# Patient Record
Sex: FEMALE | Race: WHITE | NOT HISPANIC OR LATINO | Employment: FULL TIME | URBAN - METROPOLITAN AREA
[De-identification: names, ages, dates, MRNs, and addresses within clinical notes are randomized per-mention and may not be internally consistent; named-entity substitution may affect disease eponyms.]

---

## 2017-01-07 ENCOUNTER — OFFICE VISIT (OUTPATIENT)
Dept: URGENT CARE | Facility: URGENT CARE | Age: 47
End: 2017-01-07
Payer: COMMERCIAL

## 2017-01-07 VITALS
HEART RATE: 81 BPM | OXYGEN SATURATION: 97 % | RESPIRATION RATE: 16 BRPM | WEIGHT: 258 LBS | DIASTOLIC BLOOD PRESSURE: 72 MMHG | TEMPERATURE: 98.2 F | BODY MASS INDEX: 45.71 KG/M2 | SYSTOLIC BLOOD PRESSURE: 112 MMHG

## 2017-01-07 DIAGNOSIS — R42 LIGHTHEADEDNESS: ICD-10-CM

## 2017-01-07 DIAGNOSIS — J01.90 ACUTE SINUSITIS WITH SYMPTOMS > 10 DAYS: ICD-10-CM

## 2017-01-07 DIAGNOSIS — A08.4 VIRAL GASTROENTERITIS: Primary | ICD-10-CM

## 2017-01-07 PROCEDURE — 99213 OFFICE O/P EST LOW 20 MIN: CPT | Performed by: FAMILY MEDICINE

## 2017-01-07 RX ORDER — AZITHROMYCIN 250 MG/1
TABLET, FILM COATED ORAL
Qty: 6 TABLET | Refills: 0 | Status: SHIPPED | OUTPATIENT
Start: 2017-01-07 | End: 2017-07-31

## 2017-01-07 RX ORDER — FLUTICASONE PROPIONATE 50 MCG
2 SPRAY, SUSPENSION (ML) NASAL DAILY
Qty: 1 BOTTLE | Refills: 1 | Status: SHIPPED | OUTPATIENT
Start: 2017-01-07 | End: 2017-07-31

## 2017-01-07 NOTE — NURSING NOTE
"Hannah Meade is a 46 year old female.      Chief Complaint   Patient presents with     Urgent Care     Sick     pt states that she has not been feeling well for the last couple month - sinus congestion and fever off and on - over the last 4 days it has gotten worse and nausea - also having diarrhea       Initial /72 mmHg  Pulse 81  Temp(Src) 98.2  F (36.8  C) (Oral)  Resp 16  Wt 258 lb (117.028 kg)  SpO2 97% Estimated body mass index is 45.71 kg/(m^2) as calculated from the following:    Height as of 9/23/16: 5' 3\" (1.6 m).    Weight as of this encounter: 258 lb (117.028 kg).    BP completed using cuff size: X-large    Questioned patient about current smoking habits.  Pt. has never smoked.      Rosemary Mckeon CMA        "

## 2017-01-07 NOTE — MR AVS SNAPSHOT
After Visit Summary   1/7/2017    Hannah Meade    MRN: [id number]           Patient Information     Date Of Birth          1970        Visit Information        Provider Department      1/7/2017 1:00 PM Gómez Askew MD [facility]        Today's Diagnoses     Viral gastroenteritis    -  1     Acute sinusitis with symptoms > 10 days         Lightheadedness           Care Instructions    Saline nasal rinses    Steam, Humidifier    Drink plenty of liquids    follow up with the primary care provider if not better in 5-7 days.     Eat a gentle, bland, mushy diet for now.     Go to the emergency room if you feel like you're going to pass out or if there are large amounts of blood in the stools.              Follow-ups after your visit        Who to contact     If you have questions or need follow up information about today's clinic visit or your schedule please contact [facility] directly at [phone].  Normal or non-critical lab and imaging results will be communicated to you by Raynhart, letter or phone within 4 business days after the clinic has received the results. If you do not hear from us within 7 days, please contact the clinic through HomeStarst or phone. If you have a critical or abnormal lab result, we will notify you by phone as soon as possible.  Submit refill requests through Hand Talk or call your pharmacy and they will forward the refill request to us. Please allow 3 business days for your refill to be completed.          Additional Information About Your Visit        MyChart Information     Hand Talk gives you secure access to your electronic health record. If you see a primary care provider, you can also send messages to your care team and make appointments. If you have questions, please call your primary care clinic.  If you do not have a primary care provider, please call [phone] and they will assist you.        Care EveryWhere ID     This is your  Care EveryWhere ID. This could be used by other organizations to access your [place] medical records  [id number]        Your Vitals Were     Pulse Temperature Respirations Pulse Oximetry          81 98.2  F (36.8  C) (Oral) 16 97%         Blood Pressure from Last 3 Encounters:   01/07/17 112/72   12/22/16 139/86   11/02/16 144/90    Weight from Last 3 Encounters:   01/07/17 258 lb (117.028 kg)   09/23/16 258 lb (117.028 kg)   08/22/16 255 lb (115.667 kg)              Today, you had the following     No orders found for display         Today's Medication Changes          These changes are accurate as of: 1/7/17  1:31 PM.  If you have any questions, ask your nurse or doctor.               Start taking these medicines.        Dose/Directions    azithromycin 250 MG tablet   Commonly known as:  ZITHROMAX   Used for:  Acute sinusitis with symptoms > 10 days   Started by:  Gómez Askew MD        Two tablets first day, then one tablet daily for four days.   Quantity:  6 tablet   Refills:  0       fluticasone 50 MCG/ACT spray   Commonly known as:  FLONASE   Used for:  Acute sinusitis with symptoms > 10 days   Started by:  Gómez Askew MD        Dose:  2 spray   Spray 2 sprays into both nostrils daily   Quantity:  1 Bottle   Refills:  1            Where to get your medicines      These medications were sent to [place] [address] [place] [address] [address]     Phone:  [phone]    - azithromycin 250 MG tablet  - fluticasone 50 MCG/ACT spray             Primary Care Provider Office Phone # Fax #    Hollis River [phone]       [address] [address]        Thank you!     Thank you for choosing [facility]  for your care. Our goal is always to provide you with excellent care. Hearing back from our patients is one way we can continue to improve our services. Please take a  few minutes to complete the written survey that you may receive in the mail after your visit with us. Thank you!             Your Updated Medication List - Protect others around you: Learn how to safely use, store and throw away your medicines at www.disposemymeds.org.          This list is accurate as of: 1/7/17  1:31 PM.  Always use your most recent med list.                   Brand Name Dispense Instructions for use    azithromycin 250 MG tablet    ZITHROMAX    6 tablet    Two tablets first day, then one tablet daily for four days.       BENADRYL PO      Take 25 mg by mouth daily as needed       BUDESONIDE (INHALATION) 90 MCG/ACT Aepb     1 each    Inhale 2 puffs into the lungs 2 times daily       escitalopram 20 MG tablet    LEXAPRO    90 tablet    Take 1 tablet (20 mg) by mouth daily       fluticasone 50 MCG/ACT spray    FLONASE    1 Bottle    Spray 2 sprays into both nostrils daily       furosemide 20 MG tablet    LASIX    30 tablet    Take 1 tablet (20 mg) by mouth daily       HYDROcodone-acetaminophen 5-325 MG per tablet    NORCO    15 tablet    Take 1-2 tablets by mouth every 4 hours as needed for moderate to severe pain       ibuprofen 600 MG tablet    ADVIL/MOTRIN    40 tablet    Take 1 tablet (600 mg) by mouth 3 times daily       * ipratropium - albuterol 0.5 mg/2.5 mg/3 mL 0.5-2.5 (3) MG/3ML neb solution    DUONEB    30 vial    Take 1 vial (3 mLs) by nebulization every 6 hours as needed for shortness of breath / dyspnea or wheezing       * Ipratropium-Albuterol  MCG/ACT inhaler    COMBIVENT RESPIMAT    1 Inhaler    Inhale 1 puff into the lungs every 4 hours Not to exceed 6 doses per day.       order for DME     1 Device    Equipment being ordered: knee high compression stocking for left leg compression stocking with 20-30 mm of pressure       TYLENOL PO      Take 1,000 mg by mouth every 6 hours as needed for mild pain or fever       * Notice:  This list has 2 medication(s) that are the same as  other medications prescribed for you. Read the directions carefully, and ask your doctor or other care provider to review them with you.

## 2017-01-07 NOTE — PROGRESS NOTES
SUBJECTIVE:   Hannah Meade is a 46 year old female presenting with a chief complaint of feeling dizzy for the past couple days (feeling lightheaded off and on over the past couple days, nothing worsens this sensation.  The episodes last about five minutes. ), nausea (off and on and frequent.  No vomiting) for the past four days, diarrhea (watery discharge, frequent.  No blood in the stools.  ) for the past four days, abdominal cramping at the bilateral lower abdomen, sinus congestion (stuffy nose, pressure at the midline of the forehead) for the past two months, on and off fever (low-grade) over the past 4 days.  There has been thick yellowish-green nasal discharge. No new medications.       Course of illness is about the same. .      Current and Associated symptoms: as listed above.   Treatment measures tried include Benadryl .  Predisposing factors include grandchildren have been diarrhea recently. .    Past Medical History   Diagnosis Date     Nonspecific reaction to tuberculin skin test without active tuberculosis      Obesity, unspecified      Plantar fasciitis 2/23/2015     Anxiety      Current Outpatient Prescriptions   Medication Sig Dispense Refill     escitalopram (LEXAPRO) 20 MG tablet Take 1 tablet (20 mg) by mouth daily 90 tablet 0     furosemide (LASIX) 20 MG tablet Take 1 tablet (20 mg) by mouth daily 30 tablet 3     Ipratropium-Albuterol (COMBIVENT RESPIMAT)  MCG/ACT inhaler Inhale 1 puff into the lungs every 4 hours Not to exceed 6 doses per day. 1 Inhaler 11     HYDROcodone-acetaminophen (NORCO) 5-325 MG per tablet Take 1-2 tablets by mouth every 4 hours as needed for moderate to severe pain 15 tablet 0     order for DME Equipment being ordered: knee high compression stocking for left leg compression stocking with 20-30 mm of pressure 1 Device 0     ibuprofen (ADVIL,MOTRIN) 600 MG tablet Take 1 tablet (600 mg) by mouth 3 times daily 40 tablet 0     BUDESONIDE, INHALATION, 90 MCG/ACT AEPB  Inhale 2 puffs into the lungs 2 times daily 1 each 1     ipratropium - albuterol 0.5 mg/2.5 mg/3 mL (DUONEB) 0.5-2.5 (3) MG/3ML nebulization Take 1 vial (3 mLs) by nebulization every 6 hours as needed for shortness of breath / dyspnea or wheezing 30 vial 1     Acetaminophen (TYLENOL PO) Take 1,000 mg by mouth every 6 hours as needed for mild pain or fever       DiphenhydrAMINE HCl (BENADRYL PO) Take 25 mg by mouth daily as needed        Social History   Substance Use Topics     Smoking status: Never Smoker      Smokeless tobacco: Never Used     Alcohol Use: No       ROS:    OBJECTIVE  :/72 mmHg  Pulse 81  Temp(Src) 98.2  F (36.8  C) (Oral)  Resp 16  Wt 258 lb (117.028 kg)  SpO2 97%  GENERAL APPEARANCE: healthy, alert and no distress.  No acute respiratory distress.    EYES: Eyes grossly normal to inspection.  Eyes are moist.   HENT: TM's normal bilaterally, nasal turbinates erythematous, swollen and oral mucous membranes moist, no erythema noted.  Mouth is moist.   NECK: supple, nontender, no lymphadenopathy  RESP: lungs clear to auscultation - no rales, rhonchi or wheezes  CV: regular rates and rhythm, normal S1 S2, no murmur noted  ABDOMEN: soft, tenderness moderate RLQ and LLQ, hyperactive bowel sounds, No rebound/guarding.  No distension. Abdomen is obese.   SKIN: no suspicious lesions or rashes    ASSESSMENT:  Viral Gastroenteritis  Acute Sinusitis  Lightheadedness    PLAN:  Rx:  Flonase, Azithromycin  Soft, gentle diet for now.   See orders in Epic  Saline nasal rinses  Steam, Humidifier  Drink plenty of liquids  follow up with the primary care provider if not better in 5-7 days.     Gómez Askew MD

## 2017-01-07 NOTE — PATIENT INSTRUCTIONS
Saline nasal rinses    Steam, Humidifier    Drink plenty of liquids    follow up with the primary care provider if not better in 5-7 days.     Eat a gentle, bland, mushy diet for now.     Go to the emergency room if you feel like you're going to pass out or if there are large amounts of blood in the stools.

## 2017-01-30 DIAGNOSIS — F41.9 ANXIETY: Primary | ICD-10-CM

## 2017-01-30 NOTE — TELEPHONE ENCOUNTER
Escitalopram 20mg     Last Written Prescription Date: 11/2/2016  Last Fill Quantity: 90,11/02/2016 # refills: 0  Last Office Visit with [facility] primary care provider:  09/23/2016-Dr River        Last PHQ-9 score on record=   PHQ-9 SCORE 8/4/2016   Total Score -   Total Score 11

## 2017-02-01 RX ORDER — ESCITALOPRAM OXALATE 20 MG/1
20 TABLET ORAL DAILY
Qty: 90 TABLET | Refills: 1 | Status: SHIPPED | OUTPATIENT
Start: 2017-02-01 | End: 2017-07-26

## 2017-02-01 NOTE — TELEPHONE ENCOUNTER
Diagnosis anxiety, pt resumed med, see October note, Hollis River MD please advise refill, route if appointment due  Monique Qiu RN, BSN  Message handled by Nurse Triage.

## 2017-04-04 ENCOUNTER — OFFICE VISIT (OUTPATIENT)
Dept: FAMILY MEDICINE | Facility: CLINIC | Age: 47
End: 2017-04-04
Payer: COMMERCIAL

## 2017-04-04 ENCOUNTER — RADIANT APPOINTMENT (OUTPATIENT)
Dept: GENERAL RADIOLOGY | Facility: CLINIC | Age: 47
End: 2017-04-04
Attending: FAMILY MEDICINE
Payer: COMMERCIAL

## 2017-04-04 VITALS
SYSTOLIC BLOOD PRESSURE: 149 MMHG | BODY MASS INDEX: 46.07 KG/M2 | WEIGHT: 260 LBS | OXYGEN SATURATION: 96 % | HEART RATE: 97 BPM | HEIGHT: 63 IN | RESPIRATION RATE: 14 BRPM | TEMPERATURE: 98.1 F | DIASTOLIC BLOOD PRESSURE: 91 MMHG

## 2017-04-04 DIAGNOSIS — R10.11 ABDOMINAL PAIN, RIGHT UPPER QUADRANT: Primary | ICD-10-CM

## 2017-04-04 DIAGNOSIS — E78.01 FAMILIAL HYPERCHOLESTEROLEMIA: ICD-10-CM

## 2017-04-04 DIAGNOSIS — E66.01 MORBID OBESITY, UNSPECIFIED OBESITY TYPE (H): ICD-10-CM

## 2017-04-04 LAB
ALBUMIN UR-MCNC: NEGATIVE MG/DL
APPEARANCE UR: CLEAR
BASOPHILS # BLD AUTO: 0 10E9/L (ref 0–0.2)
BASOPHILS NFR BLD AUTO: 0.2 %
BILIRUB UR QL STRIP: NEGATIVE
COLOR UR AUTO: YELLOW
DIFFERENTIAL METHOD BLD: ABNORMAL
EOSINOPHIL # BLD AUTO: 0.4 10E9/L (ref 0–0.7)
EOSINOPHIL NFR BLD AUTO: 3 %
ERYTHROCYTE [DISTWIDTH] IN BLOOD BY AUTOMATED COUNT: 13.5 % (ref 10–15)
GLUCOSE UR STRIP-MCNC: NEGATIVE MG/DL
HCT VFR BLD AUTO: 40 % (ref 35–47)
HGB BLD-MCNC: 13 G/DL (ref 11.7–15.7)
HGB UR QL STRIP: NEGATIVE
KETONES UR STRIP-MCNC: NEGATIVE MG/DL
LEUKOCYTE ESTERASE UR QL STRIP: NEGATIVE
LYMPHOCYTES # BLD AUTO: 3.2 10E9/L (ref 0.8–5.3)
LYMPHOCYTES NFR BLD AUTO: 26.7 %
MCH RBC QN AUTO: 30 PG (ref 26.5–33)
MCHC RBC AUTO-ENTMCNC: 32.5 G/DL (ref 31.5–36.5)
MCV RBC AUTO: 92 FL (ref 78–100)
MONOCYTES # BLD AUTO: 0.9 10E9/L (ref 0–1.3)
MONOCYTES NFR BLD AUTO: 7.2 %
NEUTROPHILS # BLD AUTO: 7.6 10E9/L (ref 1.6–8.3)
NEUTROPHILS NFR BLD AUTO: 62.9 %
NITRATE UR QL: NEGATIVE
PH UR STRIP: 6 PH (ref 5–7)
PLATELET # BLD AUTO: 372 10E9/L (ref 150–450)
RBC # BLD AUTO: 4.34 10E12/L (ref 3.8–5.2)
SP GR UR STRIP: 1.02 (ref 1–1.03)
URN SPEC COLLECT METH UR: NORMAL
UROBILINOGEN UR STRIP-ACNC: 1 EU/DL (ref 0.2–1)
WBC # BLD AUTO: 12.1 10E9/L (ref 4–11)

## 2017-04-04 PROCEDURE — 71101 X-RAY EXAM UNILAT RIBS/CHEST: CPT | Mod: RT

## 2017-04-04 PROCEDURE — 81003 URINALYSIS AUTO W/O SCOPE: CPT | Performed by: FAMILY MEDICINE

## 2017-04-04 PROCEDURE — 99214 OFFICE O/P EST MOD 30 MIN: CPT | Performed by: FAMILY MEDICINE

## 2017-04-04 PROCEDURE — 36415 COLL VENOUS BLD VENIPUNCTURE: CPT | Performed by: FAMILY MEDICINE

## 2017-04-04 PROCEDURE — 85025 COMPLETE CBC W/AUTO DIFF WBC: CPT | Performed by: FAMILY MEDICINE

## 2017-04-04 NOTE — MR AVS SNAPSHOT
"              After Visit Summary   4/4/2017    Hannah Meade    MRN: [id number]           Patient Information     Date Of Birth          1970        Visit Information        Provider Department      4/4/2017 4:00 PM Hollis River MD [facility]        Today's Diagnoses     Abdominal pain, right upper quadrant    -  1    Familial hypercholesterolemia        Morbid obesity, unspecified obesity type (H)           Follow-ups after your visit        Who to contact     If you have questions or need follow up information about today's clinic visit or your schedule please contact [facility] directly at [phone].  Normal or non-critical lab and imaging results will be communicated to you by MyChart, letter or phone within 4 business days after the clinic has received the results. If you do not hear from us within 7 days, please contact the clinic through Audiomshart or phone. If you have a critical or abnormal lab result, we will notify you by phone as soon as possible.  Submit refill requests through Mobile365 (fka InphoMatch) or call your pharmacy and they will forward the refill request to us. Please allow 3 business days for your refill to be completed.          Additional Information About Your Visit        MyChart Information     Mobile365 (fka InphoMatch) gives you secure access to your electronic health record. If you see a primary care provider, you can also send messages to your care team and make appointments. If you have questions, please call your primary care clinic.  If you do not have a primary care provider, please call [phone] and they will assist you.        Care EveryWhere ID     This is your Care EveryWhere ID. This could be used by other organizations to access your [place] medical records  [id number]        Your Vitals Were     Pulse Temperature Respirations Height Pulse Oximetry BMI (Body Mass Index)    97 98.1  F (36.7  C) (Oral) 14 5' 3\" (1.6 m) 96% 46.06 kg/m2       Blood " Pressure from Last 3 Encounters:   04/04/17 (!) 149/91   01/07/17 112/72   12/22/16 139/86    Weight from Last 3 Encounters:   04/04/17 260 lb (117.9 kg)   01/07/17 258 lb (117 kg)   09/23/16 258 lb (117 kg)              We Performed the Following     *UA reflex to Microscopic and Culture ([place] and [facility] (except [place] and [place])     CBC with platelets and differential     Lipid panel reflex to direct LDL     XR Ribs & Chest Right G/E 3 Views        Primary Care Provider Office Phone # Fax #    Hollis John River [phone]       [facility] [id number] [place] [id number]        Thank you!     Thank you for choosing [facility]  for your care. Our goal is always to provide you with excellent care. Hearing back from our patients is one way we can continue to improve our services. Please take a few minutes to complete the written survey that you may receive in the mail after your visit with us. Thank you!             Your Updated Medication List - Protect others around you: Learn how to safely use, store and throw away your medicines at www.disposemymeds.org.          This list is accurate as of: 4/4/17 11:59 PM.  Always use your most recent med list.                   Brand Name Dispense Instructions for use    azithromycin 250 MG tablet    ZITHROMAX    6 tablet    Two tablets first day, then one tablet daily for four days.       BENADRYL PO      Take 25 mg by mouth daily as needed       BUDESONIDE (INHALATION) 90 MCG/ACT Aepb     1 each    Inhale 2 puffs into the lungs 2 times daily       escitalopram 20 MG tablet    LEXAPRO    90 tablet    Take 1 tablet (20 mg) by mouth daily       fluticasone 50 MCG/ACT spray    FLONASE    1 Bottle    Spray 2 sprays into both nostrils daily       furosemide 20 MG tablet    LASIX    30 tablet    Take 1 tablet (20 mg) by mouth daily       HYDROcodone-acetaminophen 5-325 MG per tablet    NORCO    15  tablet    Take 1-2 tablets by mouth every 4 hours as needed for moderate to severe pain       ibuprofen 600 MG tablet    ADVIL/MOTRIN    40 tablet    Take 1 tablet (600 mg) by mouth 3 times daily       * ipratropium - albuterol 0.5 mg/2.5 mg/3 mL 0.5-2.5 (3) MG/3ML neb solution    DUONEB    30 vial    Take 1 vial (3 mLs) by nebulization every 6 hours as needed for shortness of breath / dyspnea or wheezing       * Ipratropium-Albuterol  MCG/ACT inhaler    COMBIVENT RESPIMAT    1 Inhaler    Inhale 1 puff into the lungs every 4 hours Not to exceed 6 doses per day.       order for DME     1 Device    Equipment being ordered: knee high compression stocking for left leg compression stocking with 20-30 mm of pressure       TYLENOL PO      Take 1,000 mg by mouth every 6 hours as needed for mild pain or fever       * Notice:  This list has 2 medication(s) that are the same as other medications prescribed for you. Read the directions carefully, and ask your doctor or other care provider to review them with you.

## 2017-04-04 NOTE — PROGRESS NOTES
SUBJECTIVE:                                                    Hannah Meade is a 47 year old female who presents to clinic today for the following health issues:      Concern - breathing issues and pain in the right side      Onset: 1 week     Description:   Sharp pain when working around     Intensity: moderate, severe    Progression of Symptoms:  worsening    Accompanying Signs & Symptoms:  None        Previous history of similar problem:   None     Precipitating factors:   Worsened by: when lift things     Alleviating factors:  Improved by: goes away when not doing things        Therapies Tried and outcome: none    Past Medical History:   Diagnosis Date     Anxiety      Nonspecific reaction to tuberculin skin test without active tuberculosis      Obesity, unspecified      Plantar fasciitis 2/23/2015       Past Surgical History:   Procedure Laterality Date     C NONSPECIFIC PROCEDURE      2 C- SECTIONS     EXCISE MASS BACK Left 9/17/2014    Procedure: EXCISE MASS BACK;  Surgeon: Yaz Avilez MD;  Location: [facility] OR     EXCISE MASS UPPER EXTREMITY Left 9/17/2014    Procedure: EXCISE MASS UPPER EXTREMITY;  Surgeon: Yaz Avilez MD;  Location: [facility] OR     HYSTERECTOMY VAGINAL  1992    ovaries intact     ORTHOPEDIC SURGERY      left foot     other      lump on right arm removed      THORACOTOMY Right 8/25/2015    Procedure: THORACOTOMY;  Surgeon: Andrew Gordon MD;  Location:  OR       Family History   Problem Relation Age of Onset     Neurologic Disorder Mother      Parkinsons     DIABETES Mother      Hypertension Father      Cardiovascular Father      Gallbladder Disease Father      DIABETES Maternal Grandmother      C.A.D. Paternal Grandfather      Cardiovascular Paternal Grandfather      Hypertension Sister        Social History   Substance Use Topics     Smoking status: Never Smoker     Smokeless tobacco: Never Used     Alcohol use No        REVIEW OF SYSTEMS    Generally has  been mostly feeling well until this episode. No problems with vision, hearing, dental or neck pain.Has hph   airborne or ingestion allergy  No chest pain, palpitations, dyspnea, change in bowel habits, blood  in stool or dyspepsia.  No rashes, changing moles, weakness, lassitude or back problems.  No chronic issues . No dysuria  Patient not  a smoker. No problems with significant headaches.  On exam the vital signs are stable  Weight is Body mass index is 46.06 kg/(m^2).   Eyes show nery  No neck masses or thyromegaly.Ear nose and throat shows normal   No bruits, murmers, rubs or extrasounds. No cardiomegaly or chest wall tenderness. Lungs clear, no abdominal masses or organomegaly. No CVA tenderness.  Skin eval no rash   Good peripheral pulses. No adenopathy.  Normal gait and stance. Neck is supple.  Back exam shows lumbar dysfunction and decreased rom    (R10.11) Abdominal pain, right upper quadrant  (primary encounter diagnosis)  Comment:   Plan: *UA reflex to Microscopic and Culture ([place]         and [facility] (except [facility] and         [place]), CBC with platelets and differential,         XR Ribs & Chest Right G/E 3 Views       ua and cbc normal, no abdominal tenderness, right lower ribs tender??obesity related  Get GB study if not improving, food does minimally aggravate     (E78.01) Familial hypercholesterolemia  Comment:   Plan: Lipid panel reflex to direct LDL            (E66.01) BMI 46- obesity, unspecified obesity type (H)  Comment:   Plan: Lipid panel reflex to direct LDL

## 2017-04-04 NOTE — NURSING NOTE
"Chief Complaint   Patient presents with     Breathing Problem     pain in the right side of the ribs        Initial BP (!) 149/91 (BP Location: Right arm, Patient Position: Chair, Cuff Size: Adult Large)  Pulse 97  Temp 98.1  F (36.7  C) (Oral)  Resp 14  Ht 5' 3\" (1.6 m)  Wt 260 lb (117.9 kg)  SpO2 96%  BMI 46.06 kg/m2 Estimated body mass index is 46.06 kg/(m^2) as calculated from the following:    Height as of this encounter: 5' 3\" (1.6 m).    Weight as of this encounter: 260 lb (117.9 kg).  Medication Reconciliation: complete   "

## 2017-04-05 DIAGNOSIS — R60.0 LOCALIZED EDEMA: ICD-10-CM

## 2017-04-05 NOTE — TELEPHONE ENCOUNTER
FUROSEMIDE 20 MG TABLET        Last Written Prescription Date: 11-2-2016  Last Fill Quantity: 02-, #30 refills: 3  Last Office Visit with G, P or [facility] prescribing provider: 04- with Dr. River.       Potassium   Date Value Ref Range Status   09/23/2016 4.0 3.4 - 5.3 mmol/L Final     Creatinine   Date Value Ref Range Status   09/23/2016 0.77 0.52 - 1.04 mg/dL Final     BP Readings from Last 3 Encounters:   04/04/17 (!) 149/91   01/07/17 112/72   12/22/16 139/86

## 2017-04-06 RX ORDER — FUROSEMIDE 20 MG
20 TABLET ORAL DAILY
Qty: 30 TABLET | Refills: 3 | Status: SHIPPED | OUTPATIENT
Start: 2017-04-06 | End: 2017-08-05

## 2017-04-06 NOTE — TELEPHONE ENCOUNTER
Prescription approved per [facility] Refill Protocol  Breanne Ferguson RN BS

## 2017-04-11 ENCOUNTER — TELEPHONE (OUTPATIENT)
Dept: FAMILY MEDICINE | Facility: CLINIC | Age: 47
End: 2017-04-11

## 2017-04-11 NOTE — TELEPHONE ENCOUNTER
METRIC FAILED: Problem List  RECOMMENDATIONS: does not qualify for HLPD diagnosis, see Hollis River MD diagnosis, family hx only  Recent Labs   Lab Test  01/09/12   1837  05/19/09   1608   CHOL  216*  234*   HDL  56  61   LDL  110  129   TRIG  247*  216*   CHOLHDLRATIO  3.8  3.8     Lab Results   Component Value Date    AST 24 01/09/2012     Lab Results   Component Value Date    ALT 25 01/09/2012       Monique Qiu RN, BSN  Message handled by Nurse Triage.

## 2017-06-07 ENCOUNTER — HOSPITAL ENCOUNTER (EMERGENCY)
Facility: CLINIC | Age: 47
Discharge: HOME | End: 2017-06-07
Attending: EMERGENCY MEDICINE | Admitting: EMERGENCY MEDICINE
Payer: COMMERCIAL

## 2017-06-07 ENCOUNTER — APPOINTMENT (OUTPATIENT)
Dept: GENERAL RADIOLOGY | Facility: CLINIC | Age: 47
End: 2017-06-07
Attending: EMERGENCY MEDICINE
Payer: COMMERCIAL

## 2017-06-07 VITALS
SYSTOLIC BLOOD PRESSURE: 127 MMHG | HEIGHT: 63 IN | OXYGEN SATURATION: 96 % | BODY MASS INDEX: 44.3 KG/M2 | HEART RATE: 88 BPM | RESPIRATION RATE: 20 BRPM | DIASTOLIC BLOOD PRESSURE: 81 MMHG | WEIGHT: 250 LBS | TEMPERATURE: 97.2 F

## 2017-06-07 DIAGNOSIS — R07.9 CHEST PAIN, UNSPECIFIED TYPE: ICD-10-CM

## 2017-06-07 DIAGNOSIS — J20.9 ACUTE BRONCHITIS, UNSPECIFIED ORGANISM: ICD-10-CM

## 2017-06-07 LAB
ANION GAP SERPL CALCULATED.3IONS-SCNC: 5 MMOL/L (ref 3–14)
BUN SERPL-MCNC: 15 MG/DL (ref 7–30)
CALCIUM SERPL-MCNC: 9.3 MG/DL (ref 8.5–10.1)
CHLORIDE SERPL-SCNC: 104 MMOL/L (ref 94–109)
CO2 SERPL-SCNC: 29 MMOL/L (ref 20–32)
CREAT SERPL-MCNC: 0.72 MG/DL (ref 0.52–1.04)
D DIMER PPP FEU-MCNC: NORMAL UG/ML FEU (ref 0–0.5)
ERYTHROCYTE [DISTWIDTH] IN BLOOD BY AUTOMATED COUNT: 13.2 % (ref 10–15)
GFR SERPL CREATININE-BSD FRML MDRD: 86 ML/MIN/1.7M2
GLUCOSE SERPL-MCNC: 103 MG/DL (ref 70–99)
HCT VFR BLD AUTO: 40.2 % (ref 35–47)
HGB BLD-MCNC: 13.1 G/DL (ref 11.7–15.7)
MCH RBC QN AUTO: 29.5 PG (ref 26.5–33)
MCHC RBC AUTO-ENTMCNC: 32.6 G/DL (ref 31.5–36.5)
MCV RBC AUTO: 91 FL (ref 78–100)
PLATELET # BLD AUTO: 316 10E9/L (ref 150–450)
POTASSIUM SERPL-SCNC: 4.1 MMOL/L (ref 3.4–5.3)
RBC # BLD AUTO: 4.44 10E12/L (ref 3.8–5.2)
SODIUM SERPL-SCNC: 138 MMOL/L (ref 133–144)
TROPONIN I SERPL-MCNC: NORMAL UG/L (ref 0–0.04)
WBC # BLD AUTO: 12.5 10E9/L (ref 4–11)

## 2017-06-07 PROCEDURE — 71020 XR CHEST 2 VW: CPT

## 2017-06-07 PROCEDURE — 84484 ASSAY OF TROPONIN QUANT: CPT | Performed by: EMERGENCY MEDICINE

## 2017-06-07 PROCEDURE — 85379 FIBRIN DEGRADATION QUANT: CPT | Performed by: EMERGENCY MEDICINE

## 2017-06-07 PROCEDURE — 93005 ELECTROCARDIOGRAM TRACING: CPT

## 2017-06-07 PROCEDURE — 80048 BASIC METABOLIC PNL TOTAL CA: CPT | Performed by: EMERGENCY MEDICINE

## 2017-06-07 PROCEDURE — 99285 EMERGENCY DEPT VISIT HI MDM: CPT | Mod: 25

## 2017-06-07 PROCEDURE — 85027 COMPLETE CBC AUTOMATED: CPT | Performed by: EMERGENCY MEDICINE

## 2017-06-07 RX ORDER — BENZONATATE 200 MG/1
200 CAPSULE ORAL 3 TIMES DAILY PRN
Qty: 21 CAPSULE | Refills: 0 | Status: SHIPPED | OUTPATIENT
Start: 2017-06-07 | End: 2017-07-31

## 2017-06-07 ASSESSMENT — ENCOUNTER SYMPTOMS
PALPITATIONS: 0
VOMITING: 0
FEVER: 0

## 2017-06-07 NOTE — ED NOTES
ABCs intact. Pt c/o chest congestion since Saturday. Pt states congestion is better, but chest is sore and SOB with exertion. Pt has not been able to sleep laying down. Pt declines EKG.

## 2017-06-07 NOTE — DISCHARGE INSTRUCTIONS
Discharge Instructions  Bronchitis, Pneumonia, Bronchospasm    You were seen today for a chest infection or inflammation. If your doctor decided this was due to a bacterial infection, you may need an antibiotic. Sometimes these are caused by a virus, and then an antibiotic will not help.     Return to the Emergency Department if:    Your breathing gets much worse.    You are very weak, or feel much more ill.    You develop new symptoms, such as chest pain.    You cough up blood.    You are vomiting enough that you can t keep fluids or your medicine down.    What can I do to help myself?    Fill any prescriptions the doctor gave you and take them right away--especially antibiotics. Be sure to finish the whole antibiotic prescription.    You may be given a prescription for an inhaler, which can help loosen tight air passages.  Use this as needed, but not more often than directed. Inhalers work much better when used with a spacer.     You may be given a prescription for a steroid to reduce inflammation. Used long-term, these can have many serious side effects, but for short courses these do not happen. You may notice restlessness or increased appetite.        You may use non-prescription cough or cold medicines. Cough medicines may help, but don t make the cough go away completely.     Avoid smoke, because this can make your symptoms worse. If you smoke, this may be a good time to quit! Consider using nicotine lozenges, gum, or patches to reduce cravings.     If you have a fever, Tylenol  (acetaminophen), Motrin  (ibuprofen), or Advil  (ibuprofen) may help bring fever down and may help you feel more comfortable. Be sure to read and follow the package directions, and ask your doctor if you have questions.    Be sure to get your flu shot each year.  The pneumonia shot can help prevent pneumonia.  Probiotics: If you have been given an antibiotic, you may want to also take a probiotic pill or eat yogurt with live cultures.  "Probiotics have \"good bacteria\" to help your intestines stay healthy. Studies have shown that probiotics help prevent diarrhea and other intestine problems (including C. diff infection) when you take antibiotics. You can buy these without a prescription in the pharmacy section of the store.     If your doctor has told you to follow-up at your clinic, be sure to call right away and go to your appointment.  If there is any problem with keeping your appointment, call your doctor or return to the Emergency Department.    If you were given a prescription for medicine here today, be sure to read all of the information (including the package insert) that comes with your prescription.  This will include important information about the medicine, its side effects, and any warnings that you need to know about.  The pharmacist who fills the prescription can provide more information and answer questions you may have about the medicine.  If you have questions or concerns that the pharmacist cannot address, please call or return to the Emergency Department.     Discharge Instructions  Chest Pain    You have been seen today for chest pain or discomfort.  At this time, your doctor has found no signs that your chest pain is due to a serious or life-threatening condition, (or you have declined more testing and/or admission to the hospital). However, sometimes there is a serious problem that does not show up right away. Your evaluation today may not be complete and you may need further testing and evaluation.     You need to follow-up with your regular doctor within 3 days.    Return to the Emergency Department if:    Your chest pain changes, gets worse, starts to happen more often, or comes with less activity.    You are short of breath.    You get very weak or tired.    You pass out or faint.    You have any new symptoms, like fever, cough, numb legs, or you cough up blood.    You have anything else that worries you.    Until you " follow-up with your regular doctor please do the following:    Take one aspirin daily unless you have an allergy or are told not to by your doctor.    If a stress test appointment has been made, go to the appointment.    If you have questions, contact your regular doctor.    If your doctor today has told you to follow-up with your regular doctor, it is very important that you make an appointment with your clinic and go to the appointment.  If you do not follow-up with your primary doctor, it may result in missing an important development which could result in permanent injury or disability and/or lasting pain.  If there is any problem keeping your appointment, call your doctor or return to the Emergency Department.    If you were given a prescription for medicine here today, be sure to read all of the information (including the package insert) that comes with your prescription.  This will include important information about the medicine, its side effects, and any warnings that you need to know about.  The pharmacist who fills the prescription can provide more information and answer questions you may have about the medicine.  If you have questions or concerns that the pharmacist cannot address, please call or return to the Emergency Department.

## 2017-06-07 NOTE — ED AVS SNAPSHOT
[facility] Emergency Department    [address]    [facility] [address]    Phone:  [phone]    Fax:  [phone]                                       Hannah Meade   MRN: [id number]    Department:  [facility] Emergency Department   Date of Visit:  6/7/2017           Patient Information     Date Of Birth          1970        Your diagnoses for this visit were:     Acute bronchitis, unspecified organism     Chest pain, unspecified type        You were seen by Gavin Medina MD.      Follow-up Information     Follow up with Hollis River MD. Schedule an appointment as soon as possible for a visit in 2 days.    Specialty:  Family Practice    Contact information:    [address] [id number] [phone]          Discharge Instructions       Discharge Instructions  Bronchitis, Pneumonia, Bronchospasm    You were seen today for a chest infection or inflammation. If your doctor decided this was due to a bacterial infection, you may need an antibiotic. Sometimes these are caused by a virus, and then an antibiotic will not help.     Return to the Emergency Department if:    Your breathing gets much worse.    You are very weak, or feel much more ill.    You develop new symptoms, such as chest pain.    You cough up blood.    You are vomiting enough that you can t keep fluids or your medicine down.    What can I do to help myself?    Fill any prescriptions the doctor gave you and take them right away--especially antibiotics. Be sure to finish the whole antibiotic prescription.    You may be given a prescription for an inhaler, which can help loosen tight air passages.  Use this as needed, but not more often than directed. Inhalers work much better when used with a spacer.     You may be given a prescription for a steroid to reduce inflammation. Used long-term, these can have many serious side effects, but for short  "courses these do not happen. You may notice restlessness or increased appetite.        You may use non-prescription cough or cold medicines. Cough medicines may help, but don t make the cough go away completely.     Avoid smoke, because this can make your symptoms worse. If you smoke, this may be a good time to quit! Consider using nicotine lozenges, gum, or patches to reduce cravings.     If you have a fever, Tylenol  (acetaminophen), Motrin  (ibuprofen), or Advil  (ibuprofen) may help bring fever down and may help you feel more comfortable. Be sure to read and follow the package directions, and ask your doctor if you have questions.    Be sure to get your flu shot each year.  The pneumonia shot can help prevent pneumonia.  Probiotics: If you have been given an antibiotic, you may want to also take a probiotic pill or eat yogurt with live cultures. Probiotics have \"good bacteria\" to help your intestines stay healthy. Studies have shown that probiotics help prevent diarrhea and other intestine problems (including C. diff infection) when you take antibiotics. You can buy these without a prescription in the pharmacy section of the store.     If your doctor has told you to follow-up at your clinic, be sure to call right away and go to your appointment.  If there is any problem with keeping your appointment, call your doctor or return to the Emergency Department.    If you were given a prescription for medicine here today, be sure to read all of the information (including the package insert) that comes with your prescription.  This will include important information about the medicine, its side effects, and any warnings that you need to know about.  The pharmacist who fills the prescription can provide more information and answer questions you may have about the medicine.  If you have questions or concerns that the pharmacist cannot address, please call or return to the Emergency Department.     Discharge " Instructions  Chest Pain    You have been seen today for chest pain or discomfort.  At this time, your doctor has found no signs that your chest pain is due to a serious or life-threatening condition, (or you have declined more testing and/or admission to the hospital). However, sometimes there is a serious problem that does not show up right away. Your evaluation today may not be complete and you may need further testing and evaluation.     You need to follow-up with your regular doctor within 3 days.    Return to the Emergency Department if:    Your chest pain changes, gets worse, starts to happen more often, or comes with less activity.    You are short of breath.    You get very weak or tired.    You pass out or faint.    You have any new symptoms, like fever, cough, numb legs, or you cough up blood.    You have anything else that worries you.    Until you follow-up with your regular doctor please do the following:    Take one aspirin daily unless you have an allergy or are told not to by your doctor.    If a stress test appointment has been made, go to the appointment.    If you have questions, contact your regular doctor.    If your doctor today has told you to follow-up with your regular doctor, it is very important that you make an appointment with your clinic and go to the appointment.  If you do not follow-up with your primary doctor, it may result in missing an important development which could result in permanent injury or disability and/or lasting pain.  If there is any problem keeping your appointment, call your doctor or return to the Emergency Department.    If you were given a prescription for medicine here today, be sure to read all of the information (including the package insert) that comes with your prescription.  This will include important information about the medicine, its side effects, and any warnings that you need to know about.  The pharmacist who fills the prescription can provide more  information and answer questions you may have about the medicine.  If you have questions or concerns that the pharmacist cannot address, please call or return to the Emergency Department.         24 Hour Appointment Hotline       To make an appointment at any [facility], call [phone] ([phone]). If you don't have a family doctor or clinic, we will help you find one. [place] clinics are conveniently located to serve the needs of you and your family.             Review of your medicines      START taking        Dose / Directions Last dose taken    benzonatate 200 MG capsule   Commonly known as:  TESSALON   Dose:  200 mg   Quantity:  21 capsule        Take 1 capsule (200 mg) by mouth 3 times daily as needed for cough   Refills:  0          Our records show that you are taking the medicines listed below. If these are incorrect, please call your family doctor or clinic.        Dose / Directions Last dose taken    azithromycin 250 MG tablet   Commonly known as:  ZITHROMAX   Quantity:  6 tablet        Two tablets first day, then one tablet daily for four days.   Refills:  0        BENADRYL PO   Dose:  25 mg        Take 25 mg by mouth daily as needed   Refills:  0        BUDESONIDE (INHALATION) 90 MCG/ACT Aepb   Dose:  2 puff   Quantity:  1 each        Inhale 2 puffs into the lungs 2 times daily   Refills:  1        escitalopram 20 MG tablet   Commonly known as:  LEXAPRO   Dose:  20 mg   Quantity:  90 tablet        Take 1 tablet (20 mg) by mouth daily   Refills:  1        fluticasone 50 MCG/ACT spray   Commonly known as:  FLONASE   Dose:  2 spray   Quantity:  1 Bottle        Spray 2 sprays into both nostrils daily   Refills:  1        furosemide 20 MG tablet   Commonly known as:  LASIX   Dose:  20 mg   Quantity:  30 tablet        Take 1 tablet (20 mg) by mouth daily   Refills:  3        HYDROcodone-acetaminophen 5-325 MG per tablet   Commonly known as:  NORCO   Dose:  1-2 tablet   Quantity:  15 tablet         Take 1-2 tablets by mouth every 4 hours as needed for moderate to severe pain   Refills:  0        ibuprofen 600 MG tablet   Commonly known as:  ADVIL/MOTRIN   Dose:  600 mg   Quantity:  40 tablet        Take 1 tablet (600 mg) by mouth 3 times daily   Refills:  0        * ipratropium - albuterol 0.5 mg/2.5 mg/3 mL 0.5-2.5 (3) MG/3ML neb solution   Commonly known as:  DUONEB   Dose:  1 vial   Quantity:  30 vial        Take 1 vial (3 mLs) by nebulization every 6 hours as needed for shortness of breath / dyspnea or wheezing   Refills:  1        * Ipratropium-Albuterol  MCG/ACT inhaler   Commonly known as:  COMBIVENT RESPIMAT   Dose:  1 puff   Quantity:  1 Inhaler        Inhale 1 puff into the lungs every 4 hours Not to exceed 6 doses per day.   Refills:  11        order for DME   Quantity:  1 Device        Equipment being ordered: knee high compression stocking for left leg compression stocking with 20-30 mm of pressure   Refills:  0        TYLENOL PO   Dose:  1000 mg        Take 1,000 mg by mouth every 6 hours as needed for mild pain or fever   Refills:  0        * Notice:  This list has 2 medication(s) that are the same as other medications prescribed for you. Read the directions carefully, and ask your doctor or other care provider to review them with you.            Prescriptions were sent or printed at these locations (1 Prescription)                   Other Prescriptions                Printed at Department/Unit printer (1 of 1)         benzonatate (TESSALON) 200 MG capsule                Procedures and tests performed during your visit     Basic metabolic panel (BMP)    CBC (platelets, no diff)    Chest XR,  PA & LAT    D dimer quantitative    EKG 12 lead    Troponin I (now)      Orders Needing Specimen Collection     None      Pending Results     No orders found from 6/5/2017 to 6/8/2017.            Pending Culture Results     No orders found from 6/5/2017 to 6/8/2017.            Pending Results  Instructions     If you had any lab results that were not finalized at the time of your Discharge, you can call the ED Lab Result RN at [phone]. You will be contacted by this team for any positive Lab results or changes in treatment. The nurses are available 7 days a week from 10A to 6:30P.  You can leave a message 24 hours per day and they will return your call.        Test Results From Your Hospital Stay        6/7/2017  4:39 PM      Narrative     CHEST TWO VIEWS 6/7/2017 4:25 PM     HISTORY: Cough    COMPARISON: 4/4/2017    FINDINGS: There are no acute infiltrates. The cardiac silhouette is  not enlarged. Pulmonary vasculature is unremarkable.        Impression     IMPRESSION: No acute disease.    JOYCE CARMONA MD         6/7/2017  5:41 PM      Component Results     Component Value Ref Range & Units Status    WBC 12.5 (H) 4.0 - 11.0 10e9/L Final    RBC Count 4.44 3.8 - 5.2 10e12/L Final    Hemoglobin 13.1 11.7 - 15.7 g/dL Final    Hematocrit 40.2 35.0 - 47.0 % Final    MCV 91 78 - 100 fl Final    MCH 29.5 26.5 - 33.0 pg Final    MCHC 32.6 31.5 - 36.5 g/dL Final    RDW 13.2 10.0 - 15.0 % Final    Platelet Count 316 150 - 450 10e9/L Final         6/7/2017  5:57 PM      Component Results     Component Value Ref Range & Units Status    Sodium 138 133 - 144 mmol/L Final    Potassium 4.1 3.4 - 5.3 mmol/L Final    Chloride 104 94 - 109 mmol/L Final    Carbon Dioxide 29 20 - 32 mmol/L Final    Anion Gap 5 3 - 14 mmol/L Final    Glucose 103 (H) 70 - 99 mg/dL Final    Urea Nitrogen 15 7 - 30 mg/dL Final    Creatinine 0.72 0.52 - 1.04 mg/dL Final    GFR Estimate 86 >60 mL/min/1.7m2 Final    Non  GFR Calc    GFR Estimate If Black >90   GFR Calc   >60 mL/min/1.7m2 Final    Calcium 9.3 8.5 - 10.1 mg/dL Final         6/7/2017  5:50 PM      Component Results     Component Value Ref Range & Units Status    D Dimer  0.0 - 0.50 ug/ml FEU Final    <0.3  This D-dimer assay is intended for use in  conjuntion with a clinical pretest   probability assessment model to exclude pulmonary embolism (PE) and as an aid   in the diagnosis of deep venous thrombosis (DVT) in outpatients suspected of PE   or DVT. The cut-off value is 0.5 g/mL FEU.           6/7/2017  5:57 PM      Component Results     Component Value Ref Range & Units Status    Troponin I ES  0.000 - 0.045 ug/L Final    <0.015  The 99th percentile for upper reference range is 0.045 ug/L.  Troponin values in   the range of 0.045 - 0.120 ug/L may be associated with risks of adverse   clinical events.                  Clinical Quality Measure: Blood Pressure Screening     Your blood pressure was checked while you were in the emergency department today. The last reading we obtained was  BP: 127/90 . Please read the guidelines below about what these numbers mean and what you should do about them.  If your systolic blood pressure (the top number) is less than 120 and your diastolic blood pressure (the bottom number) is less than 80, then your blood pressure is normal. There is nothing more that you need to do about it.  If your systolic blood pressure (the top number) is 120-139 or your diastolic blood pressure (the bottom number) is 80-89, your blood pressure may be higher than it should be. You should have your blood pressure rechecked within a year by a primary care provider.  If your systolic blood pressure (the top number) is 140 or greater or your diastolic blood pressure (the bottom number) is 90 or greater, you may have high blood pressure. High blood pressure is treatable, but if left untreated over time it can put you at risk for heart attack, stroke, or kidney failure. You should have your blood pressure rechecked by a primary care provider within the next 4 weeks.  If your provider in the emergency department today gave you specific instructions to follow-up with your doctor or provider even sooner than that, you should follow that instruction and  not wait for up to 4 weeks for your follow-up visit.        Thank you for choosing [facility]       Thank you for choosing [facility] for your care. Our goal is always to provide you with excellent care. Hearing back from our patients is one way we can continue to improve our services. Please take a few minutes to complete the written survey that you may receive in the mail after you visit with us. Thank you!        [facility] Information     [facility] gives you secure access to your electronic health record. If you see a primary care provider, you can also send messages to your care team and make appointments. If you have questions, please call your primary care clinic.  If you do not have a primary care provider, please call [phone] and they will assist you.        Care EveryWhere ID     This is your Care EveryWhere ID. This could be used by other organizations to access your [facility] medical records  [id number]        After Visit Summary       This is your record. Keep this with you and show to your community pharmacist(s) and doctor(s) at your next visit.

## 2017-06-07 NOTE — ED AVS SNAPSHOT
[facility] Emergency Department    [address]    [facility] [address]    Phone:  [phone]    Fax:  [phone]                                       Hannah Meade   MRN: [id number]    Department:  [facility] Emergency Department   Date of Visit:  6/7/2017           After Visit Summary Signature Page     I have received my discharge instructions, and my questions have been answered. I have discussed any challenges I see with this plan with the nurse or doctor.    ..........................................................................................................................................  Patient/Patient Representative Signature      ..........................................................................................................................................  Patient Representative Print Name and Relationship to Patient    ..................................................               ................................................  Date                                            Time    ..........................................................................................................................................  Reviewed by Signature/Title    ...................................................              ..............................................  Date                                                            Time

## 2017-06-08 LAB — INTERPRETATION ECG - MUSE: NORMAL

## 2017-06-08 NOTE — ED PROVIDER NOTES
History     Chief Complaint:    Dyspnea     HPI   Hannah Meade is a 47 year old female who presents with a five-day history of cough and dyspnea.  Patient states that a proximal knee 5 days ago she began with a intermittent productive cough this was shortly followed by dyspnea which occurs with exertion or persistent coughing.  Her dyspnea is also worsened by lying flat.  She denies any new leg swelling, unilateral leg edema, hemoptysis, estrogen use or history of DVT PE.  She did have a recent 4 hour car trip to [place] within the last 1 week.  She then also developed a dull central chest pain and has been present for approximately 3 days.  The pain typically lasts 20-30 minutes with each episode.  There are no alleviating factors to her chest pain.  She has no other complaints or concerns .    Allergies:  Seasonal Allergies  Ampicillin  Codeine  Decongestant [Oxymetazoline]  No Clinical Screening - See Comments  Sulfa Drugs     Medications:      Acetaminophen (TYLENOL PO)    --  --  Dummy, Bfp User        Take 1,000 mg by mouth every 6 hours as needed for mild pain or fever      azithromycin (ZITHROMAX) 250 MG tablet    01/07/17  --  Gómez Askew MD      Two tablets first day, then one tablet daily for four days.      BUDESONIDE, INHALATION, 90 MCG/ACT AEPB    02/25/16  --  Sandra Fink DO      Inhale 2 puffs into the lungs 2 times daily      DiphenhydrAMINE HCl (BENADRYL PO)    --  --  Reported, Patient      Take 25 mg by mouth daily as needed       escitalopram (LEXAPRO) 20 MG tablet    02/01/17  --  Hollis River MD      Take 1 tablet (20 mg) by mouth daily      fluticasone (FLONASE) 50 MCG/ACT spray    01/07/17  --  Gómez Askew MD      Spray 2 sprays into both nostrils daily      furosemide (LASIX) 20 MG tablet    04/06/17  --  Hollis River MD      Take 1 tablet (20 mg) by mouth daily      HYDROcodone-acetaminophen (NORCO) 5-325 MG per tablet    08/14/16  --  Yesi Walsh  MD Vikki      Take 1-2 tablets by mouth every 4 hours as needed for moderate to severe pain      ibuprofen (ADVIL,MOTRIN) 600 MG tablet    05/15/16  --  Aliyah Mora PA-C      Take 1 tablet (600 mg) by mouth 3 times daily      ipratropium - albuterol 0.5 mg/2.5 mg/3 mL (DUONEB) 0.5-2.5 (3) MG/3ML nebulization    02/25/16  --  Sandra Fink DO      Take 1 vial (3 mLs) by nebulization every 6 hours as needed for shortness of breath / dyspnea or wheezing      Ipratropium-Albuterol (COMBIVENT RESPIMAT)  MCG/ACT inhaler    08/17/16  --  Sandra Fink DO       Past Medical History:    Past Medical History:   Diagnosis Date     Anxiety      Nonspecific reaction to tuberculin skin test without active tuberculosis      Obesity, unspecified      Plantar fasciitis 2/23/2015        Past Surgical History:    Past Surgical History:   Procedure Laterality Date     C NONSPECIFIC PROCEDURE      2 C- SECTIONS     EXCISE MASS BACK Left 9/17/2014    Procedure: EXCISE MASS BACK;  Surgeon: Yaz Avilez MD;  Location: [facility] OR     EXCISE MASS UPPER EXTREMITY Left 9/17/2014    Procedure: EXCISE MASS UPPER EXTREMITY;  Surgeon: Yaz Avilez MD;  Location: [facility] OR     HYSTERECTOMY VAGINAL  1992    ovaries intact     ORTHOPEDIC SURGERY      left foot     other      lump on right arm removed      THORACOTOMY Right 8/25/2015    Procedure: THORACOTOMY;  Surgeon: Andrew Gordon MD;  Location:  OR        Family History:    family history includes C.A.D. in her paternal grandfather; Cardiovascular in her father and paternal grandfather; DIABETES in her maternal grandmother and mother; Gallbladder Disease in her father; Hypertension in her father and sister; Neurologic Disorder in her mother.    Social History:   reports that she has never smoked. She has never used smokeless tobacco. She reports that she does not drink alcohol or use illicit drugs.    PCP: Hollis River     Review of  "Systems   Constitutional: Negative for fever.   HENT: Negative for ear pain.    Cardiovascular: Negative for palpitations.   Gastrointestinal: Negative for vomiting.   All other systems reviewed and are negative.           Physical Exam   Patient Vitals for the past 24 hrs:   BP Temp Temp src Pulse Heart Rate Resp SpO2 Height Weight   06/07/17 1815 127/81 - - - 74 - 96 % - -   06/07/17 1800 130/83 - - - 77 - 93 % - -   06/07/17 1745 127/77 - - - 76 - 94 % - -   06/07/17 1730 132/71 - - - 76 - 95 % - -   06/07/17 1715 128/86 - - - - - - - -   06/07/17 1555 127/90 97.2  F (36.2  C) Temporal 88 - 20 96 % 1.6 m (5' 3\") 113.4 kg (250 lb)        Physical Exam     General:   Pleasant, age appropriate.  HEENT:    Oropharynx is moist, without lesions or trismus.     No tonsillar erythema or exudate.     No unilateral edema.  Eyes:    Conjunctiva normal  Neck:    Supple, no meningismus.     CV:     Regular rate and rhythm.      No murmurs, rubs or gallops.       No JVD or unilateral leg swelling.       2+ radial pulses bilateral.       No lower extremity edema.  PULM:    Clear to auscultation bilateral.       No respiratory distress.      Good air exchange.     No rales or wheezing.     No stridor.  ABD:    Soft, non-tender, non-distended.       No pulsatile masses.       No rebound, guarding or rigidity.  MSK:     No gross deformity to all four extremities.   LYMPH:   No cervical lymphadenopathy.  NEURO:   Alert. Good muscle tone, no atrophy.  Skin:    Warm, dry and intact.    Psych:    Mood is good and affect is appropriate.        Emergency Department Course     ECG:  Rate 81 bpm.    Interpreted at 1726 per Dr. Medina    Imaging:    Chest XR,  PA & LAT   Final Result   IMPRESSION: No acute disease.      JOYCE CARMONA MD           Laboratory:    Results for orders placed or performed during the hospital encounter of 06/07/17 (from the past 24 hour(s))   Chest XR,  PA & LAT    Narrative    CHEST TWO VIEWS 6/7/2017 4:25 PM "     HISTORY: Cough    COMPARISON: 4/4/2017    FINDINGS: There are no acute infiltrates. The cardiac silhouette is  not enlarged. Pulmonary vasculature is unremarkable.      Impression    IMPRESSION: No acute disease.    JOYCE CARMONA MD   EKG 12 lead   Result Value Ref Range    Interpretation ECG Click View Image link to view waveform and result    CBC (platelets, no diff)   Result Value Ref Range    WBC 12.5 (H) 4.0 - 11.0 10e9/L    RBC Count 4.44 3.8 - 5.2 10e12/L    Hemoglobin 13.1 11.7 - 15.7 g/dL    Hematocrit 40.2 35.0 - 47.0 %    MCV 91 78 - 100 fl    MCH 29.5 26.5 - 33.0 pg    MCHC 32.6 31.5 - 36.5 g/dL    RDW 13.2 10.0 - 15.0 %    Platelet Count 316 150 - 450 10e9/L   Basic metabolic panel (BMP)   Result Value Ref Range    Sodium 138 133 - 144 mmol/L    Potassium 4.1 3.4 - 5.3 mmol/L    Chloride 104 94 - 109 mmol/L    Carbon Dioxide 29 20 - 32 mmol/L    Anion Gap 5 3 - 14 mmol/L    Glucose 103 (H) 70 - 99 mg/dL    Urea Nitrogen 15 7 - 30 mg/dL    Creatinine 0.72 0.52 - 1.04 mg/dL    GFR Estimate 86 >60 mL/min/1.7m2    GFR Estimate If Black >90   GFR Calc   >60 mL/min/1.7m2    Calcium 9.3 8.5 - 10.1 mg/dL   D dimer quantitative   Result Value Ref Range    D Dimer  0.0 - 0.50 ug/ml FEU     <0.3  This D-dimer assay is intended for use in conjuntion with a clinical pretest   probability assessment model to exclude pulmonary embolism (PE) and as an aid   in the diagnosis of deep venous thrombosis (DVT) in outpatients suspected of PE   or DVT. The cut-off value is 0.5 g/mL FEU.     Troponin I (now)   Result Value Ref Range    Troponin I ES  0.000 - 0.045 ug/L     <0.015  The 99th percentile for upper reference range is 0.045 ug/L.  Troponin values in   the range of 0.045 - 0.120 ug/L may be associated with risks of adverse   clinical events.       Emergency Department Course:  Past medical records, nursing notes, and vitals reviewed.  I performed an exam of the patient and obtained history, as  documented above.    I rechecked the patient. Findings and plan explained to the Patient and . Patient was discharge home.    Impression & Plan      Medical Decision Makin-year-old female visiting from cough difficulty breathing and atypical chest pain.  She has no features concerning for ACS.  EKG is without ischemic changes.  Troponin normal limits despite greater than 48 hours of symptoms.  I do not feel that she requires provocative testing in the short-term.  Her only risk factor for pulmonary embolus was recent immobilization thus d-dimer was sent.  D-dimer was negative thus no indication for CT scan of the chest.  Findings are  most suggestive of bronchitis with possible bronchitis-induced pleurisy or costochondritis.  Patient safely discharged home with supportive treatment      Diagnosis:    ICD-10-CM    1. Acute bronchitis, unspecified organism J20.9    2. Chest pain, unspecified type R07.9         Discharge Medications:  Discharge Medication List as of 2017  6:16 PM      START taking these medications    Details   benzonatate (TESSALON) 200 MG capsule Take 1 capsule (200 mg) by mouth 3 times daily as needed for cough, Disp-21 capsule, R-0, Local Print              2017   No att. providers found        Gavin Medina MD  [phone]

## 2017-07-26 DIAGNOSIS — F41.9 ANXIETY: ICD-10-CM

## 2017-07-26 NOTE — TELEPHONE ENCOUNTER
ESCITALOPRAM 20 MG TABLET     Last Written Prescription Date: 02-  Last Fill Quantity: 04-, #90 refills: 1  Last Office Visit with [facility] primary care provider:  04- Dr. River        Last PHQ-9 score on record=   PHQ-9 SCORE 8/4/2016   Total Score -   Total Score 11

## 2017-07-29 NOTE — TELEPHONE ENCOUNTER
1/22/16 last visit that mentions anxiety.  Should have enough medications til 8/1/17.  Sending to provider.  Please advise on plan.  Ban Gomez RN

## 2017-07-30 RX ORDER — ESCITALOPRAM OXALATE 20 MG/1
TABLET ORAL
Qty: 90 TABLET | Refills: 1 | Status: SHIPPED | OUTPATIENT
Start: 2017-07-30 | End: 2017-12-08

## 2017-07-31 ENCOUNTER — OFFICE VISIT (OUTPATIENT)
Dept: FAMILY MEDICINE | Facility: CLINIC | Age: 47
End: 2017-07-31
Payer: COMMERCIAL

## 2017-07-31 VITALS
HEART RATE: 85 BPM | WEIGHT: 252 LBS | BODY MASS INDEX: 44.65 KG/M2 | RESPIRATION RATE: 14 BRPM | SYSTOLIC BLOOD PRESSURE: 126 MMHG | HEIGHT: 63 IN | DIASTOLIC BLOOD PRESSURE: 90 MMHG | TEMPERATURE: 98 F | OXYGEN SATURATION: 96 %

## 2017-07-31 DIAGNOSIS — R10.31 RLQ ABDOMINAL PAIN: Primary | ICD-10-CM

## 2017-07-31 DIAGNOSIS — Z13.220 LIPID SCREENING: ICD-10-CM

## 2017-07-31 LAB
ALBUMIN SERPL-MCNC: 3.5 G/DL (ref 3.4–5)
ALBUMIN UR-MCNC: NEGATIVE MG/DL
ALP SERPL-CCNC: 106 U/L (ref 40–150)
ALT SERPL W P-5'-P-CCNC: 60 U/L (ref 0–50)
ANION GAP SERPL CALCULATED.3IONS-SCNC: 7 MMOL/L (ref 3–14)
APPEARANCE UR: CLEAR
AST SERPL W P-5'-P-CCNC: 46 U/L (ref 0–45)
BACTERIA #/AREA URNS HPF: ABNORMAL /HPF
BILIRUB SERPL-MCNC: 0.7 MG/DL (ref 0.2–1.3)
BILIRUB UR QL STRIP: NEGATIVE
BUN SERPL-MCNC: 11 MG/DL (ref 7–30)
CALCIUM SERPL-MCNC: 9.3 MG/DL (ref 8.5–10.1)
CHLORIDE SERPL-SCNC: 107 MMOL/L (ref 94–109)
CHOLEST SERPL-MCNC: 222 MG/DL
CO2 SERPL-SCNC: 26 MMOL/L (ref 20–32)
COLOR UR AUTO: YELLOW
CREAT SERPL-MCNC: 0.73 MG/DL (ref 0.52–1.04)
ERYTHROCYTE [DISTWIDTH] IN BLOOD BY AUTOMATED COUNT: 13.6 % (ref 10–15)
GFR SERPL CREATININE-BSD FRML MDRD: 84 ML/MIN/1.7M2
GLUCOSE SERPL-MCNC: 111 MG/DL (ref 70–99)
GLUCOSE UR STRIP-MCNC: NEGATIVE MG/DL
HBA1C MFR BLD: 5.4 % (ref 4.3–6)
HCT VFR BLD AUTO: 41.1 % (ref 35–47)
HDLC SERPL-MCNC: 54 MG/DL
HGB BLD-MCNC: 13.4 G/DL (ref 11.7–15.7)
HGB UR QL STRIP: ABNORMAL
KETONES UR STRIP-MCNC: NEGATIVE MG/DL
LDLC SERPL CALC-MCNC: 109 MG/DL
LEUKOCYTE ESTERASE UR QL STRIP: NEGATIVE
MCH RBC QN AUTO: 30 PG (ref 26.5–33)
MCHC RBC AUTO-ENTMCNC: 32.6 G/DL (ref 31.5–36.5)
MCV RBC AUTO: 92 FL (ref 78–100)
NITRATE UR QL: NEGATIVE
NON-SQ EPI CELLS #/AREA URNS LPF: ABNORMAL /LPF
NONHDLC SERPL-MCNC: 168 MG/DL
PH UR STRIP: 5.5 PH (ref 5–7)
PLATELET # BLD AUTO: 307 10E9/L (ref 150–450)
POTASSIUM SERPL-SCNC: 4.2 MMOL/L (ref 3.4–5.3)
PROT SERPL-MCNC: 7.3 G/DL (ref 6.8–8.8)
RBC # BLD AUTO: 4.46 10E12/L (ref 3.8–5.2)
RBC #/AREA URNS AUTO: ABNORMAL /HPF (ref 0–2)
SODIUM SERPL-SCNC: 140 MMOL/L (ref 133–144)
SP GR UR STRIP: 1.02 (ref 1–1.03)
TRIGL SERPL-MCNC: 295 MG/DL
URN SPEC COLLECT METH UR: ABNORMAL
UROBILINOGEN UR STRIP-ACNC: 0.2 EU/DL (ref 0.2–1)
WBC # BLD AUTO: 9.7 10E9/L (ref 4–11)
WBC #/AREA URNS AUTO: ABNORMAL /HPF (ref 0–2)

## 2017-07-31 PROCEDURE — 81001 URINALYSIS AUTO W/SCOPE: CPT | Performed by: FAMILY MEDICINE

## 2017-07-31 PROCEDURE — 80061 LIPID PANEL: CPT | Performed by: FAMILY MEDICINE

## 2017-07-31 PROCEDURE — 80053 COMPREHEN METABOLIC PANEL: CPT | Performed by: FAMILY MEDICINE

## 2017-07-31 PROCEDURE — 83036 HEMOGLOBIN GLYCOSYLATED A1C: CPT | Performed by: FAMILY MEDICINE

## 2017-07-31 PROCEDURE — 99214 OFFICE O/P EST MOD 30 MIN: CPT | Performed by: FAMILY MEDICINE

## 2017-07-31 PROCEDURE — 36415 COLL VENOUS BLD VENIPUNCTURE: CPT | Performed by: FAMILY MEDICINE

## 2017-07-31 PROCEDURE — 85027 COMPLETE CBC AUTOMATED: CPT | Performed by: FAMILY MEDICINE

## 2017-07-31 ASSESSMENT — ANXIETY QUESTIONNAIRES
6. BECOMING EASILY ANNOYED OR IRRITABLE: SEVERAL DAYS
2. NOT BEING ABLE TO STOP OR CONTROL WORRYING: MORE THAN HALF THE DAYS
3. WORRYING TOO MUCH ABOUT DIFFERENT THINGS: MORE THAN HALF THE DAYS
7. FEELING AFRAID AS IF SOMETHING AWFUL MIGHT HAPPEN: MORE THAN HALF THE DAYS
IF YOU CHECKED OFF ANY PROBLEMS ON THIS QUESTIONNAIRE, HOW DIFFICULT HAVE THESE PROBLEMS MADE IT FOR YOU TO DO YOUR WORK, TAKE CARE OF THINGS AT HOME, OR GET ALONG WITH OTHER PEOPLE: SOMEWHAT DIFFICULT
5. BEING SO RESTLESS THAT IT IS HARD TO SIT STILL: SEVERAL DAYS
1. FEELING NERVOUS, ANXIOUS, OR ON EDGE: MORE THAN HALF THE DAYS
GAD7 TOTAL SCORE: 11

## 2017-07-31 ASSESSMENT — PATIENT HEALTH QUESTIONNAIRE - PHQ9: 5. POOR APPETITE OR OVEREATING: SEVERAL DAYS

## 2017-07-31 NOTE — PROGRESS NOTES
SUBJECTIVE:                                                    Hannah Meade is a 47 year old female who presents to clinic today for the following health issues:      Abdominal Pain      Duration: since April    Description (location/character/radiation): RLQ, pain sharp pains       Associated flank pain: None    Intensity:  moderate    Accompanying signs and symptoms:        Fever/Chills: no        Gas/Bloating: no        Nausea/vomitting: no        Diarrhea: no        Dysuria or Hematuria: no     History (previous similar pain/trauma/previous testing): yes has had previous problem    Precipitating or alleviating factors:       Pain worse with eating/BM/urination: no       Pain relieved by BM: no     Therapies tried and outcome: None    LMP:  not applicable    S/P hysterectomy, but no other abdominal surgeries.  Still has both ovaries.        Problem list and histories reviewed & adjusted, as indicated.  Additional history: as documented    Patient Active Problem List   Diagnosis     Backache     Carbuncle and furuncle of trunk     CARDIOVASCULAR SCREENING; LDL GOAL LESS THAN 160     Insomnia     Anxiety     Plantar fasciitis     Ovarian cyst     Chest mass     Bronchogenic cyst     Chest wall pain     Right-sided low back pain with left-sided sciatica     bmi over 40     Past Surgical History:   Procedure Laterality Date     C NONSPECIFIC PROCEDURE      2 C- SECTIONS     EXCISE MASS BACK Left 9/17/2014    Procedure: EXCISE MASS BACK;  Surgeon: Yaz Avilez MD;  Location: [facility] OR     EXCISE MASS UPPER EXTREMITY Left 9/17/2014    Procedure: EXCISE MASS UPPER EXTREMITY;  Surgeon: Yaz Avilez MD;  Location: [facility] OR     HYSTERECTOMY VAGINAL  1992    ovaries intact     ORTHOPEDIC SURGERY      left foot     other      lump on right arm removed      THORACOTOMY Right 8/25/2015    Procedure: THORACOTOMY;  Surgeon: Andrew Gordon MD;  Location:  OR       Social History   Substance Use  "Topics     Smoking status: Never Smoker     Smokeless tobacco: Never Used     Alcohol use No     Family History   Problem Relation Age of Onset     Neurologic Disorder Mother      Parkinsons     DIABETES Mother      Hypertension Father      Cardiovascular Father      Gallbladder Disease Father      DIABETES Maternal Grandmother      C.A.D. Paternal Grandfather      Cardiovascular Paternal Grandfather      Hypertension Sister              Reviewed and updated as needed this visit by clinical staffTobacco  Allergies  Meds  Med Hx  Surg Hx  Fam Hx  Soc Hx      Reviewed and updated as needed this visit by Provider         ROS:  Constitutional, HEENT, cardiovascular, pulmonary, gi and gu systems are negative, except as otherwise noted.      OBJECTIVE:   /90 (BP Location: Right arm, Patient Position: Chair, Cuff Size: Adult Large)  Pulse 85  Temp 98  F (36.7  C) (Oral)  Resp 14  Ht 5' 3\" (1.6 m)  Wt 252 lb (114.3 kg)  SpO2 96%  BMI 44.64 kg/m2  Body mass index is 44.64 kg/(m^2).  GENERAL: healthy, alert and no distress  RESP: lungs clear to auscultation - no rales, rhonchi or wheezes  CV: regular rates and rhythm, normal S1 S2, no S3 or S4 and no murmur, click or rub  ABDOMEN: Soft.  Non-distended.  Mild TTP in RLQ with no rebound or guarding.  No other TTP.  No masses or organomegaly.    Results for orders placed or performed in visit on 07/31/17 (from the past 48 hour(s))   UA reflex to Microscopic and Culture   Result Value Ref Range    Color Urine Yellow     Appearance Urine Clear     Glucose Urine Negative NEG mg/dL    Bilirubin Urine Negative NEG    Ketones Urine Negative NEG mg/dL    Specific Gravity Urine 1.025 1.003 - 1.035    Blood Urine Trace (A) NEG    pH Urine 5.5 5.0 - 7.0 pH    Protein Albumin Urine Negative NEG mg/dL    Urobilinogen Urine 0.2 0.2 - 1.0 EU/dL    Nitrite Urine Negative NEG    Leukocyte Esterase Urine Negative NEG    Source Midstream Urine    CBC with platelets   Result " Value Ref Range    WBC 9.7 4.0 - 11.0 10e9/L    RBC Count 4.46 3.8 - 5.2 10e12/L    Hemoglobin 13.4 11.7 - 15.7 g/dL    Hematocrit 41.1 35.0 - 47.0 %    MCV 92 78 - 100 fl    MCH 30.0 26.5 - 33.0 pg    MCHC 32.6 31.5 - 36.5 g/dL    RDW 13.6 10.0 - 15.0 %    Platelet Count 307 150 - 450 10e9/L   Hemoglobin A1c   Result Value Ref Range    Hemoglobin A1C 5.4 4.3 - 6.0 %   Urine Microscopic   Result Value Ref Range    WBC Urine O - 2 0 - 2 /HPF    RBC Urine O - 2 0 - 2 /HPF    Squamous Epithelial /LPF Urine Many (A) FEW /LPF    Bacteria Urine Moderate (A) NEG /HPF         ASSESSMENT/PLAN:     1. RLQ abdominal pain  - Discussed large differential for RLQ pain in a woman   - Will get labs and US for now  - May need CT scan if pain continues   - UA reflex to Microscopic and Culture  - CBC with platelets  - Comprehensive metabolic panel  - US Pelvic Complete w Transvaginal; Future  - Urine Microscopic    2. Lipid screening  - Lipid panel reflex to direct LDL    3. BMI 40.0-44.9, adult (H)  - Hemoglobin A1c    F/U after labs and US are back     Sandra Fink DO  [facility]

## 2017-07-31 NOTE — MR AVS SNAPSHOT
After Visit Summary   7/31/2017    Hannah Meade    MRN: [id number]           Patient Information     Date Of Birth          1970        Visit Information        Provider Department      7/31/2017 7:20 AM Sandra Fink,  [facility]        Today's Diagnoses     RLQ abdominal pain    -  1    Lipid screening        BMI 40.0-44.9, adult (H)           Follow-ups after your visit        Your next 10 appointments already scheduled     Aug 01, 2017  2:40 PM CDT   US PELVIC COMPLETE W TRANSVAGINAL with [id number]   [facility] [place] ([facility])    [address]  [facility] [address] [phone]           Please bring a list of your medicines (including vitamins, minerals and over-the-counter drugs). Also, tell your doctor about any allergies you may have. Wear comfortable clothes and leave your valuables at home.  Adults: Drink six 8-ounce glasses of fluid one hour before your exam. Do NOT empty your bladder.  If you need to empty your bladder before your exam, try to release only a little bit of urine. Then, drink another 8oz glass of fluid.  Children: Children who are potty trained should drink at least 4 cups (32 oz) of liquid 45 minutes to one hour prior to the exam. The child s bladder must be full in order to achieve a diagnostic exam. If your child is very uncomfortable or has an urgent need to pee, please notify a technologist; they will try to find out how much longer the wait may be and provide instructions to help relieve the pressure. Occasionally it is medically necessary to insert a urinary catheter to fill the bladder.  Please call the Imaging Department at your exam site with any questions.              Future tests that were ordered for you today     Open Future Orders        Priority Expected Expires Ordered    US Pelvic Complete w Transvaginal Routine  7/31/2018 7/31/2017            Who to contact   "   If you have questions or need follow up information about today's clinic visit or your schedule please contact [facility] directly at [phone].  Normal or non-critical lab and imaging results will be communicated to you by MyChart, letter or phone within 4 business days after the clinic has received the results. If you do not hear from us within 7 days, please contact the clinic through [employer] or phone. If you have a critical or abnormal lab result, we will notify you by phone as soon as possible.  Submit refill requests through [employer] or call your pharmacy and they will forward the refill request to us. Please allow 3 business days for your refill to be completed.          Additional Information About Your Visit        [employer] Information     [employer] gives you secure access to your electronic health record. If you see a primary care provider, you can also send messages to your care team and make appointments. If you have questions, please call your primary care clinic.  If you do not have a primary care provider, please call [phone] and they will assist you.        Care EveryWhere ID     This is your Care EveryWhere ID. This could be used by other organizations to access your [place] medical records  [id number]        Your Vitals Were     Pulse Temperature Respirations Height Pulse Oximetry BMI (Body Mass Index)    85 98  F (36.7  C) (Oral) 14 5' 3\" (1.6 m) 96% 44.64 kg/m2       Blood Pressure from Last 3 Encounters:   07/31/17 126/90   06/07/17 127/81   04/04/17 (!) 149/91    Weight from Last 3 Encounters:   07/31/17 252 lb (114.3 kg)   06/07/17 250 lb (113.4 kg)   04/04/17 260 lb (117.9 kg)              We Performed the Following     CBC with platelets     Comprehensive metabolic panel     Hemoglobin A1c     Lipid panel reflex to direct LDL     UA reflex to Microscopic and Culture     Urine Microscopic          Today's Medication Changes          These changes are accurate as " of: 7/31/17 12:33 PM.  If you have any questions, ask your nurse or doctor.               Stop taking these medicines if you haven't already. Please contact your care team if you have questions.     azithromycin 250 MG tablet   Commonly known as:  ZITHROMAX   Stopped by:  Sandra Fink,            benzonatate 200 MG capsule   Commonly known as:  TESSALON   Stopped by:  Sandra Fink, DO           fluticasone 50 MCG/ACT spray   Commonly known as:  FLONASE   Stopped by:  Sandra Fink,            HYDROcodone-acetaminophen 5-325 MG per tablet   Commonly known as:  NORCO   Stopped by:  Sandra Fink, DO                    Primary Care Provider Office Phone # Fax #    Hollis River [phone]       [address] [address]        Equal Access to Services     [facility]: Hadii aad ku hadasho Soomaali, waaxda luqadaha, qaybta kaalmada adeegyada, waxay idiin hayaaching nelson khbell menezes . So [facility] [phone].    ATENCIÓN: Si habla español, tiene a lloyd disposición servicios gratuitos de asistencia lingüística. Llame al [phone].    We comply with applicable federal civil rights laws and [place] laws. We do not discriminate on the basis of race, color, national origin, age, disability sex, sexual orientation or gender identity.            Thank you!     Thank you for choosing [facility]  for your care. Our goal is always to provide you with excellent care. Hearing back from our patients is one way we can continue to improve our services. Please take a few minutes to complete the written survey that you may receive in the mail after your visit with us. Thank you!             Your Updated Medication List - Protect others around you: Learn how to safely use, store and throw away your medicines at www.disposemymeds.org.          This list is accurate as of: 7/31/17 12:33 PM.  Always use your most recent med  list.                   Brand Name Dispense Instructions for use Diagnosis    BENADRYL PO      Take 25 mg by mouth daily as needed        BUDESONIDE (INHALATION) 90 MCG/ACT Aepb     1 each    Inhale 2 puffs into the lungs 2 times daily    SOB (shortness of breath)       escitalopram 20 MG tablet    LEXAPRO    90 tablet    TAKE 1 TABLET (20 MG) BY MOUTH DAILY    Anxiety       furosemide 20 MG tablet    LASIX    30 tablet    Take 1 tablet (20 mg) by mouth daily    Localized edema       ibuprofen 600 MG tablet    ADVIL/MOTRIN    40 tablet    Take 1 tablet (600 mg) by mouth 3 times daily    Acute exacerbation of chronic low back pain       * ipratropium - albuterol 0.5 mg/2.5 mg/3 mL 0.5-2.5 (3) MG/3ML neb solution    DUONEB    30 vial    Take 1 vial (3 mLs) by nebulization every 6 hours as needed for shortness of breath / dyspnea or wheezing    SOB (shortness of breath)       * Ipratropium-Albuterol  MCG/ACT inhaler    COMBIVENT RESPIMAT    1 Inhaler    Inhale 1 puff into the lungs every 4 hours Not to exceed 6 doses per day.    SOB (shortness of breath)       order for DME     1 Device    Equipment being ordered: knee high compression stocking for left leg compression stocking with 20-30 mm of pressure    Pain and swelling of left ankle       TYLENOL PO      Take 1,000 mg by mouth every 6 hours as needed for mild pain or fever        * Notice:  This list has 2 medication(s) that are the same as other medications prescribed for you. Read the directions carefully, and ask your doctor or other care provider to review them with you.

## 2017-07-31 NOTE — NURSING NOTE
"Chief Complaint   Patient presents with     Abdominal Pain     Chest Pain       Initial There were no vitals taken for this visit. Estimated body mass index is 44.29 kg/(m^2) as calculated from the following:    Height as of 6/7/17: 5' 3\" (1.6 m).    Weight as of 6/7/17: 250 lb (113.4 kg).  Medication Reconciliation: complete   Jing Lauren CMA      "

## 2017-08-01 ENCOUNTER — HOSPITAL ENCOUNTER (OUTPATIENT)
Dept: ULTRASOUND IMAGING | Facility: CLINIC | Age: 47
Discharge: HOME | End: 2017-08-01
Attending: FAMILY MEDICINE | Admitting: FAMILY MEDICINE
Payer: COMMERCIAL

## 2017-08-01 DIAGNOSIS — R10.31 RLQ ABDOMINAL PAIN: ICD-10-CM

## 2017-08-01 PROCEDURE — 76830 TRANSVAGINAL US NON-OB: CPT

## 2017-08-01 ASSESSMENT — ASTHMA QUESTIONNAIRES: ACT_TOTALSCORE: 17

## 2017-08-01 ASSESSMENT — ANXIETY QUESTIONNAIRES: GAD7 TOTAL SCORE: 11

## 2017-08-01 ASSESSMENT — PATIENT HEALTH QUESTIONNAIRE - PHQ9: SUM OF ALL RESPONSES TO PHQ QUESTIONS 1-9: 11

## 2017-08-02 ENCOUNTER — MYC MEDICAL ADVICE (OUTPATIENT)
Dept: FAMILY MEDICINE | Facility: CLINIC | Age: 47
End: 2017-08-02

## 2017-08-02 DIAGNOSIS — E66.01 MORBID OBESITY DUE TO EXCESS CALORIES (H): Primary | ICD-10-CM

## 2017-08-05 DIAGNOSIS — R60.0 LOCALIZED EDEMA: ICD-10-CM

## 2017-08-05 NOTE — TELEPHONE ENCOUNTER
Pending Prescriptions:                       Disp   Refills    furosemide (LASIX) 20 MG tablet [Pharmacy*30 tab*3            Sig: TAKE 1 TABLET (20 MG) BY MOUTH DAILY              Last Written Prescription Date: 4/6/2017  Last Fill Quantity: 30, # refills: 3  Last Office Visit with [facility], [facility] or [facility] prescribing provider: 7/31/2017, Aleks         Potassium   Date Value Ref Range Status   07/31/2017 4.2 3.4 - 5.3 mmol/L Final     Creatinine   Date Value Ref Range Status   07/31/2017 0.73 0.52 - 1.04 mg/dL Final     BP Readings from Last 3 Encounters:   07/31/17 126/90   06/07/17 127/81   04/04/17 (!) 149/91

## 2017-08-07 RX ORDER — FUROSEMIDE 20 MG
TABLET ORAL
Qty: 30 TABLET | Refills: 5 | Status: SHIPPED | OUTPATIENT
Start: 2017-08-07 | End: 2017-12-20

## 2017-08-09 ENCOUNTER — OFFICE VISIT (OUTPATIENT)
Dept: URGENT CARE | Facility: URGENT CARE | Age: 47
End: 2017-08-09
Payer: COMMERCIAL

## 2017-08-09 VITALS
DIASTOLIC BLOOD PRESSURE: 78 MMHG | SYSTOLIC BLOOD PRESSURE: 128 MMHG | WEIGHT: 256.06 LBS | BODY MASS INDEX: 45.37 KG/M2 | HEIGHT: 63 IN | HEART RATE: 78 BPM | TEMPERATURE: 98.2 F

## 2017-08-09 DIAGNOSIS — H60.502 ACUTE OTITIS EXTERNA OF LEFT EAR, UNSPECIFIED TYPE: Primary | ICD-10-CM

## 2017-08-09 PROCEDURE — 99213 OFFICE O/P EST LOW 20 MIN: CPT | Performed by: PHYSICIAN ASSISTANT

## 2017-08-09 RX ORDER — CIPROFLOXACIN AND DEXAMETHASONE 3; 1 MG/ML; MG/ML
4 SUSPENSION/ DROPS AURICULAR (OTIC) 2 TIMES DAILY
Qty: 7.5 ML | Refills: 0 | Status: SHIPPED | OUTPATIENT
Start: 2017-08-09 | End: 2017-08-16

## 2017-08-09 NOTE — NURSING NOTE
"Chief Complaint   Patient presents with     Urgent Care     Ear Problem     c/o ear pain and kendrick pain for 6 days       Initial /78  Pulse 78  Temp 98.2  F (36.8  C) (Tympanic)  Ht 5' 3\" (1.6 m)  Wt 256 lb 1 oz (116.1 kg)  BMI 45.36 kg/m2 Estimated body mass index is 45.36 kg/(m^2) as calculated from the following:    Height as of this encounter: 5' 3\" (1.6 m).    Weight as of this encounter: 256 lb 1 oz (116.1 kg).  Medication Reconciliation: complete   Lola Aparicio MA    "

## 2017-08-09 NOTE — PATIENT INSTRUCTIONS
External Ear Infection (Adult)    External otitis (also called  swimmer s ear ) is an infection in the ear canal. It is often caused by bacteria or fungus. It can occur a few days after water gets trapped in the ear canal (from swimming or bathing). It can also occur after cleaning too deeply in the ear canal with a cotton swab or other object. Sometimes, hair care products get into the ear canal and cause this problem.  Symptoms can include pain, fever, itching, redness, drainage, or swelling of the ear canal. Temporary hearing loss may also occur.  Home care    Do not try to clean the ear canal. This can push pus and bacteria deeper into the canal.    Use prescribed ear drops as directed. These help reduce swelling and fight the infection. If an ear wick was placed in the ear canal, apply drops right onto the end of the wick. The wick will draw the medication into the ear canal even if it is swollen closed.    A cotton ball may be loosely placed in the outer ear to absorb any drainage.    You may use acetaminophen or ibuprofen to control pain, unless another medication was prescribed. Note: If you have chronic liver or kidney disease or ever had a stomach ulcer or GI bleeding, talk to your health care provider before taking any of these medications.    Do not allow water to get into your ear when bathing. Also, avoid swimming until the infection has cleared.  Prevention    Keep your ears dry. This helps lower the risk of infection. Dry your ears with a towel or hair dryer after getting wet. Also, use ear plugs when swimming.    Do not stick any objects in the ear to remove wax.    If you feel water trapped in your ear, use ear drops right away. You can get these drops over the counter at most drugstores. They work by removing water from the ear canal.  Follow-up care  Follow up with your health care provider in one week, or as advised.  When to seek medical advice  Call your health care provider right away if  any of these occur:    Ear pain becomes worse or doesn t improve after 3 days of treatment    Redness or swelling of the outer ear occurs or gets worse    Headache    Painful or stiff neck    Drowsiness or confusion    Fever of 100.4 F (38 C) or higher, or as directed by your health care provider    Seizure  Date Last Reviewed: 3/22/2015    4682-5696 [employer]. [address] [place] [address]. All rights reserved. This information is not intended as a substitute for professional medical care. Always follow your healthcare professional's instructions.

## 2017-08-09 NOTE — MR AVS SNAPSHOT
After Visit Summary   8/9/2017    Hannah Meade    MRN: [id number]           Patient Information     Date Of Birth          1970        Visit Information        Provider Department      8/9/2017 5:55 PM Price Tovar PA-C [facility]        Today's Diagnoses     Acute otitis externa of left ear, unspecified type    -  1      Care Instructions      External Ear Infection (Adult)    External otitis (also called  swimmer s ear ) is an infection in the ear canal. It is often caused by bacteria or fungus. It can occur a few days after water gets trapped in the ear canal (from swimming or bathing). It can also occur after cleaning too deeply in the ear canal with a cotton swab or other object. Sometimes, hair care products get into the ear canal and cause this problem.  Symptoms can include pain, fever, itching, redness, drainage, or swelling of the ear canal. Temporary hearing loss may also occur.  Home care    Do not try to clean the ear canal. This can push pus and bacteria deeper into the canal.    Use prescribed ear drops as directed. These help reduce swelling and fight the infection. If an ear wick was placed in the ear canal, apply drops right onto the end of the wick. The wick will draw the medication into the ear canal even if it is swollen closed.    A cotton ball may be loosely placed in the outer ear to absorb any drainage.    You may use acetaminophen or ibuprofen to control pain, unless another medication was prescribed. Note: If you have chronic liver or kidney disease or ever had a stomach ulcer or GI bleeding, talk to your health care provider before taking any of these medications.    Do not allow water to get into your ear when bathing. Also, avoid swimming until the infection has cleared.  Prevention    Keep your ears dry. This helps lower the risk of infection. Dry your ears with a towel or hair dryer after getting wet. Also, use ear plugs when  swimming.    Do not stick any objects in the ear to remove wax.    If you feel water trapped in your ear, use ear drops right away. You can get these drops over the counter at most drugstores. They work by removing water from the ear canal.  Follow-up care  Follow up with your health care provider in one week, or as advised.  When to seek medical advice  Call your health care provider right away if any of these occur:    Ear pain becomes worse or doesn t improve after 3 days of treatment    Redness or swelling of the outer ear occurs or gets worse    Headache    Painful or stiff neck    Drowsiness or confusion    Fever of 100.4 F (38 C) or higher, or as directed by your health care provider    Seizure  Date Last Reviewed: 3/22/2015    [id number] [employer]. [address] [place] [address]. All rights reserved. This information is not intended as a substitute for professional medical care. Always follow your healthcare professional's instructions.                Follow-ups after your visit        Your next 10 appointments already scheduled     Aug 16, 2017  8:00 AM CDT   New Visit with SUSIE Rowell [facility] ([facility])    [address] [facility] [address] [phone]              Who to contact     If you have questions or need follow up information about today's clinic visit or your schedule please contact [facility] directly at [phone].  Normal or non-critical lab and imaging results will be communicated to you by MyChart, letter or phone within 4 business days after the clinic has received the results. If you do not hear from us within 7 days, please contact the clinic through MyChart or phone. If you have a critical or abnormal lab result, we will notify you by phone as soon as possible.  Submit refill requests through [employer] or call your pharmacy and they will forward the refill request to us.  "Please allow 3 business days for your refill to be completed.          Additional Information About Your Visit        [employer] Information     [employer] gives you secure access to your electronic health record. If you see a primary care provider, you can also send messages to your care team and make appointments. If you have questions, please call your primary care clinic.  If you do not have a primary care provider, please call [phone] and they will assist you.        Care EveryWhere ID     This is your Care EveryWhere ID. This could be used by other organizations to access your [facility] medical records  [id number]        Your Vitals Were     Pulse Temperature Height BMI (Body Mass Index)          78 98.2  F (36.8  C) (Tympanic) 5' 3\" (1.6 m) 45.36 kg/m2         Blood Pressure from Last 3 Encounters:   08/09/17 128/78   07/31/17 126/90   06/07/17 127/81    Weight from Last 3 Encounters:   08/09/17 256 lb 1 oz (116.1 kg)   07/31/17 252 lb (114.3 kg)   06/07/17 250 lb (113.4 kg)              Today, you had the following     No orders found for display         Today's Medication Changes          These changes are accurate as of: 8/9/17  6:27 PM.  If you have any questions, ask your nurse or doctor.               Start taking these medicines.        Dose/Directions    ciprofloxacin-dexamethasone otic suspension   Commonly known as:  CIPRODEX   Used for:  Acute otitis externa of left ear, unspecified type   Started by:  Price Tovar PA-C        Dose:  4 drop   Place 4 drops Into the left ear 2 times daily for 7 days   Quantity:  7.5 mL   Refills:  0            Where to get your medicines      These medications were sent to [place] [address] [place] [address] [address]     Phone:  [phone]     ciprofloxacin-dexamethasone otic suspension                Primary Care Provider Office Phone # Fax #    Sandra Yaneth DO Aleks [phone] " [phone]       [address] [address] [facility] [address]        Equal Access to Services     ONELJL RENETTA : Hadii claus donahue linda Lees, wahillaryda luqadaha, qaybta karodrigoda mimi, man dayanarain hayaaching weinbergmanav doll laMairavlad khan. So [facility] [phone].    ATENCIÓN: Si habla español, tiene a lloyd disposición servicios gratuitos de asistencia lingüística. Llame al [phone].    We comply with applicable federal civil rights laws and [place] laws. We do not discriminate on the basis of race, color, national origin, age, disability sex, sexual orientation or gender identity.            Thank you!     Thank you for choosing [facility]  for your care. Our goal is always to provide you with excellent care. Hearing back from our patients is one way we can continue to improve our services. Please take a few minutes to complete the written survey that you may receive in the mail after your visit with us. Thank you!             Your Updated Medication List - Protect others around you: Learn how to safely use, store and throw away your medicines at www.disposemymeds.org.          This list is accurate as of: 8/9/17  6:27 PM.  Always use your most recent med list.                   Brand Name Dispense Instructions for use Diagnosis    BENADRYL PO      Take 25 mg by mouth daily as needed        BUDESONIDE (INHALATION) 90 MCG/ACT Aepb     1 each    Inhale 2 puffs into the lungs 2 times daily    SOB (shortness of breath)       ciprofloxacin-dexamethasone otic suspension    CIPRODEX    7.5 mL    Place 4 drops Into the left ear 2 times daily for 7 days    Acute otitis externa of left ear, unspecified type       escitalopram 20 MG tablet    LEXAPRO    90 tablet    TAKE 1 TABLET (20 MG) BY MOUTH DAILY    Anxiety       furosemide 20 MG tablet    LASIX    30 tablet    TAKE 1 TABLET (20 MG) BY MOUTH DAILY    Localized edema       ibuprofen 600 MG tablet    ADVIL/MOTRIN    40 tablet    Take 1 tablet (600 mg) by mouth 3 times  daily    Acute exacerbation of chronic low back pain       * ipratropium - albuterol 0.5 mg/2.5 mg/3 mL 0.5-2.5 (3) MG/3ML neb solution    DUONEB    30 vial    Take 1 vial (3 mLs) by nebulization every 6 hours as needed for shortness of breath / dyspnea or wheezing    SOB (shortness of breath)       * Ipratropium-Albuterol  MCG/ACT inhaler    COMBIVENT RESPIMAT    1 Inhaler    Inhale 1 puff into the lungs every 4 hours Not to exceed 6 doses per day.    SOB (shortness of breath)       order for DME     1 Device    Equipment being ordered: knee high compression stocking for left leg compression stocking with 20-30 mm of pressure    Pain and swelling of left ankle       TYLENOL PO      Take 1,000 mg by mouth every 6 hours as needed for mild pain or fever        * Notice:  This list has 2 medication(s) that are the same as other medications prescribed for you. Read the directions carefully, and ask your doctor or other care provider to review them with you.

## 2017-08-09 NOTE — PROGRESS NOTES
"SUBJECTIVE:    Hannah Meade presents to  today for evaluation of left ear pain and left jaw pain x 6 days duration. Onset of pain after using ear wax gtts and Q-Tips.     Admits to chronic nasal congestion and chronic tooth pain (many teeth) but denies any acute worsening of these chronic sxs over the past 6 days. Denies any recent dental work.     ROS:     HEENT: Positive chronic nasal congestion and chronic tooth pain. Denies any increased pain in right lower molar but admits to chronic pain in that tooth   RESP: Denies any cough, wheezing or SOB   GI: Denies any N/V/D. No abdominal pain. Normal BM's  SKIN: Denies rash  NEURO: Denies any severe headaches, neck stiffness, photophobia, rash, mental status changes or lethargy.     Current Outpatient Prescriptions   Medication     furosemide (LASIX) 20 MG tablet     escitalopram (LEXAPRO) 20 MG tablet     Ipratropium-Albuterol (COMBIVENT RESPIMAT)  MCG/ACT inhaler     ibuprofen (ADVIL,MOTRIN) 600 MG tablet     BUDESONIDE, INHALATION, 90 MCG/ACT AEPB     ipratropium - albuterol 0.5 mg/2.5 mg/3 mL (DUONEB) 0.5-2.5 (3) MG/3ML nebulization     Acetaminophen (TYLENOL PO)     DiphenhydrAMINE HCl (BENADRYL PO)     order for DME     No current facility-administered medications for this visit.      Allergies   Allergen Reactions     Seasonal Allergies      Ampicillin Rash     \"sunburn\"     Codeine Rash     \"sunburn\"     Decongestant [Oxymetazoline] Rash     \"sunburn\"     No Clinical Screening - See Comments Rash     Sulfa Drugs Rash     \"sunburn\"         OBJECTIVE:  /78  Pulse 78  Temp 98.2  F (36.8  C) (Tympanic)  Ht 5' 3\" (1.6 m)  Wt 256 lb 1 oz (116.1 kg)  BMI 45.36 kg/m2    General appearance: alert and no apparent distress  Skin color is pink and without rash.  HEENT:   Conjunctiva not injected.  Sclera clear.  Left external canal erythema, mild swelling and small amount of debris. Left TM is normal in color  Right TM is normal: no effusions, no " "erythema, and normal landmarks.  Nasal mucosa is congested   Oropharyngeal exam is normal other than somehat poor dentition and left lower back molar with filling occupying most of tooth: no lesions, erythema, adenopathy or exudate.No facial swelling.   Neck is supple, FROM with no adenopathy. Specifically no lateral neck swelling.   CARDIAC:NORMAL - regular rate and rhythm without murmur.  RESP: Normal - CTA without rales, rhonchi, or wheezing.  ABDOMEN: Abdomen soft, non-tender. BS normal. No masses, organomegaly    ASSESSMENT/PLAN:    (H60.502) Acute otitis externa of left ear, unspecified type  (primary encounter diagnosis)  Plan: ciprofloxacin-dexamethasone (CIPRODEX) otic         suspension    1. Cipro-dex as per above   2. Follow-up with dentist to evaluate left jaw pain if jaw pain fails to resolve with tx provided here today   3. Follow-up with PCP if sxs change, worsen or fail to fully resolve with above tx.  4.  In addition to the above, OE \"red flag\" signs and sxs are reviewed with pt both verbally and by way of printed educational material for home review.  Pt verbalizes understanding of and agrees to the above plan.         "

## 2017-08-16 ENCOUNTER — OFFICE VISIT (OUTPATIENT)
Dept: ENDOCRINOLOGY | Facility: CLINIC | Age: 47
End: 2017-08-16
Payer: COMMERCIAL

## 2017-08-16 VITALS
DIASTOLIC BLOOD PRESSURE: 83 MMHG | RESPIRATION RATE: 16 BRPM | OXYGEN SATURATION: 95 % | SYSTOLIC BLOOD PRESSURE: 122 MMHG | HEART RATE: 83 BPM | WEIGHT: 252.7 LBS | BODY MASS INDEX: 44.76 KG/M2 | TEMPERATURE: 98 F

## 2017-08-16 DIAGNOSIS — E66.01 MORBID OBESITY DUE TO EXCESS CALORIES (H): Primary | ICD-10-CM

## 2017-08-16 LAB
ALBUMIN SERPL-MCNC: 3.3 G/DL (ref 3.4–5)
ALP SERPL-CCNC: 90 U/L (ref 40–150)
ALT SERPL W P-5'-P-CCNC: 49 U/L (ref 0–50)
AST SERPL W P-5'-P-CCNC: 42 U/L (ref 0–45)
BILIRUB DIRECT SERPL-MCNC: 0.1 MG/DL (ref 0–0.2)
BILIRUB SERPL-MCNC: 0.5 MG/DL (ref 0.2–1.3)
PROT SERPL-MCNC: 7 G/DL (ref 6.8–8.8)
TSH SERPL DL<=0.005 MIU/L-ACNC: 1.84 MU/L (ref 0.4–4)

## 2017-08-16 PROCEDURE — 99204 OFFICE O/P NEW MOD 45 MIN: CPT | Performed by: CLINICAL NURSE SPECIALIST

## 2017-08-16 PROCEDURE — 84443 ASSAY THYROID STIM HORMONE: CPT | Performed by: CLINICAL NURSE SPECIALIST

## 2017-08-16 PROCEDURE — 80076 HEPATIC FUNCTION PANEL: CPT | Performed by: CLINICAL NURSE SPECIALIST

## 2017-08-16 PROCEDURE — 36415 COLL VENOUS BLD VENIPUNCTURE: CPT | Performed by: CLINICAL NURSE SPECIALIST

## 2017-08-16 RX ORDER — AMOXICILLIN 500 MG/1
CAPSULE ORAL
Refills: 0 | COMMUNITY
Start: 2017-08-10 | End: 2017-09-12

## 2017-08-16 RX ORDER — PHENTERMINE HYDROCHLORIDE 37.5 MG/1
37.5 TABLET ORAL
Qty: 32 TABLET | Refills: 0 | Status: SHIPPED | OUTPATIENT
Start: 2017-08-16 | End: 2017-09-12

## 2017-08-16 NOTE — NURSING NOTE
"Chief Complaint   Patient presents with     Weight Problem     management       Initial /83 (BP Location: Left arm, Patient Position: Chair, Cuff Size: Adult Large)  Pulse 83  Temp 98  F (36.7  C) (Oral)  Resp 16  Wt 252 lb 11.2 oz (114.6 kg)  SpO2 95%  Breastfeeding? No  BMI 44.76 kg/m2 Estimated body mass index is 44.76 kg/(m^2) as calculated from the following:    Height as of 8/9/17: 5' 3\" (1.6 m).    Weight as of this encounter: 252 lb 11.2 oz (114.6 kg).  Medication Reconciliation: complete  Chen Valadez M.A.  "

## 2017-08-16 NOTE — PROGRESS NOTES
Name: Hannah Meade  Seen at the request of Sandra Fink for obesity.  HPI:  Hannah Meade is a 47 year old female who presents for the evaluation of obesity.    Menses:   Diarrhea/Constipation:No  Changes in Hair or Skin: Yes, increased hair loss  Diabetes:No  Sleep: takes benadryl   Sleep Apnea/Snores: snores, no sleep apnea  Hypertension or CAD:No  Hyperlipidemia:Yes: recent cholesterol elevated  Hirsutism:No  Easy Bruising:No  Use of Steroids:No  Family history of Obesity:  Diet: Has tried [place] Weight loss, weight watchers.  Exercise:difficult - just got custody of 4 years old grandson and takes care of parents  PMH/PSH:  Past Medical History:   Diagnosis Date     Anxiety      Obesity, unspecified      Plantar fasciitis 2/23/2015     Tuberculin test reaction      Past Surgical History:   Procedure Laterality Date     C NONSPECIFIC PROCEDURE      2 C- SECTIONS     EXCISE MASS BACK Left 9/17/2014    Procedure: EXCISE MASS BACK;  Surgeon: Yaz Avilez MD;  Location: [facility] OR     EXCISE MASS UPPER EXTREMITY Left 9/17/2014    Procedure: EXCISE MASS UPPER EXTREMITY;  Surgeon: Yaz Avilez MD;  Location: [facility] OR     HYSTERECTOMY VAGINAL  1992    ovaries intact     ORTHOPEDIC SURGERY      left foot     other      lump on right arm removed      THORACOTOMY Right 8/25/2015    Procedure: THORACOTOMY;  Surgeon: Andrew Gordon MD;  Location: [facility] OR     Family Hx:  Family History   Problem Relation Age of Onset     Neurologic Disorder Mother      Parkinsons     DIABETES Mother      Hypertension Father      Cardiovascular Father      Gallbladder Disease Father      DIABETES Maternal Grandmother      C.A.D. Paternal Grandfather      Cardiovascular Paternal Grandfather      Hypertension Sister      Polycystic Kidney Diease Other      Thyroid disease: No         DM2: Yes: mother, MGM         Autoimmune: DM1, SLE, RA, Vitiligo No    Social Hx:  Social History     Social History     Marital status:       Spouse name: N/A     Number of children: N/A     Years of education: N/A     Occupational History     Not on file.     Social History Main Topics     Smoking status: Never Smoker     Smokeless tobacco: Never Used     Alcohol use No     Drug use: No     Sexual activity: Yes     Partners: Male     Birth control/ protection: Surgical     Other Topics Concern     Parent/Sibling W/ Cabg, Mi Or Angioplasty Before 65f 55m? No     Social History Narrative          MEDICATIONS:  has a current medication list which includes the following prescription(s): amoxicillin, ciprofloxacin-dexamethasone, furosemide, escitalopram, ipratropium-albuterol, order for dme, ibuprofen, budesonide (inhalation), ipratropium - albuterol 0.5 mg/2.5 mg/3 ml, acetaminophen, and diphenhydramine hcl.    ROS     GENERAL: no weight loss, fevers, chills, malaise, night sweats.   HEENT: no dysphagia, odonophagia, diplopia, neck pain or tenderness, dry/scratch eyes, URI, cough, sinus drainage, tinittus, sinus pressure  CV: no chest pain, pressure, palpitations, skipped beats, LOC  LUNGS: no SOB, MATHEWS, cough, sputum production, wheezing   ABDOMEN: no diarrhea, constipation, abdominal pain  EXTREMITIES: no rashes, ulcers, edema  NEUROLOGY: no changes in vision, tingling or numbness in hands or feet.   MSK: no muscle aches or pains, weakness  SKIN: no rashes or lesions  ENDOCRINE: no heat or cold intolerance    Physical Exam   VS: /83 (BP Location: Left arm, Patient Position: Chair, Cuff Size: Adult Large)  Pulse 83  Temp 98  F (36.7  C) (Oral)  Resp 16  Wt 114.6 kg (252 lb 11.2 oz)  SpO2 95%  Breastfeeding? No  BMI 44.76 kg/m2  GENERAL: AXOX3, NAD, well dressed, answering questions appropriately, appears stated age.  HEENT: OP clear, no LAD, no TM, non-tender, no exophthalmos, no proptosis, EOMI, no lig lag, no retraction  NECK:  Supple, thyroid gland palpably full, no adenopathy  CV: RRR, no rubs, gallops, no murmurs  LUNGS: CTAB,  "no wheezes, rales, or rhonchi  ABDOMEN: soft, nontender, nondistended, no broad striae noted.  EXTREMITIES: no edema, +pulses, no rashes, no lesions  NEUROLOGY: CN grossly intact, no tremors  MSK: grossly intact  SKIN: no acanthosis, skin tags; no rashes, no lesions, no intertrigo    LABS:  !COMPREHENSIVE Latest Ref Rng & Units 2017   SODIUM 133 - 144 mmol/L 140   POTASSIUM 3.4 - 5.3 mmol/L 4.2   CHLORIDE 94 - 109 mmol/L 107   BUN 7 - 30 mg/dL 11   Creatinine 0.52 - 1.04 mg/dL    Creatinine 0.52 - 1.04 mg/dL 0.73   Glucose 70 - 99 mg/dL 111 (H)   ANION GAP 3 - 14 mmol/L 7   CALCIUM 8.5 - 10.1 mg/dL 9.3   ALBUMIN 3.4 - 5.0 g/dL 3.5     !LIPID/HEPATIC Latest Ref Rng & Units 2017   CHOLESTEROL <200 mg/dL 222 (H)   TRIGLYCERIDES <150 mg/dL 295 (H)   HDL CHOLESTEROL >49 mg/dL 54   LDL CHOLESTEROL, CALCULATED <100 mg/dL 109 (H)   VLDL-CHOLESTEROL 0 - 30 mg/dL    NON HDL CHOLESTEROL <130 mg/dL 168 (H)   CHOLESTEROL/HDL RATIO 0.0 - 5.0    AST 0 - 45 U/L 46 (H)   ALT 0 - 50 U/L 60 (H)     !THYROID Latest Ref Rng & Units 2013   TSH 0.4 - 5.0 mU/L 1.98     Component    Latest Ref Rng & Units 2017   Hemoglobin A1C    4.3 - 6.0 % 5.4     Vital Signs 2017   Weight (LB) 256 lb 1 oz   Height 5' 3\"   BMI (Calculated) 45.45   Waist circumference: 51\" (today)    EK2017 - normal    All pertinent notes, labs, and images personally reviewed by me.     A/P  Ms.Shelly VIDA Meade is a 47 year old here for the evaluation of obesity:    1. Morbid Obesity- BMI 45-46. Comorbidities hyperlipidemia.  Obesity is associated with a significant increase in mortality and risk of many disorders, including diabetes mellitus, hypertension, dyslipidemia, heart disease, stroke, sleep apnea, cancer, and many others. Conversely, weight loss is associated with a reduction in obesity-associated morbidity.    Endocrine evaluation of obesity includes Diabetes/prediabetes, Cushing's and thyroid dysfunction.  The relevant work up for " the diagnosis and management of obesity and various treatment options were discussed. Body Mass Index (BMI) has been a standard means for calculating risk for overweight and obesity. The new American Association of Clinical Endocrinology (AACE) algorithm recommends lifestyle modifications as the initial phase of treatment for all patients with the BMI equal or greater than 25 kg/m2. Lifestyle modifications includes use of medical nutrition therapy, exercise, tobacco cessation, adequate quality and quantity of sleep, limited consumption of alcohol and reduced stress through implementation of a structured, multidisciplinary program.    In patients with complications associated with obesity, graded interventions are recommended including pharmacological therapy such as phentermine, orlistat, lorcaserin and phentermine/topiramate ER, contrave ( buproprion/naltreone) and the use of very low calorie meal replacement programs.    If medical intervention is insufficient, surgical therapy may be considered, especially in patients with BMI greater than or equal to 35 kg/m2 with multiple complications. Surgical treatments include lap-band, gastric sleeve or gastric bypass surgery.    I informed the pt that:  1.Weight loss medications can be taken to assist with weight reduction when combined with appropriate healthy lifestyle changes.  2.I discussed possible s/e, risks and benefits of weight loss medications.  3.All medications are FDA approved, however, some may be used ''off label'' for their weight loss benefits and some ''short term'' medications can be used for longer periods to achieve desired clinical outcomes.  4.All patients taking weight loss medications must be seen in clinic for refill authorization.    Counseling exercise ( V65.41)  Dietary counseling( V65.3)  Calculated BMI above the upper parameter and f/u plan was documented in the medical record.  Discussed indications, risks and benefits of all medications  prescribed, and answered questions to patient's satisfaction.  Start Phentermine 37.5 mg, 1/2 tab x 1 week then increase to 1 tab qd as tolerated.    Slightly elevated LFT's.  Recheck today.  .      Labs ordered today:   Orders Placed This Encounter   Procedures     Hepatic panel     TSH with free T4 reflex     Radiology/Consults ordered today: None    More than 50% of the time spent with Ms. Meade on counseling / coordinating her care.  Total face to face time was 30 minutes.      Follow-up:  follow up in 1 month(s)    Charlotte Hallman NP  Endocrinology  [facility]  CC: Sandra Fink   ____________________________________________________________

## 2017-08-16 NOTE — MR AVS SNAPSHOT
After Visit Summary   8/16/2017    Hannah Meade    MRN: [id number]           Patient Information     Date Of Birth          1970        Visit Information        Provider Department      8/16/2017 8:00 AM Charlotte Hallman APRN CNP [facility]        Today's Diagnoses     Morbid obesity due to excess calories (H)    -  1    BMI 45.0-49.9, adult (H)           Follow-ups after your visit        Follow-up notes from your care team     Return in about 4 weeks (around 9/13/2017).      Who to contact     If you have questions or need follow up information about today's clinic visit or your schedule please contact [facility] directly at [phone].  Normal or non-critical lab and imaging results will be communicated to you by [employer], letter or phone within 4 business days after the clinic has received the results. If you do not hear from us within 7 days, please contact the clinic through [employer] or phone. If you have a critical or abnormal lab result, we will notify you by phone as soon as possible.  Submit refill requests through [employer] or call your pharmacy and they will forward the refill request to us. Please allow 3 business days for your refill to be completed.          Additional Information About Your Visit        MyChart Information     [employer] gives you secure access to your electronic health record. If you see a primary care provider, you can also send messages to your care team and make appointments. If you have questions, please call your primary care clinic.  If you do not have a primary care provider, please call [phone] and they will assist you.        Care EveryWhere ID     This is your Care EveryWhere ID. This could be used by other organizations to access your [place] medical records  [id number]        Your Vitals Were     Pulse Temperature Respirations Pulse Oximetry Breastfeeding? BMI (Body Mass Index)    83 98  F (36.7  C) (Oral)  16 95% No 44.76 kg/m2       Blood Pressure from Last 3 Encounters:   08/16/17 122/83   08/09/17 128/78   07/31/17 126/90    Weight from Last 3 Encounters:   08/16/17 114.6 kg (252 lb 11.2 oz)   08/09/17 116.1 kg (256 lb 1 oz)   07/31/17 114.3 kg (252 lb)              We Performed the Following     Hepatic panel     TSH with free T4 reflex          Today's Medication Changes          These changes are accurate as of: 8/16/17  8:35 AM.  If you have any questions, ask your nurse or doctor.               Start taking these medicines.        Dose/Directions    phentermine 37.5 MG tablet   Commonly known as:  ADIPEX-P   Used for:  Morbid obesity due to excess calories (H), BMI 45.0-49.9, adult (H)   Started by:  Charlotte Hallman APRN CNP        Dose:  37.5 mg   Take 1 tablet (37.5 mg) by mouth every morning (before breakfast) Take 1/2 tab q am x 1 week then increase to 1 tab q am   Quantity:  32 tablet   Refills:  0            Where to get your medicines      Some of these will need a paper prescription and others can be bought over the counter.  Ask your nurse if you have questions.     Bring a paper prescription for each of these medications     phentermine 37.5 MG tablet                Primary Care Provider Office Phone # Fax #    Sandra Fink [phone] [address] [facility] [address]        Equal Access to Services     JL JACKSON AH: Hadii claus crafto Sobibi, waaxda luqadaha, qaybta kaalmada ademanavyada, waxay winter nice ademanav khan. So [facility] [phone].    ATENCIÓN: Si habla español, tiene a lloyd disposición servicios gratuitos de asistencia lingüística. Llkailee al [phone].    We comply with applicable federal civil rights laws and [place] laws. We do not discriminate on the basis of race, color, national origin, age, disability sex, sexual orientation or gender identity.            Thank you!     Thank you for choosing [facility]  for  your care. Our goal is always to provide you with excellent care. Hearing back from our patients is one way we can continue to improve our services. Please take a few minutes to complete the written survey that you may receive in the mail after your visit with us. Thank you!             Your Updated Medication List - Protect others around you: Learn how to safely use, store and throw away your medicines at www.disposemymeds.org.          This list is accurate as of: 8/16/17  8:35 AM.  Always use your most recent med list.                   Brand Name Dispense Instructions for use Diagnosis    amoxicillin 500 MG capsule    AMOXIL     TK 1 C PO TID UNTIL GONE        BENADRYL PO      Take 25 mg by mouth daily as needed        BUDESONIDE (INHALATION) 90 MCG/ACT Aepb     1 each    Inhale 2 puffs into the lungs 2 times daily    SOB (shortness of breath)       ciprofloxacin-dexamethasone otic suspension    CIPRODEX    7.5 mL    Place 4 drops Into the left ear 2 times daily for 7 days    Acute otitis externa of left ear, unspecified type       escitalopram 20 MG tablet    LEXAPRO    90 tablet    TAKE 1 TABLET (20 MG) BY MOUTH DAILY    Anxiety       furosemide 20 MG tablet    LASIX    30 tablet    TAKE 1 TABLET (20 MG) BY MOUTH DAILY    Localized edema       ibuprofen 600 MG tablet    ADVIL/MOTRIN    40 tablet    Take 1 tablet (600 mg) by mouth 3 times daily    Acute exacerbation of chronic low back pain       * ipratropium - albuterol 0.5 mg/2.5 mg/3 mL 0.5-2.5 (3) MG/3ML neb solution    DUONEB    30 vial    Take 1 vial (3 mLs) by nebulization every 6 hours as needed for shortness of breath / dyspnea or wheezing    SOB (shortness of breath)       * Ipratropium-Albuterol  MCG/ACT inhaler    COMBIVENT RESPIMAT    1 Inhaler    Inhale 1 puff into the lungs every 4 hours Not to exceed 6 doses per day.    SOB (shortness of breath)       order for DME     1 Device    Equipment being ordered: knee high compression stocking  for left leg compression stocking with 20-30 mm of pressure    Pain and swelling of left ankle       phentermine 37.5 MG tablet    ADIPEX-P    32 tablet    Take 1 tablet (37.5 mg) by mouth every morning (before breakfast) Take 1/2 tab q am x 1 week then increase to 1 tab q am    Morbid obesity due to excess calories (H), BMI 45.0-49.9, adult (H)       TYLENOL PO      Take 1,000 mg by mouth every 6 hours as needed for mild pain or fever        * Notice:  This list has 2 medication(s) that are the same as other medications prescribed for you. Read the directions carefully, and ask your doctor or other care provider to review them with you.

## 2017-08-17 NOTE — PROGRESS NOTES
Hannah,  Your liver function and thyroid function tests were both normal.  Here's a copy of the results for your records.  Charlotte Hallman NP  Endocrinology

## 2017-09-12 ENCOUNTER — OFFICE VISIT (OUTPATIENT)
Dept: ENDOCRINOLOGY | Facility: CLINIC | Age: 47
End: 2017-09-12
Payer: COMMERCIAL

## 2017-09-12 VITALS
DIASTOLIC BLOOD PRESSURE: 76 MMHG | SYSTOLIC BLOOD PRESSURE: 110 MMHG | RESPIRATION RATE: 16 BRPM | TEMPERATURE: 98 F | WEIGHT: 243.5 LBS | HEART RATE: 88 BPM | BODY MASS INDEX: 43.13 KG/M2

## 2017-09-12 DIAGNOSIS — E78.5 HYPERLIPIDEMIA LDL GOAL <100: ICD-10-CM

## 2017-09-12 DIAGNOSIS — E66.01 MORBID OBESITY DUE TO EXCESS CALORIES (H): ICD-10-CM

## 2017-09-12 PROCEDURE — 99214 OFFICE O/P EST MOD 30 MIN: CPT | Performed by: CLINICAL NURSE SPECIALIST

## 2017-09-12 RX ORDER — PHENTERMINE HYDROCHLORIDE 37.5 MG/1
37.5 TABLET ORAL
Qty: 32 TABLET | Refills: 2 | Status: SHIPPED | OUTPATIENT
Start: 2017-09-12 | End: 2017-12-26

## 2017-09-12 NOTE — MR AVS SNAPSHOT
After Visit Summary   9/12/2017    Hannah Meade    MRN: [id number]           Patient Information     Date Of Birth          1970        Visit Information        Provider Department      9/12/2017 8:00 AM Charlotte Hallman APRN CNP [facility]        Today's Diagnoses     BMI 40.0-44.9, adult (H)    -  1    Morbid obesity due to excess calories (H)        Hyperlipidemia LDL goal <100           Follow-ups after your visit        Follow-up notes from your care team     Return in about 3 months (around 12/12/2017).      Who to contact     If you have questions or need follow up information about today's clinic visit or your schedule please contact [facility] directly at [phone].  Normal or non-critical lab and imaging results will be communicated to you by [employer], letter or phone within 4 business days after the clinic has received the results. If you do not hear from us within 7 days, please contact the clinic through [employer] or phone. If you have a critical or abnormal lab result, we will notify you by phone as soon as possible.  Submit refill requests through [employer] or call your pharmacy and they will forward the refill request to us. Please allow 3 business days for your refill to be completed.          Additional Information About Your Visit        MyChart Information     [employer] gives you secure access to your electronic health record. If you see a primary care provider, you can also send messages to your care team and make appointments. If you have questions, please call your primary care clinic.  If you do not have a primary care provider, please call [phone] and they will assist you.        Care EveryWhere ID     This is your Care EveryWhere ID. This could be used by other organizations to access your [place] medical records  [id number]        Your Vitals Were     Pulse Temperature Respirations BMI (Body Mass Index)          88 98  F  (36.7  C) (Oral) 16 43.13 kg/m2         Blood Pressure from Last 3 Encounters:   09/12/17 110/76   08/16/17 122/83   08/09/17 128/78    Weight from Last 3 Encounters:   09/12/17 110.5 kg (243 lb 8 oz)   08/16/17 114.6 kg (252 lb 11.2 oz)   08/09/17 116.1 kg (256 lb 1 oz)              Today, you had the following     No orders found for display         Today's Medication Changes          These changes are accurate as of: 9/12/17  8:31 AM.  If you have any questions, ask your nurse or doctor.               These medicines have changed or have updated prescriptions.        Dose/Directions    phentermine 37.5 MG tablet   Commonly known as:  ADIPEX-P   This may have changed:  additional instructions   Used for:  Morbid obesity due to excess calories (H)   Changed by:  Charlotte Hallman APRN CNP        Dose:  37.5 mg   Take 1 tablet (37.5 mg) by mouth every morning (before breakfast)   Quantity:  32 tablet   Refills:  2            Where to get your medicines      Some of these will need a paper prescription and others can be bought over the counter.  Ask your nurse if you have questions.     Bring a paper prescription for each of these medications     phentermine 37.5 MG tablet                Primary Care Provider Office Phone # Fax #    Sandra EdmondsonDO trang [phone] [address] [facility] [address]        Equal Access to Services     [facility] : Hadii claus donahue hadasho Soelisali, waaxda luqadaha, qaybta kaalmada adeegyada, man menezes . So [facility] [phone].    ATENCIÓN: Si habla español, tiene a lloyd disposición servicios gratuitos de asistencia lingüística. Llame al [phone].    We comply with applicable federal civil rights laws and [place] laws. We do not discriminate on the basis of race, color, national origin, age, disability sex, sexual orientation or gender identity.            Thank you!     Thank you for choosing [facility]  for  your care. Our goal is always to provide you with excellent care. Hearing back from our patients is one way we can continue to improve our services. Please take a few minutes to complete the written survey that you may receive in the mail after your visit with us. Thank you!             Your Updated Medication List - Protect others around you: Learn how to safely use, store and throw away your medicines at www.disposemymeds.org.          This list is accurate as of: 9/12/17  8:31 AM.  Always use your most recent med list.                   Brand Name Dispense Instructions for use Diagnosis    BENADRYL PO      Take 25 mg by mouth daily as needed        BUDESONIDE (INHALATION) 90 MCG/ACT Aepb     1 each    Inhale 2 puffs into the lungs 2 times daily    SOB (shortness of breath)       escitalopram 20 MG tablet    LEXAPRO    90 tablet    TAKE 1 TABLET (20 MG) BY MOUTH DAILY    Anxiety       furosemide 20 MG tablet    LASIX    30 tablet    TAKE 1 TABLET (20 MG) BY MOUTH DAILY    Localized edema       ibuprofen 600 MG tablet    ADVIL/MOTRIN    40 tablet    Take 1 tablet (600 mg) by mouth 3 times daily    Acute exacerbation of chronic low back pain       * ipratropium - albuterol 0.5 mg/2.5 mg/3 mL 0.5-2.5 (3) MG/3ML neb solution    DUONEB    30 vial    Take 1 vial (3 mLs) by nebulization every 6 hours as needed for shortness of breath / dyspnea or wheezing    SOB (shortness of breath)       * Ipratropium-Albuterol  MCG/ACT inhaler    COMBIVENT RESPIMAT    1 Inhaler    Inhale 1 puff into the lungs every 4 hours Not to exceed 6 doses per day.    SOB (shortness of breath)       phentermine 37.5 MG tablet    ADIPEX-P    32 tablet    Take 1 tablet (37.5 mg) by mouth every morning (before breakfast)    Morbid obesity due to excess calories (H)       * Notice:  This list has 2 medication(s) that are the same as other medications prescribed for you. Read the directions carefully, and ask your doctor or other care provider  to review them with you.

## 2017-09-12 NOTE — NURSING NOTE
"Chief Complaint   Patient presents with     Weight Loss       Initial /76 (BP Location: Left arm, Patient Position: Chair, Cuff Size: Adult Large)  Pulse 88  Temp 98  F (36.7  C) (Oral)  Resp 16  Wt 243 lb 8 oz (110.5 kg)  BMI 43.13 kg/m2 Estimated body mass index is 43.13 kg/(m^2) as calculated from the following:    Height as of 8/9/17: 5' 3\" (1.6 m).    Weight as of this encounter: 243 lb 8 oz (110.5 kg).  Medication Reconciliation: complete    "

## 2017-09-12 NOTE — PROGRESS NOTES
Name: Hannah Meade  Seen at the request of Sandra Fink for obesity (Last seen 8/16/2017).  HPI:  Hannah Meade is a 47 year old female who presents for the evaluation of obesity.  Started Phentermine 8/16/17. She is tolerating the phentermine well, no dry mouth, no tachycardia or palpitations, no constipation, no increased anxiety.  She doesn't sleep well but this is unchanged and was present prior to starting phentermine - she attributes this to disrupted sleep from her 4 year old grandson who is autisti    Diarrhea/Constipation:No  Changes in Hair or Skin: Yes, increased hair loss  Diabetes:No  Sleep: takes benadryl   Sleep Apnea/Snores: snores, no sleep apnea  Hypertension or CAD:No  Hyperlipidemia:Yes: recent cholesterol elevated  Diet: Has tried [place] Weight loss, weight watchers.  Current diet efforts include portion control, limited snacking - eliminated sweets and junk food, making healthier food choices.  Exercise:difficult - just got custody of 4 years old grandson and takes care of parents  PMH/PSH:  Past Medical History:   Diagnosis Date     Anxiety      Obesity, unspecified      Plantar fasciitis 2/23/2015     Tuberculin test reaction      Past Surgical History:   Procedure Laterality Date     C NONSPECIFIC PROCEDURE      2 C- SECTIONS     EXCISE MASS BACK Left 9/17/2014    Procedure: EXCISE MASS BACK;  Surgeon: Yaz Avilez MD;  Location: [facility] OR     EXCISE MASS UPPER EXTREMITY Left 9/17/2014    Procedure: EXCISE MASS UPPER EXTREMITY;  Surgeon: Yaz Avilez MD;  Location: [facility] OR     HYSTERECTOMY VAGINAL  1992    ovaries intact     ORTHOPEDIC SURGERY      left foot     other      lump on right arm removed      THORACOTOMY Right 8/25/2015    Procedure: THORACOTOMY;  Surgeon: Andrew Gordon MD;  Location:  OR     Family Hx:  Family History   Problem Relation Age of Onset     Neurologic Disorder Mother      Parkinsons     DIABETES Mother      Hypertension Father       Cardiovascular Father      Gallbladder Disease Father      DIABETES Maternal Grandmother      C.A.D. Paternal Grandfather      Cardiovascular Paternal Grandfather      Hypertension Sister      Polycystic Kidney Diease Other      Thyroid disease: No         DM2: Yes: mother, MGM         Autoimmune: DM1, SLE, RA, Vitiligo No    Social Hx:  Social History     Social History     Marital status:      Spouse name: N/A     Number of children: N/A     Years of education: N/A     Occupational History     Not on file.     Social History Main Topics     Smoking status: Never Smoker     Smokeless tobacco: Never Used     Alcohol use No     Drug use: No     Sexual activity: Yes     Partners: Male     Birth control/ protection: Surgical     Other Topics Concern     Parent/Sibling W/ Cabg, Mi Or Angioplasty Before 65f 55m? No     Social History Narrative          MEDICATIONS:  has a current medication list which includes the following prescription(s): phentermine, furosemide, escitalopram, ipratropium-albuterol, ibuprofen, budesonide (inhalation), ipratropium - albuterol 0.5 mg/2.5 mg/3 ml, and diphenhydramine hcl.    ROS     GENERAL: no weight loss, fevers, chills, malaise, night sweats.   HEENT: no dysphagia, odonophagia, diplopia, neck pain or tenderness, dry/scratch eyes, URI, cough, sinus drainage, tinittus, sinus pressure  CV: no chest pain, pressure, palpitations, skipped beats, LOC  LUNGS: no SOB, MATHEWS, cough, sputum production, wheezing   ABDOMEN: no diarrhea, constipation, abdominal pain  EXTREMITIES: no rashes, ulcers, edema  NEUROLOGY: no changes in vision, tingling or numbness in hands or feet.   MSK: no muscle aches or pains, weakness  SKIN: no rashes or lesions  ENDOCRINE: no heat or cold intolerance    Physical Exam   VS: /76 (BP Location: Left arm, Patient Position: Chair, Cuff Size: Adult Large)  Pulse 88  Temp 98  F (36.7  C) (Oral)  Resp 16  Wt 110.5 kg (243 lb 8 oz)  BMI 43.13 kg/m2  GENERAL:  "AXOX3, NAD, well dressed, answering questions appropriately, appears stated age.  HEENT: no exophthalmos, no proptosis, EOMI, no lig lag, no retraction  CV: RRR, no rubs, gallops, no murmurs  LUNGS: Clear.  ABDOMEN: nondistended.  EXTREMITIES: no edema, +pulses, no rashes, no lesions  NEUROLOGY: CN grossly intact, no tremors  MSK: grossly intact    LABS:  !COMPREHENSIVE Latest Ref Rng & Units 2017   SODIUM 133 - 144 mmol/L 138 140   POTASSIUM 3.4 - 5.3 mmol/L 4.1 4.2   CHLORIDE 94 - 109 mmol/L 104 107   BUN 7 - 30 mg/dL 15 11   Creatinine 0.52 - 1.04 mg/dL     Creatinine 0.52 - 1.04 mg/dL 0.72 0.73   Glucose 70 - 99 mg/dL 103 (H) 111 (H)   ANION GAP 3 - 14 mmol/L 5 7   CALCIUM 8.5 - 10.1 mg/dL 9.3 9.3     !LIPID/HEPATIC Latest Ref Rng & Units 2017   CHOLESTEROL <200 mg/dL 222 (H)   TRIGLYCERIDES <150 mg/dL 295 (H)   HDL CHOLESTEROL >49 mg/dL 54   LDL CHOLESTEROL, CALCULATED <100 mg/dL 109 (H)   VLDL-CHOLESTEROL 0 - 30 mg/dL    NON HDL CHOLESTEROL <130 mg/dL 168 (H)   CHOLESTEROL/HDL RATIO 0.0 - 5.0      !LIPID/HEPATIC Latest Ref Rng & Units 2012   AST 0 - 45 U/L 24 46 (H) 42   ALT 0 - 50 U/L 25 60 (H) 49     !THYROID Latest Ref Rng & Units 2013   TSH 0.4 - 5.0 mU/L 1.98     Component    Latest Ref Rng & Units 2017   Hemoglobin A1C    4.3 - 6.0 % 5.4     Vital Signs 2017   Weight (LB) 252 lb 11.2 oz 243 lb 8 oz   Height     BMI (Calculated) 45.45 43.13     Waist circumference: 51\" (2017).  47.25\" (today)    EK2017 - normal    All pertinent notes, labs, and images personally reviewed by me.     A/P  Ms.Shelly VIDA Meade is a 47 year old here for the evaluation of obesity:    1. Morbid Obesity- BMI 45.45-->43.13. Comorbidities hyperlipidemia.  Obesity is associated with a significant increase in mortality and risk of many disorders, including diabetes mellitus, hypertension, dyslipidemia, heart disease, stroke, sleep apnea, cancer, and many " others. Conversely, weight loss is associated with a reduction in obesity-associated morbidity.    Endocrine evaluation of obesity includes Diabetes/prediabetes, Cushing's and thyroid dysfunction.  The relevant work up for the diagnosis and management of obesity and various treatment options were discussed. Body Mass Index (BMI) has been a standard means for calculating risk for overweight and obesity. The new American Association of Clinical Endocrinology (AACE) algorithm recommends lifestyle modifications as the initial phase of treatment for all patients with the BMI equal or greater than 25 kg/m2. Lifestyle modifications includes use of medical nutrition therapy, exercise, tobacco cessation, adequate quality and quantity of sleep, limited consumption of alcohol and reduced stress through implementation of a structured, multidisciplinary program.    In patients with complications associated with obesity, graded interventions are recommended including pharmacological therapy such as phentermine, orlistat, lorcaserin and phentermine/topiramate ER, contrave ( buproprion/naltreone) and the use of very low calorie meal replacement programs.    If medical intervention is insufficient, surgical therapy may be considered, especially in patients with BMI greater than or equal to 35 kg/m2 with multiple complications. Surgical treatments include lap-band, gastric sleeve or gastric bypass surgery.    I informed the pt that:  1.Weight loss medications can be taken to assist with weight reduction when combined with appropriate healthy lifestyle changes.  2.I discussed possible s/e, risks and benefits of weight loss medications.  3.All medications are FDA approved, however, some may be used ''off label'' for their weight loss benefits and some ''short term'' medications can be used for longer periods to achieve desired clinical outcomes.  4.All patients taking weight loss medications must be seen in clinic for refill  authorization.    Counseling exercise ( V65.41)  Dietary counseling( V65.3)  Calculated BMI above the upper parameter and f/u plan was documented in the medical record.  Discussed indications, risks and benefits of all medications prescribed, and answered questions to patient's satisfaction.  Started Phentermine 37.5 mg/day one month ago (8/2017).  She has lost 9 lbs since starting Phentermine + diet and exercise efforts, 252-->243 lbs.  Continue Phentermine 37.5 mg qd.    Slightly elevated LFT's-resolved.  .    2.  Hyperlipidemia.  Total and LDL cholesterol slightly elevated.  Triglycerides also elevated at 295.  Recommend continued diet, exercise, and weight loss efforts.  I would expect this to improve with these efforts, especially the triglycerides.  Continue to monitor.    Labs ordered today:   No orders of the defined types were placed in this encounter.    Radiology/Consults ordered today: None    More than 50% of the time spent with Ms. Meade on counseling / coordinating her care.  Total face to face time was 20 minutes.      Follow-up:  follow up in 3 month(s)    Charlotte Hallman NP  Endocrinology  [facility]  CC: Sandra Fink   ____________________________________________________________

## 2017-10-17 ENCOUNTER — OFFICE VISIT (OUTPATIENT)
Dept: FAMILY MEDICINE | Facility: CLINIC | Age: 47
End: 2017-10-17
Payer: COMMERCIAL

## 2017-10-17 ENCOUNTER — MYC REFILL (OUTPATIENT)
Dept: FAMILY MEDICINE | Facility: CLINIC | Age: 47
End: 2017-10-17

## 2017-10-17 VITALS
HEART RATE: 93 BPM | SYSTOLIC BLOOD PRESSURE: 132 MMHG | DIASTOLIC BLOOD PRESSURE: 85 MMHG | HEIGHT: 63 IN | TEMPERATURE: 98 F | OXYGEN SATURATION: 98 % | BODY MASS INDEX: 42.95 KG/M2 | WEIGHT: 242.4 LBS | RESPIRATION RATE: 16 BRPM

## 2017-10-17 DIAGNOSIS — R06.02 SOB (SHORTNESS OF BREATH): ICD-10-CM

## 2017-10-17 PROCEDURE — 99214 OFFICE O/P EST MOD 30 MIN: CPT | Performed by: FAMILY MEDICINE

## 2017-10-17 RX ORDER — PREDNISONE 20 MG/1
40 TABLET ORAL DAILY
Qty: 10 TABLET | Refills: 0 | Status: SHIPPED | OUTPATIENT
Start: 2017-10-17 | End: 2017-10-22

## 2017-10-17 RX ORDER — AZITHROMYCIN 250 MG/1
TABLET, FILM COATED ORAL
Qty: 6 TABLET | Refills: 0 | Status: SHIPPED | OUTPATIENT
Start: 2017-10-17 | End: 2017-10-26

## 2017-10-17 NOTE — PROGRESS NOTES
SUBJECTIVE:   Hannah Meade is a 47 year old female who presents to clinic today for the following health issues:      Acute Illness   Acute illness concerns: URI  Onset: 2 weeks     Fever: YES    Chills/Sweats: YES    Headache (location?): no     Sinus Pressure:no    Conjunctivitis:  no    Ear Pain: no    Rhinorrhea: no     Congestion: no    Sore Throat: no      Cough: YES-productive of clear sputum    Wheeze: YES    Decreased Appetite: YES    Nausea: YES    Vomiting: YES    Diarrhea:  no     Dysuria/Freq.: no     Fatigue/Achiness: YES    Sick/Strep Exposure: no      Therapies Tried and outcome: Albuterol inhalers, benadryl, Tessalon        Problem list and histories reviewed & adjusted, as indicated.  Additional history: as documented    Patient Active Problem List   Diagnosis     Backache     Carbuncle and furuncle of trunk     CARDIOVASCULAR SCREENING; LDL GOAL LESS THAN 160     Insomnia     Anxiety     Plantar fasciitis     Ovarian cyst     Chest mass     Bronchogenic cyst     Chest wall pain     Right-sided low back pain with left-sided sciatica     Morbid obesity (H)     bmi over 40     BMI 40.0-44.9, adult (H)     Past Surgical History:   Procedure Laterality Date     C NONSPECIFIC PROCEDURE      2 C- SECTIONS     EXCISE MASS BACK Left 9/17/2014    Procedure: EXCISE MASS BACK;  Surgeon: Yaz Avilez MD;  Location: [facility] OR     EXCISE MASS UPPER EXTREMITY Left 9/17/2014    Procedure: EXCISE MASS UPPER EXTREMITY;  Surgeon: Yaz Avilez MD;  Location: [facility] OR     HYSTERECTOMY VAGINAL  1992    ovaries intact     ORTHOPEDIC SURGERY      left foot     other      lump on right arm removed      THORACOTOMY Right 8/25/2015    Procedure: THORACOTOMY;  Surgeon: Andrew Gordon MD;  Location:  OR       Social History   Substance Use Topics     Smoking status: Never Smoker     Smokeless tobacco: Never Used     Alcohol use No     Family History   Problem Relation Age of Onset     Neurologic  "Disorder Mother      Parkinsons     DIABETES Mother      Hypertension Father      Cardiovascular Father      Gallbladder Disease Father      DIABETES Maternal Grandmother      C.A.D. Paternal Grandfather      Cardiovascular Paternal Grandfather      Hypertension Sister      Polycystic Kidney Diease Other              Reviewed and updated as needed this visit by clinical staff     Reviewed and updated as needed this visit by Provider         ROS:  Constitutional, HEENT, cardiovascular, pulmonary, gi and gu systems are negative, except as otherwise noted.      OBJECTIVE:   /85 (BP Location: Right arm, Patient Position: Chair, Cuff Size: Adult Large)  Pulse 93  Temp 98  F (36.7  C) (Oral)  Resp 16  Ht 5' 3\" (1.6 m)  Wt 242 lb 6.4 oz (110 kg)  SpO2 98%  BMI 42.94 kg/m2  Body mass index is 42.94 kg/(m^2).  GENERAL: healthy, alert and no distress  RESP: lungs clear to auscultation - no rales, rhonchi or wheezes and decreased breath sounds throughout  CV: regular rate and rhythm, normal S1 S2, no S3 or S4, no murmur, click or rub, no peripheral edema and peripheral pulses strong    ASSESSMENT/PLAN:     1. SOB (shortness of breath)  - Long history of SOB  - Echo normal earlier this year  - Patient does not have a formal diagnosis of asthma   - She has seen pulmonology in the past, but did not follow up  - Has pulmicort prescribed, but doesn't use it because she doesn't like the powder  - Will start Qvar instead of pulmicort  - Continue Combivent inhaler PRN  - Rx for prednisone and Zpack   - beclomethasone (QVAR) 80 MCG/ACT Inhaler; Inhale 2 puffs into the lungs 2 times daily  Dispense: 1 Inhaler; Refill: 1  - predniSONE (DELTASONE) 20 MG tablet; Take 2 tablets (40 mg) by mouth daily for 5 days  Dispense: 10 tablet; Refill: 0  - azithromycin (ZITHROMAX) 250 MG tablet; Two tablets first day, then one tablet daily for four days.  Dispense: 6 tablet; Refill: 0    Follow up in 2-3 weeks.  If Qvar is not helping " would recommend referral back to pulmonology.      Sandra Fink, DO  [facility]

## 2017-10-17 NOTE — MR AVS SNAPSHOT
"              After Visit Summary   10/17/2017    Hannah Meade    MRN: [id number]           Patient Information     Date Of Birth          1970        Visit Information        Provider Department      10/17/2017 7:00 AM Sandra Fink, DO [facility]        Today's Diagnoses     SOB (shortness of breath)           Follow-ups after your visit        Who to contact     If you have questions or need follow up information about today's clinic visit or your schedule please contact [facility] directly at [phone].  Normal or non-critical lab and imaging results will be communicated to you by [employer]hart, letter or phone within 4 business days after the clinic has received the results. If you do not hear from us within 7 days, please contact the clinic through [employer] or phone. If you have a critical or abnormal lab result, we will notify you by phone as soon as possible.  Submit refill requests through [employer] or call your pharmacy and they will forward the refill request to us. Please allow 3 business days for your refill to be completed.          Additional Information About Your Visit        MyChart Information     [employer] gives you secure access to your electronic health record. If you see a primary care provider, you can also send messages to your care team and make appointments. If you have questions, please call your primary care clinic.  If you do not have a primary care provider, please call [phone] and they will assist you.        Care EveryWhere ID     This is your Care EveryWhere ID. This could be used by other organizations to access your [place] medical records  [id number]        Your Vitals Were     Pulse Temperature Respirations Height Pulse Oximetry BMI (Body Mass Index)    93 98  F (36.7  C) (Oral) 16 5' 3\" (1.6 m) 98% 42.94 kg/m2       Blood Pressure from Last 3 Encounters:   10/17/17 132/85   09/12/17 110/76   08/16/17 122/83    Weight from Last " 3 Encounters:   10/17/17 242 lb 6.4 oz (110 kg)   09/12/17 243 lb 8 oz (110.5 kg)   08/16/17 252 lb 11.2 oz (114.6 kg)              Today, you had the following     No orders found for display         Today's Medication Changes          These changes are accurate as of: 10/17/17  7:45 AM.  If you have any questions, ask your nurse or doctor.               Start taking these medicines.        Dose/Directions    azithromycin 250 MG tablet   Commonly known as:  ZITHROMAX   Used for:  SOB (shortness of breath)   Started by:  Sandra Fink DO        Two tablets first day, then one tablet daily for four days.   Quantity:  6 tablet   Refills:  0       beclomethasone 80 MCG/ACT Inhaler   Commonly known as:  QVAR   Used for:  SOB (shortness of breath)   Started by:  Sandra Fink DO        Dose:  2 puff   Inhale 2 puffs into the lungs 2 times daily   Quantity:  1 Inhaler   Refills:  1       predniSONE 20 MG tablet   Commonly known as:  DELTASONE   Used for:  SOB (shortness of breath)   Started by:  Sandra Fink DO        Dose:  40 mg   Take 2 tablets (40 mg) by mouth daily for 5 days   Quantity:  10 tablet   Refills:  0         Stop taking these medicines if you haven't already. Please contact your care team if you have questions.     BUDESONIDE (INHALATION) 90 MCG/ACT Aepb   Stopped by:  Sandra Fink DO                Where to get your medicines      These medications were sent to [place] [address] [place] [address] [address]     Phone:  [phone]     azithromycin 250 MG tablet    beclomethasone 80 MCG/ACT Inhaler    predniSONE 20 MG tablet                Primary Care Provider Office Phone # Fax #    Sandra Fink [phone] [address] [facility] [address]        Equal Access to Services     LENA JACKSON AH: Annette Lees, sarah snyder, man martinez  omar da silvaolgafreddie acevesMairaaaching ah. So [facility] [phone].    ATENCIÓN: Si madhav luther, tiene a lloyd disposición servicios gratuitos de asistencia lingüística. Aracelis al [phone].    We comply with applicable federal civil rights laws and [place] laws. We do not discriminate on the basis of race, color, national origin, age, disability, sex, sexual orientation, or gender identity.            Thank you!     Thank you for choosing [facility]  for your care. Our goal is always to provide you with excellent care. Hearing back from our patients is one way we can continue to improve our services. Please take a few minutes to complete the written survey that you may receive in the mail after your visit with us. Thank you!             Your Updated Medication List - Protect others around you: Learn how to safely use, store and throw away your medicines at www.disposemymeds.org.          This list is accurate as of: 10/17/17  7:45 AM.  Always use your most recent med list.                   Brand Name Dispense Instructions for use Diagnosis    azithromycin 250 MG tablet    ZITHROMAX    6 tablet    Two tablets first day, then one tablet daily for four days.    SOB (shortness of breath)       beclomethasone 80 MCG/ACT Inhaler    QVAR    1 Inhaler    Inhale 2 puffs into the lungs 2 times daily    SOB (shortness of breath)       BENADRYL PO      Take 25 mg by mouth daily as needed        escitalopram 20 MG tablet    LEXAPRO    90 tablet    TAKE 1 TABLET (20 MG) BY MOUTH DAILY    Anxiety       furosemide 20 MG tablet    LASIX    30 tablet    TAKE 1 TABLET (20 MG) BY MOUTH DAILY    Localized edema       ibuprofen 600 MG tablet    ADVIL/MOTRIN    40 tablet    Take 1 tablet (600 mg) by mouth 3 times daily    Acute exacerbation of chronic low back pain       * ipratropium - albuterol 0.5 mg/2.5 mg/3 mL 0.5-2.5 (3) MG/3ML neb solution    DUONEB    30 vial    Take 1 vial (3 mLs) by nebulization every 6 hours as needed for  shortness of breath / dyspnea or wheezing    SOB (shortness of breath)       * Ipratropium-Albuterol  MCG/ACT inhaler    COMBIVENT RESPIMAT    1 Inhaler    Inhale 1 puff into the lungs every 4 hours Not to exceed 6 doses per day.    SOB (shortness of breath)       phentermine 37.5 MG tablet    ADIPEX-P    32 tablet    Take 1 tablet (37.5 mg) by mouth every morning (before breakfast)    Morbid obesity due to excess calories (H)       predniSONE 20 MG tablet    DELTASONE    10 tablet    Take 2 tablets (40 mg) by mouth daily for 5 days    SOB (shortness of breath)       * Notice:  This list has 2 medication(s) that are the same as other medications prescribed for you. Read the directions carefully, and ask your doctor or other care provider to review them with you.

## 2017-10-18 NOTE — TELEPHONE ENCOUNTER
Message from [employer]:  Original authorizing provider: DO Hannah Mendes would like a refill of the following medications:  Ipratropium-Albuterol (COMBIVENT RESPIMAT)  MCG/ACT inhaler [Sandra Fink DO]    Preferred pharmacy: [facility] [address] [place], [place] - [address]    Comment:

## 2017-10-18 NOTE — TELEPHONE ENCOUNTER
Pt saw MP yesterday, please refill if appropriate    Monique Qiu RN, BSN  Message handled by Nurse Triage.

## 2017-10-22 ENCOUNTER — MYC MEDICAL ADVICE (OUTPATIENT)
Dept: FAMILY MEDICINE | Facility: CLINIC | Age: 47
End: 2017-10-22

## 2017-10-23 NOTE — TELEPHONE ENCOUNTER
MP out, sent Mychart response, will monitor reply    If Qvar is not helping would recommend referral back to pulmonology.      Monique Qiu RN, BSN  Message handled by Nurse Triage.

## 2017-10-23 NOTE — TELEPHONE ENCOUNTER
See below, pt responded, will monitor and make f/u appointment if needed  Monique Qiu RN, BSN  Message handled by Nurse Triage.

## 2017-10-25 ENCOUNTER — MYC MEDICAL ADVICE (OUTPATIENT)
Dept: FAMILY MEDICINE | Facility: CLINIC | Age: 47
End: 2017-10-25

## 2017-10-25 NOTE — TELEPHONE ENCOUNTER
PCP please see [employer] message regarding SOB update.     Would you like patient to follow up with you? Or ok for pulmonary referral?    Iman MCPHERSON RN, BSN, PHN  [place] [facility] RN

## 2017-10-25 NOTE — TELEPHONE ENCOUNTER
Patient informed. OV scheduled for tomorrow.     Iman MCPHERSON RN, BSN, PHN  [place] [facility] RN

## 2017-10-26 ENCOUNTER — OFFICE VISIT (OUTPATIENT)
Dept: FAMILY MEDICINE | Facility: CLINIC | Age: 47
End: 2017-10-26
Payer: COMMERCIAL

## 2017-10-26 VITALS
OXYGEN SATURATION: 96 % | HEART RATE: 94 BPM | WEIGHT: 243 LBS | TEMPERATURE: 98 F | DIASTOLIC BLOOD PRESSURE: 82 MMHG | BODY MASS INDEX: 43.05 KG/M2 | SYSTOLIC BLOOD PRESSURE: 124 MMHG | RESPIRATION RATE: 16 BRPM

## 2017-10-26 DIAGNOSIS — R05.3 CHRONIC COUGH: Primary | ICD-10-CM

## 2017-10-26 LAB
FEF 25/75: 41
FEV-1: 50
FEV1/FVC: 91
FVC: 55

## 2017-10-26 PROCEDURE — 94010 BREATHING CAPACITY TEST: CPT | Performed by: FAMILY MEDICINE

## 2017-10-26 PROCEDURE — 99214 OFFICE O/P EST MOD 30 MIN: CPT | Mod: 25 | Performed by: FAMILY MEDICINE

## 2017-10-26 NOTE — MR AVS SNAPSHOT
After Visit Summary   10/26/2017    Hannah Meade    MRN: [id number]           Patient Information     Date Of Birth          1970        Visit Information        Provider Department      10/26/2017 7:00 AM Sandra Fink,  [facility]        Today's Diagnoses     Chronic cough    -  1       Follow-ups after your visit        Additional Services     PULMONARY MEDICINE REFERRAL       Your provider has referred you to: [facility]: [place] [facility] [phone]   [email or web address]    Please be aware that coverage of these services is subject to the terms and limitations of your health insurance plan.  Call member services at your health plan with any benefit or coverage questions.      Please bring the following with you to your appointment:    (1) Any X-Rays, CTs or MRIs which have been performed.  Contact the facility where they were done to arrange for  prior to your scheduled appointment.    (2) List of current medications   (3) This referral request   (4) Any documents/labs given to you for this referral                  Future tests that were ordered for you today     Open Future Orders        Priority Expected Expires Ordered    CT Chest w/o & w Contrast Routine  10/26/2018 10/26/2017            Who to contact     If you have questions or need follow up information about today's clinic visit or your schedule please contact [facility] directly at [phone].  Normal or non-critical lab and imaging results will be communicated to you by MyChart, letter or phone within 4 business days after the clinic has received the results. If you do not hear from us within 7 days, please contact the clinic through MyChart or phone. If you have a critical or abnormal lab result, we will notify you by phone as soon as possible.  Submit refill requests through [employer] or call your pharmacy and they will forward the refill request to us. Please  allow 3 business days for your refill to be completed.          Additional Information About Your Visit        MyChart Information     [employer] gives you secure access to your electronic health record. If you see a primary care provider, you can also send messages to your care team and make appointments. If you have questions, please call your primary care clinic.  If you do not have a primary care provider, please call [phone] and they will assist you.        Care EveryWhere ID     This is your Care EveryWhere ID. This could be used by other organizations to access your [place] medical records  [id number]        Your Vitals Were     Pulse Temperature Respirations Pulse Oximetry BMI (Body Mass Index)       94 98  F (36.7  C) (Oral) 16 96% 43.05 kg/m2        Blood Pressure from Last 3 Encounters:   10/26/17 124/82   10/17/17 132/85   09/12/17 110/76    Weight from Last 3 Encounters:   10/26/17 243 lb (110.2 kg)   10/17/17 242 lb 6.4 oz (110 kg)   09/12/17 243 lb 8 oz (110.5 kg)              We Performed the Following     PULMONARY MEDICINE REFERRAL     Spirometry, Breathing Capacity: Normal Order, Clinic Performed          Today's Medication Changes          These changes are accurate as of: 10/26/17  7:36 AM.  If you have any questions, ask your nurse or doctor.               Stop taking these medicines if you haven't already. Please contact your care team if you have questions.     azithromycin 250 MG tablet   Commonly known as:  ZITHROMAX   Stopped by:  Sandra Fink DO                    Primary Care Provider Office Phone # Fax #    Sandra Fink [phone] [address] [facility] [address]        Equal Access to Services     [facility]: Annette Lees, sarah snyder, man martinez. So [facility] [phone].    ATENCIÓN: Si habla español, tiene a lloyd disposición servicios gratuitos de asistencia  lingüísticaJacinto Hsu al [phone].    We comply with applicable federal civil rights laws and [place] laws. We do not discriminate on the basis of race, color, national origin, age, disability, sex, sexual orientation, or gender identity.            Thank you!     Thank you for choosing [facility]  for your care. Our goal is always to provide you with excellent care. Hearing back from our patients is one way we can continue to improve our services. Please take a few minutes to complete the written survey that you may receive in the mail after your visit with us. Thank you!             Your Updated Medication List - Protect others around you: Learn how to safely use, store and throw away your medicines at www.disposemymeds.org.          This list is accurate as of: 10/26/17  7:36 AM.  Always use your most recent med list.                   Brand Name Dispense Instructions for use Diagnosis    beclomethasone 80 MCG/ACT Inhaler    QVAR    1 Inhaler    Inhale 2 puffs into the lungs 2 times daily    SOB (shortness of breath)       BENADRYL PO      Take 25 mg by mouth daily as needed        escitalopram 20 MG tablet    LEXAPRO    90 tablet    TAKE 1 TABLET (20 MG) BY MOUTH DAILY    Anxiety       furosemide 20 MG tablet    LASIX    30 tablet    TAKE 1 TABLET (20 MG) BY MOUTH DAILY    Localized edema       ibuprofen 600 MG tablet    ADVIL/MOTRIN    40 tablet    Take 1 tablet (600 mg) by mouth 3 times daily    Acute exacerbation of chronic low back pain       * ipratropium - albuterol 0.5 mg/2.5 mg/3 mL 0.5-2.5 (3) MG/3ML neb solution    DUONEB    30 vial    Take 1 vial (3 mLs) by nebulization every 6 hours as needed for shortness of breath / dyspnea or wheezing    SOB (shortness of breath)       * Ipratropium-Albuterol  MCG/ACT inhaler    COMBIVENT RESPIMAT    1 Inhaler    Inhale 1 puff into the lungs every 4 hours Not to exceed 6 doses per day.    SOB (shortness of breath)       phentermine 37.5  MG tablet    ADIPEX-P    32 tablet    Take 1 tablet (37.5 mg) by mouth every morning (before breakfast)    Morbid obesity due to excess calories (H)       * Notice:  This list has 2 medication(s) that are the same as other medications prescribed for you. Read the directions carefully, and ask your doctor or other care provider to review them with you.

## 2017-10-26 NOTE — NURSING NOTE
"Chief Complaint   Patient presents with     Breathing Problem     Cough       Initial There were no vitals taken for this visit. Estimated body mass index is 42.94 kg/(m^2) as calculated from the following:    Height as of 10/17/17: 5' 3\" (1.6 m).    Weight as of 10/17/17: 242 lb 6.4 oz (110 kg).  Medication Reconciliation: complete   Jing Lauren CMA      "

## 2017-10-26 NOTE — PROGRESS NOTES
SUBJECTIVE:   Hannah Meade is a 47 year old female who presents to clinic today for the following health issues:      Consult-cough, sob, feels as food is stuck in throat and won't go away-chest and ribs hurt    Patient is here to follow up regarding her chronic cough and SOB.  She does not have a smoking history.  CT chest in August 2016 showed multiple small nodules.  Echocardiogram in August 2016 was normal.  She has failed to get better with albuterol, ICS, oral prednisone, antibiotics.  Cough worsens with the cold air.  She saw [facility] in December 2015, but never followed up with them.  She had a bronchogenic cyst removed prior to this cough starting.        Problem list and histories reviewed & adjusted, as indicated.  Additional history: as documented    Patient Active Problem List   Diagnosis     Backache     Carbuncle and furuncle of trunk     CARDIOVASCULAR SCREENING; LDL GOAL LESS THAN 160     Insomnia     Anxiety     Plantar fasciitis     Ovarian cyst     Chest mass     Bronchogenic cyst     Chest wall pain     Right-sided low back pain with left-sided sciatica     Morbid obesity (H)     bmi over 40     BMI 40.0-44.9, adult (H)     Past Surgical History:   Procedure Laterality Date     C NONSPECIFIC PROCEDURE      2 C- SECTIONS     EXCISE MASS BACK Left 9/17/2014    Procedure: EXCISE MASS BACK;  Surgeon: Yaz Avilez MD;  Location: [facility] OR     EXCISE MASS UPPER EXTREMITY Left 9/17/2014    Procedure: EXCISE MASS UPPER EXTREMITY;  Surgeon: Yaz Avilez MD;  Location: [facility] OR     HYSTERECTOMY VAGINAL  1992    ovaries intact     ORTHOPEDIC SURGERY      left foot     other      lump on right arm removed      THORACOTOMY Right 8/25/2015    Procedure: THORACOTOMY;  Surgeon: Andrew Gordon MD;  Location:  OR       Social History   Substance Use Topics     Smoking status: Never Smoker     Smokeless tobacco: Never Used     Alcohol use No     Family History   Problem  Relation Age of Onset     Neurologic Disorder Mother      Parkinsons     DIABETES Mother      Hypertension Father      Cardiovascular Father      Gallbladder Disease Father      DIABETES Maternal Grandmother      C.A.D. Paternal Grandfather      Cardiovascular Paternal Grandfather      Hypertension Sister      Polycystic Kidney Diease Other              Reviewed and updated as needed this visit by clinical staffTobacco  Allergies  Meds  Med Hx  Surg Hx  Fam Hx  Soc Hx      Reviewed and updated as needed this visit by Provider         ROS:  Constitutional, HEENT, cardiovascular, pulmonary, gi and gu systems are negative, except as otherwise noted.      OBJECTIVE:   /82 (BP Location: Right arm, Patient Position: Chair, Cuff Size: Adult Large)  Pulse 94  Temp 98  F (36.7  C) (Oral)  Resp 16  Wt 243 lb (110.2 kg)  SpO2 96%  BMI 43.05 kg/m2  Body mass index is 43.05 kg/(m^2).  GENERAL: healthy, alert and no distress  RESP: lungs clear to auscultation - no rales, rhonchi or wheezes  CV: regular rate and rhythm, normal S1 S2, no S3 or S4, no murmur, click or rub, no peripheral edema and peripheral pulses strong    Diagnostic Test Results:  Spirometry- Insufficient effort     ASSESSMENT/PLAN:     1. Chronic cough  - Spirometry is non-diagnostic today 2/2 insufficient effort  - Will get a CT scan to monitor the lung nodules that were found last year  - Re-referred to [facility] for further workup  - Spirometry, Breathing Capacity: Normal Order, Clinic Performed  - PULMONARY MEDICINE REFERRAL  - CT Chest w/o & w Contrast; Future    Follow up after pulmonology referral    Sandra Fink,   [facility]

## 2017-10-31 ENCOUNTER — HOSPITAL ENCOUNTER (OUTPATIENT)
Dept: CT IMAGING | Facility: CLINIC | Age: 47
Discharge: HOME | End: 2017-10-31
Attending: FAMILY MEDICINE | Admitting: FAMILY MEDICINE
Payer: COMMERCIAL

## 2017-10-31 DIAGNOSIS — R05.3 CHRONIC COUGH: ICD-10-CM

## 2017-10-31 PROCEDURE — 25000128 H RX IP 250 OP 636: Performed by: FAMILY MEDICINE

## 2017-10-31 PROCEDURE — 71260 CT THORAX DX C+: CPT

## 2017-10-31 RX ORDER — IOPAMIDOL 755 MG/ML
500 INJECTION, SOLUTION INTRAVASCULAR ONCE
Status: COMPLETED | OUTPATIENT
Start: 2017-10-31 | End: 2017-10-31

## 2017-10-31 RX ADMIN — IOPAMIDOL 80 ML: 755 INJECTION, SOLUTION INTRAVENOUS at 09:42

## 2017-10-31 RX ADMIN — SODIUM CHLORIDE 60 ML: 9 INJECTION, SOLUTION INTRAVENOUS at 09:42

## 2017-11-14 ENCOUNTER — TRANSFERRED RECORDS (OUTPATIENT)
Dept: HEALTH INFORMATION MANAGEMENT | Facility: CLINIC | Age: 47
End: 2017-11-14

## 2017-12-08 DIAGNOSIS — F41.9 ANXIETY: ICD-10-CM

## 2017-12-08 RX ORDER — ESCITALOPRAM OXALATE 20 MG/1
TABLET ORAL
Qty: 90 TABLET | Refills: 1 | Status: SHIPPED | OUTPATIENT
Start: 2017-12-08 | End: 2018-07-19

## 2017-12-08 NOTE — TELEPHONE ENCOUNTER
Prescription approved per [facility] Refill Protocol.    Iman MCPHERSON RN, BSN, PHN  [place] [facility] RN

## 2017-12-10 DIAGNOSIS — R06.02 SOB (SHORTNESS OF BREATH): ICD-10-CM

## 2017-12-12 ENCOUNTER — MYC REFILL (OUTPATIENT)
Dept: FAMILY MEDICINE | Facility: CLINIC | Age: 47
End: 2017-12-12

## 2017-12-12 DIAGNOSIS — F41.9 ANXIETY: ICD-10-CM

## 2017-12-12 RX ORDER — ESCITALOPRAM OXALATE 20 MG/1
TABLET ORAL
Qty: 90 TABLET | Refills: 1 | Status: CANCELLED | OUTPATIENT
Start: 2017-12-12

## 2017-12-12 NOTE — TELEPHONE ENCOUNTER
Message from MyChart:  Original authorizing provider: MD Kandace Hankinsvero MCPHERSONJacinto Meade would like a refill of the following medications:  escitalopram (LEXAPRO) 20 MG tablet [[place] MD]    Preferred pharmacy: [address]    Comment:

## 2017-12-12 NOTE — TELEPHONE ENCOUNTER
Routing refill request to provider for review/approval because:  SOB not on list of diagnosis on [facility] protocol.     Med is pended, please review. Thanks.     Brook Sharpe RN -- [facility] [place] [facility]

## 2017-12-12 NOTE — TELEPHONE ENCOUNTER
Requested Prescriptions   Pending Prescriptions Disp Refills     QVAR 80 MCG/ACT Inhaler [Pharmacy Med Name: QVAR 80 MCG ORAL INHALER] 8.7 g 1     Sig: INHALE 2 PUFFS INTO THE LUNGS 2 TIMES DAILY    Inhaled Steroids Protocol Failed    12/10/2017  2:37 PM       Failed - Asthma control test 20 or greater in last 6 months    Please review ACT score.          Passed - Patient is age 12 or older       Passed - Recent (6 mo) or future visit with authorizing provider's specialty    Patient had office visit in the last 6 months or has a visit in the next 30 days with authorizing provider.  See chart review.

## 2017-12-12 NOTE — TELEPHONE ENCOUNTER
Duplicate, sent 12/8, sent [employer] message informing  Monique Qiu RN, BSN  Message handled by Nurse Triage.

## 2017-12-13 NOTE — TELEPHONE ENCOUNTER
Pt returned call  Did not request kyle  Has an inhaler on hand if she needs it  Does not need this filled at this time    Breanne Ferguson RN, BS  Clinical Nurse Triage.

## 2017-12-15 ENCOUNTER — TRANSFERRED RECORDS (OUTPATIENT)
Dept: HEALTH INFORMATION MANAGEMENT | Facility: CLINIC | Age: 47
End: 2017-12-15

## 2017-12-20 ENCOUNTER — TELEPHONE (OUTPATIENT)
Dept: FAMILY MEDICINE | Facility: CLINIC | Age: 47
End: 2017-12-20

## 2017-12-20 ENCOUNTER — OFFICE VISIT (OUTPATIENT)
Dept: FAMILY MEDICINE | Facility: CLINIC | Age: 47
End: 2017-12-20
Payer: COMMERCIAL

## 2017-12-20 ENCOUNTER — APPOINTMENT (OUTPATIENT)
Dept: ULTRASOUND IMAGING | Facility: CLINIC | Age: 47
End: 2017-12-20
Attending: EMERGENCY MEDICINE
Payer: COMMERCIAL

## 2017-12-20 ENCOUNTER — HOSPITAL ENCOUNTER (EMERGENCY)
Facility: CLINIC | Age: 47
Discharge: HOME | End: 2017-12-20
Attending: EMERGENCY MEDICINE | Admitting: EMERGENCY MEDICINE
Payer: COMMERCIAL

## 2017-12-20 VITALS
RESPIRATION RATE: 16 BRPM | BODY MASS INDEX: 43.4 KG/M2 | WEIGHT: 245 LBS | DIASTOLIC BLOOD PRESSURE: 100 MMHG | OXYGEN SATURATION: 96 % | SYSTOLIC BLOOD PRESSURE: 142 MMHG | HEART RATE: 91 BPM | TEMPERATURE: 97.6 F

## 2017-12-20 VITALS
SYSTOLIC BLOOD PRESSURE: 137 MMHG | BODY MASS INDEX: 43.52 KG/M2 | DIASTOLIC BLOOD PRESSURE: 82 MMHG | HEIGHT: 63 IN | WEIGHT: 245.59 LBS | OXYGEN SATURATION: 95 % | RESPIRATION RATE: 20 BRPM | TEMPERATURE: 98.1 F

## 2017-12-20 DIAGNOSIS — I10 BENIGN ESSENTIAL HYPERTENSION: ICD-10-CM

## 2017-12-20 DIAGNOSIS — E66.01 MORBID OBESITY (H): ICD-10-CM

## 2017-12-20 DIAGNOSIS — Z12.31 VISIT FOR SCREENING MAMMOGRAM: ICD-10-CM

## 2017-12-20 DIAGNOSIS — R03.0 ELEVATED BLOOD PRESSURE READING WITHOUT DIAGNOSIS OF HYPERTENSION: Primary | ICD-10-CM

## 2017-12-20 DIAGNOSIS — G47.30 SLEEP APNEA, UNSPECIFIED TYPE: ICD-10-CM

## 2017-12-20 DIAGNOSIS — R60.0 LOCALIZED EDEMA: ICD-10-CM

## 2017-12-20 DIAGNOSIS — D17.9 MULTIPLE LIPOMAS: ICD-10-CM

## 2017-12-20 DIAGNOSIS — R42 VERTIGO: ICD-10-CM

## 2017-12-20 PROBLEM — J45.909 ASTHMA: Status: ACTIVE | Noted: 2017-12-20

## 2017-12-20 PROBLEM — R91.8 OTHER NONSPECIFIC ABNORMAL FINDING OF LUNG FIELD (CODE): Status: ACTIVE | Noted: 2017-12-20

## 2017-12-20 LAB
ANION GAP SERPL CALCULATED.3IONS-SCNC: 8 MMOL/L (ref 3–14)
BASOPHILS # BLD AUTO: 0 10E9/L (ref 0–0.2)
BASOPHILS NFR BLD AUTO: 0.4 %
BUN SERPL-MCNC: 13 MG/DL (ref 7–30)
CALCIUM SERPL-MCNC: 8.8 MG/DL (ref 8.5–10.1)
CHLORIDE SERPL-SCNC: 106 MMOL/L (ref 94–109)
CO2 SERPL-SCNC: 24 MMOL/L (ref 20–32)
CREAT SERPL-MCNC: 0.51 MG/DL (ref 0.52–1.04)
DIFFERENTIAL METHOD BLD: NORMAL
EOSINOPHIL # BLD AUTO: 0.2 10E9/L (ref 0–0.7)
EOSINOPHIL NFR BLD AUTO: 1.9 %
ERYTHROCYTE [DISTWIDTH] IN BLOOD BY AUTOMATED COUNT: 13.3 % (ref 10–15)
GFR SERPL CREATININE-BSD FRML MDRD: >90 ML/MIN/1.7M2
GLUCOSE SERPL-MCNC: 99 MG/DL (ref 70–99)
HCT VFR BLD AUTO: 40.3 % (ref 35–47)
HGB BLD-MCNC: 13.2 G/DL (ref 11.7–15.7)
IMM GRANULOCYTES # BLD: 0 10E9/L (ref 0–0.4)
IMM GRANULOCYTES NFR BLD: 0.4 %
LYMPHOCYTES # BLD AUTO: 2.9 10E9/L (ref 0.8–5.3)
LYMPHOCYTES NFR BLD AUTO: 28 %
MCH RBC QN AUTO: 29.8 PG (ref 26.5–33)
MCHC RBC AUTO-ENTMCNC: 32.8 G/DL (ref 31.5–36.5)
MCV RBC AUTO: 91 FL (ref 78–100)
MONOCYTES # BLD AUTO: 0.6 10E9/L (ref 0–1.3)
MONOCYTES NFR BLD AUTO: 5.7 %
NEUTROPHILS # BLD AUTO: 6.5 10E9/L (ref 1.6–8.3)
NEUTROPHILS NFR BLD AUTO: 63.6 %
NRBC # BLD AUTO: 0 10*3/UL
NRBC BLD AUTO-RTO: 0 /100
PLATELET # BLD AUTO: 321 10E9/L (ref 150–450)
POTASSIUM SERPL-SCNC: 4.5 MMOL/L (ref 3.4–5.3)
RBC # BLD AUTO: 4.43 10E12/L (ref 3.8–5.2)
SODIUM SERPL-SCNC: 138 MMOL/L (ref 133–144)
TROPONIN I SERPL-MCNC: <0.015 UG/L (ref 0–0.04)
WBC # BLD AUTO: 10.2 10E9/L (ref 4–11)

## 2017-12-20 PROCEDURE — G0202 SCR MAMMO BI INCL CAD: HCPCS | Mod: TC

## 2017-12-20 PROCEDURE — 99285 EMERGENCY DEPT VISIT HI MDM: CPT | Mod: 25

## 2017-12-20 PROCEDURE — 25000131 ZZH RX MED GY IP 250 OP 636 PS 637: Performed by: EMERGENCY MEDICINE

## 2017-12-20 PROCEDURE — 99214 OFFICE O/P EST MOD 30 MIN: CPT | Performed by: FAMILY MEDICINE

## 2017-12-20 PROCEDURE — 93005 ELECTROCARDIOGRAM TRACING: CPT

## 2017-12-20 PROCEDURE — 80048 BASIC METABOLIC PNL TOTAL CA: CPT | Performed by: EMERGENCY MEDICINE

## 2017-12-20 PROCEDURE — 84484 ASSAY OF TROPONIN QUANT: CPT | Performed by: EMERGENCY MEDICINE

## 2017-12-20 PROCEDURE — 85025 COMPLETE CBC W/AUTO DIFF WBC: CPT | Performed by: EMERGENCY MEDICINE

## 2017-12-20 PROCEDURE — 93970 EXTREMITY STUDY: CPT

## 2017-12-20 RX ORDER — MECLIZINE HYDROCHLORIDE 25 MG/1
25 TABLET ORAL EVERY 6 HOURS PRN
Qty: 30 TABLET | Refills: 1 | Status: SHIPPED | OUTPATIENT
Start: 2017-12-20 | End: 2018-10-31

## 2017-12-20 RX ORDER — FUROSEMIDE 20 MG
40 TABLET ORAL DAILY
Qty: 60 TABLET | Refills: 5 | Status: SHIPPED | OUTPATIENT
Start: 2017-12-20 | End: 2018-06-28

## 2017-12-20 RX ORDER — MECLIZINE HYDROCHLORIDE 25 MG/1
25 TABLET ORAL ONCE
Status: COMPLETED | OUTPATIENT
Start: 2017-12-20 | End: 2017-12-20

## 2017-12-20 RX ADMIN — MECLIZINE HYDROCHLORIDE 25 MG: 25 TABLET ORAL at 17:45

## 2017-12-20 NOTE — TELEPHONE ENCOUNTER
Agree with your plan if BP remains that high.  Have them recheck it after she has rested for a bit.

## 2017-12-20 NOTE — TELEPHONE ENCOUNTER
Patient calling and states was here this am and bp was elevated.  Was 140/100 here.  States had a EMT take it now at work and it was 220/140.  Questioned on this and she said he got another cuff and he rechecked it and he is a EMT so feels it is accurate.  He told her to call us and let us know what it was.  Has been having ongoing dizziness but denied other symptoms.  Recommended [facility] immediately if bp 220/140.  Discussed not delaying as bp that elevated could lead to stroke or heart attack.  Advised I would route message also but advise ER now for her bp.  Ban Gomez RN

## 2017-12-20 NOTE — ED AVS SNAPSHOT
[facility] Emergency Department    [address]    [facility] [address]    Phone:  [phone]    Fax:  [phone]                                       Hannah Meade   MRN: [id number]    Department:  [facility] Emergency Department   Date of Visit:  12/20/2017           After Visit Summary Signature Page     I have received my discharge instructions, and my questions have been answered. I have discussed any challenges I see with this plan with the nurse or doctor.    ..........................................................................................................................................  Patient/Patient Representative Signature      ..........................................................................................................................................  Patient Representative Print Name and Relationship to Patient    ..................................................               ................................................  Date                                            Time    ..........................................................................................................................................  Reviewed by Signature/Title    ...................................................              ..............................................  Date                                                            Time

## 2017-12-20 NOTE — TELEPHONE ENCOUNTER
Called Pt to relay the below information.   The Pt is currently in the ER receiving treatment. She will follow up with Dr. Fink after she is discharged.     Brook Sharpe RN -- [facility] [employer]

## 2017-12-20 NOTE — MR AVS SNAPSHOT
After Visit Summary   12/20/2017    Hannah Meade    MRN: [id number]           Patient Information     Date Of Birth          1970        Visit Information        Provider Department      12/20/2017 8:00 AM Sandra Fink DO [facility]        Today's Diagnoses     Elevated blood pressure reading without diagnosis of hypertension    -  1    Localized edema        Morbid obesity (H)        Sleep apnea, unspecified type        Multiple lipomas           Follow-ups after your visit        Who to contact     If you have questions or need follow up information about today's clinic visit or your schedule please contact [facility] directly at [phone].  Normal or non-critical lab and imaging results will be communicated to you by Seirathermhart, letter or phone within 4 business days after the clinic has received the results. If you do not hear from us within 7 days, please contact the clinic through Seirathermhart or phone. If you have a critical or abnormal lab result, we will notify you by phone as soon as possible.  Submit refill requests through Multichannel or call your pharmacy and they will forward the refill request to us. Please allow 3 business days for your refill to be completed.          Additional Information About Your Visit        MyChart Information     Multichannel gives you secure access to your electronic health record. If you see a primary care provider, you can also send messages to your care team and make appointments. If you have questions, please call your primary care clinic.  If you do not have a primary care provider, please call [phone] and they will assist you.        Care EveryWhere ID     This is your Care EveryWhere ID. This could be used by other organizations to access your [place] medical records  [id number]        Your Vitals Were     Pulse Temperature Respirations Pulse Oximetry BMI (Body Mass Index)       91 97.6  F (36.4  C) (Oral)  16 96% 43.4 kg/m2        Blood Pressure from Last 3 Encounters:   12/20/17 (!) 142/100   10/26/17 124/82   10/17/17 132/85    Weight from Last 3 Encounters:   12/20/17 245 lb (111.1 kg)   10/26/17 243 lb (110.2 kg)   10/17/17 242 lb 6.4 oz (110 kg)              Today, you had the following     No orders found for display         Today's Medication Changes          These changes are accurate as of: 12/20/17  8:36 AM.  If you have any questions, ask your nurse or doctor.               These medicines have changed or have updated prescriptions.        Dose/Directions    furosemide 20 MG tablet   Commonly known as:  LASIX   This may have changed:  See the new instructions.   Used for:  Localized edema   Changed by:  Sandra Fink DO        Dose:  40 mg   Take 2 tablets (40 mg) by mouth daily   Quantity:  60 tablet   Refills:  5            Where to get your medicines      These medications were sent to [place] [address] [place] [address] [address]     Phone:  [phone]     furosemide 20 MG tablet                Primary Care Provider Office Phone # Fax #    Sandra Fink [phone]  [phone]        Equal Access to Services     JL JACKSON AH: Annette donahue hadasho Sobibi, waaxda luqadaha, qaybta kaalmada adeegyada, man khan. So [facility] [phone].    ATENCIÓN: Si habla español, tiene a lloyd disposición servicios gratuitos de asistencia lingüística. Llame al [phone].    We comply with applicable federal civil rights laws and [place] laws. We do not discriminate on the basis of race, color, national origin, age, disability, sex, sexual orientation, or gender identity.            Thank you!     Thank you for choosing [facility]  for your care. Our goal is always to provide you with excellent care. Hearing back from our patients is one way we  can continue to improve our services. Please take a few minutes to complete the written survey that you may receive in the mail after your visit with us. Thank you!             Your Updated Medication List - Protect others around you: Learn how to safely use, store and throw away your medicines at www.disposemymeds.org.          This list is accurate as of: 12/20/17  8:36 AM.  Always use your most recent med list.                   Brand Name Dispense Instructions for use Diagnosis    beclomethasone 80 MCG/ACT Inhaler    QVAR    1 Inhaler    Inhale 2 puffs into the lungs 2 times daily    SOB (shortness of breath)       BENADRYL PO      Take 25 mg by mouth daily as needed        escitalopram 20 MG tablet    LEXAPRO    90 tablet    TAKE 1 TABLET (20 MG) BY MOUTH DAILY    Anxiety       furosemide 20 MG tablet    LASIX    60 tablet    Take 2 tablets (40 mg) by mouth daily    Localized edema       ibuprofen 600 MG tablet    ADVIL/MOTRIN    40 tablet    Take 1 tablet (600 mg) by mouth 3 times daily    Acute exacerbation of chronic low back pain       * ipratropium - albuterol 0.5 mg/2.5 mg/3 mL 0.5-2.5 (3) MG/3ML neb solution    DUONEB    30 vial    Take 1 vial (3 mLs) by nebulization every 6 hours as needed for shortness of breath / dyspnea or wheezing    SOB (shortness of breath)       * Ipratropium-Albuterol  MCG/ACT inhaler    COMBIVENT RESPIMAT    1 Inhaler    Inhale 1 puff into the lungs every 4 hours Not to exceed 6 doses per day.    SOB (shortness of breath)       phentermine 37.5 MG tablet    ADIPEX-P    32 tablet    Take 1 tablet (37.5 mg) by mouth every morning (before breakfast)    Morbid obesity due to excess calories (H)       * Notice:  This list has 2 medication(s) that are the same as other medications prescribed for you. Read the directions carefully, and ask your doctor or other care provider to review them with you.

## 2017-12-20 NOTE — PROGRESS NOTES
SUBJECTIVE:   Hannah Meade is a 47 year old female who presents to clinic today for the following health issues:      Consult-just not feeling well, insomnia, dizziness constant, lumps all over body sx worse, edema sx worse feet, ankles, hands and knees-water pill just not working now    1) Patient saw the sleep center a couple weeks ago and had a sleep study done recently.  She will follow up with them next month to go over results.  She notes issues staying asleep and restless leg symptoms.  I suspect that she will be diagnosed with sleep apnea as well.     2) She has a history of lipomas which have been removed and confirmed as lipomas on pathology.  She has noticed more recently, specifically on her arms.  Some are painful, but not all.      3) She notes more swelling in her legs and hands than usual.  She is currently taking LAsix 20mg daily, which was previously helpful, but now less so.  She had a completely normal echo in August 2016.      Problem list and histories reviewed & adjusted, as indicated.  Additional history: as documented    Patient Active Problem List   Diagnosis     Backache     Carbuncle and furuncle of trunk     CARDIOVASCULAR SCREENING; LDL GOAL LESS THAN 160     Insomnia     Anxiety     Plantar fasciitis     Ovarian cyst     Chest mass     Bronchogenic cyst     Chest wall pain     Right-sided low back pain with left-sided sciatica     Morbid obesity (H)     bmi over 40     BMI 40.0-44.9, adult (H)     Asthma     Other nonspecific abnormal finding of lung field (CODE)     Sleep apnea, unspecified     Multiple lipomas     Past Surgical History:   Procedure Laterality Date     C NONSPECIFIC PROCEDURE      2 C- SECTIONS     EXCISE MASS BACK Left 9/17/2014    Procedure: EXCISE MASS BACK;  Surgeon: Yaz Avilez MD;  Location: [facility] OR     EXCISE MASS UPPER EXTREMITY Left 9/17/2014    Procedure: EXCISE MASS UPPER EXTREMITY;  Surgeon: Yaz Avilez MD;  Location:  OR      HYSTERECTOMY VAGINAL  1992    ovaries intact     ORTHOPEDIC SURGERY      left foot     other      lump on right arm removed      THORACOTOMY Right 8/25/2015    Procedure: THORACOTOMY;  Surgeon: Andrew Gordon MD;  Location:  OR       Social History   Substance Use Topics     Smoking status: Never Smoker     Smokeless tobacco: Never Used     Alcohol use No     Family History   Problem Relation Age of Onset     Neurologic Disorder Mother      Parkinsons     DIABETES Mother      Hypertension Father      Cardiovascular Father      Gallbladder Disease Father      DIABETES Maternal Grandmother      C.A.D. Paternal Grandfather      Cardiovascular Paternal Grandfather      Hypertension Sister      Polycystic Kidney Diease Other              Reviewed and updated as needed this visit by clinical staffTobacco  Allergies  Meds  Med Hx  Surg Hx  Fam Hx  Soc Hx      Reviewed and updated as needed this visit by Provider         ROS:  Constitutional, HEENT, cardiovascular, pulmonary, gi and gu systems are negative, except as otherwise noted.      OBJECTIVE:   BP (!) 142/100 (BP Location: Right arm, Patient Position: Chair, Cuff Size: Adult Regular)  Pulse 91  Temp 97.6  F (36.4  C) (Oral)  Resp 16  Wt 245 lb (111.1 kg)  SpO2 96%  BMI 43.4 kg/m2  Body mass index is 43.4 kg/(m^2).  GENERAL: healthy, alert and no distress  EYES: Eyes grossly normal to inspection, PERRL and EOMI, pt notes dizziness with EOM  HENT: normal cephalic/atraumatic, ear canals and TM's normal, nose and mouth without ulcers or lesions, oropharynx clear and oral mucous membranes moist  NECK: no adenopathy, no asymmetry, masses, or scars and thyroid normal to palpation  RESP: lungs clear to auscultation - no rales, rhonchi or wheezes  CV: regular rate and rhythm, normal S1 S2, no S3 or S4, no murmur, click or rub, no peripheral edema and peripheral pulses strong  SKIN: Multiple lipomas on lateral arms    ASSESSMENT/PLAN:       ICD-10-CM     1. Elevated blood pressure reading without diagnosis of hypertension R03.0    2. Localized edema R60.0 furosemide (LASIX) 20 MG tablet   3. Morbid obesity (H) E66.01    4. Sleep apnea, unspecified type G47.30    5. Multiple lipomas D17.9      Suspect that most of her symptoms are related to her obesity and sleep issues (suspect sleep apnea and restless leg syndrome).  Awaiting results of her sleep study.  Encouraged her to take some time for herself for exercise at least once a week.  Discussed ways to get more activity into her daily routine.  BP is elevated today and has not been previously.  Will not prescribe BP meds today, but plan on following up on this in 1 month.  May need BP meds in the future.  Will increase Lasix to 40mg daily (or 20mg BID depending on her symptoms).  Continue to use compression stockings PRN.  Again, echo done last year was normal so I do not think her swelling is cardiac in nature.      Discussed that the best way to treat the lipomas is weight loss.  Hopefully once her sleep issues get treated she will start to feel much better in general.     Follow up in 1 month for recheck.      Sandra Fink, DO  [facility]

## 2017-12-20 NOTE — ED PROVIDER NOTES
History     Chief Complaint:  Dizziness; Hypertension; and Leg Swelling    HPI   Hannah Meade is a 47 year old female who presents with hypertension, lightheadedness, and leg swelling.     The patient reports seeing her PCP, Dr. Fink, this morning for ongoing dizziness and was noted to have elevated blood pressure. She was discharged and checked again this afternoon at work and found her blood pressure to be 220/140, so she presents to the ED for further evaluation and treatment. The patient is prescribed Lasix but did not take it for a few days secondary to nausea and vomiting recently, which has since subsided. She notes having a history of elevated blood pressure which has been well controlled for the last 6 months; her PCP instructed her to double her Lasix dosage as of this morning and if it was not working in the next month, a new prescription would be added. After stopping Lasix due to nausea and vomiting, the patient noticed bilateral leg swelling which has not subsided after re-starting the prescription. She endorses some weight gain recently.    The patient additionally reports having constant, ongoing dizziness for the past few weeks which she describes as a head rush into her ears and feeling 'off'. She states that the feeling worsens with tracking movements horizontally but notes no difference when tracking vertically. She notes it worsening with movement of her head and states that she can sense the feeling with her eyes closed and when not moving as well. The  reports having to help her stand up last night. She notes a headache last night which has since subsided but denies visual or auditory disturbances. No tinnitus.     No chest pain or SOB. She notes no significant nausea, vomiting, or diarrhea. The patient notes no dysuria or urinary changes or symptoms.     CARDIAC RISK FACTORS:  Sex:    Female   Tobacco:   No   Hypertension:   Yes   Hyperlipidemia:  Slightly Elevated  Diabetes:  "  No   Family History:  Yes     PE/DVT RISK FACTORS:  Sex:    Female   Tobacco:   No   Cancer:   No   Personal history:  No   Family history:  No       Allergies:  Seasonal Allergies  Ampicillin  Codeine  Decongestant [Oxymetazoline]  No Clinical Screening - See Comments  Sulfa Drugs      Medications:    Lasix  Lexapro  Combivent inhaler  Qvar inhaler  Ibuprofen     Past Medical History:    Anxiety  Obesity  Plantar fascitis   Tuberculin test reaction  Cyst    Past Surgical History:    C sections  Mass excision back, upper extremity  Hysterectomy  Orthopedic surgery  Thoracotomy    Family History:    Parkinsons - mother  Diabetes - mother  Hypertension - father, sister  Cardiovascular - father, paternal grandfather  Gallbladder Disease - father    Social History:  Smoking status: no  Alcohol use: no   PCP: Sandra Fink   Patient presents with .   Marital Status:       Review of Systems   All other systems reviewed and are negative.    Physical Exam     Patient Vitals for the past 24 hrs:   BP Temp Temp src Heart Rate Resp SpO2 Height Weight   12/20/17 1949 - - - 80 - 95 % - -   12/20/17 1830 137/82 - - 79 - 94 % - -   12/20/17 1815 151/82 - - - - - - -   12/20/17 1800 143/81 - - 77 - 93 % - -   12/20/17 1745 154/79 - - 82 - 94 % - -   12/20/17 1730 (!) 147/94 - - 85 - 98 % - -   12/20/17 1708 (!) 139/98 - - 77 - - - -   12/20/17 1506 178/83 98.1  F (36.7  C) Oral 94 20 99 % 1.6 m (5' 3\") 111.4 kg (245 lb 9.5 oz)     Physical Exam  Physical Exam   General: Resting on the bed.  Head: No obvious trauma to head.  Ears, Nose, Throat:  External ears normal.  Nose normal. Clear TMs.    Eyes:  Conjunctivae clear.  Pupils are equal, round, and reactive. EOMI.  Nystagmus on left lateral gaze.    Neck: Normal range of motion.  Neck supple.   CV: Regular rate and rhythm.  No murmurs.      Respiratory: Effort normal and breath sounds normal.  No wheezing or crackles.   Gastrointestinal: Soft.  No distension. " There is no tenderness.    Musculoskeletal: bilateral calf swelling and mild right calf posterior tenderness.    Neuro: Alert. Moving all extremities appropriately.  Normal speech.  CN II-XII grossly intact, no pronator drift, normal finger-nose-finger, visual fields intact.  Gross muscle strength intact of the proximal and distal bilateral upper and lower extremities.  Sensation intact to light touch in all 4 extremities.  2+ patellar reflexes.  Normal gait.    Skin: Skin is warm and dry.  No rash noted.        Emergency Department Course   ECG (17:11):  Rate 82 bpm. MT interval 178. QT/QTc 382/446.   Sinus rhythm with premature atrial complexes with aberrant conduction. Low voltage QRS.  Interpreted at 1717 by Silvia Garcia MD.      Imaging:  Radiographic findings were communicated with the patient who voiced understanding of the findings.    US Lower extremity venous duplex bilateral  No DVT demonstrated.  As read by radiology.    Laboratory:  CBC: WNL (WBC 10.2, HGB 13.2, )   BMP: Creatinine 0.51 (L), o/w WNL  Troponin: <0.015    Interventions:   1745: Antivert 25 mg PO    Emergency Department Course:  Past medical records, nursing notes, and vitals reviewed.  1721: I performed an exam of the patient and obtained history, as documented above. GCS 15.   IV inserted and blood drawn.   The patient was taken for ultrasound, see imaging results above.    1949: I rechecked the patient. Findings and plan explained to the Patient. Patient discharged home with instructions regarding supportive care, medications, and reasons to return. The importance of close follow-up was reviewed.      Impression & Plan      Medical Decision Making:  Hannah Meade is a 47 year old female who presents with vague symptoms of dizziness and hypertension. Broad differential diagnosis includes but not limited to stroke, inner ear issues, vertigo, hypertension, end stage renal function, end stage renal damage, hypertension  emergency/urgency, etc. Overall, the patient was initially hypertensive but this resolved on recheck. She had improving vitals. She never was symptomatic with shortness of breath, chest pain, or headache. She has a completely non focal neurologic exam, not concerning for stroke or mass or bleed. She is able to ambulate without difficulty. On examination, she does have nystagmus with left lateral gaze, suspect this is inner ear related vertigo. She is given meclozine with improvement of symptoms. No indication for emergent head imaging at present.  No big stroke risk factors besides HTN which is controlled on medications.  Non smoker.  Exam most consistent with peripheral vertigo.      In regards to HTN, EKG reviewed unremarkable with no ischemic changes and negative troponin is reassuring for no evidence of ACS. BMP without any acute electrolyte or metabolic abnormality, normal renal function. CBC without any leukocytosis or anemia. DVT ultrasound was obtained, showing no evidence of DVT on ultrasound.     Patient is otherwise walking and well appearing. Patient's blood pressure resolved without any interventions. She has no signs of end organ dysfunction at this time related to HTN. Overall, she appears to be safe for discharge home. Advise close follow up for peripheral vertigo and blood pressure issues. She was understanding and discharged home in stable condition.    Diagnosis:    ICD-10-CM    1. Vertigo R42    2. Benign essential hypertension I10        Disposition:  discharged to home    Discharge Medications:  Discharge Medication List as of 12/20/2017  7:51 PM      START taking these medications    Details   meclizine (ANTIVERT) 25 MG tablet Take 1 tablet (25 mg) by mouth every 6 hours as needed for dizziness, Disp-30 tablet, R-1, Local Print               Vivien San  12/20/2017   [facility] EMERGENCY DEPARTMENT  I, Vivien San am serving as a scribe at 5:21 PM on 12/20/2017 to document  services personally performed by Silvia Garcia MD based on my observations and the provider's statements to me.        Silvia Garcia MD  12/21/17 [id number]

## 2017-12-20 NOTE — ED AVS SNAPSHOT
[facility] Emergency Department    [address]    [facility] [address]    Phone:  [phone]    Fax:  [phone]                                       Hannah Maede   MRN: [id number]    Department:  [facility] Emergency Department   Date of Visit:  12/20/2017           Patient Information     Date Of Birth          1970        Your diagnoses for this visit were:     Vertigo     Benign essential hypertension        You were seen by Silvia Garcia MD.      Follow-up Information     Follow up with Sandra Fink DO. Schedule an appointment as soon as possible for a visit in 2 days.    Specialty:  Family Practice    Contact information:    [address] [facility] [address] [phone]          Discharge Instructions       Return to the ED if worsening dizziness, unable to walk, severe headache, vision changes, weakness, numbness or tingling or other acute concerns.      Please follow up with your PCP 2-3 days.    Discharge Instructions  Vertigo  You have been diagnosed with vertigo.  This is a dizzy feeling often described as spinning or that the room is moving around you. You will often have nausea (sick to your stomach), vomiting (throwing up), and balance problems with it.  Vertigo is usually caused by a problem in the inner ear which helps control your balance.  Many things can cause vertigo, including calcium collections in the inner ear, a virus infection of the inner ear, concussion, migraine, and some medicines.  Luckily, these causes are not life threatening and will eventually go away.  However, sometimes there is a serious problem that does not show up right away.  Generally, every Emergency Department visit should have a follow-up clinic visit with either a primary or a specialty clinic/provider. Please follow-up as instructed by your emergency provider today.  Return to the Emergency Department if you have:    New or severe  headache.    Double vision (seeing two of things).    Trouble speaking or hearing.    Weakness or trouble moving/using one side of your body.    Passing out.    Numbness or tingling.    Chest pain.    Vomiting that will not stop.    Treatment:    There are several commonly prescribed medications:  o Antihistamines such as meclizine (Antivert ), dimenhydrinate (Dramamine ), or diphenhydramine (Benadryl ).  o Prescription anti-nausea medicines, such as promethazine (Phenergan ), metoclopramide (Reglan ), or ondansetron (Zofran ).  o Prescription sedative medicines, such as diazepam (Valium ), lorazepam (Ativan ), or clonazepam (Klonopin ).    Most of these medicines make you sleepy, and you should not take them before you work or drive. You should only take prescription medicines to treat severe vertigo symptoms, and you should stop the medicine when your symptoms improve.    Follow Up:    If you have vertigo longer than three days, it is important that you follow up either with your primary provider or an Ear, Nose, and Throat (ENT) specialist.  You may need further testing to evaluate your vertigo and you may also need  vestibular  therapy which is a special form of physical therapy to make the vertigo go away.    If you were given a prescription for medicine here today, be sure to read all of the information (including the package insert) that comes with your prescription.  This will include important information about the medicine, its side effects, and any warnings that you need to know about.  The pharmacist who fills the prescription can provide more information and answer questions you may have about the medicine.  If you have questions or concerns that the pharmacist cannot address, please call or return to the Emergency Department.     Remember that you can always come back to the Emergency Department if you are not able to see your regular provider in the amount of time listed above, if you get any new  symptoms, or if there is anything that worries you.      24 Hour Appointment Hotline       To make an appointment at any [facility], call [phone] ([phone]). If you don't have a family doctor or clinic, we will help you find one. [facility] [place] clinics are conveniently located to serve the needs of you and your family.             Review of your medicines      START taking        Dose / Directions Last dose taken    meclizine 25 MG tablet   Commonly known as:  ANTIVERT   Dose:  25 mg   Quantity:  30 tablet        Take 1 tablet (25 mg) by mouth every 6 hours as needed for dizziness   Refills:  1          Our records show that you are taking the medicines listed below. If these are incorrect, please call your family doctor or clinic.        Dose / Directions Last dose taken    beclomethasone 80 MCG/ACT Inhaler   Commonly known as:  QVAR   Dose:  2 puff   Quantity:  1 Inhaler        Inhale 2 puffs into the lungs 2 times daily   Refills:  1        BENADRYL PO   Dose:  25 mg        Take 25 mg by mouth daily as needed   Refills:  0        escitalopram 20 MG tablet   Commonly known as:  LEXAPRO   Quantity:  90 tablet        TAKE 1 TABLET (20 MG) BY MOUTH DAILY   Refills:  1        furosemide 20 MG tablet   Commonly known as:  LASIX   Dose:  40 mg   Quantity:  60 tablet        Take 2 tablets (40 mg) by mouth daily   Refills:  5        ibuprofen 600 MG tablet   Commonly known as:  ADVIL/MOTRIN   Dose:  600 mg   Quantity:  40 tablet        Take 1 tablet (600 mg) by mouth 3 times daily   Refills:  0        * ipratropium - albuterol 0.5 mg/2.5 mg/3 mL 0.5-2.5 (3) MG/3ML neb solution   Commonly known as:  DUONEB   Dose:  1 vial   Quantity:  30 vial        Take 1 vial (3 mLs) by nebulization every 6 hours as needed for shortness of breath / dyspnea or wheezing   Refills:  1        * Ipratropium-Albuterol  MCG/ACT inhaler   Commonly known as:  COMBIVENT RESPIMAT   Dose:  1 puff   Quantity:  1 Inhaler         Inhale 1 puff into the lungs every 4 hours Not to exceed 6 doses per day.   Refills:  11        phentermine 37.5 MG tablet   Commonly known as:  ADIPEX-P   Dose:  37.5 mg   Quantity:  32 tablet        Take 1 tablet (37.5 mg) by mouth every morning (before breakfast)   Refills:  2        * Notice:  This list has 2 medication(s) that are the same as other medications prescribed for you. Read the directions carefully, and ask your doctor or other care provider to review them with you.            Prescriptions were sent or printed at these locations (1 Prescription)                   Other Prescriptions                Printed at Department/Unit printer (1 of 1)         meclizine (ANTIVERT) 25 MG tablet                Procedures and tests performed during your visit     Basic metabolic panel    CBC with platelets differential    EKG 12 lead    Troponin I    US Lower Extremity Venous Duplex Bilateral      Orders Needing Specimen Collection     None      Pending Results     Date and Time Order Name Status Description    12/20/2017 1707 EKG 12 lead Preliminary             Pending Culture Results     No orders found from 12/18/2017 to 12/21/2017.            Pending Results Instructions     If you had any lab results that were not finalized at the time of your Discharge, you can call the ED Lab Result RN at [phone]. You will be contacted by this team for any positive Lab results or changes in treatment. The nurses are available 7 days a week from 10A to 6:30P.  You can leave a message 24 hours per day and they will return your call.        Test Results From Your Hospital Stay        12/20/2017  5:50 PM      Component Results     Component Value Ref Range & Units Status    WBC 10.2 4.0 - 11.0 10e9/L Final    RBC Count 4.43 3.8 - 5.2 10e12/L Final    Hemoglobin 13.2 11.7 - 15.7 g/dL Final    Hematocrit 40.3 35.0 - 47.0 % Final    MCV 91 78 - 100 fl Final    MCH 29.8 26.5 - 33.0 pg Final    MCHC 32.8 31.5 - 36.5 g/dL Final     RDW 13.3 10.0 - 15.0 % Final    Platelet Count 321 150 - 450 10e9/L Final    Diff Method Automated Method  Final    % Neutrophils 63.6 % Final    % Lymphocytes 28.0 % Final    % Monocytes 5.7 % Final    % Eosinophils 1.9 % Final    % Basophils 0.4 % Final    % Immature Granulocytes 0.4 % Final    Nucleated RBCs 0 0 /100 Final    Absolute Neutrophil 6.5 1.6 - 8.3 10e9/L Final    Absolute Lymphocytes 2.9 0.8 - 5.3 10e9/L Final    Absolute Monocytes 0.6 0.0 - 1.3 10e9/L Final    Absolute Eosinophils 0.2 0.0 - 0.7 10e9/L Final    Absolute Basophils 0.0 0.0 - 0.2 10e9/L Final    Abs Immature Granulocytes 0.0 0 - 0.4 10e9/L Final    Absolute Nucleated RBC 0.0  Final         12/20/2017  7:00 PM      Component Results     Component Value Ref Range & Units Status    Sodium 138 133 - 144 mmol/L Final    Potassium 4.5 3.4 - 5.3 mmol/L Final    Specimen slightly hemolyzed, potassium may be falsely elevated    Chloride 106 94 - 109 mmol/L Final    Carbon Dioxide 24 20 - 32 mmol/L Final    Anion Gap 8 3 - 14 mmol/L Final    Glucose 99 70 - 99 mg/dL Final    Urea Nitrogen 13 7 - 30 mg/dL Final    Creatinine 0.51 (L) 0.52 - 1.04 mg/dL Final    GFR Estimate >90 >60 mL/min/1.7m2 Final    Non  GFR Calc    GFR Estimate If Black >90 >60 mL/min/1.7m2 Final    African American GFR Calc    Calcium 8.8 8.5 - 10.1 mg/dL Final         12/20/2017  7:00 PM      Component Results     Component Value Ref Range & Units Status    Troponin I ES <0.015 0.000 - 0.045 ug/L Final    The 99th percentile for upper reference range is 0.045 ug/L.  Troponin values   in the range of 0.045 - 0.120 ug/L may be associated with risks of adverse   clinical events.           12/20/2017  7:09 PM      Narrative     ULTRASOUND VENOUS LOWER EXTREMITY BILATERAL 12/20/2017 7:07 PM     HISTORY: lower sweling and pain, pain;     COMPARISON: None.    TECHNIQUE: Ultrasound gray scale, Color Doppler flow, and spectral  Doppler waveform analysis  performed.    FINDINGS: The bilateral common femoral, superficial femoral, popliteal  and posterior tibial veins are patent and fully compressible and  demonstrate normal venous Doppler flow.        Impression     IMPRESSION: No DVT demonstrated.    LENARD GASPAR MD                Clinical Quality Measure: Blood Pressure Screening     Your blood pressure was checked while you were in the emergency department today. The last reading we obtained was  BP: 137/82 . Please read the guidelines below about what these numbers mean and what you should do about them.  If your systolic blood pressure (the top number) is less than 120 and your diastolic blood pressure (the bottom number) is less than 80, then your blood pressure is normal. There is nothing more that you need to do about it.  If your systolic blood pressure (the top number) is 120-139 or your diastolic blood pressure (the bottom number) is 80-89, your blood pressure may be higher than it should be. You should have your blood pressure rechecked within a year by a primary care provider.  If your systolic blood pressure (the top number) is 140 or greater or your diastolic blood pressure (the bottom number) is 90 or greater, you may have high blood pressure. High blood pressure is treatable, but if left untreated over time it can put you at risk for heart attack, stroke, or kidney failure. You should have your blood pressure rechecked by a primary care provider within the next 4 weeks.  If your provider in the emergency department today gave you specific instructions to follow-up with your doctor or provider even sooner than that, you should follow that instruction and not wait for up to 4 weeks for your follow-up visit.        Thank you for choosing [facility]       Thank you for choosing [facility] for your care. Our goal is always to provide you with excellent care. Hearing back from our patients is one way we can continue to improve our services. Please take a few  minutes to complete the written survey that you may receive in the mail after you visit with us. Thank you!        [facility] Information     [facility] gives you secure access to your electronic health record. If you see a primary care provider, you can also send messages to your care team and make appointments. If you have questions, please call your primary care clinic.  If you do not have a primary care provider, please call [phone] and they will assist you.        Care EveryWhere ID     This is your Care EveryWhere ID. This could be used by other organizations to access your [facility] medical records  [id number]        Equal Access to Services     [facility] : Annette Lees, sarah snyder, tom le, man khan. So [facility] [phone].    ATENCIÓN: Si habla español, tiene a lloyd disposición servicios gratuitos de asistencia lingüística. Llame al [phone].    We comply with applicable federal civil rights laws and [place] laws. We do not discriminate on the basis of race, color, national origin, age, disability, sex, sexual orientation, or gender identity.            After Visit Summary       This is your record. Keep this with you and show to your community pharmacist(s) and doctor(s) at your next visit.

## 2017-12-20 NOTE — ED NOTES
Pt has had dizziness for past 2 weeks.  She had a high BP reading while at work today and it was 220/140.  She now has leg swelling over the past week.  She was seen in clinic and dose of lasix was increased.

## 2017-12-21 LAB — INTERPRETATION ECG - MUSE: NORMAL

## 2017-12-21 NOTE — DISCHARGE INSTRUCTIONS
Return to the ED if worsening dizziness, unable to walk, severe headache, vision changes, weakness, numbness or tingling or other acute concerns.      Please follow up with your PCP 2-3 days.    Discharge Instructions  Vertigo  You have been diagnosed with vertigo.  This is a dizzy feeling often described as spinning or that the room is moving around you. You will often have nausea (sick to your stomach), vomiting (throwing up), and balance problems with it.  Vertigo is usually caused by a problem in the inner ear which helps control your balance.  Many things can cause vertigo, including calcium collections in the inner ear, a virus infection of the inner ear, concussion, migraine, and some medicines.  Luckily, these causes are not life threatening and will eventually go away.  However, sometimes there is a serious problem that does not show up right away.  Generally, every Emergency Department visit should have a follow-up clinic visit with either a primary or a specialty clinic/provider. Please follow-up as instructed by your emergency provider today.  Return to the Emergency Department if you have:    New or severe headache.    Double vision (seeing two of things).    Trouble speaking or hearing.    Weakness or trouble moving/using one side of your body.    Passing out.    Numbness or tingling.    Chest pain.    Vomiting that will not stop.    Treatment:    There are several commonly prescribed medications:  o Antihistamines such as meclizine (Antivert ), dimenhydrinate (Dramamine ), or diphenhydramine (Benadryl ).  o Prescription anti-nausea medicines, such as promethazine (Phenergan ), metoclopramide (Reglan ), or ondansetron (Zofran ).  o Prescription sedative medicines, such as diazepam (Valium ), lorazepam (Ativan ), or clonazepam (Klonopin ).    Most of these medicines make you sleepy, and you should not take them before you work or drive. You should only take prescription medicines to treat severe vertigo  symptoms, and you should stop the medicine when your symptoms improve.    Follow Up:    If you have vertigo longer than three days, it is important that you follow up either with your primary provider or an Ear, Nose, and Throat (ENT) specialist.  You may need further testing to evaluate your vertigo and you may also need  vestibular  therapy which is a special form of physical therapy to make the vertigo go away.    If you were given a prescription for medicine here today, be sure to read all of the information (including the package insert) that comes with your prescription.  This will include important information about the medicine, its side effects, and any warnings that you need to know about.  The pharmacist who fills the prescription can provide more information and answer questions you may have about the medicine.  If you have questions or concerns that the pharmacist cannot address, please call or return to the Emergency Department.     Remember that you can always come back to the Emergency Department if you are not able to see your regular provider in the amount of time listed above, if you get any new symptoms, or if there is anything that worries you.

## 2017-12-21 NOTE — ED NOTES
"Ambulated pt, walked with a slight limp and said she felt \"a little bit dizzy but better than before\"  "

## 2017-12-26 DIAGNOSIS — E66.01 MORBID OBESITY DUE TO EXCESS CALORIES (H): ICD-10-CM

## 2017-12-26 NOTE — TELEPHONE ENCOUNTER
Last Written Prescription Date: 8/16/17  Last Fill Quantity: 32,  # refills: 0   Last Office Visit with G, P or [facility] prescribing provider: 9/12/17    Baylee Cruz, Pharmacy [facility]

## 2017-12-28 RX ORDER — PHENTERMINE HYDROCHLORIDE 37.5 MG/1
37.5 TABLET ORAL
Qty: 32 TABLET | Refills: 0 | Status: SHIPPED | OUTPATIENT
Start: 2017-12-28 | End: 2018-09-10

## 2017-12-28 NOTE — TELEPHONE ENCOUNTER
Patient followed by Charlotte Hallman.  I will refill phentermine for 32 tablets 0 refills  Further refills to be addressed by Charlotte.

## 2018-02-14 DIAGNOSIS — R60.0 LOCALIZED EDEMA: ICD-10-CM

## 2018-02-14 NOTE — TELEPHONE ENCOUNTER
"Requested Prescriptions   Pending Prescriptions Disp Refills     furosemide (LASIX) 20 MG tablet [Pharmacy Med Name: FUROSEMIDE 20 MG TABLET]    Last Written Prescription Date:  12/20/17  Last Fill Quantity: 60 tablet,  # refills: 5   Last office visit: 12/20/2017 with prescribing provider: Aleks 12/20/17   Future Office Visit:   30 tablet 5     Sig: TAKE 1 TABLET (20 MG) BY MOUTH DAILY    Diuretics (Including Combos) Protocol Failed    2/14/2018  1:47 AM       Failed - Normal serum creatinine on file in past 12 months    Recent Labs   Lab Test  12/20/17   1715   CR  0.51*             Passed - Blood pressure under 140/90 in past 12 months    BP Readings from Last 3 Encounters:   12/20/17 137/82   12/20/17 (!) 142/100   10/26/17 124/82                Passed - Recent or future visit with authorizing provider's specialty    Patient had office visit in the last year or has a visit in the next 30 days with authorizing provider.  See \"Patient Info\" tab in inbasket, or \"Choose Columns\" in Meds & Orders section of the refill encounter.            Passed - Patient is age 18 or older       Passed - No active pregancy on record       Passed - Normal serum potassium on file in past 12 months    Recent Labs   Lab Test  12/20/17   1715   POTASSIUM  4.5                   Passed - Normal serum sodium on file in past 12 months    Recent Labs   Lab Test  12/20/17   1715   NA  138             Passed - No positive pregnancy test in past 12 months        "

## 2018-02-15 RX ORDER — FUROSEMIDE 20 MG
TABLET ORAL
Qty: 90 TABLET | Refills: 2 | Status: SHIPPED | OUTPATIENT
Start: 2018-02-15 | End: 2018-09-10

## 2018-02-15 NOTE — TELEPHONE ENCOUNTER
Routing refill request to provider for review/approval because:  Labs out of range:  Cr    Brook Sharpe RN -- [facility]

## 2018-03-05 DIAGNOSIS — R60.0 LOCALIZED EDEMA: ICD-10-CM

## 2018-03-05 NOTE — TELEPHONE ENCOUNTER
"Requested Prescriptions   Pending Prescriptions Disp Refills     furosemide (LASIX) 20 MG tablet [Pharmacy Med Name: FUROSEMIDE 20 MG TABLET]  Medication may not be due for refill  Last Written Prescription Date:  2/15/2018  Last Fill Quantity: 90 tablet,  # refills: 2   Last office visit: 12/20/2017 with prescribing provider:  Aleks    30 tablet 5     Sig: TAKE 1 TABLET (20 MG) BY MOUTH DAILY    Diuretics (Including Combos) Protocol Failed    3/5/2018  8:54 AM       Failed - Normal serum creatinine on file in past 12 months    Recent Labs   Lab Test  12/20/17   1715   CR  0.51*           Passed - Blood pressure under 140/90 in past 12 months    BP Readings from Last 3 Encounters:   12/20/17 137/82   12/20/17 (!) 142/100   10/26/17 124/82          Passed - Recent (12 mo) or future (30 days) visit within the authorizing provider's specialty    Patient had office visit in the last year or has a visit in the next 30 days with authorizing provider.  See \"Patient Info\" tab in inbasket, or \"Choose Columns\" in Meds & Orders section of the refill encounter.        Passed - Patient is age 18 or older       Passed - No active pregancy on record       Passed - Normal serum potassium on file in past 12 months    Recent Labs   Lab Test  12/20/17   1715   POTASSIUM  4.5          Passed - Normal serum sodium on file in past 12 months    Recent Labs   Lab Test  12/20/17   1715   NA  138          Passed - No positive pregnancy test in past 12 months          "

## 2018-03-06 RX ORDER — FUROSEMIDE 20 MG
TABLET ORAL
Qty: 30 TABLET | Refills: 5 | OUTPATIENT
Start: 2018-03-06

## 2018-03-06 NOTE — TELEPHONE ENCOUNTER
duplicate  E-Prescribing Status: Receipt confirmed by pharmacy (2/15/2018 12:30 PM CST)  Yo Ledbetter RN, BSN

## 2018-05-14 ENCOUNTER — OFFICE VISIT (OUTPATIENT)
Dept: URGENT CARE | Facility: URGENT CARE | Age: 48
End: 2018-05-14
Payer: COMMERCIAL

## 2018-05-14 ENCOUNTER — RADIANT APPOINTMENT (OUTPATIENT)
Dept: GENERAL RADIOLOGY | Facility: CLINIC | Age: 48
End: 2018-05-14
Attending: PHYSICIAN ASSISTANT
Payer: COMMERCIAL

## 2018-05-14 VITALS
SYSTOLIC BLOOD PRESSURE: 130 MMHG | TEMPERATURE: 97.5 F | DIASTOLIC BLOOD PRESSURE: 82 MMHG | OXYGEN SATURATION: 95 % | RESPIRATION RATE: 16 BRPM | HEART RATE: 88 BPM

## 2018-05-14 DIAGNOSIS — S99.921A TOE INJURY, RIGHT, INITIAL ENCOUNTER: Primary | ICD-10-CM

## 2018-05-14 DIAGNOSIS — S99.921A TOE INJURY, RIGHT, INITIAL ENCOUNTER: ICD-10-CM

## 2018-05-14 PROCEDURE — 99213 OFFICE O/P EST LOW 20 MIN: CPT | Performed by: PHYSICIAN ASSISTANT

## 2018-05-14 PROCEDURE — 73660 X-RAY EXAM OF TOE(S): CPT | Mod: RT

## 2018-05-14 NOTE — PROGRESS NOTES
SUBJECTIVE:  Chief Complaint   Patient presents with     Urgent Care     Foot Injury     Patient stubbed her right foot on 5/11/18. Pain and swelling.     Hannah Meade is a 48 year old female presents with a chief complaint of right toe pain, swelling, tenderness and bruising.  The injury occurred 2 day(s) ago.   The injury happened while at home. How: stubbed toe immediate pain, immediate swelling, was able to bear weight directly after injury, no deformity was noted by the patient, painful to walk on and wear shoes.  The patient complained of moderate pain  and has had decreased ROM.  Pain exacerbated by walking.  Relieved by rest.  She treated it initially with ice and OTC med. attempted to buddy tape but did not like the feel. This is the first time this type of injury has occurred to this patient.     Past Medical History:   Diagnosis Date     Anxiety      Obesity, unspecified      Plantar fasciitis 2/23/2015     Sleep apnea, unspecified 12/20/2017     Tuberculin test reaction      Current Outpatient Prescriptions   Medication Sig Dispense Refill     beclomethasone (QVAR) 80 MCG/ACT Inhaler Inhale 2 puffs into the lungs 2 times daily 1 Inhaler 1     escitalopram (LEXAPRO) 20 MG tablet TAKE 1 TABLET (20 MG) BY MOUTH DAILY 90 tablet 1     furosemide (LASIX) 20 MG tablet Take 2 tablets (40 mg) by mouth daily 60 tablet 5     furosemide (LASIX) 20 MG tablet TAKE 1 TABLET (20 MG) BY MOUTH DAILY 90 tablet 2     ibuprofen (ADVIL,MOTRIN) 600 MG tablet Take 1 tablet (600 mg) by mouth 3 times daily 40 tablet 0     Ipratropium-Albuterol (COMBIVENT RESPIMAT)  MCG/ACT inhaler Inhale 1 puff into the lungs every 4 hours Not to exceed 6 doses per day. 1 Inhaler 11     DiphenhydrAMINE HCl (BENADRYL PO) Take 25 mg by mouth daily as needed        ipratropium - albuterol 0.5 mg/2.5 mg/3 mL (DUONEB) 0.5-2.5 (3) MG/3ML nebulization Take 1 vial (3 mLs) by nebulization every 6 hours as needed for shortness of breath / dyspnea  or wheezing (Patient not taking: Reported on 5/14/2018) 30 vial 1     meclizine (ANTIVERT) 25 MG tablet Take 1 tablet (25 mg) by mouth every 6 hours as needed for dizziness (Patient not taking: Reported on 5/14/2018) 30 tablet 1     phentermine (ADIPEX-P) 37.5 MG tablet Take 1 tablet (37.5 mg) by mouth every morning (before breakfast) (Patient not taking: Reported on 5/14/2018) 32 tablet 0     Social History   Substance Use Topics     Smoking status: Never Smoker     Smokeless tobacco: Never Used     Alcohol use No       ROS:  Review of systems negative except as stated above.    EXAM:   /82 (Cuff Size: Adult Large)  Pulse 88  Temp 97.5  F (36.4  C) (Oral)  Resp 16  SpO2 95%  Gen: healthy,alert,no distress  Extremity: toe(s) 3rd and 4th has mild swelling and bruising between toes.  No deformity noted.  Painful to bend.  Tendons intact.   There is not compromise to the distal circulation.  Pulses are +2 and CRT is brisk  GENERAL APPEARANCE: healthy, alert and no distress  EXTREMITIES: peripheral pulses normal  SKIN: no suspicious lesions or rashes  NEURO: Normal strength and tone, sensory exam grossly normal, mentation intact and speech normal    X-RAY was done. - no fracture was initially seen.  X-ray noted non displaced fracture of 4th toe.  She is aware    assessment/plan:  (S99.921A) Toe injury, right, initial encounter  (primary encounter diagnosis)  Comment:   Plan: XR Toe Right G/E 2 Views, order for DME           Fracture not initially noted but patient is aware.  Does not want to buddy tape as uncomfortable.  Post op shoe was given and finds relief of pain.  RTA as tolerated.  To FU with PCP as needed.

## 2018-05-14 NOTE — NURSING NOTE
Chief Complaint   Patient presents with     Urgent Care     Foot Injury     Patient stubbed her right foot on 5/11/18. Pain and swelling.        EBONY MONTIEL CMA

## 2018-05-14 NOTE — MR AVS SNAPSHOT
After Visit Summary   5/14/2018    Hannah Meade    MRN: [id number]           Patient Information     Date Of Birth          1970        Visit Information        Provider Department      5/14/2018 5:10 PM Silvia Natarajan PA-C [facility] Urgent [facility]        Today's Diagnoses     Toe injury, right, initial encounter    -  1       Follow-ups after your visit        Who to contact     If you have questions or need follow up information about today's clinic visit or your schedule please contact [facility] URGENT CARE directly at [phone].  Normal or non-critical lab and imaging results will be communicated to you by [employer], letter or phone within 4 business days after the clinic has received the results. If you do not hear from us within 7 days, please contact the clinic through [employer] or phone. If you have a critical or abnormal lab result, we will notify you by phone as soon as possible.  Submit refill requests through [employer] or call your pharmacy and they will forward the refill request to us. Please allow 3 business days for your refill to be completed.          Additional Information About Your Visit        MyChart Information     [employer] gives you secure access to your electronic health record. If you see a primary care provider, you can also send messages to your care team and make appointments. If you have questions, please call your primary care clinic.  If you do not have a primary care provider, please call [phone] and they will assist you.        Care EveryWhere ID     This is your Care EveryWhere ID. This could be used by other organizations to access your [place] medical records  [id number]        Your Vitals Were     Pulse Temperature Respirations Pulse Oximetry          88 97.5  F (36.4  C) (Oral) 16 95%         Blood Pressure from Last 3 Encounters:   05/14/18 130/82   12/20/17 137/82   12/20/17 (!) 142/100    Weight from Last 3 Encounters:   12/20/17 245  lb 9.5 oz (111.4 kg)   12/20/17 245 lb (111.1 kg)   10/26/17 243 lb (110.2 kg)                 Today's Medication Changes          These changes are accurate as of 5/14/18 11:59 PM.  If you have any questions, ask your nurse or doctor.               Start taking these medicines.        Dose/Directions    order for DME   Used for:  Toe injury, right, initial encounter   Started by:  Silvia Natarajan PA-C        Equipment being ordered: post op shoe   Quantity:  1 Device   Refills:  0            Where to get your medicines      Some of these will need a paper prescription and others can be bought over the counter.  Ask your nurse if you have questions.     Bring a paper prescription for each of these medications     order for DME                Primary Care Provider Office Phone # Fax #    Sandra Fink [phone] [id number] [facility] [id number]        Equal Access to Services     [facility] : Hadii aad ku hadasho Soelisali, waaxda luqadaha, qaybta kaalmada adeegyada, man khan. So [facility] [phone].    ATENCIÓN: Si habla español, tiene a lloyd disposición servicios gratuitos de asistencia lingüística. Llame al [phone].    We comply with applicable federal civil rights laws and [place] laws. We do not discriminate on the basis of race, color, national origin, age, disability, sex, sexual orientation, or gender identity.            Thank you!     Thank you for choosing [facility] URGENT CARE  for your care. Our goal is always to provide you with excellent care. Hearing back from our patients is one way we can continue to improve our services. Please take a few minutes to complete the written survey that you may receive in the mail after your visit with us. Thank you!             Your Updated Medication List - Protect others around you: Learn how to safely use, store and throw away your medicines at www.disposemymeds.org.          This  list is accurate as of 5/14/18 11:59 PM.  Always use your most recent med list.                   Brand Name Dispense Instructions for use Diagnosis    beclomethasone 80 MCG/ACT Inhaler    QVAR    1 Inhaler    Inhale 2 puffs into the lungs 2 times daily    SOB (shortness of breath)       BENADRYL PO      Take 25 mg by mouth daily as needed        escitalopram 20 MG tablet    LEXAPRO    90 tablet    TAKE 1 TABLET (20 MG) BY MOUTH DAILY    Anxiety       * furosemide 20 MG tablet    LASIX    60 tablet    Take 2 tablets (40 mg) by mouth daily    Localized edema       * furosemide 20 MG tablet    LASIX    90 tablet    TAKE 1 TABLET (20 MG) BY MOUTH DAILY    Localized edema       ibuprofen 600 MG tablet    ADVIL/MOTRIN    40 tablet    Take 1 tablet (600 mg) by mouth 3 times daily    Acute exacerbation of chronic low back pain       * ipratropium - albuterol 0.5 mg/2.5 mg/3 mL 0.5-2.5 (3) MG/3ML neb solution    DUONEB    30 vial    Take 1 vial (3 mLs) by nebulization every 6 hours as needed for shortness of breath / dyspnea or wheezing    SOB (shortness of breath)       * Ipratropium-Albuterol  MCG/ACT inhaler    COMBIVENT RESPIMAT    1 Inhaler    Inhale 1 puff into the lungs every 4 hours Not to exceed 6 doses per day.    SOB (shortness of breath)       meclizine 25 MG tablet    ANTIVERT    30 tablet    Take 1 tablet (25 mg) by mouth every 6 hours as needed for dizziness        order for DME     1 Device    Equipment being ordered: post op shoe    Toe injury, right, initial encounter       phentermine 37.5 MG tablet    ADIPEX-P    32 tablet    Take 1 tablet (37.5 mg) by mouth every morning (before breakfast)    Morbid obesity due to excess calories (H)       * Notice:  This list has 4 medication(s) that are the same as other medications prescribed for you. Read the directions carefully, and ask your doctor or other care provider to review them with you.

## 2018-05-16 ENCOUNTER — NURSE TRIAGE (OUTPATIENT)
Dept: NURSING | Facility: CLINIC | Age: 48
End: 2018-05-16

## 2018-05-16 NOTE — TELEPHONE ENCOUNTER
Patient stating she is returning a call. Reviewed notes     Xray report of right 4th toe with nondisplaced fracture.  Provider documentation is not complete.  Please contact patient to confirm that patient is aware of this.  Quang tape toes, rest, ice, elevation and NSAIDs for discomfort.    Dangelo Guan MD  May 16, 2018 9:12 AM    Patient stating she is aware of results. Stating she has My Chart access. Caller verbalized understanding. Denies further questions.    Kirsten Alvarez RN  [facility]

## 2018-06-28 DIAGNOSIS — R60.0 LOCALIZED EDEMA: ICD-10-CM

## 2018-06-28 RX ORDER — FUROSEMIDE 20 MG
TABLET ORAL
Qty: 60 TABLET | Refills: 0 | Status: SHIPPED | OUTPATIENT
Start: 2018-06-28 | End: 2018-07-26

## 2018-06-28 NOTE — TELEPHONE ENCOUNTER
Follow up in 1 month for recheck, apt letter sent  Prescription approved per [facility] Refill Protocol.  Monique Qiu RN, BSN

## 2018-06-28 NOTE — TELEPHONE ENCOUNTER
"Requested Prescriptions   Pending Prescriptions Disp Refills     furosemide (LASIX) 20 MG tablet [Pharmacy Med Name: FUROSEMIDE 20 MG TABLET]    Last Written Prescription Date:  2/15/18  Last Fill Quantity: 90 tablet,  # refills: 2   Last office visit: 12/20/2017 with prescribing provider:  Aleks   Future Office Visit:     60 tablet 5     Sig: TAKE 2 TABLETS (40 MG) BY MOUTH DAILY    Diuretics (Including Combos) Protocol Failed    6/28/2018  1:27 AM       Failed - Normal serum creatinine on file in past 12 months    Recent Labs   Lab Test  12/20/17   1715   CR  0.51*             Passed - Blood pressure under 140/90 in past 12 months    BP Readings from Last 3 Encounters:   05/14/18 130/82   12/20/17 137/82   12/20/17 (!) 142/100                Passed - Recent (12 mo) or future (30 days) visit within the authorizing provider's specialty    Patient had office visit in the last 12 months or has a visit in the next 30 days with authorizing provider or within the authorizing provider's specialty.  See \"Patient Info\" tab in inbasket, or \"Choose Columns\" in Meds & Orders section of the refill encounter.           Passed - Patient is age 18 or older       Passed - No active pregancy on record       Passed - Normal serum potassium on file in past 12 months    Recent Labs   Lab Test  12/20/17   1715   POTASSIUM  4.5                   Passed - Normal serum sodium on file in past 12 months    Recent Labs   Lab Test  12/20/17   1715   NA  138             Passed - No positive pregnancy test in past 12 months          "

## 2018-06-28 NOTE — LETTER
"June 28, 2018      Hannah Meade  [address]   [facility] [address]        Dear Hannah,     We recently received a call from your pharmacy requesting a refill of your medication (furosemide).    A review of your chart indicates that an appointment is required.  Please contact our office at [phone] to schedule your doctor's appointment.    \"Follow up in 1 month for recheck\"    We have authorized one refill of your medication to allow time for you to schedule your appointment.    Taking care of your health is important to us and ongoing visits with your provider are vital to your care.  We look forward to seeing you in the near future.    Sincerely,    Sandra Fink D.O. /Monique ASTUDILLO          "

## 2018-07-26 DIAGNOSIS — R60.0 LOCALIZED EDEMA: ICD-10-CM

## 2018-07-26 RX ORDER — FUROSEMIDE 20 MG
TABLET ORAL
Qty: 30 TABLET | Refills: 0 | Status: SHIPPED | OUTPATIENT
Start: 2018-07-26 | End: 2018-09-10

## 2018-07-26 NOTE — TELEPHONE ENCOUNTER
"Requested Prescriptions   Pending Prescriptions Disp Refills     furosemide (LASIX) 20 MG tablet [Pharmacy Med Name: FUROSEMIDE 20 MG TABLET] 60 tablet 0    Last Written Prescription Date:  6/28/18  Last Fill Quantity: 60,  # refills: 0   Last Office Visit: 12/20/2017   Future Office Visit:      Sig: TAKE 2 TABLETS (40 MG) BY MOUTH DAILY    Diuretics (Including Combos) Protocol Failed    7/26/2018  1:36 AM       Failed - Normal serum creatinine on file in past 12 months    Recent Labs   Lab Test  12/20/17   1715   CR  0.51*             Passed - Blood pressure under 140/90 in past 12 months    BP Readings from Last 3 Encounters:   05/14/18 130/82   12/20/17 137/82   12/20/17 (!) 142/100                Passed - Recent (12 mo) or future (30 days) visit within the authorizing provider's specialty    Patient had office visit in the last 12 months or has a visit in the next 30 days with authorizing provider or within the authorizing provider's specialty.  See \"Patient Info\" tab in inbasket, or \"Choose Columns\" in Meds & Orders section of the refill encounter.           Passed - Patient is age 18 or older       Passed - No active pregancy on record       Passed - Normal serum potassium on file in past 12 months    Recent Labs   Lab Test  12/20/17   1715   POTASSIUM  4.5                   Passed - Normal serum sodium on file in past 12 months    Recent Labs   Lab Test  12/20/17   1715   NA  138             Passed - No positive pregnancy test in past 12 months          "

## 2018-08-21 DIAGNOSIS — R60.0 LOCALIZED EDEMA: ICD-10-CM

## 2018-08-21 NOTE — TELEPHONE ENCOUNTER
"Last Written Prescription Date:  7/26/18  Last Fill Quantity: 30 tablet,  # refills: 0   Last office visit: 12/20/2017 with prescribing provider:  Aleks   Future Office Visit:      Notes to Pharmacy: Needs f/u visit, see last refill, no appointment made, may have 2 weeks if needed, please inform pt    Requested Prescriptions   Pending Prescriptions Disp Refills     furosemide (LASIX) 20 MG tablet [Pharmacy Med Name: FUROSEMIDE 20 MG TABLET] 30 tablet 0     Sig: TAKE 2 TABLETS (40 MG) BY MOUTH DAILY    Diuretics (Including Combos) Protocol Failed    8/21/2018 11:42 AM       Failed - Normal serum creatinine on file in past 12 months    Recent Labs   Lab Test  12/20/17   1715   CR  0.51*             Passed - Blood pressure under 140/90 in past 12 months    BP Readings from Last 3 Encounters:   05/14/18 130/82   12/20/17 137/82   12/20/17 (!) 142/100                Passed - Recent (12 mo) or future (30 days) visit within the authorizing provider's specialty    Patient had office visit in the last 12 months or has a visit in the next 30 days with authorizing provider or within the authorizing provider's specialty.  See \"Patient Info\" tab in inbasket, or \"Choose Columns\" in Meds & Orders section of the refill encounter.           Passed - Patient is age 18 or older       Passed - No active pregancy on record       Passed - Normal serum potassium on file in past 12 months    Recent Labs   Lab Test  12/20/17   1715   POTASSIUM  4.5                   Passed - Normal serum sodium on file in past 12 months    Recent Labs   Lab Test  12/20/17   1715   NA  138             Passed - No positive pregnancy test in past 12 months          "

## 2018-08-21 NOTE — TELEPHONE ENCOUNTER
Routing refill request to provider for review/approval because:  Amy given x1 and patient did not follow up, please advise  Yo Ledbetter RN, BSN

## 2018-08-22 ENCOUNTER — MYC MEDICAL ADVICE (OUTPATIENT)
Dept: FAMILY MEDICINE | Facility: CLINIC | Age: 48
End: 2018-08-22

## 2018-08-22 RX ORDER — FUROSEMIDE 20 MG
TABLET ORAL
Qty: 30 TABLET | Refills: 0 | OUTPATIENT
Start: 2018-08-22

## 2018-08-22 NOTE — TELEPHONE ENCOUNTER
Sent [employer]t message informing, previous letter sent and pharmacy messages  Monique Qiu RN, BSN  Message handled by Nurse Triage.

## 2018-08-22 NOTE — TELEPHONE ENCOUNTER
Sent [employer] response, needs appointment, see other encounter  Monique Qiu RN, BSN  Message handled by Nurse Triage.

## 2018-09-10 ENCOUNTER — OFFICE VISIT (OUTPATIENT)
Dept: FAMILY MEDICINE | Facility: CLINIC | Age: 48
End: 2018-09-10
Payer: COMMERCIAL

## 2018-09-10 VITALS
BODY MASS INDEX: 47.13 KG/M2 | RESPIRATION RATE: 16 BRPM | HEART RATE: 83 BPM | DIASTOLIC BLOOD PRESSURE: 68 MMHG | HEIGHT: 63 IN | WEIGHT: 266 LBS | SYSTOLIC BLOOD PRESSURE: 119 MMHG | TEMPERATURE: 98.1 F

## 2018-09-10 DIAGNOSIS — R07.89 CHEST WALL PAIN: ICD-10-CM

## 2018-09-10 DIAGNOSIS — G47.33 OSA (OBSTRUCTIVE SLEEP APNEA): ICD-10-CM

## 2018-09-10 DIAGNOSIS — F32.0 MILD MAJOR DEPRESSION (H): ICD-10-CM

## 2018-09-10 DIAGNOSIS — Z11.4 SCREENING FOR HIV (HUMAN IMMUNODEFICIENCY VIRUS): ICD-10-CM

## 2018-09-10 DIAGNOSIS — Z00.00 ROUTINE GENERAL MEDICAL EXAMINATION AT A HEALTH CARE FACILITY: ICD-10-CM

## 2018-09-10 DIAGNOSIS — I89.0 LYMPHEDEMA: Primary | ICD-10-CM

## 2018-09-10 PROBLEM — G47.30 SLEEP APNEA, UNSPECIFIED: Status: RESOLVED | Noted: 2017-12-20 | Resolved: 2018-09-10

## 2018-09-10 PROCEDURE — 99214 OFFICE O/P EST MOD 30 MIN: CPT | Mod: 25 | Performed by: FAMILY MEDICINE

## 2018-09-10 PROCEDURE — 80048 BASIC METABOLIC PNL TOTAL CA: CPT | Performed by: FAMILY MEDICINE

## 2018-09-10 PROCEDURE — 36415 COLL VENOUS BLD VENIPUNCTURE: CPT | Performed by: FAMILY MEDICINE

## 2018-09-10 PROCEDURE — 80061 LIPID PANEL: CPT | Performed by: FAMILY MEDICINE

## 2018-09-10 PROCEDURE — 90732 PPSV23 VACC 2 YRS+ SUBQ/IM: CPT | Performed by: FAMILY MEDICINE

## 2018-09-10 PROCEDURE — 87389 HIV-1 AG W/HIV-1&-2 AB AG IA: CPT | Performed by: FAMILY MEDICINE

## 2018-09-10 PROCEDURE — 90471 IMMUNIZATION ADMIN: CPT | Performed by: FAMILY MEDICINE

## 2018-09-10 RX ORDER — FUROSEMIDE 20 MG
TABLET ORAL
Qty: 180 TABLET | Refills: 1 | Status: SHIPPED | OUTPATIENT
Start: 2018-09-10 | End: 2019-03-13

## 2018-09-10 ASSESSMENT — ANXIETY QUESTIONNAIRES
2. NOT BEING ABLE TO STOP OR CONTROL WORRYING: NEARLY EVERY DAY
7. FEELING AFRAID AS IF SOMETHING AWFUL MIGHT HAPPEN: SEVERAL DAYS
3. WORRYING TOO MUCH ABOUT DIFFERENT THINGS: MORE THAN HALF THE DAYS
GAD7 TOTAL SCORE: 11
GAD7 TOTAL SCORE: 11
6. BECOMING EASILY ANNOYED OR IRRITABLE: SEVERAL DAYS
1. FEELING NERVOUS, ANXIOUS, OR ON EDGE: SEVERAL DAYS
GAD7 TOTAL SCORE: 11
4. TROUBLE RELAXING: MORE THAN HALF THE DAYS
5. BEING SO RESTLESS THAT IT IS HARD TO SIT STILL: SEVERAL DAYS
7. FEELING AFRAID AS IF SOMETHING AWFUL MIGHT HAPPEN: SEVERAL DAYS

## 2018-09-10 ASSESSMENT — PATIENT HEALTH QUESTIONNAIRE - PHQ9
SUM OF ALL RESPONSES TO PHQ QUESTIONS 1-9: 12
10. IF YOU CHECKED OFF ANY PROBLEMS, HOW DIFFICULT HAVE THESE PROBLEMS MADE IT FOR YOU TO DO YOUR WORK, TAKE CARE OF THINGS AT HOME, OR GET ALONG WITH OTHER PEOPLE: SOMEWHAT DIFFICULT
SUM OF ALL RESPONSES TO PHQ QUESTIONS 1-9: 12

## 2018-09-10 NOTE — PROGRESS NOTES
SUBJECTIVE:   Hannah Meade is a 48 year old female who presents to clinic today for the following health issues:  Answers for HPI/ROS submitted by the patient on 9/10/2018     If you checked off any problems, how difficult have these problems made it for you to do your work, take care of things at home, or get along with other people?: Somewhat difficult  PHQ9 TOTAL SCORE: 12  MAURISIO 7 TOTAL SCORE: 11    Lymphedema  (primary encounter diagnosis) this is pedal edema Also call localized edema in the past echocardiogram with normal left ventricular function,.  Right side poorly seen.  Robust renal function untreated JOSE J  Mild major depression (H) she is not interested in treatment  Chest wall pain this is occurred since her thoracotomy  Routine general medical examination at a health care facility health maintenance due  Screening for HIV (human immunodeficiency virus) routine    HPI  Medication Followup of     Taking Medication as prescribed: yes    Side Effects:  None    Medication Helping Symptoms:  yes       Problem list and histories reviewed & adjusted, as indicated.  Additional history: as documented    Joint, hands and feet swelling x year s    Past Medical History:   Diagnosis Date     Anxiety      Mild major depression (H) 9/10/2018     Obesity, unspecified      JOSE J (obstructive sleep apnea) 9/10/2018     Plantar fasciitis 2/23/2015     Sleep apnea, unspecified 12/20/2017     Tuberculin test reaction      Past Surgical History:   Procedure Laterality Date     C NONSPECIFIC PROCEDURE      2 C- SECTIONS     EXCISE MASS BACK Left 9/17/2014    Procedure: EXCISE MASS BACK;  Surgeon: Yaz Avilez MD;  Location: [facility] OR     EXCISE MASS UPPER EXTREMITY Left 9/17/2014    Procedure: EXCISE MASS UPPER EXTREMITY;  Surgeon: Yaz Avilez MD;  Location: [facility] OR     HYSTERECTOMY VAGINAL  1992    ovaries intact     ORTHOPEDIC SURGERY      left foot     other      lump on right arm removed      THORACOTOMY  "Right 8/25/2015    Procedure: THORACOTOMY;  Surgeon: Andrew Gordon MD;  Location: [facility] OR     Family History   Problem Relation Age of Onset     Neurologic Disorder Mother      Parkinsons     Diabetes Mother      Hypertension Father      Cardiovascular Father      Gallbladder Disease Father      Diabetes Maternal Grandmother      C.A.D. Paternal Grandfather      Cardiovascular Paternal Grandfather      Hypertension Sister      Polycystic Kidney Diease Other      Social History   Substance Use Topics     Smoking status: Never Smoker     Smokeless tobacco: Never Used     Alcohol use No     ROS:  No edema no fevers    This document serves as a record of the services and decisions personally performed and made by Ned Dennison MD. It was created on his behalf by Jude Burk, a trained medical scribe.  The creation of this document is based on the scribe's personal observations and the provider's statements to the medical scribe.  Jude Burk, September 10, 2018 6:36 PM  OBJECTIVE:   /68 (BP Location: Left arm, Patient Position: Chair, Cuff Size: Adult Large)  Pulse 83  Temp 98.1  F (36.7  C) (Oral)  Resp 16  Ht 5' 3\" (1.6 m)  Wt 266 lb (120.7 kg)  Breastfeeding? No  BMI 47.12 kg/m2  Body mass index is 47.12 kg/(m^2).  GENERAL: Healthy, Alert, No acute distress   PSYCH: Affect normal bright  No edema  Diagnostic Test Results:  No results found for this or any previous visit (from the past 24 hour(s)).  ASSESSMENT/PLAN:   ASSESSMENT / PLAN:  (I89.0) Lymphedema  (primary encounter diagnosis)  Comment: Control with diuretics  Plan: furosemide (LASIX) 20 MG tablet, Basic         metabolic panel  (Ca, Cl, CO2, Creat, Gluc, K,         Na, BUN)      Continue measure    (F32.0) Mild major depression (H)  Comment: She is not interested in addressing  Plan:     (R07.89) Chest wall pain  Comment: She proposes to visit her surgeon  Plan: This is reasonable    (Z00.00) Routine general medical examination at a " health care facility  Comment: Will accept    Plan: Lipid panel reflex to direct LDL Non-fasting,         Pneumococcal vaccine 23 valent PPSV23          (Pneumovax) [50084], ADMIN: Vaccine, Initial         (66724)            (Z11.4) Screening for HIV (human immunodeficiency virus)  Comment: Universal HIV testing introduced, rationale reviewed, all questions answered, permission granted to test    Plan: HIV Antigen Antibody Combo            (G47.33) JOSE J (obstructive sleep apnea)  Comment: Untreated  Plan: See below      Patient notes that she would like a new body.  Some of her illnesses are not being addressed.  She would benefit from rapport development    Ned Dennison MD            Vaccinations: Pneumonia shot, Flu shot late Oct     The information in this document, created by the medical scribe for me, accurately reflects the services I personally performed and the decisions made by me. I have reviewed and approved this document for accuracy prior to leaving the patient care area.  Ned Dennison MD September 10, 2018 6:34 PM    Ned Dennison MD  [facility]

## 2018-09-10 NOTE — MR AVS SNAPSHOT
After Visit Summary   9/10/2018    Hannah Meade    MRN: [id number]           Patient Information     Date Of Birth          1970        Visit Information        Provider Department      9/10/2018 6:30 PM Ned Dennison MD [facility]        Today's Diagnoses     Lymphedema    -  1    Mild major depression (H)        Chest wall pain        Routine general medical examination at a health care facility        Screening for HIV (human immunodeficiency virus)        JOSE J (obstructive sleep apnea)           Follow-ups after your visit        Follow-up notes from your care team     Return in about 6 months (around 3/10/2019).      Who to contact     If you have questions or need follow up information about today's clinic visit or your schedule please contact [facility] directly at [phone].  Normal or non-critical lab and imaging results will be communicated to you by MyChart, letter or phone within 4 business days after the clinic has received the results. If you do not hear from us within 7 days, please contact the clinic through MyChart or phone. If you have a critical or abnormal lab result, we will notify you by phone as soon as possible.  Submit refill requests through [employer] or call your pharmacy and they will forward the refill request to us. Please allow 3 business days for your refill to be completed.          Additional Information About Your Visit        MyChart Information     [employer] gives you secure access to your electronic health record. If you see a primary care provider, you can also send messages to your care team and make appointments. If you have questions, please call your primary care clinic.  If you do not have a primary care provider, please call [phone] and they will assist you.        Care EveryWhere ID     This is your Care EveryWhere ID. This could be used by other organizations to access your [facility]  "records  [id number]        Your Vitals Were     Pulse Temperature Respirations Height Breastfeeding? BMI (Body Mass Index)    83 98.1  F (36.7  C) (Oral) 16 5' 3\" (1.6 m) No 47.12 kg/m2       Blood Pressure from Last 3 Encounters:   09/10/18 119/68   05/14/18 130/82   12/20/17 137/82    Weight from Last 3 Encounters:   09/10/18 266 lb (120.7 kg)   12/20/17 245 lb 9.5 oz (111.4 kg)   12/20/17 245 lb (111.1 kg)              We Performed the Following     ADMIN: Vaccine, Initial (72918)     Basic metabolic panel  (Ca, Cl, CO2, Creat, Gluc, K, Na, BUN)     HIV Antigen Antibody Combo     Lipid panel reflex to direct LDL Non-fasting     Pneumococcal vaccine 23 valent PPSV23  (Pneumovax) [04740]          Today's Medication Changes          These changes are accurate as of 9/10/18  9:31 PM.  If you have any questions, ask your nurse or doctor.               These medicines have changed or have updated prescriptions.        Dose/Directions    furosemide 20 MG tablet   Commonly known as:  LASIX   This may have changed:  Another medication with the same name was removed. Continue taking this medication, and follow the directions you see here.   Used for:  Lymphedema   Changed by:  Ned Dennison MD        TAKE 2 TABLETS (40 MG) BY MOUTH DAILY   Quantity:  180 tablet   Refills:  1         Stop taking these medicines if you haven't already. Please contact your care team if you have questions.     phentermine 37.5 MG tablet   Commonly known as:  ADIPEX-P   Stopped by:  Ned Dennison MD                Where to get your medicines      These medications were sent to [facility] [address] [place] [facility], [place] - [address]  [address] [facility] [address]     Phone:  [phone]     furosemide 20 MG tablet                Primary Care Provider Office Phone # Fax #    Sandra Fink  [phone] [address]  [facility] [address]        Equal Access to Services     LENA JACKSON AH: Annette aad " rowan Lees, wahillaryda ashleymargaretha, qawenta kaewa le, man dayanarain hayaaching weinbergmanav rekhabell laMairavlad erin. So [facility] [phone].    ATENCIÓN: Si aliciala homa, tiene a lloyd disposición servicios gratuitos de asistencia lingüística. Aracelis al [phone].    We comply with applicable federal civil rights laws and [place] laws. We do not discriminate on the basis of race, color, national origin, age, disability, sex, sexual orientation, or gender identity.            Thank you!     Thank you for choosing [facility]  for your care. Our goal is always to provide you with excellent care. Hearing back from our patients is one way we can continue to improve our services. Please take a few minutes to complete the written survey that you may receive in the mail after your visit with us. Thank you!             Your Updated Medication List - Protect others around you: Learn how to safely use, store and throw away your medicines at www.disposemymeds.org.          This list is accurate as of 9/10/18  9:31 PM.  Always use your most recent med list.                   Brand Name Dispense Instructions for use Diagnosis    beclomethasone 80 MCG/ACT Inhaler    QVAR    1 Inhaler    Inhale 2 puffs into the lungs 2 times daily    SOB (shortness of breath)       BENADRYL PO      Take 25 mg by mouth daily as needed        escitalopram 20 MG tablet    LEXAPRO    90 tablet    TAKE 1 TABLET (20 MG) BY MOUTH DAILY    Anxiety       furosemide 20 MG tablet    LASIX    180 tablet    TAKE 2 TABLETS (40 MG) BY MOUTH DAILY    Lymphedema       ibuprofen 600 MG tablet    ADVIL/MOTRIN    40 tablet    Take 1 tablet (600 mg) by mouth 3 times daily    Acute exacerbation of chronic low back pain       * ipratropium - albuterol 0.5 mg/2.5 mg/3 mL 0.5-2.5 (3) MG/3ML neb solution    DUONEB    30 vial    Take 1 vial (3 mLs) by nebulization every 6 hours as needed for shortness of breath / dyspnea or wheezing    SOB (shortness of breath)        * Ipratropium-Albuterol  MCG/ACT inhaler    COMBIVENT RESPIMAT    1 Inhaler    Inhale 1 puff into the lungs every 4 hours Not to exceed 6 doses per day.    SOB (shortness of breath)       meclizine 25 MG tablet    ANTIVERT    30 tablet    Take 1 tablet (25 mg) by mouth every 6 hours as needed for dizziness        order for DME     1 Device    Equipment being ordered: post op shoe    Toe injury, right, initial encounter       * Notice:  This list has 2 medication(s) that are the same as other medications prescribed for you. Read the directions carefully, and ask your doctor or other care provider to review them with you.

## 2018-09-11 LAB
ANION GAP SERPL CALCULATED.3IONS-SCNC: 7 MMOL/L (ref 3–14)
BUN SERPL-MCNC: 17 MG/DL (ref 7–30)
CALCIUM SERPL-MCNC: 8.9 MG/DL (ref 8.5–10.1)
CHLORIDE SERPL-SCNC: 106 MMOL/L (ref 94–109)
CHOLEST SERPL-MCNC: 197 MG/DL
CO2 SERPL-SCNC: 27 MMOL/L (ref 20–32)
CREAT SERPL-MCNC: 0.81 MG/DL (ref 0.52–1.04)
GFR SERPL CREATININE-BSD FRML MDRD: 75 ML/MIN/1.7M2
GLUCOSE SERPL-MCNC: 147 MG/DL (ref 70–99)
HDLC SERPL-MCNC: 55 MG/DL
LDLC SERPL CALC-MCNC: 70 MG/DL
NONHDLC SERPL-MCNC: 142 MG/DL
POTASSIUM SERPL-SCNC: 4.2 MMOL/L (ref 3.4–5.3)
SODIUM SERPL-SCNC: 140 MMOL/L (ref 133–144)
TRIGL SERPL-MCNC: 358 MG/DL

## 2018-09-11 ASSESSMENT — ANXIETY QUESTIONNAIRES: GAD7 TOTAL SCORE: 11

## 2018-09-11 ASSESSMENT — PATIENT HEALTH QUESTIONNAIRE - PHQ9: SUM OF ALL RESPONSES TO PHQ QUESTIONS 1-9: 12

## 2018-09-12 LAB — HIV 1+2 AB+HIV1 P24 AG SERPL QL IA: NONREACTIVE

## 2018-09-13 ENCOUNTER — TELEPHONE (OUTPATIENT)
Dept: FAMILY MEDICINE | Facility: CLINIC | Age: 48
End: 2018-09-13

## 2018-09-13 DIAGNOSIS — R73.09 ELEVATED GLUCOSE: Primary | ICD-10-CM

## 2018-09-13 NOTE — PROGRESS NOTES
Blood sugar is higher than in the past. Let's do a bit of additional lab. Please make a lab only appointment  Ned Dennison MD

## 2018-09-17 DIAGNOSIS — R73.09 ELEVATED GLUCOSE: ICD-10-CM

## 2018-09-17 LAB
HBA1C MFR BLD: 5.6 % (ref 0–5.6)
TSH SERPL DL<=0.005 MIU/L-ACNC: 1.4 MU/L (ref 0.4–4)

## 2018-09-17 PROCEDURE — 36415 COLL VENOUS BLD VENIPUNCTURE: CPT | Performed by: FAMILY MEDICINE

## 2018-09-17 PROCEDURE — 84443 ASSAY THYROID STIM HORMONE: CPT | Performed by: FAMILY MEDICINE

## 2018-09-17 PROCEDURE — 83036 HEMOGLOBIN GLYCOSYLATED A1C: CPT | Performed by: FAMILY MEDICINE

## 2018-10-31 ENCOUNTER — OFFICE VISIT (OUTPATIENT)
Dept: FAMILY MEDICINE | Facility: CLINIC | Age: 48
End: 2018-10-31
Payer: COMMERCIAL

## 2018-10-31 VITALS
HEIGHT: 63 IN | WEIGHT: 262 LBS | OXYGEN SATURATION: 93 % | HEART RATE: 87 BPM | SYSTOLIC BLOOD PRESSURE: 140 MMHG | RESPIRATION RATE: 20 BRPM | BODY MASS INDEX: 46.42 KG/M2 | TEMPERATURE: 97.7 F | DIASTOLIC BLOOD PRESSURE: 90 MMHG

## 2018-10-31 DIAGNOSIS — J45.40 MODERATE PERSISTENT ASTHMA WITHOUT COMPLICATION: ICD-10-CM

## 2018-10-31 DIAGNOSIS — R10.31 RLQ ABDOMINAL PAIN: ICD-10-CM

## 2018-10-31 DIAGNOSIS — G47.33 OSA (OBSTRUCTIVE SLEEP APNEA): Primary | ICD-10-CM

## 2018-10-31 DIAGNOSIS — J98.4 BRONCHOGENIC CYST: ICD-10-CM

## 2018-10-31 DIAGNOSIS — I89.0 LYMPHEDEMA: ICD-10-CM

## 2018-10-31 DIAGNOSIS — R07.89 CHEST WALL PAIN: ICD-10-CM

## 2018-10-31 DIAGNOSIS — E66.01 MORBID OBESITY (H): ICD-10-CM

## 2018-10-31 PROCEDURE — 99214 OFFICE O/P EST MOD 30 MIN: CPT | Performed by: FAMILY MEDICINE

## 2018-10-31 ASSESSMENT — ANXIETY QUESTIONNAIRES
1. FEELING NERVOUS, ANXIOUS, OR ON EDGE: SEVERAL DAYS
2. NOT BEING ABLE TO STOP OR CONTROL WORRYING: SEVERAL DAYS
IF YOU CHECKED OFF ANY PROBLEMS ON THIS QUESTIONNAIRE, HOW DIFFICULT HAVE THESE PROBLEMS MADE IT FOR YOU TO DO YOUR WORK, TAKE CARE OF THINGS AT HOME, OR GET ALONG WITH OTHER PEOPLE: SOMEWHAT DIFFICULT
5. BEING SO RESTLESS THAT IT IS HARD TO SIT STILL: MORE THAN HALF THE DAYS
7. FEELING AFRAID AS IF SOMETHING AWFUL MIGHT HAPPEN: NOT AT ALL
3. WORRYING TOO MUCH ABOUT DIFFERENT THINGS: SEVERAL DAYS
GAD7 TOTAL SCORE: 7
6. BECOMING EASILY ANNOYED OR IRRITABLE: SEVERAL DAYS

## 2018-10-31 ASSESSMENT — PATIENT HEALTH QUESTIONNAIRE - PHQ9
5. POOR APPETITE OR OVEREATING: SEVERAL DAYS
SUM OF ALL RESPONSES TO PHQ QUESTIONS 1-9: 14

## 2018-10-31 NOTE — MR AVS SNAPSHOT
After Visit Summary   10/31/2018    Hannah Meade    MRN: [id number]           Patient Information     Date Of Birth          1970        Visit Information        Provider Department      10/31/2018 3:00 PM Sandra Fink,  [facility]        Today's Diagnoses     JOSE J (obstructive sleep apnea)    -  1    Lymphedema        Bronchogenic cyst        Moderate persistent asthma without complication        Morbid obesity (H)        Chest wall pain        RLQ abdominal pain          Care Instructions    To schedule your CT scan call [phone]          Follow-ups after your visit        Future tests that were ordered for you today     Open Future Orders        Priority Expected Expires Ordered    CT Chest Abdomen Pelvis w/o & w Contrast Routine  10/31/2019 10/31/2018            Who to contact     If you have questions or need follow up information about today's clinic visit or your schedule please contact [facility] directly at [phone].  Normal or non-critical lab and imaging results will be communicated to you by MyChart, letter or phone within 4 business days after the clinic has received the results. If you do not hear from us within 7 days, please contact the clinic through [employer] or phone. If you have a critical or abnormal lab result, we will notify you by phone as soon as possible.  Submit refill requests through [employer] or call your pharmacy and they will forward the refill request to us. Please allow 3 business days for your refill to be completed.          Additional Information About Your Visit        MyChart Information     [employer] gives you secure access to your electronic health record. If you see a primary care provider, you can also send messages to your care team and make appointments. If you have questions, please call your primary care clinic.  If you do not have a primary care provider, please call [phone] and they will assist  "you.        Care EveryWhere ID     This is your Care EveryWhere ID. This could be used by other organizations to access your [facility] medical records  [id number]        Your Vitals Were     Pulse Temperature Respirations Height Pulse Oximetry BMI (Body Mass Index)    87 97.7  F (36.5  C) (Oral) 20 5' 3\" (1.6 m) 93% 46.41 kg/m2       Blood Pressure from Last 3 Encounters:   10/31/18 140/90   09/10/18 119/68   05/14/18 130/82    Weight from Last 3 Encounters:   10/31/18 262 lb (118.8 kg)   09/10/18 266 lb (120.7 kg)   12/20/17 245 lb 9.5 oz (111.4 kg)              We Performed the Following     Asthma Action Plan (AAP)          Today's Medication Changes          These changes are accurate as of 10/31/18  3:49 PM.  If you have any questions, ask your nurse or doctor.               Start taking these medicines.        Dose/Directions    fluticasone furoate 100 MCG/ACT inhaler   Commonly known as:  ARNUITY ELLIPTA   Used for:  Moderate persistent asthma without complication   Started by:  Snadra Fink DO        Dose:  1 puff   Inhale 1 puff into the lungs daily   Quantity:  30 each   Refills:  1         Stop taking these medicines if you haven't already. Please contact your care team if you have questions.     beclomethasone 80 MCG/ACT Aers IS A DISCONTINUED MEDICATION   Commonly known as:  QVAR   Stopped by:  Sandra Fink DO           meclizine 25 MG tablet   Commonly known as:  ANTIVERT   Stopped by:  Sandra Fink DO                Where to get your medicines      These medications were sent to [place] [address] [place] [facility] - [place], [place] - [address]  [address]  [place] [address] [facility] [address]     Phone:  [phone]     fluticasone furoate 100 MCG/ACT inhaler                Primary Care Provider Office Phone # Fax #    Sandra Fink [phone] [address] [facility] [address]        Equal Access to Services     LENA JACKSON AH: Annette mckeon " Yoana, ahsanda luqadaha, qaybta kaalsolo le, man nice sultanamanav rekhabell laMairavlad erin. So [facility] [phone].    ATENCIÓN: Si madhav luther, tiene a lloyd disposición servicios gratuitos de asistencia lingüística. Aracelis al [phone].    We comply with applicable federal civil rights laws and [place] laws. We do not discriminate on the basis of race, color, national origin, age, disability, sex, sexual orientation, or gender identity.            Thank you!     Thank you for choosing [facility]  for your care. Our goal is always to provide you with excellent care. Hearing back from our patients is one way we can continue to improve our services. Please take a few minutes to complete the written survey that you may receive in the mail after your visit with us. Thank you!             Your Updated Medication List - Protect others around you: Learn how to safely use, store and throw away your medicines at www.disposemymeds.org.          This list is accurate as of 10/31/18  3:49 PM.  Always use your most recent med list.                   Brand Name Dispense Instructions for use Diagnosis    BENADRYL PO      Take 25 mg by mouth daily as needed        escitalopram 20 MG tablet    LEXAPRO    90 tablet    TAKE 1 TABLET (20 MG) BY MOUTH DAILY    Anxiety       fluticasone furoate 100 MCG/ACT inhaler    ARNUITY ELLIPTA    30 each    Inhale 1 puff into the lungs daily    Moderate persistent asthma without complication       furosemide 20 MG tablet    LASIX    180 tablet    TAKE 2 TABLETS (40 MG) BY MOUTH DAILY    Lymphedema       ibuprofen 600 MG tablet    ADVIL/MOTRIN    40 tablet    Take 1 tablet (600 mg) by mouth 3 times daily    Acute exacerbation of chronic low back pain       * ipratropium - albuterol 0.5 mg/2.5 mg/3 mL 0.5-2.5 (3) MG/3ML neb solution    DUONEB    30 vial    Take 1 vial (3 mLs) by nebulization every 6 hours as needed for shortness of breath / dyspnea or wheezing    SOB  (shortness of breath)       * Ipratropium-Albuterol  MCG/ACT inhaler    COMBIVENT RESPIMAT    1 Inhaler    Inhale 1 puff into the lungs every 4 hours Not to exceed 6 doses per day.    SOB (shortness of breath)       order for DME     1 Device    Equipment being ordered: post op shoe    Toe injury, right, initial encounter       * Notice:  This list has 2 medication(s) that are the same as other medications prescribed for you. Read the directions carefully, and ask your doctor or other care provider to review them with you.

## 2018-10-31 NOTE — PROGRESS NOTES
SUBJECTIVE:   Hannah Meade is a 48 year old female who presents to clinic today for the following health issues:      Abdominal Pain      Duration: 6 mos    Description (location/character/radiation): bilateral abdominal pain, sx worse when laying down or sitting to standing-on and off, not daily       Associated flank pain: None    Intensity:  moderate    Accompanying signs and symptoms:        Fever/Chills: no        Gas/Bloating: no        Nausea/vomitting: no        Diarrhea: no        Dysuria or Hematuria: no     History (previous similar pain/trauma/previous testing): unable to remember    Precipitating or alleviating factors:       Pain worse with eating/BM/urination: none       Pain relieved by BM: no     Therapies tried and outcome: None    LMP:  not applicable-partial hysterectomy    Patient has multiple complaints today.  She has noticed right side pain and SOB, painful cough, worsening foot and leg swelling for the past month. She is morbidly obese and has a history of asthma, JOSE J, pulmonary nodules, lymphedema and bronchogenic cyst.  She also notes sharp RLQ pain that is worse when laying down or when she stands up from seated position.  She has a history of a brochogenic cyst in the past and states that her current symptoms are similar to when she had the cyst before.  She does have a CPAP, but stopped using it because it was painful.  She started using it again lately but hasn't noticed a difference in her symptoms.  She has an Rx for QVar, but is not usiing it as prescribed.  Only taking it once daily at the most because it tastes bad.       Problem list and histories reviewed & adjusted, as indicated.  Additional history: as documented    Patient Active Problem List   Diagnosis     Backache     Carbuncle and furuncle of trunk     CARDIOVASCULAR SCREENING; LDL GOAL LESS THAN 160     Insomnia     Anxiety     Plantar fasciitis     Ovarian cyst     Bronchogenic cyst     Chest wall pain      "Right-sided low back pain with left-sided sciatica     Morbid obesity (H)     bmi over 40     BMI 40.0-44.9, adult (H)     Asthma     Other nonspecific abnormal finding of lung field (CODE)     Multiple lipomas     Mild major depression (H)     JOSE J (obstructive sleep apnea)     Lymphedema     Elevated glucose     Past Surgical History:   Procedure Laterality Date     C NONSPECIFIC PROCEDURE      2 C- SECTIONS     EXCISE MASS BACK Left 9/17/2014    Procedure: EXCISE MASS BACK;  Surgeon: Yaz Avilez MD;  Location: [facility] OR     EXCISE MASS UPPER EXTREMITY Left 9/17/2014    Procedure: EXCISE MASS UPPER EXTREMITY;  Surgeon: Yaz Avilez MD;  Location: [facility] OR     HYSTERECTOMY VAGINAL  1992    ovaries intact     ORTHOPEDIC SURGERY      left foot     other      lump on right arm removed      THORACOTOMY Right 8/25/2015    Procedure: THORACOTOMY;  Surgeon: Andrew Gordon MD;  Location:  OR       Social History   Substance Use Topics     Smoking status: Never Smoker     Smokeless tobacco: Never Used     Alcohol use No     Family History   Problem Relation Age of Onset     Neurologic Disorder Mother      Parkinsons     Diabetes Mother      Hypertension Father      Cardiovascular Father      Gallbladder Disease Father      Diabetes Maternal Grandmother      C.A.D. Paternal Grandfather      Cardiovascular Paternal Grandfather      Hypertension Sister      Polycystic Kidney Diease Other            Reviewed and updated as needed this visit by clinical staff  Tobacco  Allergies       Reviewed and updated as needed this visit by Provider         ROS:  Constitutional, HEENT, cardiovascular, pulmonary, GI, , musculoskeletal, neuro, skin, endocrine and psych systems are negative, except as otherwise noted.    OBJECTIVE:     /90 (BP Location: Right arm, Patient Position: Chair, Cuff Size: Adult Large)  Pulse 87  Temp 97.7  F (36.5  C) (Oral)  Resp 20  Ht 5' 3\" (1.6 m)  Wt 262 lb (118.8 kg)  " SpO2 93%  BMI 46.41 kg/m2  Body mass index is 46.41 kg/(m^2).  GENERAL: alert, no distress and obese  RESP: decreased breath sounds throughout  CV: regular rate and rhythm, normal S1 S2, no S3 or S4, no murmur, click or rub, no peripheral edema and peripheral pulses strong  ABDOMEN: soft, nontender, no hepatosplenomegaly, no masses and bowel sounds normal    ASSESSMENT/PLAN:       ICD-10-CM    1. JOSE J (obstructive sleep apnea) G47.33    2. Lymphedema I89.0    3. Bronchogenic cyst J98.4    4. Moderate persistent asthma without complication J45.40 fluticasone furoate (ARNUITY ELLIPTA) 100 MCG/ACT inhaler   5. Morbid obesity (H) E66.01    6. Chest wall pain R07.89 CT Chest Abdomen Pelvis w/o & w Contrast   7. RLQ abdominal pain R10.31 CT Chest Abdomen Pelvis w/o & w Contrast     Progressively worsening SOB, leg swelling, chest pain over the past month or so.  Possibly from uncontrolled asthma and JOSE J vs growing pulmonary nodules vs recurrent bronchogenic cyst.  Advised using CPAP every night.  Will stop Qvar and switch to Arnuity to help with compliance.  Will get a CT chest to evaluate further.  May need to go back to pulmonology for eval.    RLQ sharp pains with position changes are suspicious for a hernia.  Will also get a CT of her abd and pelvis to rule out abd wall defect.      Regarding the leg swelling, I advised her to take an extra furosemide as needed for days when her swelling is worse.      Patient should get the CT scan in the next week or so.  Will follow up after results are back.      Sandra Fink DO  [facility]

## 2018-11-01 ASSESSMENT — ANXIETY QUESTIONNAIRES: GAD7 TOTAL SCORE: 7

## 2018-11-01 ASSESSMENT — ASTHMA QUESTIONNAIRES: ACT_TOTALSCORE: 11

## 2018-11-07 ENCOUNTER — TELEPHONE (OUTPATIENT)
Dept: FAMILY MEDICINE | Facility: CLINIC | Age: 48
End: 2018-11-07

## 2018-11-07 DIAGNOSIS — F32.0 MILD MAJOR DEPRESSION (H): Primary | ICD-10-CM

## 2018-11-07 NOTE — TELEPHONE ENCOUNTER
Panel Management Review      Patient has the following on her problem list:     Depression / Dysthymia review    Measure:  Needs PHQ-9 score of 4 or less during index window.  Administer PHQ-9 and if score is 5 or more, send encounter to provider for next steps.    5 - 7 month window range:     PHQ-9 SCORE 7/31/2017 9/10/2018 10/31/2018   Total Score - - -   Total Score MyChart - 12 (Moderate depression) -   Total Score 11 12 14       If PHQ-9 recheck is 5 or more, route to provider for next steps.    Patient is due for:  DAP    Asthma review     ACT Total Scores 10/31/2018   ACT TOTAL SCORE (Goal Greater than or Equal to 20) 11   In the past 12 months, how many times did you visit the emergency room for your asthma without being admitted to the hospital? 0   In the past 12 months, how many times were you hospitalized overnight because of your asthma? 0      1. Is Asthma diagnosis on the Problem List? Yes    2. Is Asthma listed on Health Maintenance? Yes    3. Patient is due for:  none    Hypertension   Last three blood pressure readings:  BP Readings from Last 3 Encounters:   10/31/18 140/90   09/10/18 119/68   05/14/18 130/82     Blood pressure: FAILED    HTN Guidelines:  Age 18-59 BP range:  Less than 140/90  Age 60-85 with Diabetes:  Less than 140/90  Age 60-85 without Diabetes:  less than 150/90      Composite cancer screening  Chart review shows that this patient is due/due soon for the following None  Summary:    Patient is due/failing the following:   BP CHECK and DAP    Action needed:   Patient needs nurse only appointment.    Type of outreach:    OUTREACH PT    Questions for provider review:    None                                                                                                                                    Jing Lauren CMA       Chart routed to PANEL POOL .

## 2018-11-08 ENCOUNTER — HOSPITAL ENCOUNTER (OUTPATIENT)
Dept: CT IMAGING | Facility: CLINIC | Age: 48
Discharge: HOME | End: 2018-11-08
Attending: FAMILY MEDICINE | Admitting: FAMILY MEDICINE
Payer: COMMERCIAL

## 2018-11-08 DIAGNOSIS — R10.31 RLQ ABDOMINAL PAIN: ICD-10-CM

## 2018-11-08 DIAGNOSIS — R07.89 CHEST WALL PAIN: ICD-10-CM

## 2018-11-08 DIAGNOSIS — J98.4 BRONCHOGENIC CYST: ICD-10-CM

## 2018-11-08 DIAGNOSIS — R91.8 PULMONARY NODULES: Primary | ICD-10-CM

## 2018-11-08 PROCEDURE — 25000128 H RX IP 250 OP 636: Performed by: RADIOLOGY

## 2018-11-08 PROCEDURE — 71260 CT THORAX DX C+: CPT

## 2018-11-08 RX ORDER — IOPAMIDOL 755 MG/ML
500 INJECTION, SOLUTION INTRAVASCULAR ONCE
Status: COMPLETED | OUTPATIENT
Start: 2018-11-08 | End: 2018-11-08

## 2018-11-08 RX ADMIN — IOPAMIDOL 100 ML: 755 INJECTION, SOLUTION INTRAVENOUS at 15:13

## 2018-11-08 RX ADMIN — SODIUM CHLORIDE 65 ML: 9 INJECTION, SOLUTION INTRAVENOUS at 15:13

## 2018-11-28 ENCOUNTER — TRANSFERRED RECORDS (OUTPATIENT)
Dept: HEALTH INFORMATION MANAGEMENT | Facility: CLINIC | Age: 48
End: 2018-11-28

## 2019-01-21 DIAGNOSIS — F41.9 ANXIETY: ICD-10-CM

## 2019-01-21 NOTE — TELEPHONE ENCOUNTER
"Requested Prescriptions   Pending Prescriptions Disp Refills     escitalopram (LEXAPRO) 20 MG tablet [Pharmacy Med Name: ESCITALOPRAM 20 MG TABLET]  Last Written Prescription Date:  7/19/2018  Last Fill Quantity: 90 tablet,  # refills: 1   Last office visit: 10/31/2018 with prescribing provider:  Aleks   Future Office Visit:     90 tablet 1     Sig: TAKE 1 TABLET (20 MG) BY MOUTH DAILY    SSRIs Protocol Passed - 1/21/2019 12:40 AM       Passed - Recent (12 mo) or future (30 days) visit within the authorizing provider's specialty    Patient had office visit in the last 12 months or has a visit in the next 30 days with authorizing provider or within the authorizing provider's specialty.  See \"Patient Info\" tab in inbasket, or \"Choose Columns\" in Meds & Orders section of the refill encounter.             Passed - Medication is active on med list       Passed - Patient is age 18 or older       Passed - No active pregnancy on record       Passed - No positive pregnancy test in last 12 months          "

## 2019-01-22 RX ORDER — ESCITALOPRAM OXALATE 20 MG/1
TABLET ORAL
Qty: 90 TABLET | Refills: 0 | Status: SHIPPED | OUTPATIENT
Start: 2019-01-22 | End: 2019-03-19

## 2019-01-22 NOTE — TELEPHONE ENCOUNTER
[facility] Follow-up to PHQ 7/31/2017 9/10/2018 10/31/2018   PHQ-9 9. Suicide Ideation past 2 weeks Not at all Not at all Not at all     Prescription approved per [facility] [place].  Monique Qiu RN, BSN

## 2019-02-14 ENCOUNTER — TELEPHONE (OUTPATIENT)
Dept: FAMILY MEDICINE | Facility: CLINIC | Age: 49
End: 2019-02-14

## 2019-02-14 NOTE — TELEPHONE ENCOUNTER
Fax from [facility], asking for escitalopram refill, has refills existing at [facility], faxed note back informing, call if ?'s    escitalopram (LEXAPRO) 20 MG tablet 90 tablet 0 1/22/2019     Monique Qiu, RN, BSN  Message handled by Nurse Triage.

## 2019-03-13 DIAGNOSIS — I89.0 LYMPHEDEMA: ICD-10-CM

## 2019-03-13 NOTE — LETTER
March 15, 2019      Hannah Meade  [address] [facility] [address]        Dear Hannah,     We recently received a call from your pharmacy requesting a refill of Furosemide.     A review of your chart indicates that an appointment is required with your provider for med check and establish care with new primary provider. Please call the clinic at [phone] to schedule your appointment.     We have authorized one refill of your medication to allow time for you to schedule your appointment.      Taking care of your health is important to us and ongoing visits with your provider are vital to your care.  We look forward to seeing you in the near future.     Sincerely,        Ned Dennison MD/Ban Gomez RN

## 2019-03-13 NOTE — TELEPHONE ENCOUNTER
"Requested Prescriptions   Pending Prescriptions Disp Refills     furosemide (LASIX) 20 MG tablet [Pharmacy Med Name: FUROSEMIDE 20 MG TABLET]    Last Written Prescription Date: 9/10/18   Last Fill Quantity: 180 Tablets,  # refills: 1   Last office visit: 10/31/2018 with prescribing provider:   Aleks  Future Office Visit:     180 tablet 1     Sig: TAKE 2 TABLETS BY MOUTH EVERY DAY    Diuretics (Including Combos) Protocol Failed - 3/13/2019  1:51 AM       Failed - Blood pressure under 140/90 in past 12 months    BP Readings from Last 3 Encounters:   10/31/18 140/90   09/10/18 119/68   05/14/18 130/82                Passed - Recent (12 mo) or future (30 days) visit within the authorizing provider's specialty    Patient had office visit in the last 12 months or has a visit in the next 30 days with authorizing provider or within the authorizing provider's specialty.  See \"Patient Info\" tab in inbasket, or \"Choose Columns\" in Meds & Orders section of the refill encounter.             Passed - Medication is active on med list       Passed - Patient is age 18 or older       Passed - No active pregancy on record       Passed - Normal serum creatinine on file in past 12 months    Recent Labs   Lab Test 09/10/18  1854   CR 0.81             Passed - Normal serum potassium on file in past 12 months    Recent Labs   Lab Test 09/10/18  1854   POTASSIUM 4.2                   Passed - Normal serum sodium on file in past 12 months    Recent Labs   Lab Test 09/10/18  1854                Passed - No positive pregnancy test in past 12 months          "

## 2019-03-15 RX ORDER — FUROSEMIDE 20 MG
TABLET ORAL
Qty: 60 TABLET | Refills: 0 | Status: SHIPPED | OUTPATIENT
Start: 2019-03-15 | End: 2019-04-02

## 2019-03-19 ENCOUNTER — MYC REFILL (OUTPATIENT)
Dept: FAMILY MEDICINE | Facility: CLINIC | Age: 49
End: 2019-03-19

## 2019-03-19 ENCOUNTER — MYC MEDICAL ADVICE (OUTPATIENT)
Dept: FAMILY MEDICINE | Facility: CLINIC | Age: 49
End: 2019-03-19

## 2019-03-19 DIAGNOSIS — F41.9 ANXIETY: ICD-10-CM

## 2019-03-19 RX ORDER — ESCITALOPRAM OXALATE 20 MG/1
20 TABLET ORAL DAILY
Qty: 90 TABLET | Refills: 0 | Status: CANCELLED | OUTPATIENT
Start: 2019-03-19

## 2019-03-19 RX ORDER — ESCITALOPRAM OXALATE 20 MG/1
20 TABLET ORAL DAILY
Qty: 90 TABLET | Refills: 0 | Status: SHIPPED | OUTPATIENT
Start: 2019-03-19 | End: 2019-05-01

## 2019-03-19 NOTE — TELEPHONE ENCOUNTER
Sent in a 90 day supply.  Patient needs to follow up regarding her depression before further refills can be sent.  She may see me in [place] or can see any of the providers at [place] instead if she prefers that location.

## 2019-03-19 NOTE — TELEPHONE ENCOUNTER
"Last Written Prescription Date:  1/22/19  Last Fill Quantity: 90 tablet,  # refills: 0   Last office visit: 10/31/2018 with prescribing provider:  Aleks   Future Office Visit:      [facility] Follow-up to PHQ 7/31/2017 9/10/2018 10/31/2018   PHQ-9 9. Suicide Ideation past 2 weeks Not at all Not at all Not at all     MAURISIO-7 SCORE 7/31/2017 9/10/2018 10/31/2018   Total Score - - -   Total Score - 11 (moderate anxiety) -   Total Score 11 11 7     Requested Prescriptions   Pending Prescriptions Disp Refills     escitalopram (LEXAPRO) 20 MG tablet 90 tablet 0     Sig: Take 1 tablet (20 mg) by mouth daily    SSRIs Protocol Passed - 3/19/2019  1:02 PM       Passed - Recent (12 mo) or future (30 days) visit within the authorizing provider's specialty    Patient had office visit in the last 12 months or has a visit in the next 30 days with authorizing provider or within the authorizing provider's specialty.  See \"Patient Info\" tab in inbasket, or \"Choose Columns\" in Meds & Orders section of the refill encounter.             Passed - Medication is active on med list       Passed - Patient is age 18 or older       Passed - No active pregnancy on record       Passed - No positive pregnancy test in last 12 months          "

## 2019-03-19 NOTE — TELEPHONE ENCOUNTER
Routing to PCP. Are you okay with refilling this? Dr. River previously prescribed it.    Price eDnton RN

## 2019-03-24 DIAGNOSIS — F41.9 ANXIETY: ICD-10-CM

## 2019-03-24 DIAGNOSIS — I89.0 LYMPHEDEMA: ICD-10-CM

## 2019-03-25 NOTE — TELEPHONE ENCOUNTER
"Requested Prescriptions   Pending Prescriptions Disp Refills     furosemide (LASIX) 20 MG tablet [Pharmacy Med Name: FUROSEMIDE 20 MG TABLET] 180 tablet 1    Last Written Prescription Date:  03/15/2019  Last Fill Quantity: 60 tablet,  # refills: 0   Last office visit: 10/31/2018 with prescribing provider:  10/31/2018   Future Office Visit:     Sig: TAKE 2 TABLETS BY MOUTH EVERY DAY    Diuretics (Including Combos) Protocol Failed - 3/24/2019 12:30 AM       Failed - Blood pressure under 140/90 in past 12 months    BP Readings from Last 3 Encounters:   10/31/18 140/90   09/10/18 119/68   05/14/18 130/82                Passed - Recent (12 mo) or future (30 days) visit within the authorizing provider's specialty    Patient had office visit in the last 12 months or has a visit in the next 30 days with authorizing provider or within the authorizing provider's specialty.  See \"Patient Info\" tab in inbasket, or \"Choose Columns\" in Meds & Orders section of the refill encounter.             Passed - Medication is active on med list       Passed - Patient is age 18 or older       Passed - No active pregancy on record       Passed - Normal serum creatinine on file in past 12 months    Recent Labs   Lab Test 09/10/18  1854   CR 0.81             Passed - Normal serum potassium on file in past 12 months    Recent Labs   Lab Test 09/10/18  1854   POTASSIUM 4.2                   Passed - Normal serum sodium on file in past 12 months    Recent Labs   Lab Test 09/10/18  1854                Passed - No positive pregnancy test in past 12 months        [place] [facility]  "

## 2019-03-26 RX ORDER — ESCITALOPRAM OXALATE 20 MG/1
TABLET ORAL
Qty: 90 TABLET | Refills: 1 | OUTPATIENT
Start: 2019-03-26

## 2019-03-26 RX ORDER — FUROSEMIDE 20 MG
TABLET ORAL
Qty: 60 TABLET | Refills: 0 | Status: SHIPPED | OUTPATIENT
Start: 2019-03-26 | End: 2019-04-02

## 2019-03-26 NOTE — TELEPHONE ENCOUNTER
See mychart on 3/19/19.  Pt was given a 90 day florencio by Dr Fink and pt needs to be seen prior to next appt.  [facility]    Yo Ledbetter RN, BSN

## 2019-03-26 NOTE — TELEPHONE ENCOUNTER
Prescription approved per [facility] Refill Protocol.  For 30 day fill pt due for f/u        Return in about 6 months (around 3/10/2019      Radha Barrett RN

## 2019-03-26 NOTE — TELEPHONE ENCOUNTER
Pt returned call.  Informed and agrees. History shows 9/10/18 physical with Dr. Dennison.   Pt prefers to meet with Dr. Stone for the med check now that Dr. Fink is no longer working in our office.  Scheduled.   Celestino Kwong RN

## 2019-04-02 ENCOUNTER — OFFICE VISIT (OUTPATIENT)
Dept: FAMILY MEDICINE | Facility: CLINIC | Age: 49
End: 2019-04-02
Payer: COMMERCIAL

## 2019-04-02 ENCOUNTER — ANCILLARY PROCEDURE (OUTPATIENT)
Dept: GENERAL RADIOLOGY | Facility: CLINIC | Age: 49
End: 2019-04-02
Attending: FAMILY MEDICINE
Payer: COMMERCIAL

## 2019-04-02 VITALS
SYSTOLIC BLOOD PRESSURE: 138 MMHG | TEMPERATURE: 98.1 F | RESPIRATION RATE: 20 BRPM | OXYGEN SATURATION: 98 % | HEIGHT: 63 IN | DIASTOLIC BLOOD PRESSURE: 84 MMHG | BODY MASS INDEX: 46.78 KG/M2 | WEIGHT: 264 LBS | HEART RATE: 98 BPM

## 2019-04-02 DIAGNOSIS — J45.40 MODERATE PERSISTENT ASTHMA WITHOUT COMPLICATION: ICD-10-CM

## 2019-04-02 DIAGNOSIS — I89.0 LYMPHEDEMA: Primary | ICD-10-CM

## 2019-04-02 DIAGNOSIS — M79.645 PAIN OF LEFT THUMB: ICD-10-CM

## 2019-04-02 PROCEDURE — 73140 X-RAY EXAM OF FINGER(S): CPT | Mod: LT

## 2019-04-02 PROCEDURE — 99214 OFFICE O/P EST MOD 30 MIN: CPT | Performed by: FAMILY MEDICINE

## 2019-04-02 RX ORDER — MONTELUKAST SODIUM 10 MG/1
10 TABLET ORAL AT BEDTIME
Qty: 90 TABLET | Refills: 0 | Status: SHIPPED | OUTPATIENT
Start: 2019-04-02 | End: 2019-05-13

## 2019-04-02 RX ORDER — FUROSEMIDE 20 MG
TABLET ORAL
Qty: 180 TABLET | Refills: 1 | Status: SHIPPED | OUTPATIENT
Start: 2019-04-02 | End: 2019-12-23

## 2019-04-02 ASSESSMENT — ANXIETY QUESTIONNAIRES
4. TROUBLE RELAXING: SEVERAL DAYS
1. FEELING NERVOUS, ANXIOUS, OR ON EDGE: SEVERAL DAYS
GAD7 TOTAL SCORE: 10
7. FEELING AFRAID AS IF SOMETHING AWFUL MIGHT HAPPEN: MORE THAN HALF THE DAYS
2. NOT BEING ABLE TO STOP OR CONTROL WORRYING: MORE THAN HALF THE DAYS
5. BEING SO RESTLESS THAT IT IS HARD TO SIT STILL: SEVERAL DAYS
GAD7 TOTAL SCORE: 10
6. BECOMING EASILY ANNOYED OR IRRITABLE: SEVERAL DAYS
GAD7 TOTAL SCORE: 10
3. WORRYING TOO MUCH ABOUT DIFFERENT THINGS: MORE THAN HALF THE DAYS

## 2019-04-02 ASSESSMENT — PATIENT HEALTH QUESTIONNAIRE - PHQ9
SUM OF ALL RESPONSES TO PHQ QUESTIONS 1-9: 9
SUM OF ALL RESPONSES TO PHQ QUESTIONS 1-9: 9
10. IF YOU CHECKED OFF ANY PROBLEMS, HOW DIFFICULT HAVE THESE PROBLEMS MADE IT FOR YOU TO DO YOUR WORK, TAKE CARE OF THINGS AT HOME, OR GET ALONG WITH OTHER PEOPLE: SOMEWHAT DIFFICULT

## 2019-04-02 ASSESSMENT — MIFFLIN-ST. JEOR: SCORE: 1786.63

## 2019-04-02 NOTE — PROGRESS NOTES
SUBJECTIVE:   Hannah Meade is a 49 year old female who presents to clinic today for the following health issues:      HPI  Medication Followup of Furosemide    Taking Medication as prescribed: yes    Side Effects:  Concerns with flushing    Medication Helping Symptoms:  yes         Musculoskeletal problem/pain      Duration: 2 weeks     Description  Location: left thumb     Intensity:  moderate    Accompanying signs and symptoms: cracking     History  Previous similar problem: no   Previous evaluation:  none    Precipitating or alleviating factors:  Trauma or overuse: YES  Aggravating factors include: when open jars or anything that requires a twisting motion     Therapies tried and outcome: ibuprofen     Asthma Follow-Up    Was ACT completed today?    Yes    ACT Total Scores 4/2/2019   ACT TOTAL SCORE (Goal Greater than or Equal to 20) 10   In the past 12 months, how many times did you visit the emergency room for your asthma without being admitted to the hospital? 0   In the past 12 months, how many times were you hospitalized overnight because of your asthma? 0       Recent asthma triggers that patient is dealing with: upper respiratory infections and cold air    Admits does not use inhalers because she doesn't really like the way it tastes and they are too expensive.     Problem list and histories reviewed & adjusted, as indicated.  Additional history: as documented      Patient Active Problem List   Diagnosis     Backache     Carbuncle and furuncle of trunk     CARDIOVASCULAR SCREENING; LDL GOAL LESS THAN 160     Insomnia     Anxiety     Plantar fasciitis     Ovarian cyst     Bronchogenic cyst     Chest wall pain     Right-sided low back pain with left-sided sciatica     Morbid obesity (H)     bmi over 40     BMI 40.0-44.9, adult (H)     Asthma     Other nonspecific abnormal finding of lung field (CODE)     Multiple lipomas     Mild major depression (H)     JOSE J (obstructive sleep apnea)     Lymphedema      "Elevated glucose     Past Surgical History:   Procedure Laterality Date     C NONSPECIFIC PROCEDURE      2 C- SECTIONS     EXCISE MASS BACK Left 9/17/2014    Procedure: EXCISE MASS BACK;  Surgeon: Yaz Avilez MD;  Location: [facility] OR     EXCISE MASS UPPER EXTREMITY Left 9/17/2014    Procedure: EXCISE MASS UPPER EXTREMITY;  Surgeon: Yaz Avilez MD;  Location: [facility] OR     HYSTERECTOMY VAGINAL  1992    ovaries intact     ORTHOPEDIC SURGERY      left foot     other      lump on right arm removed      THORACOTOMY Right 8/25/2015    Procedure: THORACOTOMY;  Surgeon: Andrew Gordon MD;  Location: [facility] OR       Social History     Tobacco Use     Smoking status: Never Smoker     Smokeless tobacco: Never Used   Substance Use Topics     Alcohol use: No     Alcohol/week: 0.0 oz     Family History   Problem Relation Age of Onset     Neurologic Disorder Mother         Parkinsons     Diabetes Mother      Hypertension Father      Cardiovascular Father      Gallbladder Disease Father      Diabetes Maternal Grandmother      C.A.D. Paternal Grandfather      Cardiovascular Paternal Grandfather      Hypertension Sister      Polycystic Kidney Diease Other            ROS:  Constitutional, HEENT, cardiovascular, pulmonary, gi and gu systems are negative, except as otherwise noted.    OBJECTIVE:     /84 (BP Location: Right arm, Patient Position: Chair, Cuff Size: Adult Large)   Pulse 98   Temp 98.1  F (36.7  C) (Oral)   Resp 20   Ht 1.6 m (5' 3\")   Wt 119.7 kg (264 lb)   SpO2 98%   BMI 46.77 kg/m    Body mass index is 46.77 kg/m .  GENERAL: healthy, alert and no distress  RESP: lungs clear to auscultation - no rales, rhonchi or wheezes  CV: regular rate and rhythm, normal S1 S2  MS: left thumb- TTP CMC joint, pain with AROM, no swelling   PSYCH: mentation appears normal, affect normal/bright, negative finkelstein     Diagnostic Test Results:  XR left thumb: I independently visualized the xray: some " degenerative changes noted at CMC joint.     ASSESSMENT/PLAN:          1. Lymphedema  -stable   - furosemide (LASIX) 20 MG tablet; TAKE 2 TABLETS BY MOUTH EVERY DAY  Dispense: 180 tablet; Refill: 1    2. Moderate persistent asthma without complication  - poorly controlled since she does not use inhalers as prescribed. Will start on singulair at bedtime   - montelukast (SINGULAIR) 10 MG tablet; Take 1 tablet (10 mg) by mouth At Bedtime  Dispense: 90 tablet; Refill: 0    3. Pain of left thumb  - due to early arthritis in CMC joint. Supportive care discussed.   - XR Finger Left G/E 2 Views; Future    See Patient Instructions    Lianna Cleveland MD  [facility]  Answers for HPI/ROS submitted by the patient on 4/2/2019   MAURISIO 7 TOTAL SCORE: 10  If you checked off any problems, how difficult have these problems made it for you to do your work, take care of things at home, or get along with other people?: Somewhat difficult  PHQ9 TOTAL SCORE: 9

## 2019-04-02 NOTE — PATIENT INSTRUCTIONS
Follow up in 4 weeks for asthma recheck       Understanding Carpometacarpal Osteoarthritis    The base of the thumb where it meets the hand is called the carpometacarpal (CMC) joint. This joint allows the thumb to move freely in many directions. It also provides strength so the hand can grasp and .  A smooth tissue called cartilage lines and cushions the bones of the CMC joint. Using the thumb puts stress on the joint. Over time, this can lead to the breakdown of the cartilage in the joint. This is known as osteoarthritis. With this condition, bones of the joint may be exposed and rub together. They may become irritated and rough. This keeps the joint from moving smoothly and can lead to pain.     How to say it  TAMIE-[id number]-met-uh-TAMIE-yesikahl      What causes CMC joint osteoarthritis?    This type of osteoarthritis is mainly caused by years of using the hand and thumb. The condition may be more likely if you:    Regularly do things that put great stress on the thumb joint    Have had previous thumb injuries    Have weakened or loose structures in the thumb    Are a woman who is past menopause  Symptoms of CMC joint osteoarthritis  Symptoms commonly include:    Thumb pain. It may get worse with pinching or gripping.    Thumb weakness    Sounds of grinding or popping in the thumb joint    Base of the thumb is enlarged   Treatment for CMC joint osteoarthritis  Osteoarthritis is a long-term (chronic) condition. Treatment focuses on managing symptoms. It may include:    Taking prescription or over-the-counter pain medicines to help reduce pain and swelling    Using a brace to rest and support the thumb joint    Using hot or cold therapy to help relieve pain    Learning and practicing ways to reduce stress on the thumb joint    Using devices that help protect the joint. These include jar openers, pen , and spring-action scissors.    Following a plan of physical therapy and exercises. This will help improve the  flexibility and strength of the hand and thumb.    Getting shots of medicine into the joint to help relieve symptoms for a time  If these treatments don t do enough to relieve severe pain, you may need surgery. There are several different types of surgery. In general, the goal is to relieve pain and help you to be able to use the hand.     When to call your healthcare provider  Call your healthcare provider right away if you have any of these:    Fever of 100.4 F (38 C) or higher, or as directed    Symptoms that don t get better, or get worse    New symptoms   Date Last Reviewed: 3/10/2016    5386-6503 [email or web address] [address] [place] [address], [place] [address]. All rights reserved. This information is not intended as a substitute for professional medical care. Always follow your healthcare professional's instructions.

## 2019-04-03 ASSESSMENT — ANXIETY QUESTIONNAIRES: GAD7 TOTAL SCORE: 10

## 2019-04-03 ASSESSMENT — PATIENT HEALTH QUESTIONNAIRE - PHQ9: SUM OF ALL RESPONSES TO PHQ QUESTIONS 1-9: 9

## 2019-04-03 ASSESSMENT — ASTHMA QUESTIONNAIRES: ACT_TOTALSCORE: 10

## 2019-04-08 DIAGNOSIS — F41.9 ANXIETY: ICD-10-CM

## 2019-04-08 NOTE — TELEPHONE ENCOUNTER
"Requested Prescriptions   Pending Prescriptions Disp Refills     escitalopram (LEXAPRO) 20 MG tablet [Pharmacy Med Name: ESCITALOPRAM 20 MG TABLET] 90 tablet 1     Sig: TAKE 1 TABLET (20 MG) BY MOUTH DAILY   Last Written Prescription Date:  3/19/19  Last Fill Quantity: 90,  # refills: 0   Last Office Visit: 4/2/2019 Henriquemon-D      Return in about 4 weeks (around 4/30/2019).     Future Office Visit:         SSRIs Protocol Passed - 4/8/2019  1:11 AM   PHQ-9 SCORE 9/10/2018 10/31/2018 4/2/2019   PHQ-9 Total Score - - -   PHQ-9 Total Score MyChart 12 (Moderate depression) - 9 (Mild depression)   PHQ-9 Total Score 12 14 9     MAURISIO-7 SCORE 9/10/2018 10/31/2018 4/2/2019   Total Score - - -   Total Score 11 (moderate anxiety) - 10 (moderate anxiety)   Total Score 11 7 10          Passed - Recent (12 mo) or future (30 days) visit within the authorizing provider's specialty     Patient had office visit in the last 12 months or has a visit in the next 30 days with authorizing provider or within the authorizing provider's specialty.  See \"Patient Info\" tab in inbasket, or \"Choose Columns\" in Meds & Orders section of the refill encounter.              Passed - Medication is active on med list        Passed - Patient is age 18 or older        Passed - No active pregnancy on record        Passed - No positive pregnancy test in last 12 months        "

## 2019-04-09 RX ORDER — ESCITALOPRAM OXALATE 20 MG/1
TABLET ORAL
Qty: 90 TABLET | Refills: 1 | OUTPATIENT
Start: 2019-04-09

## 2019-04-09 NOTE — TELEPHONE ENCOUNTER
Duplicate  E-Prescribing Status: Receipt confirmed by pharmacy (3/19/2019  2:16 PM CDT)  Yo Ledbetter RN, BSN

## 2019-04-14 DIAGNOSIS — F41.9 ANXIETY: ICD-10-CM

## 2019-04-14 NOTE — TELEPHONE ENCOUNTER
"Requested Prescriptions   Pending Prescriptions Disp Refills     escitalopram (LEXAPRO) 20 MG tablet [Pharmacy Med Name: ESCITALOPRAM 20 MG TABLET]  Last Written Prescription Date:  3/19/2019  Last Fill Quantity: 90 tablet,  # refills: 0   Last office visit: 4/2/2019 with prescribing provider:  Cristofer   Future Office Visit:     90 tablet 1     Sig: TAKE 1 TABLET (20 MG) BY MOUTH DAILY       SSRIs Protocol Passed - 4/14/2019 12:15 AM        Passed - Recent (12 mo) or future (30 days) visit within the authorizing provider's specialty     Patient had office visit in the last 12 months or has a visit in the next 30 days with authorizing provider or within the authorizing provider's specialty.  See \"Patient Info\" tab in inbasket, or \"Choose Columns\" in Meds & Orders section of the refill encounter.              Passed - Medication is active on med list        Passed - Patient is age 18 or older        Passed - No active pregnancy on record        Passed - No positive pregnancy test in last 12 months          "

## 2019-04-15 RX ORDER — ESCITALOPRAM OXALATE 20 MG/1
TABLET ORAL
Status: SHIPPED
Start: 2019-04-15 | End: 2019-05-13

## 2019-04-15 NOTE — TELEPHONE ENCOUNTER
Denied, see 3/19/19 rx sent, needs appointment before next refill, send pharmacy message    Monique Qiu, RN, BSN  Message handled by Nurse Triage.

## 2019-04-17 ENCOUNTER — TELEPHONE (OUTPATIENT)
Dept: FAMILY MEDICINE | Facility: CLINIC | Age: 49
End: 2019-04-17

## 2019-04-17 NOTE — LETTER
[address] [id number] [phone]                                                                                                April 17, 2019    Hannah VIDA Halina  [address] [address] [place] [address]        Dear Ms. Meade,    Your [facility] Team works hard to make sure that you and your family receive exceptional care. Enclosed you will find a copy of the Asthma Control Test (ACT) that our clinic uses to monitor and manage your asthma. This test is an assessment tool that we use to determine how well your asthma is controlled.      Please complete the enclosed questionnaire and mail it back to us in the self-addressed stamped envelope. We can also complete the questions over the phone if you keep a copy of the ACT for your reference when we call you.     Healthy regards,    Your [facility] Care Team

## 2019-04-17 NOTE — TELEPHONE ENCOUNTER
Panel Management Review      Patient has the following on her problem list:     Asthma review     ACT Total Scores 4/2/2019   ACT TOTAL SCORE (Goal Greater than or Equal to 20) 10   In the past 12 months, how many times did you visit the emergency room for your asthma without being admitted to the hospital? 0   In the past 12 months, how many times were you hospitalized overnight because of your asthma? 0      1. Is Asthma diagnosis on the Problem List? Yes    2. Is Asthma listed on Health Maintenance? Yes    3. Patient is due for:  ACT      Composite cancer screening  Chart review shows that this patient is due/due soon for the following Colonoscopy  Summary:    Patient is due/failing the following:   ACT    Action needed:   Patient needs to do ACT.    Type of outreach:    Copy of ACT mailed to patient, will reach out in 5 days.    Questions for provider review:    None                                                                                                                                      Annelise Singer MA       Chart routed to Care Team .

## 2019-04-29 NOTE — TELEPHONE ENCOUNTER
ACT returned- score- 15. Updated in Epic. Will route to PCP to advise if any follow-up is needed.    Annelise Singer MA

## 2019-04-30 NOTE — TELEPHONE ENCOUNTER
Patient due for repeat ACT please check on this. She was to follow up in 1 month from last visit.     LUCINDA

## 2019-04-30 NOTE — TELEPHONE ENCOUNTER
It appears that she may be seeing Dr. Stone at the [facility] now.  I'll defer to her.

## 2019-05-01 ENCOUNTER — ANCILLARY PROCEDURE (OUTPATIENT)
Dept: GENERAL RADIOLOGY | Facility: CLINIC | Age: 49
End: 2019-05-01
Attending: FAMILY MEDICINE
Payer: COMMERCIAL

## 2019-05-01 ENCOUNTER — OFFICE VISIT (OUTPATIENT)
Dept: FAMILY MEDICINE | Facility: CLINIC | Age: 49
End: 2019-05-01
Payer: COMMERCIAL

## 2019-05-01 VITALS
DIASTOLIC BLOOD PRESSURE: 80 MMHG | HEART RATE: 106 BPM | SYSTOLIC BLOOD PRESSURE: 136 MMHG | RESPIRATION RATE: 24 BRPM | TEMPERATURE: 98.2 F | OXYGEN SATURATION: 95 % | BODY MASS INDEX: 46.23 KG/M2 | WEIGHT: 261 LBS

## 2019-05-01 DIAGNOSIS — R06.02 SHORTNESS OF BREATH: Primary | ICD-10-CM

## 2019-05-01 DIAGNOSIS — J45.41 MODERATE PERSISTENT ASTHMA WITH ACUTE EXACERBATION: ICD-10-CM

## 2019-05-01 DIAGNOSIS — J06.9 VIRAL URI: ICD-10-CM

## 2019-05-01 DIAGNOSIS — R06.02 SHORTNESS OF BREATH: ICD-10-CM

## 2019-05-01 PROCEDURE — 71046 X-RAY EXAM CHEST 2 VIEWS: CPT

## 2019-05-01 PROCEDURE — 99214 OFFICE O/P EST MOD 30 MIN: CPT | Performed by: FAMILY MEDICINE

## 2019-05-01 RX ORDER — PREDNISONE 20 MG/1
20 TABLET ORAL DAILY
Qty: 5 TABLET | Refills: 0 | Status: SHIPPED | OUTPATIENT
Start: 2019-05-01 | End: 2019-05-13

## 2019-05-01 RX ORDER — BENZONATATE 100 MG/1
100 CAPSULE ORAL 3 TIMES DAILY PRN
Qty: 42 CAPSULE | Refills: 0 | Status: SHIPPED | OUTPATIENT
Start: 2019-05-01 | End: 2019-05-13

## 2019-05-01 RX ORDER — ALBUTEROL SULFATE 90 UG/1
2 AEROSOL, METERED RESPIRATORY (INHALATION) EVERY 6 HOURS
Qty: 8.5 G | Refills: 1 | Status: SHIPPED | OUTPATIENT
Start: 2019-05-01 | End: 2019-07-14

## 2019-05-01 RX ORDER — FLUTICASONE PROPIONATE 50 MCG
2 SPRAY, SUSPENSION (ML) NASAL DAILY
Qty: 16 G | Refills: 0 | Status: SHIPPED | OUTPATIENT
Start: 2019-05-01 | End: 2021-10-04

## 2019-05-01 NOTE — LETTER
May 1, 2019      Hannah Meade  [address] [address] [id number]        To Whom It May Concern:    Hannah Meade  was seen on 5/1/19.  Please excuse her  until 5/2/19 due to illness.  For further questions or concerns please let us know.         Sincerely,          Dr. Cristofer MD

## 2019-05-01 NOTE — PATIENT INSTRUCTIONS
Recommend flonase 2 sprays in each nostril once a day  Increase fluids  Humidfier use at night   Tessalon perles up to 3 times a day for cough  Complete course of steroids

## 2019-05-01 NOTE — PROGRESS NOTES
SUBJECTIVE:   Hannah Meade is a 49 year old female who presents to clinic today for the following   health issues:        Acute Illness   Acute illness concerns: possible pneumonia  Onset: week    Fever: unsure    Chills/Sweats: YES    Headache (location?): YES- sinus    Sinus Pressure:YES    Conjunctivitis:  no    Ear Pain: YES- not pain just fullness,     Rhinorrhea: YES    Congestion: YES    Sore Throat: YES     Cough: YES    Wheeze: YES- sx worse laying down    Shortness of breath: YES     Decreased Appetite: no    Nausea: no    Vomiting: no    Diarrhea:  YES- on and off    Dysuria/Freq.: no    Fatigue/Achiness: YES    Sick/Strep Exposure: YES- pneumonia going around the office at work     Therapies Tried and outcome: Aleve, benadryl          Additional history: as documented    Reviewed  and updated as needed this visit by clinical staff         Reviewed and updated as needed this visit by Provider         Patient Active Problem List   Diagnosis     Backache     Carbuncle and furuncle of trunk     CARDIOVASCULAR SCREENING; LDL GOAL LESS THAN 160     Insomnia     Anxiety     Plantar fasciitis     Ovarian cyst     Bronchogenic cyst     Chest wall pain     Right-sided low back pain with left-sided sciatica     Morbid obesity (H)     bmi over 40     BMI 40.0-44.9, adult (H)     Asthma     Other nonspecific abnormal finding of lung field (CODE)     Multiple lipomas     Mild major depression (H)     JOSE J (obstructive sleep apnea)     Lymphedema     Elevated glucose     Past Surgical History:   Procedure Laterality Date     C NONSPECIFIC PROCEDURE      2 C- SECTIONS     EXCISE MASS BACK Left 9/17/2014    Procedure: EXCISE MASS BACK;  Surgeon: Yaz Avilez MD;  Location: [facility] OR     EXCISE MASS UPPER EXTREMITY Left 9/17/2014    Procedure: EXCISE MASS UPPER EXTREMITY;  Surgeon: Yaz Avilez MD;  Location: [facility] OR     HYSTERECTOMY VAGINAL  1992    ovaries intact     ORTHOPEDIC SURGERY      left foot      other      lump on right arm removed      THORACOTOMY Right 8/25/2015    Procedure: THORACOTOMY;  Surgeon: Andrew Gordon MD;  Location: [facility] OR       Social History     Tobacco Use     Smoking status: Never Smoker     Smokeless tobacco: Never Used   Substance Use Topics     Alcohol use: No     Alcohol/week: 0.0 oz     Family History   Problem Relation Age of Onset     Neurologic Disorder Mother         Parkinsons     Diabetes Mother      Hypertension Father      Cardiovascular Father      Gallbladder Disease Father      Diabetes Maternal Grandmother      C.A.D. Paternal Grandfather      Cardiovascular Paternal Grandfather      Hypertension Sister      Polycystic Kidney Diease Other            ROS:  Constitutional, HEENT, cardiovascular, pulmonary, gi systems are negative, except as otherwise noted.    OBJECTIVE:     /80 (BP Location: Right arm, Patient Position: Chair, Cuff Size: Adult Large)   Pulse 106   Temp 98.2  F (36.8  C) (Oral)   Resp 24   Wt 118.4 kg (261 lb)   SpO2 94%   BMI 46.23 kg/m    Body mass index is 46.23 kg/m .  GENERAL: healthy, alert and no distress  HENT: normal cephalic/atraumatic, ear canals and TM's normal, nose and mouth without ulcers or lesions, oropharynx clear and oral mucous membranes moist  NECK: no adenopathy, no asymmetry, masses, or scars and thyroid normal to palpation  RESP: mild expiratory wheezing, no increased work of breathing   CV: regular rate and rhythm, normal S1 S2,    Diagnostic Test Results:   CXR- I independently visualized the xray: no acute infiltrates noted     ASSESSMENT/PLAN:       1. Shortness of breath  - XR Chest 2 Views; Future    2. Moderate persistent asthma with acute exacerbation  - URI triggered asthma exacerbation. Supportive care discussed   - predniSONE (DELTASONE) 20 MG tablet; Take 20 mg by mouth daily.  Dispense: 5 tablet; Refill: 0  - albuterol (PROAIR HFA/PROVENTIL HFA/VENTOLIN HFA) 108 (90 Base) MCG/ACT inhaler; Inhale 2  puffs into the lungs every 6 hours  Dispense: 8.5 g; Refill: 1  - benzonatate (TESSALON) 100 MG capsule; Take 1 capsule (100 mg) by mouth 3 times daily as needed for cough  Dispense: 42 capsule; Refill: 0    3. Viral URI  - benzonatate (TESSALON) 100 MG capsule; Take 1 capsule (100 mg) by mouth 3 times daily as needed for cough  Dispense: 42 capsule; Refill: 0  - fluticasone (FLONASE) 50 MCG/ACT nasal spray; Spray 2 sprays into both nostrils daily  Dispense: 16 g; Refill: 0    See Patient Instructions    Lianna Cleveland MD  [facility]

## 2019-05-02 DIAGNOSIS — F41.9 ANXIETY: ICD-10-CM

## 2019-05-02 RX ORDER — ESCITALOPRAM OXALATE 20 MG/1
TABLET ORAL
Qty: 90 TABLET | Refills: 1 | Status: SHIPPED | OUTPATIENT
Start: 2019-05-02 | End: 2019-10-01

## 2019-05-02 NOTE — TELEPHONE ENCOUNTER
Last OV 5.1.19 Marika Avendano  Last refill 3.19.19 #90 R 0    PHQ-9 SCORE 9/10/2018 10/31/2018 4/2/2019   PHQ-9 Total Score - - -   PHQ-9 Total Score MyChart 12 (Moderate depression) - 9 (Mild depression)   PHQ-9 Total Score 12 14 9     MAURISIO-7 SCORE 9/10/2018 10/31/2018 4/2/2019   Total Score - - -   Total Score 11 (moderate anxiety) - 10 (moderate anxiety)   Total Score 11 7 10       Routing refill request to provider for review/approval because:  PHQ 9, MAURISIO 7 > 4    Breanne Ferguson RN, BS  Clinical Nurse Triage.

## 2019-05-02 NOTE — TELEPHONE ENCOUNTER
"Last Written Prescription Date:  4/15/19  Last Fill Quantity: 90,  # refills: 0  Last office visit: No previous visit found with prescribing provider:  Dr River   Future Office Visit:        Requested Prescriptions   Pending Prescriptions Disp Refills     escitalopram (LEXAPRO) 20 MG tablet [Pharmacy Med Name: ESCITALOPRAM 20 MG TABLET] 90 tablet 1     Sig: TAKE 1 TABLET (20 MG) BY MOUTH DAILY       SSRIs Protocol Passed - 5/2/2019  1:31 AM        Passed - Recent (12 mo) or future (30 days) visit within the authorizing provider's specialty     Patient had office visit in the last 12 months or has a visit in the next 30 days with authorizing provider or within the authorizing provider's specialty.  See \"Patient Info\" tab in inbasket, or \"Choose Columns\" in Meds & Orders section of the refill encounter.              Passed - Medication is active on med list        Passed - Patient is age 18 or older        Passed - No active pregnancy on record        Passed - No positive pregnancy test in last 12 months          "

## 2019-05-07 ASSESSMENT — ASTHMA QUESTIONNAIRES: ACT_TOTALSCORE: 11

## 2019-05-13 ENCOUNTER — OFFICE VISIT (OUTPATIENT)
Dept: FAMILY MEDICINE | Facility: CLINIC | Age: 49
End: 2019-05-13
Payer: COMMERCIAL

## 2019-05-13 VITALS
BODY MASS INDEX: 45.89 KG/M2 | HEIGHT: 63 IN | HEART RATE: 93 BPM | TEMPERATURE: 98.6 F | DIASTOLIC BLOOD PRESSURE: 84 MMHG | WEIGHT: 259 LBS | RESPIRATION RATE: 20 BRPM | SYSTOLIC BLOOD PRESSURE: 142 MMHG | OXYGEN SATURATION: 95 %

## 2019-05-13 DIAGNOSIS — Z12.11 COLON CANCER SCREENING: ICD-10-CM

## 2019-05-13 DIAGNOSIS — J45.40 MODERATE PERSISTENT ASTHMA WITHOUT COMPLICATION: Primary | ICD-10-CM

## 2019-05-13 PROCEDURE — 99213 OFFICE O/P EST LOW 20 MIN: CPT | Performed by: FAMILY MEDICINE

## 2019-05-13 RX ORDER — MONTELUKAST SODIUM 10 MG/1
10 TABLET ORAL AT BEDTIME
Qty: 90 TABLET | Refills: 0 | Status: SHIPPED | OUTPATIENT
Start: 2019-05-13 | End: 2019-09-29

## 2019-05-13 ASSESSMENT — MIFFLIN-ST. JEOR: SCORE: 1763.95

## 2019-05-13 NOTE — PATIENT INSTRUCTIONS
Please start on flonase  We will follow up with you in 1 month with phone call for ACT  Try Delsym at night time or as needed for cough  Follow up in September for physical

## 2019-05-13 NOTE — PROGRESS NOTES
SUBJECTIVE:   Hannah Meade is a 49 year old female who presents to clinic today for the following health issues:      HPI  Asthma Follow-Up  Starting to feel better. States her voice comes and goes. She has completed the course of steroids.   Admits she continues to have post nasal drainage- never started on flonase.     Was ACT completed today?    Yes    ACT Total Scores 5/13/2019   ACT TOTAL SCORE (Goal Greater than or Equal to 20) 11   In the past 12 months, how many times did you visit the emergency room for your asthma without being admitted to the hospital? 0   In the past 12 months, how many times were you hospitalized overnight because of your asthma? 0       Recent asthma triggers that patient is dealing with: None          Additional history: as documented    Reviewed and updated as needed this visit by clinical staff  Tobacco  Allergies         Reviewed and updated as needed this visit by Provider         Patient Active Problem List   Diagnosis     Backache     Carbuncle and furuncle of trunk     CARDIOVASCULAR SCREENING; LDL GOAL LESS THAN 160     Insomnia     Anxiety     Plantar fasciitis     Ovarian cyst     Bronchogenic cyst     Chest wall pain     Right-sided low back pain with left-sided sciatica     Morbid obesity (H)     bmi over 40     BMI 40.0-44.9, adult (H)     Asthma     Other nonspecific abnormal finding of lung field (CODE)     Multiple lipomas     Mild major depression (H)     JOSE J (obstructive sleep apnea)     Lymphedema     Elevated glucose     Past Surgical History:   Procedure Laterality Date     C NONSPECIFIC PROCEDURE      2 C- SECTIONS     EXCISE MASS BACK Left 9/17/2014    Procedure: EXCISE MASS BACK;  Surgeon: Yaz Avilez MD;  Location: [facility] OR     EXCISE MASS UPPER EXTREMITY Left 9/17/2014    Procedure: EXCISE MASS UPPER EXTREMITY;  Surgeon: Yaz Avilez MD;  Location: [facility] OR     HYSTERECTOMY VAGINAL  1992    ovaries intact     ORTHOPEDIC SURGERY       "left foot     other      lump on right arm removed      THORACOTOMY Right 8/25/2015    Procedure: THORACOTOMY;  Surgeon: Andrew Gordon MD;  Location:  OR       Social History     Tobacco Use     Smoking status: Never Smoker     Smokeless tobacco: Never Used   Substance Use Topics     Alcohol use: No     Alcohol/week: 0.0 oz     Family History   Problem Relation Age of Onset     Neurologic Disorder Mother         Parkinsons     Diabetes Mother      Hypertension Father      Cardiovascular Father      Gallbladder Disease Father      Diabetes Maternal Grandmother      C.A.D. Paternal Grandfather      Cardiovascular Paternal Grandfather      Hypertension Sister      Polycystic Kidney Diease Other            ROS:  Constitutional, HEENT, cardiovascular, pulmonary systems are negative, except as otherwise noted.    OBJECTIVE:     /84 (BP Location: Right arm, Patient Position: Chair, Cuff Size: Adult Large)   Pulse 93   Temp 98.6  F (37  C) (Oral)   Resp 20   Ht 1.6 m (5' 3\")   Wt 117.5 kg (259 lb)   SpO2 95%   Breastfeeding? No   BMI 45.88 kg/m    Body mass index is 45.88 kg/m .  GENERAL: healthy, alert and no distress  RESP: lungs clear to auscultation - no rales, rhonchi or wheezes  CV: regular rate and rhythm, normal S1 S2    Diagnostic Test Results:  none     ASSESSMENT/PLAN:         1. Moderate persistent asthma without complication  - exacerbation improving. Continue with singulair daily  - montelukast (SINGULAIR) 10 MG tablet; Take 1 tablet (10 mg) by mouth At Bedtime  Dispense: 90 tablet; Refill: 0    2. Colon cancer screening  - GASTROENTEROLOGY ADULT REF PROCEDURE ONLY [place]  [phone]; No Provider Preference    See Patient Instructions    Lianna Cleveland MD  [facility]  "

## 2019-05-14 ASSESSMENT — ASTHMA QUESTIONNAIRES: ACT_TOTALSCORE: 11

## 2019-06-13 ENCOUNTER — HOSPITAL ENCOUNTER (OUTPATIENT)
Facility: CLINIC | Age: 49
Discharge: HOME | End: 2019-06-13
Attending: INTERNAL MEDICINE | Admitting: INTERNAL MEDICINE
Payer: COMMERCIAL

## 2019-06-13 VITALS
HEIGHT: 63 IN | DIASTOLIC BLOOD PRESSURE: 69 MMHG | WEIGHT: 258 LBS | BODY MASS INDEX: 45.71 KG/M2 | SYSTOLIC BLOOD PRESSURE: 118 MMHG | RESPIRATION RATE: 18 BRPM | HEART RATE: 86 BPM | OXYGEN SATURATION: 91 %

## 2019-06-13 LAB — COLONOSCOPY: NORMAL

## 2019-06-13 PROCEDURE — G0121 COLON CA SCRN NOT HI RSK IND: HCPCS | Performed by: INTERNAL MEDICINE

## 2019-06-13 PROCEDURE — G0500 MOD SEDAT ENDO SERVICE >5YRS: HCPCS | Performed by: INTERNAL MEDICINE

## 2019-06-13 PROCEDURE — 25000128 H RX IP 250 OP 636: Performed by: INTERNAL MEDICINE

## 2019-06-13 PROCEDURE — 45378 DIAGNOSTIC COLONOSCOPY: CPT | Performed by: INTERNAL MEDICINE

## 2019-06-13 RX ORDER — ONDANSETRON 4 MG/1
4 TABLET, ORALLY DISINTEGRATING ORAL EVERY 6 HOURS PRN
Status: DISCONTINUED | OUTPATIENT
Start: 2019-06-13 | End: 2019-06-13 | Stop reason: HOSPADM

## 2019-06-13 RX ORDER — ONDANSETRON 2 MG/ML
4 INJECTION INTRAMUSCULAR; INTRAVENOUS
Status: DISCONTINUED | OUTPATIENT
Start: 2019-06-13 | End: 2019-06-13 | Stop reason: HOSPADM

## 2019-06-13 RX ORDER — FENTANYL CITRATE 50 UG/ML
INJECTION, SOLUTION INTRAMUSCULAR; INTRAVENOUS PRN
Status: DISCONTINUED | OUTPATIENT
Start: 2019-06-13 | End: 2019-06-13 | Stop reason: HOSPADM

## 2019-06-13 RX ORDER — NALOXONE HYDROCHLORIDE 0.4 MG/ML
.1-.4 INJECTION, SOLUTION INTRAMUSCULAR; INTRAVENOUS; SUBCUTANEOUS
Status: DISCONTINUED | OUTPATIENT
Start: 2019-06-13 | End: 2019-06-13 | Stop reason: HOSPADM

## 2019-06-13 RX ORDER — FLUMAZENIL 0.1 MG/ML
0.2 INJECTION, SOLUTION INTRAVENOUS
Status: DISCONTINUED | OUTPATIENT
Start: 2019-06-13 | End: 2019-06-13 | Stop reason: HOSPADM

## 2019-06-13 RX ORDER — ONDANSETRON 2 MG/ML
4 INJECTION INTRAMUSCULAR; INTRAVENOUS EVERY 6 HOURS PRN
Status: DISCONTINUED | OUTPATIENT
Start: 2019-06-13 | End: 2019-06-13 | Stop reason: HOSPADM

## 2019-06-13 RX ORDER — LIDOCAINE 40 MG/G
CREAM TOPICAL
Status: DISCONTINUED | OUTPATIENT
Start: 2019-06-13 | End: 2019-06-13 | Stop reason: HOSPADM

## 2019-06-13 ASSESSMENT — MIFFLIN-ST. JEOR: SCORE: 1759.41

## 2019-06-13 NOTE — H&P
Pre-Endoscopy History and Physical     Hannah Meade MRN# [id number]   YOB: 1970 Age: 49 year old     Date of Procedure: 6/13/2019  Primary care provider: Lianna Cleveland  Type of Endoscopy: Colonoscopy with possible biopsy, possible polypectomy  Reason for Procedure: screen  Type of Anesthesia Anticipated: Conscious Sedation    HPI:    Hannah is a 49 year old female who will be undergoing the above procedure.      A history and physical has been performed. The patient's medications and allergies have been reviewed. The risks and benefits of the procedure and the sedation options and risks were discussed with the patient.  All questions were answered and informed consent was obtained.      She denies a personal or family history of anesthesia complications or bleeding disorders.     Patient Active Problem List   Diagnosis     Backache     Carbuncle and furuncle of trunk     CARDIOVASCULAR SCREENING; LDL GOAL LESS THAN 160     Insomnia     Anxiety     Plantar fasciitis     Ovarian cyst     Bronchogenic cyst     Chest wall pain     Right-sided low back pain with left-sided sciatica     Morbid obesity (H)     bmi over 40     BMI 40.0-44.9, adult (H)     Asthma     Other nonspecific abnormal finding of lung field (CODE)     Multiple lipomas     Mild major depression (H)     JOSE J (obstructive sleep apnea)     Lymphedema     Elevated glucose        Past Medical History:   Diagnosis Date     Anxiety      Elevated glucose 9/13/2018     Lymphedema 9/10/2018     Mild major depression (H) 9/10/2018     Obesity, unspecified      JOSE J (obstructive sleep apnea) 9/10/2018     Plantar fasciitis 2/23/2015     Sleep apnea, unspecified 12/20/2017     Tuberculin test reaction         Past Surgical History:   Procedure Laterality Date     C NONSPECIFIC PROCEDURE      2 C- SECTIONS     COLONOSCOPY  06/13/2019    Dr. Leon [facility]     EXCISE MASS BACK Left 9/17/2014    Procedure: EXCISE MASS BACK;  Surgeon:  Yaz Avilez MD;  Location: [facility] OR     EXCISE MASS UPPER EXTREMITY Left 9/17/2014    Procedure: EXCISE MASS UPPER EXTREMITY;  Surgeon: Yaz Avilez MD;  Location: [facility] OR     HYSTERECTOMY VAGINAL  1992    ovaries intact     other      lump on right arm removed      THORACIC SURGERY      lung surgery     THORACOTOMY Right 8/25/2015    Procedure: THORACOTOMY;  Surgeon: Andrew Gordon MD;  Location: [facility] OR       Social History     Tobacco Use     Smoking status: Never Smoker     Smokeless tobacco: Never Used   Substance Use Topics     Alcohol use: No     Alcohol/week: 0.0 oz       Family History   Problem Relation Age of Onset     Neurologic Disorder Mother         Parkinsons     Diabetes Mother      Hypertension Father      Cardiovascular Father      Gallbladder Disease Father      Diabetes Maternal Grandmother      C.A.D. Paternal Grandfather      Cardiovascular Paternal Grandfather      Hypertension Sister      Polycystic Kidney Diease Other      Colon Cancer No family hx of        Prior to Admission medications    Medication Sig Start Date End Date Taking? Authorizing Provider   albuterol (PROAIR HFA/PROVENTIL HFA/VENTOLIN HFA) 108 (90 Base) MCG/ACT inhaler Inhale 2 puffs into the lungs every 6 hours 5/1/19  Yes Lianna Cleveland MD   DiphenhydrAMINE HCl (BENADRYL PO) Take 25 mg by mouth daily as needed    Yes Reported, Patient   escitalopram (LEXAPRO) 20 MG tablet TAKE 1 TABLET (20 MG) BY MOUTH DAILY 5/2/19  Yes Lianna Cleveland MD   furosemide (LASIX) 20 MG tablet TAKE 2 TABLETS BY MOUTH EVERY DAY 4/2/19  Yes Lianna Cleveland MD   ibuprofen (ADVIL,MOTRIN) 600 MG tablet Take 1 tablet (600 mg) by mouth 3 times daily 5/15/16  Yes Aliyah Mora PA-C   montelukast (SINGULAIR) 10 MG tablet Take 1 tablet (10 mg) by mouth At Bedtime 5/13/19  Yes Lianna Cleveland MD   fluticasone (FLONASE) 50 MCG/ACT nasal spray Spray 2 sprays into both  "nostrils daily 5/1/19   Lianna Cleveland MD   fluticasone furoate (ARNUITY ELLIPTA) 100 MCG/ACT inhaler Inhale 1 puff into the lungs daily 10/31/18   Sandra Fink, DO   ipratropium - albuterol 0.5 mg/2.5 mg/3 mL (DUONEB) 0.5-2.5 (3) MG/3ML nebulization Take 1 vial (3 mLs) by nebulization every 6 hours as needed for shortness of breath / dyspnea or wheezing 2/25/16   Sandra Fink, DO       Allergies   Allergen Reactions     Seasonal Allergies      Ampicillin Rash     \"sunburn\"     Codeine Rash     \"sunburn\"     Decongestant [Oxymetazoline] Rash     \"sunburn\"     No Clinical Screening - See Comments Rash     Sulfa Drugs Rash     \"sunburn\"        REVIEW OF SYSTEMS:   5 point ROS negative except as noted above in HPI, including Gen., Resp., CV, GI &  system review.    PHYSICAL EXAM:   There were no vitals taken for this visit. Estimated body mass index is 45.88 kg/m  as calculated from the following:    Height as of 5/13/19: 1.6 m (5' 3\").    Weight as of 5/13/19: 117.5 kg (259 lb).   GENERAL APPEARANCE: alert, and oriented  MENTAL STATUS: alert  AIRWAY EXAM: Mallampatti Class I (visualization of the soft palate, fauces, uvula, anterior and posterior pillars)  RESP: lungs clear to auscultation - no rales, rhonchi or wheezes  CV: regular rates and rhythm  DIAGNOSTICS:    Not indicated    IMPRESSION   ASA Class 2 - Mild systemic disease    PLAN:   Plan for Colonoscopy with possible biopsy, possible polypectomy. We discussed the risks, benefits and alternatives and the patient wished to proceed.    The above has been forwarded to the consulting provider.      Signed Electronically by: Mauro Leon  June 13, 2019          "

## 2019-06-13 NOTE — LETTER
May 31, 2019      Hannah Meade  [address]  [place] [id number]        Thank you for choosing [facility]. You are scheduled for the following service:     Date:  Thursday June 13, 2019             Procedure:  COLONOSCOPY  Doctor:        Dr Leon   Arrival Time:  1230pm  *Check in at Emergency/Endoscopy desk*  Procedure Time:  1pm      Location:   [facility]        Endoscopy Department, First Floor (Enter through ER Doors) *        [address] [place], [place] [address]      [phone] or [phone] () to reschedule      MIRALAX -GATORADE  PREP  Colonoscopy is the most accurate test to detect colon polyps and colon cancer; and the only test where polyps can be removed. During this procedure, a doctor examines the lining of your large intestine and rectum through a flexible tube.   Transportation  Arrange for a ride for the day of your procedure with a responsible adult.  A taxi ride is not an option unless you are accompanied by a responsible adult. If you fail to arrange transportation with a responsible adult, your procedure will be cancelled and rescheduled.    Purchase the  following supplies at your local pharmacy:  - 2 (two) bisacodyl tablets: each tablet contains 5 mg.  (Dulcolax  laxative NOT Dulcolax  stool softener)   - 1 (one) 8.3 oz bottle of Polyethylene Glycol (PEG) 3350 Powder   (MiraLAX , Smooth LAX , ClearLAX  or equivalent)  - 64 oz Gatorade    Regular Gatorade, Gatorade G2 , Powerade , Powerade Zero  or Pedialyte  is acceptable. Red colored flavors are not allowed; all other colors (yellow, green, orange, purple and blue) are okay. It is also okay to buy two 2.12 oz packets of powdered Gatorade that can be mixed with water to a total volume of 64 oz of liquid.  - 1 (one) 10 oz bottle of Magnesium Citrate (Red colored flavors are not allowed)  It is also okay for you to use a 0.5 oz package of powdered magnesium  citrate (17 g) mixed with 10 oz of water.      PREPARATION FOR COLONOSCOPY    7 days before:    Discontinue fiber supplements and medications containing iron. This includes Metamucil  and Fibercon ; and multivitamins with iron.    3 days before:    Begin a low-fiber diet. A low-fiber diet helps making the cleanout more effective.     Examples of a low-fiber diet include (but are not limited to): white bread, white rice, pasta, crackers, fish, chicken, eggs, ground beef, creamy peanut butter, cooked/steamed/boiled vegetables, canned fruit, bananas, melons, milk, plain yogurt cheese, salad dressing and other condiments.     The following are not allowed on a low-fiber diet: seeds, nuts, popcorn, bran, whole wheat, corn, quinoa, raw fruits and vegetables, berries and dried fruit, beans and lentils.    For additional details on low-fiber diet, please refer to the table on the last page.    2 days before:    Continue the low-fiber diet.     Drink at least 8 glasses of water throughout the day.     Stop eating solid foods at 11:45 pm.    1 day before:    In the morning: begin a clear liquid diet (liquids you can see through).     Examples of a clear liquid diet include: water, clear broth or bouillon, Gatorade, Pedialyte or Powerade, carbonated and non-carbonated soft drinks (Sprite , 7-Up , ginger ale), strained fruit juices without pulp (apple, white grape, white cranberry), Jell-O  and popsicles.     The following are not allowed on a clear liquid diet: red liquids, alcoholic beverages, dairy products (milk, creamer, and yogurt), protein shakes, creamy broths, juice with pulp and chewing tobacco.    At noon: take 2 (two) bisacodyl tablets     At 4 (and no later than 6pm): start drinking the Miralax-Gatorade preparation (8.3 oz of Miralax mixed with 64 oz of Gatorade in a large pitcher). Drink 1(one) 8 oz glass every 15 minutes thereafter, until the mixture is gone.    COLON CLEANSING TIPS: drink adequate amounts of  fluids before and after your colon cleansing to prevent dehydration. Stay near a toilet because you will have diarrhea. Even if you are sitting on the toilet, continue to drink the cleansing solution every 15 minutes. If you feel nauseous or vomit, rinse your mouth with water, take a 15 to 30-minute-break and then continue drinking the solution. You will be uncomfortable until the stool has flushed from your colon (in about 2 to 4 hours). You may feel chilled.    Day of your procedure  You may take all of your morning medications including blood pressure medications, blood thinners (if you have not been instructed to stop these by our office), methadone, anti-seizure medications with sips of water 3 hours prior to your procedure or earlier. Do not take insulin or vitamins prior to your procedure. Continue the clear liquid diet.       4 hours prior: drink 10 oz of magnesium citrate. It may be easier to drink it with a straw.    STOP consuming all liquids after that.     Do not take anything by mouth during this time.     Allow extra time to travel to your procedure as you may need to stop and use a restroom along the way.    You are ready for the procedure, if you followed all instructions and your stool is no longer formed, but clear or yellow liquid. If you are unsure whether your colon is clean, please call our office at [phone] before you leave for your appointment.    Bring the following to your procedure:  - Insurance Card/Photo ID.   - List of current medications including over-the-counter medications and supplements.   - Your rescue inhaler if you currently use one to control asthma.      Canceling or rescheduling your appointment:   If you must cancel or reschedule your appointment, please call [phone] as soon as possible.      COLONOSCOPY PRE-PROCEDURE CHECKLIST    If you have diabetes, ask your regular doctor for diet and medication restrictions.  If you take an anticoagulant or anti-platelet  medication (such as Coumadin , Lovenox , Pradaxa , Xarelto , Eliquis , etc.), please call your primary doctor for advice on holding this medication.  If you take aspirin you may continue to do so.  If you are or may be pregnant, please discuss the risks and benefits of this procedure with your doctor.        What happens during a colonoscopy?    Plan to spend up to two hours, starting at registration time, at the endoscopy center the day of your procedure. The colonoscopy takes an average of 15 to 30 minutes. Recovery time is about 30 minutes.      Before the exam:    You will change into a gown.    Your medical history and medication list will be reviewed with you, unless that has been done over the phone prior to the procedure.     A nurse will insert an intravenous (IV) line into your hand or arm.    The doctor will meet with you and will give you a consent form to sign.  During the exam:     Medicine will be given through the IV line to help you relax.     Your heart rate and oxygen levels will be monitored. If your blood pressure is low, you may be given fluids through the IV line.     The doctor will insert a flexible hollow tube, called a colonoscope, into your rectum. The scope will be advanced slowly through the large intestine (colon).    You may have a feeling of fullness or pressure.     If an abnormal tissue or a polyp is found, the doctor may remove it through the endoscope for closer examination, or biopsy. Tissue removal is painless    After the exam:           Any tissue samples removed during the exam will be sent to a lab for evaluation. It may take 5-7 working days for you to be notified of the results.     A nurse will provide you with complete discharge instructions before you leave the endoscopy center. Be sure to ask the nurse for specific instructions if you take blood thinners such as Aspirin, Coumadin or Plavix.     The doctor will prepare a full report for you and for the physician who  referred you for the procedure.     Your doctor will talk with you about the initial results of your exam.      Medication given during the exam will prohibit you from driving for the rest of the day.     Following the exam, you may resume your normal diet. Your first meal should be light, no greasy foods. Avoid alcohol until the next day.     You may resume your regular activities the day after the procedure.         LOW-FIBER DIET    Foods RECOMMENDED Foods to AVOID   Breads, Cereal, Rice and Pasta:   White bread, rolls, biscuits, croissant and teresa toast.   Waffles, [place]ian toast and pancakes.   White rice, noodles, pasta, macaroni and peeled cooked potatoes.   Plain crackers and saltines.   Cooked cereals: farina, cream of rice.   Cold cereals: Puffed Rice , Rice Krispies , Corn Flakes  and Special K    Breads, Cereal, Rice and Pasta:   Breads or rolls with nuts, seeds or fruit.   Whole wheat, pumpernickel, rye breads and cornbread.   Potatoes with skin, brown or wild rice, and kasha (buckwheat).     Vegetables:   Tender cooked and canned vegetables without seeds: carrots, asparagus tips, green or wax beans, pumpkin, spinach, lima beans. Vegetables:   Raw or steamed vegetables.   Vegetables with seeds.   Sauerkraut.   Winter squash, peas, broccoli, Brussel sprouts, cabbage, onions, cauliflower, baked beans, peas and corn.   Fruits:   Strained fruit juice.   Canned fruit, except pineapple.   Ripe bananas and melon. Fruits:   Prunes and prune juice.   Raw fruits.   Dried fruits: figs, dates and raisins.   Milk/Dairy:   Milk: plain or flavored.   Yogurt, custard and ice cream.   Cheese and cottage cheese Milk/Dairy:     Meat and other proteins:   ground, well-cooked tender beef, lamb, ham, veal, pork, fish, poultry and organ meats.   Eggs.   Peanut butter without nuts. Meat and other proteins:   Tough, fibrous meats with gristle.   Dry beans, peas and lentils.   Peanut butter with nuts.   Tofu.   Fats, Snack,  Sweets, Condiments and Beverages:   Margarine, butter, oils, mayonnaise, sour cream and salad dressing, plain gravy.   Sugar, hard candy, clear jelly, honey and syrup.   Spices, cooked herbs, bouillon, broth and soups made with allowed vegetable, ketchup and mustard.   Coffee, tea and carbonated drinks.   Plain cakes, cookies and pretzels.   Gelatin, plain puddings, custard, ice cream, sherbet and popsicles. Fats, Snack, Sweets, Condiments and Beverages:   Nuts, seeds and coconut.   Jam, marmalade and preserves.   Pickles, olives, relish and horseradish.   All desserts containing nuts, seeds, dried fruit and coconut; or made from whole grains or bran.   Candy made with nuts or seeds.   Popcorn.                     DIRECTIONS TO THE ENDOSCOPY DEPARTMENT     From the north ([facility])  Take [address] South, exit on [address] [address]. Get into the left hand valerie, turn left (east), go one-half mile to [address] and turn left. Go north to the first stoplight, take a right on [address] and follow it to the Emergency entrance.    From the south ([facility])  Take [address]N to the [address] split and exit on [address] [address]. On [address] [address], turn left (west) to [address]. Turn right (north) on [address]. Go north to the first stoplight, take a right on [address] and follow it to the Emergency entrance.    From the east via 35E ([facility])  Take [address] south to [address] [address] exit. Turn right on [address] [address]. Go west to [address]. Turn right (north) on [address]. Go to the first stoplight, take a right and follow on [address] to the Emergency entrance.    From the east via [address] ([facility])  Take [address] to [address]. Turn left (south) on [address] to [address]. Turn left (east) on [address] and follow it to the Emergency entrance.    From the west via [address] ([place])  Take [address] east to [address]. Turn right (south) on [address] to [facility]. Turn left (east) on [facility] [address] and follow it to the Emergency entrance.

## 2019-07-10 ENCOUNTER — HOSPITAL ENCOUNTER (OUTPATIENT)
Dept: CT IMAGING | Facility: CLINIC | Age: 49
Discharge: HOME | End: 2019-07-10
Attending: INTERNAL MEDICINE | Admitting: INTERNAL MEDICINE
Payer: COMMERCIAL

## 2019-07-10 DIAGNOSIS — R91.8 OTHER NONSPECIFIC ABNORMAL FINDING OF LUNG FIELD: ICD-10-CM

## 2019-07-10 DIAGNOSIS — R91.1 SOLITARY PULMONARY NODULE: ICD-10-CM

## 2019-07-10 PROCEDURE — 71250 CT THORAX DX C-: CPT

## 2019-07-14 DIAGNOSIS — J45.41 MODERATE PERSISTENT ASTHMA WITH ACUTE EXACERBATION: ICD-10-CM

## 2019-07-14 RX ORDER — ALBUTEROL SULFATE 90 UG/1
AEROSOL, METERED RESPIRATORY (INHALATION)
Qty: 8.5 INHALER | Refills: 1 | Status: SHIPPED | OUTPATIENT
Start: 2019-07-14 | End: 2019-10-01

## 2019-07-25 ENCOUNTER — MYC MEDICAL ADVICE (OUTPATIENT)
Dept: FAMILY MEDICINE | Facility: CLINIC | Age: 49
End: 2019-07-25

## 2019-07-25 DIAGNOSIS — J45.40 MODERATE PERSISTENT ASTHMA WITHOUT COMPLICATION: ICD-10-CM

## 2019-07-25 RX ORDER — FLUTICASONE FUROATE 100 UG/1
POWDER RESPIRATORY (INHALATION)
Qty: 30 EACH | Refills: 0 | Status: SHIPPED | OUTPATIENT
Start: 2019-07-25 | End: 2020-05-27

## 2019-07-25 NOTE — TELEPHONE ENCOUNTER
"Requested Prescriptions   Pending Prescriptions Disp Refills     ARNUITY ELLIPTA 100 MCG/ACT inhaler [Pharmacy Med Name: ARNUITY ELLIPTA 100 MCG INH]  1     Sig: TAKE 1 PUFF BY MOUTH EVERY DAY       Last Written Prescription Date: 10/31/18   Last Fill Quantity: 30 each,  # refills:    Last office visit: 5/13/2019 with prescribing provider:  Marika Kraus   Future Office Visit:        Inhaled Steroids Protocol Failed - 7/25/2019  1:35 AM        Failed - Asthma control assessment score within normal limits in last 6 months     Please review ACT score.       ACT Total Scores 4/29/2019 5/6/2019 5/13/2019   ACT TOTAL SCORE (Goal Greater than or Equal to 20) 15 11 11   In the past 12 months, how many times did you visit the emergency room for your asthma without being admitted to the hospital? 0 0 0   In the past 12 months, how many times were you hospitalized overnight because of your asthma? 0 0 0           Passed - Patient is age 12 or older        Passed - Medication is active on med list        Passed - Recent (6 mo) or future (30 days) visit within the authorizing provider's specialty     Patient had office visit in the last 6 months or has a visit in the next 30 days with authorizing provider or within the authorizing provider's specialty.  See \"Patient Info\" tab in inbasket, or \"Choose Columns\" in Meds & Orders section of the refill encounter.          Routing refill request to provider for review/approval because:  Labs out of range:  ACT  T'd up 1 month for provider review.    Promise Espinosa RN            "

## 2019-09-06 NOTE — TELEPHONE ENCOUNTER
Fax from [facility] informing us patient switched pharmacy to [facility].    We called Hannah, does she need refills?  No.   Scheduled well adult visit 10/1/19. Informed if out of refills before visit to let us know.   Celestino Kwong RN

## 2019-09-29 DIAGNOSIS — J45.40 MODERATE PERSISTENT ASTHMA WITHOUT COMPLICATION: ICD-10-CM

## 2019-09-29 NOTE — TELEPHONE ENCOUNTER
Routing refill request to provider for review/approval because:    Asthma Control Test 5/13/2019   ACT Total Score (Goal Greater than or Equal to 20) 11

## 2019-09-30 RX ORDER — MONTELUKAST SODIUM 10 MG/1
TABLET ORAL
Qty: 90 TABLET | Refills: 0 | Status: SHIPPED | OUTPATIENT
Start: 2019-09-30 | End: 2019-10-01

## 2019-09-30 ASSESSMENT — ENCOUNTER SYMPTOMS
DYSURIA: 0
HEMATURIA: 0
DIARRHEA: 0
HEMATOCHEZIA: 0
HEARTBURN: 0
NERVOUS/ANXIOUS: 1
FREQUENCY: 0
COUGH: 0
SHORTNESS OF BREATH: 1
CONSTIPATION: 0
PARESTHESIAS: 0
FEVER: 0
HEADACHES: 1
BREAST MASS: 0
CHILLS: 0
SORE THROAT: 0
NAUSEA: 0
JOINT SWELLING: 1
EYE PAIN: 0
ARTHRALGIAS: 1
ABDOMINAL PAIN: 1
PALPITATIONS: 1
WEAKNESS: 0
DIZZINESS: 1
MYALGIAS: 1

## 2019-09-30 NOTE — PROGRESS NOTES
Pre-Visit Planning     Future Appointments   Date Time Provider Department Center   10/1/2019  7:55 AM Lianna Cleveland MD [facility]     Appointment Notes for this encounter:   PHQ 9 due (9/30 no answer for PVP-jf)   Update ACT (Last score 11 on 5/13/19)     Patient preferred phone number: [phone]    Patient contacted.   Unable to reach. Left voicemail - no call back needed before your visit in the morning.    Celestino Kwong, RN

## 2019-10-01 ENCOUNTER — OFFICE VISIT (OUTPATIENT)
Dept: FAMILY MEDICINE | Facility: CLINIC | Age: 49
End: 2019-10-01
Payer: COMMERCIAL

## 2019-10-01 VITALS
HEIGHT: 63 IN | OXYGEN SATURATION: 96 % | BODY MASS INDEX: 46.78 KG/M2 | HEART RATE: 82 BPM | TEMPERATURE: 97.1 F | DIASTOLIC BLOOD PRESSURE: 85 MMHG | SYSTOLIC BLOOD PRESSURE: 131 MMHG | WEIGHT: 264 LBS | RESPIRATION RATE: 16 BRPM

## 2019-10-01 DIAGNOSIS — J45.40 MODERATE PERSISTENT ASTHMA WITHOUT COMPLICATION: ICD-10-CM

## 2019-10-01 DIAGNOSIS — Z12.31 VISIT FOR SCREENING MAMMOGRAM: ICD-10-CM

## 2019-10-01 DIAGNOSIS — Z23 NEED FOR SHINGLES VACCINE: ICD-10-CM

## 2019-10-01 DIAGNOSIS — Z00.00 ROUTINE GENERAL MEDICAL EXAMINATION AT A HEALTH CARE FACILITY: Primary | ICD-10-CM

## 2019-10-01 DIAGNOSIS — F41.9 ANXIETY: ICD-10-CM

## 2019-10-01 LAB
ANION GAP SERPL CALCULATED.3IONS-SCNC: 5 MMOL/L (ref 3–14)
BUN SERPL-MCNC: 11 MG/DL (ref 7–30)
CALCIUM SERPL-MCNC: 8.8 MG/DL (ref 8.5–10.1)
CHLORIDE SERPL-SCNC: 106 MMOL/L (ref 94–109)
CHOLEST SERPL-MCNC: 198 MG/DL
CO2 SERPL-SCNC: 27 MMOL/L (ref 20–32)
CREAT SERPL-MCNC: 0.67 MG/DL (ref 0.52–1.04)
ERYTHROCYTE [DISTWIDTH] IN BLOOD BY AUTOMATED COUNT: 14.1 % (ref 10–15)
GFR SERPL CREATININE-BSD FRML MDRD: >90 ML/MIN/{1.73_M2}
GLUCOSE SERPL-MCNC: 114 MG/DL (ref 70–99)
HCT VFR BLD AUTO: 39.3 % (ref 35–47)
HDLC SERPL-MCNC: 65 MG/DL
HGB BLD-MCNC: 12.9 G/DL (ref 11.7–15.7)
LDLC SERPL CALC-MCNC: 92 MG/DL
MCH RBC QN AUTO: 29.9 PG (ref 26.5–33)
MCHC RBC AUTO-ENTMCNC: 32.8 G/DL (ref 31.5–36.5)
MCV RBC AUTO: 91 FL (ref 78–100)
NONHDLC SERPL-MCNC: 133 MG/DL
PLATELET # BLD AUTO: 299 10E9/L (ref 150–450)
POTASSIUM SERPL-SCNC: 3.9 MMOL/L (ref 3.4–5.3)
RBC # BLD AUTO: 4.31 10E12/L (ref 3.8–5.2)
SODIUM SERPL-SCNC: 138 MMOL/L (ref 133–144)
TRIGL SERPL-MCNC: 206 MG/DL
WBC # BLD AUTO: 8.5 10E9/L (ref 4–11)

## 2019-10-01 PROCEDURE — 80048 BASIC METABOLIC PNL TOTAL CA: CPT | Performed by: FAMILY MEDICINE

## 2019-10-01 PROCEDURE — 36415 COLL VENOUS BLD VENIPUNCTURE: CPT | Performed by: FAMILY MEDICINE

## 2019-10-01 PROCEDURE — 90471 IMMUNIZATION ADMIN: CPT | Performed by: FAMILY MEDICINE

## 2019-10-01 PROCEDURE — 99396 PREV VISIT EST AGE 40-64: CPT | Mod: 25 | Performed by: FAMILY MEDICINE

## 2019-10-01 PROCEDURE — 80061 LIPID PANEL: CPT | Performed by: FAMILY MEDICINE

## 2019-10-01 PROCEDURE — 90715 TDAP VACCINE 7 YRS/> IM: CPT | Performed by: FAMILY MEDICINE

## 2019-10-01 PROCEDURE — 85027 COMPLETE CBC AUTOMATED: CPT | Performed by: FAMILY MEDICINE

## 2019-10-01 RX ORDER — MONTELUKAST SODIUM 10 MG/1
TABLET ORAL
Qty: 90 TABLET | Refills: 1 | Status: SHIPPED | OUTPATIENT
Start: 2019-10-01 | End: 2020-05-27

## 2019-10-01 RX ORDER — ESCITALOPRAM OXALATE 20 MG/1
20 TABLET ORAL DAILY
Qty: 90 TABLET | Refills: 1 | Status: SHIPPED | OUTPATIENT
Start: 2019-10-01 | End: 2020-06-08

## 2019-10-01 RX ORDER — ALBUTEROL SULFATE 90 UG/1
AEROSOL, METERED RESPIRATORY (INHALATION)
Qty: 18 G | Refills: 3 | Status: SHIPPED | OUTPATIENT
Start: 2019-10-01 | End: 2020-05-27

## 2019-10-01 ASSESSMENT — ANXIETY QUESTIONNAIRES
2. NOT BEING ABLE TO STOP OR CONTROL WORRYING: NEARLY EVERY DAY
5. BEING SO RESTLESS THAT IT IS HARD TO SIT STILL: SEVERAL DAYS
6. BECOMING EASILY ANNOYED OR IRRITABLE: NOT AT ALL
IF YOU CHECKED OFF ANY PROBLEMS ON THIS QUESTIONNAIRE, HOW DIFFICULT HAVE THESE PROBLEMS MADE IT FOR YOU TO DO YOUR WORK, TAKE CARE OF THINGS AT HOME, OR GET ALONG WITH OTHER PEOPLE: SOMEWHAT DIFFICULT
3. WORRYING TOO MUCH ABOUT DIFFERENT THINGS: NEARLY EVERY DAY
1. FEELING NERVOUS, ANXIOUS, OR ON EDGE: MORE THAN HALF THE DAYS
7. FEELING AFRAID AS IF SOMETHING AWFUL MIGHT HAPPEN: SEVERAL DAYS
GAD7 TOTAL SCORE: 11

## 2019-10-01 ASSESSMENT — ENCOUNTER SYMPTOMS
DIARRHEA: 0
WEAKNESS: 0
FEVER: 0
SHORTNESS OF BREATH: 1
DYSURIA: 0
BREAST MASS: 0
CONSTIPATION: 0
JOINT SWELLING: 1
PALPITATIONS: 1
HEMATOCHEZIA: 0
MYALGIAS: 1
HEARTBURN: 0
ARTHRALGIAS: 1
EYE PAIN: 0
HEADACHES: 1
HEMATURIA: 0
ABDOMINAL PAIN: 1
COUGH: 0
FREQUENCY: 0
DIZZINESS: 1
NERVOUS/ANXIOUS: 1
NAUSEA: 0
SORE THROAT: 0
PARESTHESIAS: 0
CHILLS: 0

## 2019-10-01 ASSESSMENT — ASTHMA QUESTIONNAIRES
ACT_TOTALSCORE: 20
QUESTION_2 LAST FOUR WEEKS HOW OFTEN HAVE YOU HAD SHORTNESS OF BREATH: ONCE OR TWICE A WEEK
QUESTION_4 LAST FOUR WEEKS HOW OFTEN HAVE YOU USED YOUR RESCUE INHALER OR NEBULIZER MEDICATION (SUCH AS ALBUTEROL): ONCE A WEEK OR LESS
QUESTION_5 LAST FOUR WEEKS HOW WOULD YOU RATE YOUR ASTHMA CONTROL: WELL CONTROLLED
QUESTION_1 LAST FOUR WEEKS HOW MUCH OF THE TIME DID YOUR ASTHMA KEEP YOU FROM GETTING AS MUCH DONE AT WORK, SCHOOL OR AT HOME: A LITTLE OF THE TIME
QUESTION_3 LAST FOUR WEEKS HOW OFTEN DID YOUR ASTHMA SYMPTOMS (WHEEZING, COUGHING, SHORTNESS OF BREATH, CHEST TIGHTNESS OR PAIN) WAKE YOU UP AT NIGHT OR EARLIER THAN USUAL IN THE MORNING: ONCE OR TWICE

## 2019-10-01 ASSESSMENT — PATIENT HEALTH QUESTIONNAIRE - PHQ9
5. POOR APPETITE OR OVEREATING: SEVERAL DAYS
SUM OF ALL RESPONSES TO PHQ QUESTIONS 1-9: 10

## 2019-10-01 ASSESSMENT — MIFFLIN-ST. JEOR: SCORE: 1786.63

## 2019-10-01 NOTE — PATIENT INSTRUCTIONS
Follow up in 6 months or sooner if needed   Patient Education     Prevention Guidelines, Women Ages 50 to 64  Screening tests and vaccines are an important part of managing your health. A screening test is done to find possible disorders or diseases in people who don't have any symptoms. The goal is to find a disease early so lifestyle changes can be made and you can be watched more closely to reduce the risk of disease, or to detect it early enough to treat it most effectively. Screening tests are not considered diagnostic, but are used to determine if more testing is needed. Health counseling is essential, too. Below are guidelines for these, for women ages 50 to 64. Talk with your healthcare provider to make sure you re up to date on what you need.  Screening Who needs it How often   Type 2 diabetes or prediabetes All women beginning at age 45 and women without symptoms at any age who are overweight or obese and have 1 or more additional risk factors for diabetes. At  least every 3 years   Type 2 diabetes or prediabetes All women diagnosed with gestational diabetes Lifelong testing every 3 years   Type 2 diabetes All women with prediabetes Every year   Alcohol misuse All women in this age group At routine exams   Blood pressure All women in this age group Yearly checkup if your blood pressure is normal  Normal blood pressure is less than 120/80 mm Hg  If your blood pressure reading is higher than normal, follow the advice of your healthcare provider   Breast cancer All women at average risk in this age group Yearly mammogram should be done until age 54. At age 55, switch to mammograms every other year or choose to continue yearly mammograms.  All women should be familiar with the potential benefits and risks of breast cancer screening with mammograms.      Cervical cancer All women in this age group, except women who have had a complete hysterectomy Pap test every 3 years or Pap test with human papillomavirus  (HPV) test every 5 years   Chlamydia Women at increased risk for infection At routine exams   Colorectal cancer All women in this age group Flexible sigmoidoscopy every 5 years, or colonoscopy every 10 years, or double-contrast barium enema every 5 years; yearly fecal occult blood test or fecal immunochemical test; or a stool DNA test as often as your health care provider advises; talk with your health care provider about which tests are best for you   Depression All women in this age group At routine exams   Gonorrhea Sexually active women at increased risk for infection At routine exams   Hepatitis C Anyone at increased risk; 1 time for those born between 1945 and 1965 At routine exams   High cholesterol or triglycerides All women in this age group who are at risk for coronary artery disease At least every 5 years   HIV All women At routine exams   Lung cancer Adults age 55 to 80 who have smoked Yearly screening in smokers with 30 pack-year history of smoking or who quit within 15 years   Obesity All women in this age group At routine exams   Osteoporosis Women who are postmenopausal Ask your healthcare provider   Syphilis Women at increased risk for infection - talk with your healthcare provider At routine exams   Tuberculosis Women at increased risk for infection - talk with your healthcare provider Ask your healthcare provider   Vision All women in this age group Ask your healthcare provider   Vaccine Who needs it How often   Chickenpox (varicella) All women in this age group who have no record of this infection or vaccine 2 doses; the second dose should be given at least 4 weeks after the first dose   Hepatitis A Women at increased risk for infection - talk with your healthcare provider 2 doses given at least 6 months apart   Hepatitis B Women at increased risk for infection - talk with your healthcare provider 3 doses over 6 months; second dose should be given 1 month after the first dose; the third dose  should be given at least 2 months after the second dose and at least 4 months after the first dose   Haemophilus influenzaeType B (HIB) Women at increased risk for infection - talk with your healthcare provider 1 to 3 doses   Influenza (flu) All women in this age group Once a year   Measles, mumps, rubella (MMR) Women in this age group through their late 50s who have no record of these infections or vaccines 1 dose   Meningococcal Women at increased risk for infection - talk with your healthcare provider 1 or more doses   Pneumococcal conjugate vaccine (PCV13) and pneumococcal polysaccharide vaccine (PPSV23) Women at increased risk for infection - talk with your healthcare provider PCV13: 1 dose ages 19 to 65 (protects against 13 types of pneumococcal bacteria)  PPSV23: 1 to 2 doses through age 64, or 1 dose at 65 or older (protects against 23 types of pneumococcal bacteria)   Tetanus/diphtheria/pertussis (Td/Tdap) booster All women in this age group Td every 10 years, or a one-time dose of Tdap instead of a Td booster after age 18, then Td every 10 years   Zoster All women ages 60 and older 1 dose   Counseling Who needs it How often   BRCA gene mutation testing for breast and ovarian cancer susceptibility Women with increased risk for having gene mutation When your risk is known   Breast cancer and chemoprevention Women at high risk for breast cancer When your risk is known   Diet and exercise Women who are overweight or obese When diagnosed, and then at routine exams   Sexually transmitted infection prevention Women at increased risk for infection - talk with your healthcare provider At routine exams   Use of daily aspirin Women ages 55 and up in this age group who are at risk for cardiovascular health problems such as stroke When your risk is known   Use of tobacco and the health effects it can cause All women in this age group Every exam   1[employer]  Date Last Reviewed: 1/26/2016 2000-2018 [employer]  [employer]. [address] [place] [address], [place] [address]. All rights reserved. This information is not intended as a substitute for professional medical care. Always follow your healthcare professional's instructions.

## 2019-10-02 ASSESSMENT — ASTHMA QUESTIONNAIRES: ACT_TOTALSCORE: 20

## 2019-10-02 ASSESSMENT — ANXIETY QUESTIONNAIRES: GAD7 TOTAL SCORE: 11

## 2019-10-26 ENCOUNTER — ANCILLARY PROCEDURE (OUTPATIENT)
Dept: MAMMOGRAPHY | Facility: CLINIC | Age: 49
End: 2019-10-26
Attending: FAMILY MEDICINE
Payer: COMMERCIAL

## 2019-10-26 DIAGNOSIS — Z12.31 VISIT FOR SCREENING MAMMOGRAM: ICD-10-CM

## 2019-10-26 PROCEDURE — 77067 SCR MAMMO BI INCL CAD: CPT | Mod: TC

## 2019-10-26 PROCEDURE — 77063 BREAST TOMOSYNTHESIS BI: CPT | Mod: TC

## 2019-12-09 ENCOUNTER — HEALTH MAINTENANCE LETTER (OUTPATIENT)
Age: 49
End: 2019-12-09

## 2019-12-23 DIAGNOSIS — I89.0 LYMPHEDEMA: ICD-10-CM

## 2019-12-23 RX ORDER — FUROSEMIDE 20 MG
TABLET ORAL
Qty: 180 TABLET | Refills: 1 | Status: SHIPPED | OUTPATIENT
Start: 2019-12-23 | End: 2020-06-11

## 2019-12-23 NOTE — TELEPHONE ENCOUNTER
Prescription approved per [facility] Refill Protocol.  Yo Ledbetter RN, BSN

## 2020-02-10 ENCOUNTER — MYC MEDICAL ADVICE (OUTPATIENT)
Dept: FAMILY MEDICINE | Facility: CLINIC | Age: 50
End: 2020-02-10

## 2020-02-10 ASSESSMENT — PATIENT HEALTH QUESTIONNAIRE - PHQ9
SUM OF ALL RESPONSES TO PHQ QUESTIONS 1-9: 12
SUM OF ALL RESPONSES TO PHQ QUESTIONS 1-9: 12
10. IF YOU CHECKED OFF ANY PROBLEMS, HOW DIFFICULT HAVE THESE PROBLEMS MADE IT FOR YOU TO DO YOUR WORK, TAKE CARE OF THINGS AT HOME, OR GET ALONG WITH OTHER PEOPLE: SOMEWHAT DIFFICULT

## 2020-02-11 ASSESSMENT — PATIENT HEALTH QUESTIONNAIRE - PHQ9: SUM OF ALL RESPONSES TO PHQ QUESTIONS 1-9: 12

## 2020-05-27 ENCOUNTER — VIRTUAL VISIT (OUTPATIENT)
Dept: FAMILY MEDICINE | Facility: CLINIC | Age: 50
End: 2020-05-27
Payer: COMMERCIAL

## 2020-05-27 DIAGNOSIS — J45.40 MODERATE PERSISTENT ASTHMA WITHOUT COMPLICATION: ICD-10-CM

## 2020-05-27 PROCEDURE — 99213 OFFICE O/P EST LOW 20 MIN: CPT | Mod: GT | Performed by: FAMILY MEDICINE

## 2020-05-27 RX ORDER — MONTELUKAST SODIUM 10 MG/1
TABLET ORAL
Qty: 90 TABLET | Refills: 1 | Status: SHIPPED | OUTPATIENT
Start: 2020-05-27 | End: 2020-10-01

## 2020-05-27 RX ORDER — ALBUTEROL SULFATE 90 UG/1
AEROSOL, METERED RESPIRATORY (INHALATION)
Qty: 18 G | Refills: 3 | Status: SHIPPED | OUTPATIENT
Start: 2020-05-27 | End: 2020-10-01

## 2020-05-27 ASSESSMENT — ASTHMA QUESTIONNAIRES
ACT_TOTALSCORE: 19
QUESTION_3 LAST FOUR WEEKS HOW OFTEN DID YOUR ASTHMA SYMPTOMS (WHEEZING, COUGHING, SHORTNESS OF BREATH, CHEST TIGHTNESS OR PAIN) WAKE YOU UP AT NIGHT OR EARLIER THAN USUAL IN THE MORNING: ONCE OR TWICE
QUESTION_5 LAST FOUR WEEKS HOW WOULD YOU RATE YOUR ASTHMA CONTROL: WELL CONTROLLED
ACUTE_EXACERBATION_TODAY: NO
QUESTION_1 LAST FOUR WEEKS HOW MUCH OF THE TIME DID YOUR ASTHMA KEEP YOU FROM GETTING AS MUCH DONE AT WORK, SCHOOL OR AT HOME: SOME OF THE TIME
QUESTION_2 LAST FOUR WEEKS HOW OFTEN HAVE YOU HAD SHORTNESS OF BREATH: ONCE OR TWICE A WEEK
QUESTION_4 LAST FOUR WEEKS HOW OFTEN HAVE YOU USED YOUR RESCUE INHALER OR NEBULIZER MEDICATION (SUCH AS ALBUTEROL): ONCE A WEEK OR LESS

## 2020-05-27 NOTE — PROGRESS NOTES
"Hannah Meade is a 50 year old female who is being evaluated via a billable video visit.      The patient has been notified of following:     \"This video visit will be conducted via a call between you and your physician/provider. We have found that certain health care needs can be provided without the need for an in-person physical exam.  This service lets us provide the care you need with a video conversation.  If a prescription is necessary we can send it directly to your pharmacy.  If lab work is needed we can place an order for that and you can then stop by our lab to have the test done at a later time.    Video visits are billed at different rates depending on your insurance coverage.  Please reach out to your insurance provider with any questions.    If during the course of the call the physician/provider feels a video visit is not appropriate, you will not be charged for this service.\"    Patient has given verbal consent for Video visit? Yes    How would you like to obtain your AVS? Gissell    Patient would like the video invitation sent by: Text to cell phone: [phone]    Will anyone else be joining your video visit? No      Subjective     Hannah Meade is a 50 year old female who presents today via video visit for the following health issues:    HPI  Asthma Follow-Up    Was ACT completed today?    Yes    ACT Total Scores 5/27/2020   ACT TOTAL SCORE (Goal Greater than or Equal to 20) 19   In the past 12 months, how many times did you visit the emergency room for your asthma without being admitted to the hospital? 0   In the past 12 months, how many times were you hospitalized overnight because of your asthma? 0       How many days per week do you miss taking your asthma controller medication?  0    Please describe any recent triggers for your asthma: None    Have you had any Emergency Room Visits, Urgent Care Visits, or Hospital Admissions since your last office visit?  No      How many servings of " fruits and vegetables do you eat daily?  2-3    On average, how many sweetened beverages do you drink each day (Examples: soda, juice, sweet tea, etc.  Do NOT count diet or artificially sweetened beverages)?   2    How many days per week do you exercise enough to make your heart beat faster? 3 or less    How many minutes a day do you exercise enough to make your heart beat faster? 9 or less    How many days per week do you miss taking your medication? 0        Video Start Time: 8:08am        Patient Active Problem List   Diagnosis     Backache     Carbuncle and furuncle of trunk     CARDIOVASCULAR SCREENING; LDL GOAL LESS THAN 160     Insomnia     Anxiety     Plantar fasciitis     Ovarian cyst     Bronchogenic cyst     Chest wall pain     Right-sided low back pain with left-sided sciatica     Morbid obesity (H)     bmi over 40     BMI 40.0-44.9, adult (H)     Asthma     Other nonspecific abnormal finding of lung field (CODE)     Multiple lipomas     Mild major depression (H)     JOSE J (obstructive sleep apnea)     Lymphedema     Elevated glucose     Past Surgical History:   Procedure Laterality Date     C NONSPECIFIC PROCEDURE      2 C- SECTIONS     COLONOSCOPY  06/13/2019    Dr. Leon [facility]     COLONOSCOPY N/A 6/13/2019    Procedure: COLONOSCOPY;  Surgeon: Mauro Leon MD;  Location:  GI     EXCISE MASS BACK Left 9/17/2014    Procedure: EXCISE MASS BACK;  Surgeon: Yaz Avilez MD;  Location:  OR     EXCISE MASS UPPER EXTREMITY Left 9/17/2014    Procedure: EXCISE MASS UPPER EXTREMITY;  Surgeon: Yaz Avilez MD;  Location:  OR     HYSTERECTOMY VAGINAL  1992    ovaries intact     other      lump on right arm removed      THORACIC SURGERY      lung surgery     THORACOTOMY Right 8/25/2015    Procedure: THORACOTOMY;  Surgeon: Andrew Gordon MD;  Location:  OR       Social History     Tobacco Use     Smoking status: Never Smoker     Smokeless tobacco: Never Used   Substance Use  "Topics     Alcohol use: No     Alcohol/week: 0.0 standard drinks     Frequency: Never     Drinks per session: Patient refused     Binge frequency: Patient refused     Family History   Problem Relation Age of Onset     Neurologic Disorder Mother         Parkinsons     Diabetes Mother      Hypertension Father      Cardiovascular Father      Gallbladder Disease Father      Diabetes Maternal Grandmother      C.A.D. Paternal Grandfather      Cardiovascular Paternal Grandfather      Hypertension Sister      Polycystic Kidney Diease Other      Colon Cancer No family hx of            Reviewed and updated as needed this visit by Provider         Review of Systems   Constitutional, HEENT, cardiovascular, pulmonary, GI, , musculoskeletal, neuro, skin, endocrine and psych systems are negative, except as otherwise noted.      Objective    There were no vitals taken for this visit.  Estimated body mass index is 46.77 kg/m  as calculated from the following:    Height as of 10/1/19: 1.6 m (5' 3\").    Weight as of 10/1/19: 119.7 kg (264 lb).  Physical Exam     GENERAL: Healthy, alert and no distress  EYES: Eyes grossly normal to inspection.  No discharge or erythema, or obvious scleral/conjunctival abnormalities.  RESP: No audible wheeze, cough, or visible cyanosis.  No visible retractions or increased work of breathing.    PSYCH: Mentation appears normal, affect normal/bright, judgement and insight intact, normal speech and appearance well-groomed.      Diagnostic Test Results:  Labs reviewed in Epic  none         Assessment & Plan     1. Moderate persistent asthma without complication  - stable. ACT today-19. Will continue with current regimen.   - montelukast (SINGULAIR) 10 MG tablet; TAKE 1 TABLET BY MOUTH EVERYDAY AT BEDTIME  Dispense: 90 tablet; Refill: 1  - albuterol (PROAIR HFA) 108 (90 Base) MCG/ACT inhaler; INHALE 2 PUFFS INTO THE LUNGS EVERY 6 HOURS  Dispense: 18 g; Refill: 3             See Patient " Instructions    Return in about 5 months (around 10/27/2020) for Physical Exam, medication recheck.    Lianna Cleveland MD  [facility]      Video-Visit Details    Type of service:  Video Visit    Video End Time:8:13am    Originating Location (pt. Location): Home    Distant Location (provider location):  [facility] op5     Platform used for Video Visit: Doximity    Return in about 5 months (around 10/27/2020) for Physical Exam, medication recheck.       Lianna Cleveland MD

## 2020-05-28 ASSESSMENT — ASTHMA QUESTIONNAIRES: ACT_TOTALSCORE: 19

## 2020-06-06 DIAGNOSIS — F41.9 ANXIETY: ICD-10-CM

## 2020-06-08 RX ORDER — ESCITALOPRAM OXALATE 20 MG/1
TABLET ORAL
Qty: 90 TABLET | Refills: 0 | Status: SHIPPED | OUTPATIENT
Start: 2020-06-08 | End: 2020-09-10

## 2020-06-08 NOTE — TELEPHONE ENCOUNTER
Routing refill request to provider for review/approval because:  Labs out of range:  PHQ    PHQ 4/2/2019 10/1/2019 2/10/2020   PHQ-9 Total Score 9 10 12   Q9: Thoughts of better off dead/self-harm past 2 weeks Not at all Not at all Not at all     Jenny STONE RN, BSN

## 2020-09-05 DIAGNOSIS — F41.9 ANXIETY: ICD-10-CM

## 2020-09-08 NOTE — TELEPHONE ENCOUNTER
Routing refill request to provider for review/approval because:  Elevated PHQ9, please update diagnosis if warranted. If only associated with anxiety, can fill a year supply of medication.     Thanks!

## 2020-09-10 RX ORDER — ESCITALOPRAM OXALATE 20 MG/1
TABLET ORAL
Qty: 90 TABLET | Refills: 0 | Status: SHIPPED | OUTPATIENT
Start: 2020-09-10 | End: 2020-10-01

## 2020-09-29 ENCOUNTER — MYC MEDICAL ADVICE (OUTPATIENT)
Dept: FAMILY MEDICINE | Facility: CLINIC | Age: 50
End: 2020-09-29

## 2020-09-29 NOTE — PROGRESS NOTES
Pre-Visit Planning   Next 5 appointments (look out 90 days)    Oct 01, 2020  7:40 AM CDT  (Arrive by 7:15 AM)  Adult Preventative Visit with Lianna Cleveland MD  [facility] [address] [place] [id number] [phone]        Appointment Notes for this encounter:   physical, anxiety med check, ACT    Questionnaires Reviewed/Assigned  Additional questionnaires assigned        Patient preferred phone number: [phone]    [facility] MyChart message sent 9/29/20.  Celestino Kwong RN

## 2020-10-01 ENCOUNTER — OFFICE VISIT (OUTPATIENT)
Dept: FAMILY MEDICINE | Facility: CLINIC | Age: 50
End: 2020-10-01
Payer: COMMERCIAL

## 2020-10-01 VITALS
DIASTOLIC BLOOD PRESSURE: 86 MMHG | WEIGHT: 279 LBS | TEMPERATURE: 97.9 F | SYSTOLIC BLOOD PRESSURE: 132 MMHG | BODY MASS INDEX: 47.63 KG/M2 | RESPIRATION RATE: 20 BRPM | HEART RATE: 79 BPM | HEIGHT: 64 IN

## 2020-10-01 DIAGNOSIS — K29.00 ACUTE GASTRITIS WITHOUT HEMORRHAGE, UNSPECIFIED GASTRITIS TYPE: ICD-10-CM

## 2020-10-01 DIAGNOSIS — F41.9 ANXIETY: ICD-10-CM

## 2020-10-01 DIAGNOSIS — E66.01 MORBID OBESITY (H): ICD-10-CM

## 2020-10-01 DIAGNOSIS — Z00.00 ROUTINE GENERAL MEDICAL EXAMINATION AT A HEALTH CARE FACILITY: Primary | ICD-10-CM

## 2020-10-01 DIAGNOSIS — Z12.31 ENCOUNTER FOR SCREENING MAMMOGRAM FOR BREAST CANCER: ICD-10-CM

## 2020-10-01 DIAGNOSIS — J45.40 MODERATE PERSISTENT ASTHMA WITHOUT COMPLICATION: ICD-10-CM

## 2020-10-01 DIAGNOSIS — I89.0 LYMPHEDEMA: ICD-10-CM

## 2020-10-01 DIAGNOSIS — G56.03 BILATERAL CARPAL TUNNEL SYNDROME: ICD-10-CM

## 2020-10-01 LAB
ANION GAP SERPL CALCULATED.3IONS-SCNC: 6 MMOL/L (ref 3–14)
BASOPHILS # BLD AUTO: 0 10E9/L (ref 0–0.2)
BASOPHILS NFR BLD AUTO: 0.3 %
BUN SERPL-MCNC: 9 MG/DL (ref 7–30)
CALCIUM SERPL-MCNC: 8.8 MG/DL (ref 8.5–10.1)
CHLORIDE SERPL-SCNC: 105 MMOL/L (ref 94–109)
CHOLEST SERPL-MCNC: 210 MG/DL
CO2 SERPL-SCNC: 27 MMOL/L (ref 20–32)
CREAT SERPL-MCNC: 0.67 MG/DL (ref 0.52–1.04)
DIFFERENTIAL METHOD BLD: NORMAL
EOSINOPHIL # BLD AUTO: 0.2 10E9/L (ref 0–0.7)
EOSINOPHIL NFR BLD AUTO: 2.6 %
ERYTHROCYTE [DISTWIDTH] IN BLOOD BY AUTOMATED COUNT: 13.9 % (ref 10–15)
GFR SERPL CREATININE-BSD FRML MDRD: >90 ML/MIN/{1.73_M2}
GLUCOSE SERPL-MCNC: 156 MG/DL (ref 70–99)
HBA1C MFR BLD: 6.9 % (ref 0–5.6)
HCT VFR BLD AUTO: 40.6 % (ref 35–47)
HDLC SERPL-MCNC: 56 MG/DL
HGB BLD-MCNC: 13.3 G/DL (ref 11.7–15.7)
LDLC SERPL CALC-MCNC: 109 MG/DL
LYMPHOCYTES # BLD AUTO: 2.4 10E9/L (ref 0.8–5.3)
LYMPHOCYTES NFR BLD AUTO: 25.5 %
MCH RBC QN AUTO: 29.6 PG (ref 26.5–33)
MCHC RBC AUTO-ENTMCNC: 32.8 G/DL (ref 31.5–36.5)
MCV RBC AUTO: 90 FL (ref 78–100)
MONOCYTES # BLD AUTO: 0.6 10E9/L (ref 0–1.3)
MONOCYTES NFR BLD AUTO: 5.9 %
NEUTROPHILS # BLD AUTO: 6.1 10E9/L (ref 1.6–8.3)
NEUTROPHILS NFR BLD AUTO: 65.7 %
NONHDLC SERPL-MCNC: 154 MG/DL
PLATELET # BLD AUTO: 279 10E9/L (ref 150–450)
POTASSIUM SERPL-SCNC: 3.9 MMOL/L (ref 3.4–5.3)
RBC # BLD AUTO: 4.5 10E12/L (ref 3.8–5.2)
SODIUM SERPL-SCNC: 138 MMOL/L (ref 133–144)
TRIGL SERPL-MCNC: 227 MG/DL
WBC # BLD AUTO: 9.3 10E9/L (ref 4–11)

## 2020-10-01 PROCEDURE — 85025 COMPLETE CBC W/AUTO DIFF WBC: CPT | Performed by: FAMILY MEDICINE

## 2020-10-01 PROCEDURE — 83036 HEMOGLOBIN GLYCOSYLATED A1C: CPT | Performed by: FAMILY MEDICINE

## 2020-10-01 PROCEDURE — 90682 RIV4 VACC RECOMBINANT DNA IM: CPT | Performed by: FAMILY MEDICINE

## 2020-10-01 PROCEDURE — 80061 LIPID PANEL: CPT | Performed by: FAMILY MEDICINE

## 2020-10-01 PROCEDURE — 80048 BASIC METABOLIC PNL TOTAL CA: CPT | Performed by: FAMILY MEDICINE

## 2020-10-01 PROCEDURE — 99396 PREV VISIT EST AGE 40-64: CPT | Mod: 25 | Performed by: FAMILY MEDICINE

## 2020-10-01 PROCEDURE — 99213 OFFICE O/P EST LOW 20 MIN: CPT | Mod: 25 | Performed by: FAMILY MEDICINE

## 2020-10-01 PROCEDURE — 90471 IMMUNIZATION ADMIN: CPT | Performed by: FAMILY MEDICINE

## 2020-10-01 PROCEDURE — 36415 COLL VENOUS BLD VENIPUNCTURE: CPT | Performed by: FAMILY MEDICINE

## 2020-10-01 RX ORDER — ALBUTEROL SULFATE 90 UG/1
AEROSOL, METERED RESPIRATORY (INHALATION)
Qty: 18 G | Refills: 3 | Status: SHIPPED | OUTPATIENT
Start: 2020-10-01 | End: 2022-01-13

## 2020-10-01 RX ORDER — MONTELUKAST SODIUM 10 MG/1
TABLET ORAL
Qty: 90 TABLET | Refills: 1 | Status: SHIPPED | OUTPATIENT
Start: 2020-10-01 | End: 2021-04-08

## 2020-10-01 RX ORDER — FAMOTIDINE 40 MG/1
40 TABLET, FILM COATED ORAL DAILY
Qty: 30 TABLET | Refills: 0 | Status: SHIPPED | OUTPATIENT
Start: 2020-10-01 | End: 2021-04-08

## 2020-10-01 RX ORDER — ESCITALOPRAM OXALATE 20 MG/1
TABLET ORAL
Qty: 90 TABLET | Refills: 1 | Status: SHIPPED | OUTPATIENT
Start: 2020-10-01 | End: 2021-04-08

## 2020-10-01 RX ORDER — FUROSEMIDE 20 MG
TABLET ORAL
Qty: 180 TABLET | Refills: 1 | Status: SHIPPED | OUTPATIENT
Start: 2020-10-01 | End: 2021-04-08

## 2020-10-01 SDOH — ECONOMIC STABILITY: FOOD INSECURITY: WITHIN THE PAST 12 MONTHS, THE FOOD YOU BOUGHT JUST DIDN'T LAST AND YOU DIDN'T HAVE MONEY TO GET MORE.: NOT ASKED

## 2020-10-01 SDOH — ECONOMIC STABILITY: FOOD INSECURITY: WITHIN THE PAST 12 MONTHS, YOU WORRIED THAT YOUR FOOD WOULD RUN OUT BEFORE YOU GOT MONEY TO BUY MORE.: NOT ASKED

## 2020-10-01 ASSESSMENT — ANXIETY QUESTIONNAIRES
GAD7 TOTAL SCORE: 5
GAD7 TOTAL SCORE: 5
6. BECOMING EASILY ANNOYED OR IRRITABLE: NOT AT ALL
7. FEELING AFRAID AS IF SOMETHING AWFUL MIGHT HAPPEN: SEVERAL DAYS
2. NOT BEING ABLE TO STOP OR CONTROL WORRYING: SEVERAL DAYS
4. TROUBLE RELAXING: SEVERAL DAYS
5. BEING SO RESTLESS THAT IT IS HARD TO SIT STILL: NOT AT ALL
GAD7 TOTAL SCORE: 5
1. FEELING NERVOUS, ANXIOUS, OR ON EDGE: SEVERAL DAYS
7. FEELING AFRAID AS IF SOMETHING AWFUL MIGHT HAPPEN: SEVERAL DAYS
3. WORRYING TOO MUCH ABOUT DIFFERENT THINGS: SEVERAL DAYS

## 2020-10-01 ASSESSMENT — ENCOUNTER SYMPTOMS
ABDOMINAL PAIN: 1
MYALGIAS: 0
HEADACHES: 1
HEARTBURN: 0
BREAST MASS: 0
DIZZINESS: 0
HEMATOCHEZIA: 0
SORE THROAT: 0
FEVER: 0
DYSURIA: 0
HEMATURIA: 0
PALPITATIONS: 0
PARESTHESIAS: 1
DIARRHEA: 0
WEAKNESS: 0
EYE PAIN: 0
ARTHRALGIAS: 1
SHORTNESS OF BREATH: 1
CONSTIPATION: 0
COUGH: 0
FREQUENCY: 0
JOINT SWELLING: 0
NAUSEA: 1
CHILLS: 0

## 2020-10-01 ASSESSMENT — MIFFLIN-ST. JEOR: SCORE: 1857.6

## 2020-10-01 ASSESSMENT — PATIENT HEALTH QUESTIONNAIRE - PHQ9
SUM OF ALL RESPONSES TO PHQ QUESTIONS 1-9: 8
SUM OF ALL RESPONSES TO PHQ QUESTIONS 1-9: 8

## 2020-10-01 NOTE — NURSING NOTE
No future appointments.  Appointment Notes for this encounter:      physical, anxiety med check, ACT, Pt will also discuss:  arms, hands and fingers going numb, Stomach and chest pains-jf    Health Maintenance Due   Topic Date Due     Pneumococcal Vaccine: Pediatrics (0 to 5 Years) and At-Risk Patients (6 to 64 Years) (1 of 1 - PPSV23) 03/25/1976     ASTHMA ACTION PLAN  10/31/2019     BMP  04/01/2020     PHQ-9  08/10/2020     INFLUENZA VACCINE (1) 09/01/2020     PREVENTIVE CARE VISIT  10/01/2020     LIPID  10/01/2020     ANNUAL REVIEW OF HM ORDERS  10/01/2020     Health Maintenance addressed:  NONE    Immunizations possible pneumo shot    MyChart Status:  Active and Using     Belinda Ortiz/KAELA  Kenneth---[facility]

## 2020-10-01 NOTE — PROGRESS NOTES
SUBJECTIVE:   CC: Hannah Meade is an 50 year old woman who presents for preventive health visit.       Patient has been advised of split billing requirements and indicates understanding: Yes  Healthy Habits:     Getting at least 3 servings of Calcium per day:  NO    Bi-annual eye exam:  NO    Dental care twice a year:  NO    Sleep apnea or symptoms of sleep apnea:  Daytime drowsiness, Excessive snoring and Sleep apnea    Diet:  Other    Frequency of exercise:  4-5 days/week    Duration of exercise:  15-30 minutes    Taking medications regularly:  Yes    Medication side effects:  None    PHQ-2 Total Score: 0    Additional concerns today:  No    Other:    1. over the last 2 weeks noticed epigastric pain. Feels more like tightness.   Worse after eating and can not lay down for very long.   Reports some nausea but no vomiting.     2. Hands/fingers go numb. Also reports some tingling.   Not dropping things of her hands.   Denies any swelling.     Wt Readings from Last 4 Encounters:   10/01/20 126.6 kg (279 lb)   10/01/19 119.7 kg (264 lb)   06/13/19 117 kg (258 lb)   05/13/19 117.5 kg (259 lb)       Today's PHQ-2 Score:   PHQ-2 ( 1999 Pfizer) 10/1/2020   Q1: Little interest or pleasure in doing things 0   Q2: Feeling down, depressed or hopeless 0   PHQ-2 Score 0   Q1: Little interest or pleasure in doing things Not at all   Q2: Feeling down, depressed or hopeless Not at all   PHQ-2 Score 0       Abuse: Current or Past (Physical, Sexual or Emotional) - No  Do you feel safe in your environment? Yes        Social History     Tobacco Use     Smoking status: Never Smoker     Smokeless tobacco: Never Used   Substance Use Topics     Alcohol use: No     Alcohol/week: 0.0 standard drinks     Frequency: Never     Drinks per session: Patient refused     Binge frequency: Patient refused     If you drink alcohol do you typically have >3 drinks per day or >7 drinks per week? No    Alcohol Use 10/1/2020   Prescreen: >3 drinks/day  "or >7 drinks/week? Not Applicable   Prescreen: >3 drinks/day or >7 drinks/week? -   No flowsheet data found.    Reviewed orders with patient.  Reviewed health maintenance and updated orders accordingly -   Labs reviewed in [facility]    Mammogram Screening: Patient over age 50, mutual decision to screen reflected in health maintenance.    Pertinent mammograms are reviewed under the imaging tab.  History of abnormal Pap smear: Status post benign hysterectomy. Health Maintenance and Surgical History updated.  PAP / HPV 1/9/2012 5/19/2009 8/25/2005   PAP NIL NIL NIL     Reviewed and updated as needed this visit by clinical staff  Tobacco  Allergies  Meds     Fam Hx  Soc Hx        Reviewed and updated as needed this visit by Provider                    Review of Systems   Constitutional: Negative for chills and fever.   HENT: Negative for congestion, ear pain, hearing loss and sore throat.    Eyes: Negative for pain and visual disturbance.   Respiratory: Positive for shortness of breath. Negative for cough.    Cardiovascular: Positive for chest pain and peripheral edema. Negative for palpitations.   Gastrointestinal: Positive for abdominal pain and nausea. Negative for constipation, diarrhea, heartburn and hematochezia.   Breasts:  Negative for tenderness, breast mass and discharge.   Genitourinary: Negative for dysuria, frequency, genital sores, hematuria, pelvic pain, urgency, vaginal bleeding and vaginal discharge.   Musculoskeletal: Positive for arthralgias. Negative for joint swelling and myalgias.   Skin: Negative for rash.   Neurological: Positive for headaches and paresthesias. Negative for dizziness and weakness.   Psychiatric/Behavioral: Negative for mood changes.          OBJECTIVE:   /86 (BP Location: Right arm, Patient Position: Chair, Cuff Size: Adult Large)   Pulse 79   Temp 97.9  F (36.6  C) (Oral)   Resp 20   Ht 1.613 m (5' 3.5\")   Wt 126.6 kg (279 lb)   Breastfeeding No   BMI 48.65 kg/m  "   Physical Exam  GENERAL: healthy, alert and no distress  EYES: Eyes grossly normal to inspection, PERRL and conjunctivae and sclerae normal  HENT: ear canals and TM's normal, nose and mouth without ulcers or lesions  NECK: no adenopathy, no asymmetry, masses, or scars and thyroid normal to palpation  RESP: lungs clear to auscultation - no rales, rhonchi or wheezes  BREAST: normal without masses, tenderness or nipple discharge and no palpable axillary masses or adenopathy  CV: regular rate and rhythm, normal S1 S2, no S3 or S4, no murmur, click or rub, no peripheral edema and peripheral pulses strong  ABDOMEN: soft, nontender,and bowel sounds normal  MS: no gross musculoskeletal defects noted, no edema  SKIN: no suspicious lesions or rashes  NEURO: Normal strength and tone, mentation intact and speech normal  PSYCH: mentation appears normal, affect normal/bright    Diagnostic Test Results:  Labs reviewed in Epic  none     ASSESSMENT/PLAN:   1. Routine general medical examination at a health care facility  - BASIC METABOLIC PANEL  - Lipid panel reflex to direct LDL Fasting  - CBC with platelets and differential  - Hemoglobin A1c    2. Acute gastritis without hemorrhage, unspecified gastritis type  - will rule out h. pylori  - H Pylori antigen stool; Future  - famotidine (PEPCID) 40 MG tablet; Take 1 tablet (40 mg) by mouth daily  Dispense: 30 tablet; Refill: 0    3. Bilateral carpal tunnel syndrome  - needs improvement. L>R- will start owth night time splinting of left wrist for now   - Wrist/Arm/Hand Supplies Order for DME - ONLY FOR DME    4. Morbid obesity (H)  - diet and lifestyle changes discussed. Goals set for the next few weeks     5. Moderate persistent asthma without complication  - stable   - albuterol (PROAIR HFA) 108 (90 Base) MCG/ACT inhaler; INHALE 2 PUFFS INTO THE LUNGS EVERY 6 HOURS  Dispense: 18 g; Refill: 3  - montelukast (SINGULAIR) 10 MG tablet; TAKE 1 TABLET BY MOUTH EVERYDAY AT BEDTIME   "Dispense: 90 tablet; Refill: 1    6. Anxiety  - stable   - escitalopram (LEXAPRO) 20 MG tablet; TAKE 1 TABLET BY MOUTH ONE TIME DAILY  Dispense: 90 tablet; Refill: 1    7. Lymphedema  - stable   - furosemide (LASIX) 20 MG tablet; TAKE 2 TABLETS BY MOUTH DAILY  Dispense: 180 tablet; Refill: 1    8. Encounter for screening mammogram for breast cancer    - *MA Screening Digital Bilateral; Future    Patient has been advised of split billing requirements and indicates understanding: Yes  COUNSELING:  Reviewed preventive health counseling, as reflected in patient instructions       Regular exercise       Healthy diet/nutrition    Estimated body mass index is 48.65 kg/m  as calculated from the following:    Height as of this encounter: 1.613 m (5' 3.5\").    Weight as of this encounter: 126.6 kg (279 lb).    Weight management plan: Discussed healthy diet and exercise guidelines    She reports that she has never smoked. She has never used smokeless tobacco.      Counseling Resources:  ATP IV Guidelines  Pooled Cohorts Equation Calculator  Breast Cancer Risk Calculator  BRCA-Related Cancer Risk Assessment: FHS-7 Tool  FRAX Risk Assessment  ICSI Preventive Guidelines  Dietary Guidelines for Americans, 2010  [employer]'s MyPlate  ASA Prophylaxis  Lung CA Screening    Lianna Cleveland MD  [facility]  Answers for HPI/ROS submitted by the patient on 10/1/2020   Annual Exam:  PHQ9 TOTAL SCORE: 8  MAURISIO 7 TOTAL SCORE: 5    "

## 2020-10-01 NOTE — PATIENT INSTRUCTIONS
No soda for the next month  At least 30 minutes of exercise 5 times a week       Patient Education     Carpal Tunnel Syndrome    Carpal tunnel syndrome is a painful condition of the wrist and arm. It is caused by pressure on the median nerve. The median nerve is one of the nerves that give feeling and movement to the hand. It passes through a tunnel in the wrist called the carpal tunnel. This tunnel is made up of bones and ligaments. Narrowing of this tunnel or swelling of the tissues inside the tunnel puts pressure on the median nerve. This causes numbness, pins and needles, or electric shooting pains in your hand and forearm. Often the pain is worse at night and may wake you when you are asleep.  Carpal tunnel syndrome may occur during pregnancy and with use of birth control pills. It is more common in workers who must often bend their wrists. It is also common in people who work with power tools that cause strong vibrations.  Home care    Rest the painful wrist. Avoid repeated bending of the wrist back and forth. This puts pressure on the median nerve. Avoid using power tools with strong vibrations.    If you were given a splint, wear it at night while you sleep. You may also wear it during the day for comfort.    Move your fingers and wrists often to prevent stiffness.    Elevate your arms on pillows when you lie down.    Try using the unaffected hand more.    Try not to hold your wrists in a bent, downward position.    Sometimes changes in the work place may ease symptoms. If you type most of the day, it may help to change the position of your keyboard or add a wrist support. Your wrist should be in a neutral position and not bent back when typing.    You may use over-the-counter pain medicine to treat pain and inflammation, unless another medicine was prescribed. Anti-inflammatory pain medicines, such as ibuprofen or naproxen may be more effective than acetaminophen, which treats pain, but not inflammation. If  you have chronic liver or kidney disease or ever had a stomach ulcer or gastrointestinal bleeding, talk with your healthcare provider before using these medicines.    Opioid pain medicine will only give temporary relief and does not treat the problem. If pain continues, you may need a shot of a steroid drug into your wrist.    If the above methods fail, you may need surgery. This will open the carpal tunnel and release the pressure on the trapped nerve.  Follow-up care  Follow up with your healthcare provider, or as advised. If X-rays were taken, you will be notified of any new findings that may affect your care.  When to seek medical advice  Call your healthcare provider right away if any of these occur:    Pain not improving with the above treatment    Fingers or hand become cold, blue, numb, or tingly    Your whole arm becomes swollen or weak  Date Last Reviewed: 5/1/2018 2000-2019 [employer]. [address] [place] [address]. All rights reserved. This information is not intended as a substitute for professional medical care. Always follow your healthcare professional's instructions.           Patient Education     Gastritis (Adult)    Gastritis is inflammation and irritation of the stomach lining. You can have it for a short time (acute) or be long lasting (chronic). Infection with bacteria called H pylori most often causes gastritis. More than a third of people in the [place] have these bacteria in their bodies. In many cases, H pylori causes no problems or symptoms. In some people, though, the infection irritates the stomach lining and causes gastritis. H. pylori may be diagnosed through blood, stool, or breath tests, we well as through biopsy during an endoscopy. Other causes of stomach irritation include drinking alcohol, smoking or chewing tobacco, or taking pain-relieving medicines called NSAIDs (such as aspirin or ibuprofen). Certain drugs (such as cocaine) and immune conditions can  also cause gastritis.  Symptoms of gastritis can include:    Belly pain or bloating    Feeling full quickly    Loss of appetite    Nausea or vomiting    Vomiting blood or having black stools    Feeling more tired than usual  An inflamed and irritated stomach lining is more likely to develop a sore called an ulcer. To help prevent this, gastritis should be treated.  Home care  If needed, our healthcare provider may prescribe medicines. If you have H pylori infection, treating it will likely relieve your symptoms. Other changes can help reduce stomach irritation and help it heal.    If you have been prescribed medicines for H pylori infection, take them as directed. Take all of the medicine until it is finished or your healthcare provider tells you to stop, even if you feel better.    Your healthcare provider may advise you not to take NSAIDs. If you take daily aspirin for your heart or other medical reasons, do not stop without talking to your healthcare provider first.    Don't drink alcohol.    Stop smoking. Smoking can irritate the stomach and delay healing. As much as possible, stay away from second hand smoke.  Follow-up care  Follow up with your healthcare provider, or as advised by our staff. You may need testing to check for inflammation or an ulcer.  When to seek medical advice  Call your healthcare provider for any of the following:    Stomach pain that gets worse or moves to the lower right belly (appendix area)    Chest pain that appears or gets worse, or spreads to the back, neck, shoulder, or arm    Frequent vomiting (can t keep down liquids)    Blood in the stool or vomit (red or black in color)    Feeling weak or dizzy    Shortness of breath    Unexplained weight loss    Fever of 100.4 F (38 C) or higher, or as directed by your healthcare provider  Date Last Reviewed: 3/1/2018    7835-7433 [employer]. [address] [place] [address], [place] [address]. All rights reserved. This information is  not intended as a substitute for professional medical care. Always follow your healthcare professional's instructions.

## 2020-10-02 DIAGNOSIS — K29.00 ACUTE GASTRITIS WITHOUT HEMORRHAGE, UNSPECIFIED GASTRITIS TYPE: ICD-10-CM

## 2020-10-02 PROCEDURE — 87338 HPYLORI STOOL AG IA: CPT | Performed by: FAMILY MEDICINE

## 2020-10-02 ASSESSMENT — PATIENT HEALTH QUESTIONNAIRE - PHQ9: SUM OF ALL RESPONSES TO PHQ QUESTIONS 1-9: 8

## 2020-10-02 ASSESSMENT — ANXIETY QUESTIONNAIRES: GAD7 TOTAL SCORE: 5

## 2020-10-05 LAB — H PYLORI AG STL QL IA: NEGATIVE

## 2020-10-12 NOTE — PROGRESS NOTES
Pre-Visit Planning     Appointment Notes for this encounter:   Diabetes, consult 10/1/20 lab results  10/13/20 8:15 AM     Questionnaires Reviewed/Assigned  No additional questionnaires are needed        Patient preferred phone number: [phone]    Gissell PVP sent.    Celestino Kwong, RN      Answers for HPI/ROS submitted by the patient on 10/12/2020   Chronic problems general questions HPI Form  On average, how many sweetened beverages do you drink each day (Examples: soda, juice, sweet tea, etc.  Do NOT count diet or artificially sweetened beverages)?: 0  How many days a week do you exercise enough to make your heart beat faster?: 5  How many days per week do you miss taking your medication?: 0  Frequency of checking blood sugars:: not at all  Diabetic concerns:: other  Paraesthesia present:: burning in feet, redness, sores, or blisters on feet

## 2020-10-13 ENCOUNTER — VIRTUAL VISIT (OUTPATIENT)
Dept: FAMILY MEDICINE | Facility: CLINIC | Age: 50
End: 2020-10-13
Payer: COMMERCIAL

## 2020-10-13 VITALS — WEIGHT: 271 LBS | BODY MASS INDEX: 48.02 KG/M2 | HEIGHT: 63 IN

## 2020-10-13 DIAGNOSIS — E11.9 TYPE 2 DIABETES MELLITUS WITHOUT COMPLICATION, WITHOUT LONG-TERM CURRENT USE OF INSULIN (H): Primary | ICD-10-CM

## 2020-10-13 PROCEDURE — 99213 OFFICE O/P EST LOW 20 MIN: CPT | Mod: 95 | Performed by: FAMILY MEDICINE

## 2020-10-13 ASSESSMENT — MIFFLIN-ST. JEOR: SCORE: 1813.38

## 2020-10-13 NOTE — PATIENT INSTRUCTIONS
Goals  No soda  Using [employer] pal arturo daily   Exercise- 150 mins/week   Increase intake of fruits and vegetables   [email or web address]

## 2020-10-13 NOTE — PROGRESS NOTES
"Hannah Meade is a 50 year old female who is being evaluated via a billable video visit.      The patient has been notified of following:     \"This video visit will be conducted via a call between you and your physician/provider. We have found that certain health care needs can be provided without the need for an in-person physical exam.  This service lets us provide the care you need with a video conversation.  If a prescription is necessary we can send it directly to your pharmacy.  If lab work is needed we can place an order for that and you can then stop by our lab to have the test done at a later time.    Video visits are billed at different rates depending on your insurance coverage.  Please reach out to your insurance provider with any questions.    If during the course of the call the physician/provider feels a video visit is not appropriate, you will not be charged for this service.\"    Patient has given verbal consent for Video visit? Yes  How would you like to obtain your AVS? MyChart  If you are dropped from the video visit, the video invite should be resent to: Text to cell phone: [phone]  Will anyone else be joining your video visit? No      Subjective     Hannah Meade is a 50 year old female who presents today via video visit for the following health issues:    HPI     Concern - discuss lab results, new dx of diabetes      Video Start Time: 8:40 am         Review of Systems   Constitutional, HEENT, cardiovascular, pulmonary, GI, , musculoskeletal, neuro, skin, endocrine and psych systems are negative, except as otherwise noted.      Objective           Vitals:  No vitals were obtained today due to virtual visit.    Physical Exam     GENERAL: Healthy, alert and no distress  EYES: Eyes grossly normal to inspection.  No discharge or erythema, or obvious scleral/conjunctival abnormalities.  RESP: No audible wheeze, cough, or visible cyanosis.  No visible retractions or increased work of " breathing.    PSYCH: Mentation appears normal, affect normal/bright, judgement and insight intact, normal speech and appearance well-groomed.      Lab Results   Component Value Date    A1C 6.9 10/01/2020    A1C 5.6 09/17/2018    A1C 5.4 07/31/2017          Assessment & Plan     Type 2 diabetes mellitus without complication, without long-term current use of insulin (H)  - new onset. Discussed natural course and treatment.   A1c is now 6.9- at this time patient prefers to try intense lifestyle changes. Goals set today to set healthy habits and meet this new goal.      BMI 40.0-44.9, adult (H)  - already lost 8 lbs since last visit.   New goals set today to help with weight loss. Will recheck weight in 1 month.           See Patient Instructions    Return in about 1 month (around 11/13/2020) for , virtual- weight check/diabetes follow up .    Lianna Cleveland MD  [facility] [place]      Video-Visit Details    Type of service:  Video Visit    Video End Time:8:57am     Originating Location (pt. Location): Home    Distant Location (provider location):  [facility] [place]     Platform used for Video Visit: Doximity          Answers for HPI/ROS submitted by the patient on 10/12/2020   Chronic problems general questions HPI Form  On average, how many sweetened beverages do you drink each day (Examples: soda, juice, sweet tea, etc.  Do NOT count diet or artificially sweetened beverages)?: 0  How many days a week do you exercise enough to make your heart beat faster?: 5  How many days per week do you miss taking your medication?: 0  Frequency of checking blood sugars:: not at all  Diabetic concerns:: other  Paraesthesia present:: burning in feet, redness, sores, or blisters on feet

## 2020-11-13 ENCOUNTER — VIRTUAL VISIT (OUTPATIENT)
Dept: FAMILY MEDICINE | Facility: CLINIC | Age: 50
End: 2020-11-13
Payer: COMMERCIAL

## 2020-11-13 VITALS — WEIGHT: 270 LBS | BODY MASS INDEX: 47.83 KG/M2

## 2020-11-13 DIAGNOSIS — E11.9 TYPE 2 DIABETES MELLITUS WITHOUT COMPLICATION, WITHOUT LONG-TERM CURRENT USE OF INSULIN (H): Primary | ICD-10-CM

## 2020-11-13 DIAGNOSIS — E66.01 MORBID OBESITY (H): ICD-10-CM

## 2020-11-13 PROCEDURE — 99213 OFFICE O/P EST LOW 20 MIN: CPT | Mod: 95 | Performed by: FAMILY MEDICINE

## 2020-11-13 NOTE — PATIENT INSTRUCTIONS
Goals  1. Exercise- 15 minutes of physical activity 5 times a week for the next 2 weeks then increase to 30 minutes 5 times a week for 1 month and maintain.  2. No soda

## 2020-11-13 NOTE — PROGRESS NOTES
"Hannah Meade is a 50 year old female who is being evaluated via a billable video visit.      The patient has been notified of following:     \"This video visit will be conducted via a call between you and your physician/provider. We have found that certain health care needs can be provided without the need for an in-person physical exam.  This service lets us provide the care you need with a video conversation.  If a prescription is necessary we can send it directly to your pharmacy.  If lab work is needed we can place an order for that and you can then stop by our lab to have the test done at a later time.    Video visits are billed at different rates depending on your insurance coverage.  Please reach out to your insurance provider with any questions.    If during the course of the call the physician/provider feels a video visit is not appropriate, you will not be charged for this service.\"    Patient has given verbal consent for Video visit? Yes  How would you like to obtain your AVS? [employer]   Text to cell phone: [phone]  Will anyone else be joining your video visit? No      Subjective     Hannah Meade is a 50 year old female who presents today via video visit for the following health issues:    History of Present Illness       Diabetes:   She presents for follow up of diabetes.  She is not checking blood glucose. She has no concerns regarding her diabetes at this time.  She is having redness, sores, or blisters on feet and excessive thirst. The patient has not had a diabetic eye exam in the last 12 months.         She eats 4 or more servings of fruits and vegetables daily.She consumes 0 sweetened beverage(s) daily.She exercises with enough effort to increase her heart rate 9 or less minutes per day.  She exercises with enough effort to increase her heart rate 3 or less days per week.   She is taking medications regularly.       Diabetes Follow-up      How often are you checking your blood sugar? Not " at all    What concerns do you have today about your diabetes? None     Do you have any of these symptoms? (Select all that apply)  No numbness or tingling in feet.  No redness, sores or blisters on feet.  No complaints of excessive thirst.  No reports of blurry vision.  No significant changes to weight.    Have you had a diabetic eye exam in the last 12 months? No    Currently sick and waiting for COVID testing results.      From last visit- increased her intake of fruits/vegetables to about about 5 servings a day.   Cut out soda from her diet, increased water intake to 4-5 bottles of water a day.     24 hour recall  Breakfast- Yogurt- yoplait light  Snack- Orange  Lunch- chicken noodle soup (progresso)  Snack- Apple   Dinner- pork tenderloin, salad   Snack- Grapes       Wt Readings from Last 4 Encounters:   10/13/20 122.9 kg (271 lb)   10/01/20 126.6 kg (279 lb)   10/01/19 119.7 kg (264 lb)   06/13/19 117 kg (258 lb)       BP Readings from Last 2 Encounters:   10/01/20 132/86   10/01/19 131/85     Hemoglobin A1C (%)   Date Value   10/01/2020 6.9 (H)   09/17/2018 5.6     LDL Cholesterol Calculated (mg/dL)   Date Value   10/01/2020 109 (H)   10/01/2019 92                 How many servings of fruits and vegetables do you eat daily?  2-3    On average, how many sweetened beverages do you drink each day (Examples: soda, juice, sweet tea, etc.  Do NOT count diet or artificially sweetened beverages)?   0    How many days per week do you exercise enough to make your heart beat faster? 3 or less    How many minutes a day do you exercise enough to make your heart beat faster? 9 or less    How many days per week do you miss taking your medication? 0        Video Start Time: 8:31 am         Review of Systems   Constitutional, HEENT, cardiovascular, pulmonary, GI, , musculoskeletal, neuro, skin, endocrine and psych systems are negative, except as otherwise noted.      Objective    Vitals - Patient Reported  Weight (Patient  "Reported): 122.5 kg (270 lb)  Height (Patient Reported): 160 cm (5' 3\")  BMI (Based on Pt Reported Ht/Wt): 47.83  Temperature (Patient Reported): 100  F (37.8  C)(temporal)      Vitals:  No vitals were obtained today due to virtual visit.    Physical Exam     GENERAL: Healthy, alert and no distress  EYES: Eyes grossly normal to inspection.  No discharge or erythema, or obvious scleral/conjunctival abnormalities.  RESP: No audible wheeze, cough, or visible cyanosis.  No visible retractions or increased work of breathing.    PSYCH: Mentation appears normal, affect normal/bright, judgement and insight intact, normal speech and appearance well-groomed.              Assessment & Plan     Type 2 diabetes mellitus without complication, without long-term current use of insulin (H)  Morbid obesity (H)  - patient to continue with current diet and lifestyle changes.   Next month we will recheck A1c and complete other diabetic care gaps that need to be completed.           See Patient Instructions    Return in about 6 weeks (around 12/24/2020) for diabetes, , in person.    Lianna Cleveland MD  [facility] [place]      Video-Visit Details    Type of service:  Video Visit    Video End Time:8:49 AM    Originating Location (pt. Location): Home    Distant Location (provider location):  [facility] [place]     Platform used for Video Visit: Tori"

## 2021-01-04 ENCOUNTER — OFFICE VISIT (OUTPATIENT)
Dept: FAMILY MEDICINE | Facility: CLINIC | Age: 51
End: 2021-01-04
Payer: COMMERCIAL

## 2021-01-04 VITALS
TEMPERATURE: 98.1 F | OXYGEN SATURATION: 96 % | BODY MASS INDEX: 47.66 KG/M2 | HEART RATE: 78 BPM | SYSTOLIC BLOOD PRESSURE: 132 MMHG | DIASTOLIC BLOOD PRESSURE: 80 MMHG | RESPIRATION RATE: 16 BRPM | HEIGHT: 63 IN | WEIGHT: 269 LBS

## 2021-01-04 DIAGNOSIS — E66.01 MORBID OBESITY (H): ICD-10-CM

## 2021-01-04 DIAGNOSIS — E11.9 TYPE 2 DIABETES MELLITUS WITHOUT COMPLICATION, WITHOUT LONG-TERM CURRENT USE OF INSULIN (H): Primary | ICD-10-CM

## 2021-01-04 DIAGNOSIS — E78.2 MIXED HYPERLIPIDEMIA: ICD-10-CM

## 2021-01-04 LAB
CREAT UR-MCNC: 16 MG/DL
HBA1C MFR BLD: 6.4 % (ref 0–5.6)
MICROALBUMIN UR-MCNC: <5 MG/L
MICROALBUMIN/CREAT UR: NORMAL MG/G CR (ref 0–25)

## 2021-01-04 PROCEDURE — 36415 COLL VENOUS BLD VENIPUNCTURE: CPT | Performed by: FAMILY MEDICINE

## 2021-01-04 PROCEDURE — 99207 PR FOOT EXAM NO CHARGE: CPT | Mod: 25 | Performed by: FAMILY MEDICINE

## 2021-01-04 PROCEDURE — 83036 HEMOGLOBIN GLYCOSYLATED A1C: CPT | Performed by: FAMILY MEDICINE

## 2021-01-04 PROCEDURE — 99214 OFFICE O/P EST MOD 30 MIN: CPT | Performed by: FAMILY MEDICINE

## 2021-01-04 PROCEDURE — 82043 UR ALBUMIN QUANTITATIVE: CPT | Performed by: FAMILY MEDICINE

## 2021-01-04 RX ORDER — LISINOPRIL 5 MG/1
5 TABLET ORAL DAILY
Qty: 90 TABLET | Refills: 1 | Status: SHIPPED | OUTPATIENT
Start: 2021-01-04 | End: 2021-04-08

## 2021-01-04 RX ORDER — ATORVASTATIN CALCIUM 20 MG/1
20 TABLET, FILM COATED ORAL DAILY
Qty: 90 TABLET | Refills: 3 | Status: SHIPPED | OUTPATIENT
Start: 2021-01-04 | End: 2022-07-07

## 2021-01-04 ASSESSMENT — ANXIETY QUESTIONNAIRES
2. NOT BEING ABLE TO STOP OR CONTROL WORRYING: NEARLY EVERY DAY
7. FEELING AFRAID AS IF SOMETHING AWFUL MIGHT HAPPEN: SEVERAL DAYS
4. TROUBLE RELAXING: SEVERAL DAYS
GAD7 TOTAL SCORE: 10
1. FEELING NERVOUS, ANXIOUS, OR ON EDGE: SEVERAL DAYS
7. FEELING AFRAID AS IF SOMETHING AWFUL MIGHT HAPPEN: SEVERAL DAYS
GAD7 TOTAL SCORE: 10
5. BEING SO RESTLESS THAT IT IS HARD TO SIT STILL: SEVERAL DAYS
3. WORRYING TOO MUCH ABOUT DIFFERENT THINGS: NEARLY EVERY DAY
6. BECOMING EASILY ANNOYED OR IRRITABLE: NOT AT ALL
GAD7 TOTAL SCORE: 10

## 2021-01-04 ASSESSMENT — ASTHMA QUESTIONNAIRES
QUESTION_1 LAST FOUR WEEKS HOW MUCH OF THE TIME DID YOUR ASTHMA KEEP YOU FROM GETTING AS MUCH DONE AT WORK, SCHOOL OR AT HOME: NONE OF THE TIME
ACT_TOTALSCORE: 19
QUESTION_2 LAST FOUR WEEKS HOW OFTEN HAVE YOU HAD SHORTNESS OF BREATH: ONCE A DAY
QUESTION_4 LAST FOUR WEEKS HOW OFTEN HAVE YOU USED YOUR RESCUE INHALER OR NEBULIZER MEDICATION (SUCH AS ALBUTEROL): ONCE A WEEK OR LESS
QUESTION_5 LAST FOUR WEEKS HOW WOULD YOU RATE YOUR ASTHMA CONTROL: WELL CONTROLLED
QUESTION_3 LAST FOUR WEEKS HOW OFTEN DID YOUR ASTHMA SYMPTOMS (WHEEZING, COUGHING, SHORTNESS OF BREATH, CHEST TIGHTNESS OR PAIN) WAKE YOU UP AT NIGHT OR EARLIER THAN USUAL IN THE MORNING: ONCE OR TWICE

## 2021-01-04 ASSESSMENT — PATIENT HEALTH QUESTIONNAIRE - PHQ9
10. IF YOU CHECKED OFF ANY PROBLEMS, HOW DIFFICULT HAVE THESE PROBLEMS MADE IT FOR YOU TO DO YOUR WORK, TAKE CARE OF THINGS AT HOME, OR GET ALONG WITH OTHER PEOPLE: NOT DIFFICULT AT ALL
SUM OF ALL RESPONSES TO PHQ QUESTIONS 1-9: 6
SUM OF ALL RESPONSES TO PHQ QUESTIONS 1-9: 6

## 2021-01-04 ASSESSMENT — MIFFLIN-ST. JEOR: SCORE: 1804.31

## 2021-01-04 NOTE — PROGRESS NOTES
Assessment & Plan     Type 2 diabetes mellitus without complication, without long-term current use of insulin (H)  - A1c improved from 6.9 to 6.4. patient encouraged to continue with diet and lifestyle changes.   - Hemoglobin A1c  - JUST IN CASE  - Albumin Random Urine Quantitative with Creat Ratio  - FOOT EXAM  - atorvastatin (LIPITOR) 20 MG tablet; Take 1 tablet (20 mg) by mouth daily  - lisinopril (ZESTRIL) 5 MG tablet; Take 1 tablet (5 mg) by mouth daily    Mixed hyperlipidemia  - atorvastatin (LIPITOR) 20 MG tablet; Take 1 tablet (20 mg) by mouth daily    Morbid obesity (H)  - ~ 10 lbs weight loss since October. Patient congratulated on this and encouraged to continue.     35 minutes spent on the date of the encounter doing chart review, history and exam, documentation and further activities as noted above           See Patient Instructions    Return in about 3 months (around 4/4/2021) for diabetes, in person, .    Lianna Cleveland MD  [facility]     Hannah Meade is a 50 year old female who presents to clinic today for the following health issues        History of Present Illness       Diabetes:   She presents for follow up of diabetes.  She is not checking blood glucose. She has no concerns regarding her diabetes at this time.  She is having burning in feet. The patient has not had a diabetic eye exam in the last 12 months.         She eats 2-3 servings of fruits and vegetables daily.She consumes 1 sweetened beverage(s) daily.She exercises with enough effort to increase her heart rate 9 or less minutes per day.  She exercises with enough effort to increase her heart rate 3 or less days per week.   She is taking medications regularly.     Patient here today for recheck of A1c. At last visit was noted to have new onset diabetes with A1c of 6.9.   She was adamant about not starting on medication. Over the last few months she has worked on  "changing her diet. Admits she hasn't been able to do a good job on this for the last month due to her busy schedule.     Wt Readings from Last 4 Encounters:   01/04/21 122 kg (269 lb)   11/13/20 122.5 kg (270 lb)   10/13/20 122.9 kg (271 lb)   10/01/20 126.6 kg (279 lb)       Review of Systems   Constitutional, HEENT, cardiovascular, pulmonary, GI, , musculoskeletal, neuro, skin, endocrine and psych systems are negative, except as otherwise noted.      Objective    /80 (BP Location: Right arm, Patient Position: Chair, Cuff Size: Adult Large)   Pulse 78   Temp 98.1  F (36.7  C) (Oral)   Resp 16   Ht 1.6 m (5' 3\")   Wt 122 kg (269 lb)   SpO2 96%   Breastfeeding No   BMI 47.65 kg/m    Body mass index is 47.65 kg/m .  Physical Exam   GENERAL: healthy, alert and no distress  RESP: lungs clear to auscultation - no rales, rhonchi or wheezes  CV: regular rate and rhythm, normal S1 S2, no S3 or S4, no murmur, click or rub, no peripheral edema and peripheral pulses strong  ABDOMEN: soft, nontender, no hepatosplenomegaly, no masses and bowel sounds normal  MS: no gross musculoskeletal defects noted, no edema  PSYCH: mentation appears normal, affect normal/bright  Diabetic foot exam: normal DP and PT pulses, no trophic changes or ulcerative lesions and normal sensory exam    Results for orders placed or performed in visit on 01/04/21   Hemoglobin A1c     Status: Abnormal   Result Value Ref Range    Hemoglobin A1C 6.4 (H) 0 - 5.6 %             Answers for HPI/ROS submitted by the patient on 1/4/2021   Chronic problems general questions HPI Form  If you checked off any problems, how difficult have these problems made it for you to do your work, take care of things at home, or get along with other people?: Not difficult at all  PHQ9 TOTAL SCORE: 6  MAURISIO 7 TOTAL SCORE: 10    "

## 2021-01-05 ASSESSMENT — ASTHMA QUESTIONNAIRES: ACT_TOTALSCORE: 19

## 2021-01-05 ASSESSMENT — PATIENT HEALTH QUESTIONNAIRE - PHQ9: SUM OF ALL RESPONSES TO PHQ QUESTIONS 1-9: 6

## 2021-01-05 ASSESSMENT — ANXIETY QUESTIONNAIRES: GAD7 TOTAL SCORE: 10

## 2021-01-26 ENCOUNTER — OFFICE VISIT (OUTPATIENT)
Dept: OPTOMETRY | Facility: CLINIC | Age: 51
End: 2021-01-26
Payer: COMMERCIAL

## 2021-01-26 DIAGNOSIS — E11.9 DIABETES MELLITUS WITHOUT OPHTHALMIC MANIFESTATIONS (H): ICD-10-CM

## 2021-01-26 DIAGNOSIS — H52.13 MYOPIA OF BOTH EYES WITH ASTIGMATISM: Primary | ICD-10-CM

## 2021-01-26 DIAGNOSIS — H52.203 MYOPIA OF BOTH EYES WITH ASTIGMATISM: Primary | ICD-10-CM

## 2021-01-26 DIAGNOSIS — H52.4 PRESBYOPIA: ICD-10-CM

## 2021-01-26 PROCEDURE — 92015 DETERMINE REFRACTIVE STATE: CPT | Performed by: OPTOMETRIST

## 2021-01-26 PROCEDURE — 92004 COMPRE OPH EXAM NEW PT 1/>: CPT | Performed by: OPTOMETRIST

## 2021-01-26 ASSESSMENT — REFRACTION_WEARINGRX
OS_AXIS: 165
OS_CYLINDER: +1.00
OD_CYLINDER: +1.50
SPECS_TYPE: SVL
OS_SPHERE: -2.25
OD_SPHERE: -2.50
OD_AXIS: 030

## 2021-01-26 ASSESSMENT — SLIT LAMP EXAM - LIDS
COMMENTS: NORMAL
COMMENTS: NORMAL

## 2021-01-26 ASSESSMENT — VISUAL ACUITY
OD_CC+: -1
OS_CC: 20/80
CORRECTION_TYPE: GLASSES
OS_SC: 20/200
OS_CC: 20/30
METHOD: SNELLEN - LINEAR
OD_SC: 20/300
OD_CC: 20/30
OD_CC: 20/120

## 2021-01-26 ASSESSMENT — REFRACTION_MANIFEST
OD_CYLINDER: +1.50
OS_SPHERE: -1.75
OS_AXIS: 167
OD_AXIS: 022
OS_CYLINDER: +1.00
OD_ADD: +2.00
OS_AXIS: 166
OS_CYLINDER: +1.00
OS_SPHERE: -1.75
OD_AXIS: 015
OD_CYLINDER: +1.25
METHOD_AUTOREFRACTION: 1
OD_SPHERE: -1.75
OS_ADD: +2.00
OD_SPHERE: -2.25

## 2021-01-26 ASSESSMENT — CONF VISUAL FIELD
METHOD: COUNTING FINGERS
OS_NORMAL: 1
OD_NORMAL: 1

## 2021-01-26 ASSESSMENT — EXTERNAL EXAM - RIGHT EYE: OD_EXAM: NORMAL

## 2021-01-26 ASSESSMENT — TONOMETRY: IOP_METHOD: BOTH EYES NORMAL BY PALPATION

## 2021-01-26 ASSESSMENT — CUP TO DISC RATIO
OD_RATIO: 0.1
OS_RATIO: 0.1

## 2021-01-26 ASSESSMENT — EXTERNAL EXAM - LEFT EYE: OS_EXAM: NORMAL

## 2021-01-26 NOTE — LETTER
1/26/2021         RE: Hannah Meade  [address]  [place] [address]        Dear Colleague,    Thank you for referring your patient, Hannah Meade, to the [facility]. Please see a copy of my visit note below.    Chief Complaint   Patient presents with     Diabetic Eye Exam     Stable vision  Diabetic  No other concerns at this time.     Pt reports she doesn't check blood sugar    Lab Results   Component Value Date    A1C 6.4 01/04/2021    A1C 6.9 10/01/2020    A1C 5.6 09/17/2018    A1C 5.4 07/31/2017       Last Eye Exam: 9 years  Dilated Previously: Yes. Signs and symptoms of dilation were discussed. Patient consents to dilation today.    What are you currently using to see?  glasses    Distance Vision Acuity:Satisfied with vision    Near Vision Acuity: Not satisfied - computer distance is a struggle.    Eye Comfort: good  Do you use eye drops? 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       OBJECTIVE: See Ophthalmology exam    ASSESSMENT:    ICD-10-CM    1. Myopia of both eyes with astigmatism  H52.13     H52.203    2. Presbyopia  H52.4    3. Diabetes mellitus without ophthalmic manifestations (H)  E11.9       PLAN:  Consider bifocal and or computer prescription   Hannah Meade aware  eye exam results will be sent to Lianna Cleveland.      Jenny Vázquez OD       Again, thank you for allowing me to participate in the care of your patient.        Sincerely,        Jenny Vázquez, OD

## 2021-01-26 NOTE — PATIENT INSTRUCTIONS
Could benefit from bifocal prescription and or computer prescription   Distance vision has become less nearsighted  Ocular health is good no diabetic complications or other    Patient Education   Diabetes weakens the blood vessels all over the body, including the eyes. Damage to the blood vessels in the eyes can cause swelling or bleeding into part of the eye (called the retina). This is called diabetic retinopathy (JERZY-[id number]-AH-puh-[id number]). If not treated, this disease can cause vision loss or blindness.   Symptoms may include blurred or distorted vision, but many people have no symptoms. It's important to see your eye doctor regularly to check for problems.   Early treatment and good control can help protect your vision. Here are the things you can do to help prevent vision loss:      1. Keep your blood sugar levels under tight control.      2. Bring high blood pressure under control.      3. No smoking.      4. Have yearly dilated eye exams.

## 2021-01-26 NOTE — PROGRESS NOTES
Chief Complaint   Patient presents with     Diabetic Eye Exam     Stable vision  Diabetic  No other concerns at this time.     Pt reports she doesn't check blood sugar    Lab Results   Component Value Date    A1C 6.4 01/04/2021    A1C 6.9 10/01/2020    A1C 5.6 09/17/2018    A1C 5.4 07/31/2017       Last Eye Exam: 9 years  Dilated Previously: Yes. Signs and symptoms of dilation were discussed. Patient consents to dilation today.    What are you currently using to see?  glasses    Distance Vision Acuity:Satisfied with vision    Near Vision Acuity: Not satisfied - computer distance is a struggle.    Eye Comfort: good  Do you use eye drops? 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       OBJECTIVE: See Ophthalmology exam    ASSESSMENT:    ICD-10-CM    1. Myopia of both eyes with astigmatism  H52.13     H52.203    2. Presbyopia  H52.4    3. Diabetes mellitus without ophthalmic manifestations (H)  E11.9       PLAN:  Consider bifocal and or computer prescription   Hannah Meade aware  eye exam results will be sent to Lianna Cleveland.      Jenny Vázquez OD

## 2021-03-14 ENCOUNTER — HEALTH MAINTENANCE LETTER (OUTPATIENT)
Age: 51
End: 2021-03-14

## 2021-04-08 ENCOUNTER — OFFICE VISIT (OUTPATIENT)
Dept: FAMILY MEDICINE | Facility: CLINIC | Age: 51
End: 2021-04-08
Payer: COMMERCIAL

## 2021-04-08 VITALS
HEART RATE: 77 BPM | OXYGEN SATURATION: 96 % | BODY MASS INDEX: 48.02 KG/M2 | WEIGHT: 271 LBS | RESPIRATION RATE: 16 BRPM | DIASTOLIC BLOOD PRESSURE: 94 MMHG | SYSTOLIC BLOOD PRESSURE: 138 MMHG | HEIGHT: 63 IN | TEMPERATURE: 98 F

## 2021-04-08 DIAGNOSIS — E66.01 MORBID OBESITY DUE TO EXCESS CALORIES (H): ICD-10-CM

## 2021-04-08 DIAGNOSIS — Z11.59 NEED FOR HEPATITIS C SCREENING TEST: ICD-10-CM

## 2021-04-08 DIAGNOSIS — F41.9 ANXIETY: ICD-10-CM

## 2021-04-08 DIAGNOSIS — I89.0 LYMPHEDEMA: ICD-10-CM

## 2021-04-08 DIAGNOSIS — Z12.31 ENCOUNTER FOR SCREENING MAMMOGRAM FOR BREAST CANCER: ICD-10-CM

## 2021-04-08 DIAGNOSIS — I10 BENIGN ESSENTIAL HYPERTENSION: ICD-10-CM

## 2021-04-08 DIAGNOSIS — J45.40 MODERATE PERSISTENT ASTHMA WITHOUT COMPLICATION: ICD-10-CM

## 2021-04-08 DIAGNOSIS — E11.65 TYPE 2 DIABETES MELLITUS WITH HYPERGLYCEMIA, WITHOUT LONG-TERM CURRENT USE OF INSULIN (H): Primary | ICD-10-CM

## 2021-04-08 LAB — HBA1C MFR BLD: 6.5 % (ref 0–5.6)

## 2021-04-08 PROCEDURE — 83036 HEMOGLOBIN GLYCOSYLATED A1C: CPT | Performed by: FAMILY MEDICINE

## 2021-04-08 PROCEDURE — 99214 OFFICE O/P EST MOD 30 MIN: CPT | Performed by: FAMILY MEDICINE

## 2021-04-08 PROCEDURE — 36415 COLL VENOUS BLD VENIPUNCTURE: CPT | Performed by: FAMILY MEDICINE

## 2021-04-08 PROCEDURE — 86803 HEPATITIS C AB TEST: CPT | Performed by: FAMILY MEDICINE

## 2021-04-08 PROCEDURE — 80048 BASIC METABOLIC PNL TOTAL CA: CPT | Performed by: FAMILY MEDICINE

## 2021-04-08 RX ORDER — LISINOPRIL 5 MG/1
5 TABLET ORAL DAILY
Qty: 90 TABLET | Refills: 1 | Status: CANCELLED | OUTPATIENT
Start: 2021-04-08

## 2021-04-08 RX ORDER — LISINOPRIL 5 MG/1
5 TABLET ORAL DAILY
Qty: 90 TABLET | Refills: 1 | Status: SHIPPED | OUTPATIENT
Start: 2021-04-08 | End: 2021-09-02 | Stop reason: SINTOL

## 2021-04-08 RX ORDER — FUROSEMIDE 20 MG
TABLET ORAL
Qty: 180 TABLET | Refills: 1 | Status: SHIPPED | OUTPATIENT
Start: 2021-04-08 | End: 2021-11-30

## 2021-04-08 RX ORDER — ESCITALOPRAM OXALATE 20 MG/1
TABLET ORAL
Qty: 90 TABLET | Refills: 1 | Status: SHIPPED | OUTPATIENT
Start: 2021-04-08 | End: 2021-10-26

## 2021-04-08 RX ORDER — MONTELUKAST SODIUM 10 MG/1
TABLET ORAL
Qty: 90 TABLET | Refills: 1 | Status: SHIPPED | OUTPATIENT
Start: 2021-04-08 | End: 2021-10-04

## 2021-04-08 ASSESSMENT — MIFFLIN-ST. JEOR: SCORE: 1804.41

## 2021-04-08 NOTE — PROGRESS NOTES
"    Assessment & Plan     Type 2 diabetes mellitus with hyperglycemia, without long-term current use of insulin (H)  - A1c stable at 6.5. will continue with diet control.   - BASIC METABOLIC PANEL  - HEMOGLOBIN A1C  - lisinopril (ZESTRIL) 5 MG tablet; Take 1 tablet (5 mg) by mouth daily    Morbid obesity due to excess calories (H)  - continue with diet and lifestyle changes. She will consider Noom as weight watchers did not work for her.     Benign essential hypertension  - lisinopril (ZESTRIL) 5 MG tablet; Take 1 tablet (5 mg) by mouth daily    Anxiety  -stable   - escitalopram (LEXAPRO) 20 MG tablet; TAKE 1 TABLET BY MOUTH ONE TIME DAILY    Moderate persistent asthma without complication  - stable   - montelukast (SINGULAIR) 10 MG tablet; TAKE 1 TABLET BY MOUTH EVERYDAY AT BEDTIME    Need for hepatitis C screening test  - Hepatitis C Screen Reflex to HCV RNA Quant and Genotype    Lymphedema  - furosemide (LASIX) 20 MG tablet; TAKE 2 TABLETS BY MOUTH DAILY    Encounter for screening mammogram for breast cancer  - MA Screening Digital Bilateral; Future             BMI:   Estimated body mass index is 48.39 kg/m  as calculated from the following:    Height as of this encounter: 1.594 m (5' 2.75\").    Weight as of this encounter: 122.9 kg (271 lb).   Weight management plan: Discussed healthy diet and exercise guidelines    See Patient Instructions    Return in about 3 months (around 7/8/2021) for diabetes, , in person.    Lianna Cleveland MD  [facility] LATONYA Young is a 51 year old who presents for the following health issues     History of Present Illness       Back Pain:  She presents for follow up of back pain. Patient's back pain is a recurring problem.  Location of back pain:  Right lower back, left lower back, right middle of back and left middle of back  Description of back pain: sharp, shooting and stabbing  Back pain spreads: nowhere    Since " "patient first noticed back pain, pain is: gradually worsening  Does back pain interfere with her job:  Yes      Diabetes:   She presents for follow up of diabetes.  She is not checking blood glucose. She is concerned about other. She is having burning in feet and redness, sores, or blisters on feet.         She eats 2-3 servings of fruits and vegetables daily.She consumes 1 sweetened beverage(s) daily.She exercises with enough effort to increase her heart rate 10 to 19 minutes per day.  She exercises with enough effort to increase her heart rate 3 or less days per week.   She is taking medications regularly.     Wt Readings from Last 4 Encounters:   04/08/21 122.9 kg (271 lb)   01/04/21 122 kg (269 lb)   11/13/20 122.5 kg (270 lb)   10/13/20 122.9 kg (271 lb)             Review of Systems   Constitutional, HEENT, cardiovascular, pulmonary, GI, , musculoskeletal, neuro, skin, endocrine and psych systems are negative, except as otherwise noted.      Objective    BP (!) 138/94   Pulse 77   Temp 98  F (36.7  C) (Oral)   Resp 16   Ht 1.594 m (5' 2.75\")   Wt 122.9 kg (271 lb)   SpO2 96%   BMI 48.39 kg/m    Body mass index is 48.39 kg/m .  Physical Exam   GENERAL: healthy, alert and no distress  EYES: Eyes grossly normal to inspection  RESP: lungs clear to auscultation - no rales, rhonchi or wheezes  CV: regular rate and rhythm, normal S1 S2, no S3 or S4, no murmur, click or rub  MS: no gross musculoskeletal defects noted, no edema  PSYCH: mentation appears normal, affect normal/bright    Results for orders placed or performed in visit on 04/08/21   HEMOGLOBIN A1C     Status: Abnormal   Result Value Ref Range    Hemoglobin A1C 6.5 (H) 0 - 5.6 %               "

## 2021-04-09 LAB
ANION GAP SERPL CALCULATED.3IONS-SCNC: 2 MMOL/L (ref 3–14)
BUN SERPL-MCNC: 17 MG/DL (ref 7–30)
CALCIUM SERPL-MCNC: 9.4 MG/DL (ref 8.5–10.1)
CHLORIDE SERPL-SCNC: 106 MMOL/L (ref 94–109)
CO2 SERPL-SCNC: 32 MMOL/L (ref 20–32)
CREAT SERPL-MCNC: 0.87 MG/DL (ref 0.52–1.04)
GFR SERPL CREATININE-BSD FRML MDRD: 77 ML/MIN/{1.73_M2}
GLUCOSE SERPL-MCNC: 105 MG/DL (ref 70–99)
HCV AB SERPL QL IA: NONREACTIVE
POTASSIUM SERPL-SCNC: 4.2 MMOL/L (ref 3.4–5.3)
SODIUM SERPL-SCNC: 140 MMOL/L (ref 133–144)

## 2021-04-09 ASSESSMENT — ANXIETY QUESTIONNAIRES
IF YOU CHECKED OFF ANY PROBLEMS ON THIS QUESTIONNAIRE, HOW DIFFICULT HAVE THESE PROBLEMS MADE IT FOR YOU TO DO YOUR WORK, TAKE CARE OF THINGS AT HOME, OR GET ALONG WITH OTHER PEOPLE: NOT DIFFICULT AT ALL
3. WORRYING TOO MUCH ABOUT DIFFERENT THINGS: NEARLY EVERY DAY
GAD7 TOTAL SCORE: 13
2. NOT BEING ABLE TO STOP OR CONTROL WORRYING: NEARLY EVERY DAY
5. BEING SO RESTLESS THAT IT IS HARD TO SIT STILL: SEVERAL DAYS
1. FEELING NERVOUS, ANXIOUS, OR ON EDGE: MORE THAN HALF THE DAYS
7. FEELING AFRAID AS IF SOMETHING AWFUL MIGHT HAPPEN: SEVERAL DAYS
6. BECOMING EASILY ANNOYED OR IRRITABLE: SEVERAL DAYS

## 2021-04-09 ASSESSMENT — PATIENT HEALTH QUESTIONNAIRE - PHQ9
5. POOR APPETITE OR OVEREATING: MORE THAN HALF THE DAYS
SUM OF ALL RESPONSES TO PHQ QUESTIONS 1-9: 12

## 2021-04-10 ASSESSMENT — ANXIETY QUESTIONNAIRES: GAD7 TOTAL SCORE: 13

## 2021-04-10 ASSESSMENT — ASTHMA QUESTIONNAIRES: ACT_TOTALSCORE: 19

## 2021-04-12 ENCOUNTER — ANCILLARY PROCEDURE (OUTPATIENT)
Dept: MAMMOGRAPHY | Facility: CLINIC | Age: 51
End: 2021-04-12
Attending: FAMILY MEDICINE
Payer: COMMERCIAL

## 2021-04-12 DIAGNOSIS — Z12.31 ENCOUNTER FOR SCREENING MAMMOGRAM FOR BREAST CANCER: ICD-10-CM

## 2021-04-12 PROCEDURE — 77067 SCR MAMMO BI INCL CAD: CPT | Mod: TC | Performed by: RADIOLOGY

## 2021-06-17 ENCOUNTER — PATIENT OUTREACH (OUTPATIENT)
Dept: FAMILY MEDICINE | Facility: CLINIC | Age: 51
End: 2021-06-17

## 2021-06-17 NOTE — TELEPHONE ENCOUNTER
Gissell reminder sent to schedule F2F visit ~7/8/21 with PCP for 3 mos diabetes f/up.  Celestino Kwong RN - Patient Advocate and Liaison (PAL)  [facility]   [phone]

## 2021-07-01 ASSESSMENT — PATIENT HEALTH QUESTIONNAIRE - PHQ9
SUM OF ALL RESPONSES TO PHQ QUESTIONS 1-9: 10
10. IF YOU CHECKED OFF ANY PROBLEMS, HOW DIFFICULT HAVE THESE PROBLEMS MADE IT FOR YOU TO DO YOUR WORK, TAKE CARE OF THINGS AT HOME, OR GET ALONG WITH OTHER PEOPLE: SOMEWHAT DIFFICULT
SUM OF ALL RESPONSES TO PHQ QUESTIONS 1-9: 10

## 2021-07-02 ASSESSMENT — PATIENT HEALTH QUESTIONNAIRE - PHQ9: SUM OF ALL RESPONSES TO PHQ QUESTIONS 1-9: 10

## 2021-07-02 NOTE — PROGRESS NOTES
Pre-Visit Planning   Next 5 appointments (look out 90 days)    Jul 08, 2021  9:30 AM  (Arrive by 9:10 AM)  Office Visit with Lianna Cleveland MD  [facility] ([facility] ) [id number] [place] [id number] [phone]        Appointment Notes for this encounter:   Diabetic check up and stomach issues - Scheduled via MyChart, not on the phone.    Questionnaires Reviewed/Assigned  No additional questionnaires are needed        Patient preferred phone number: [phone]    Patient contact not needed.   Celestino Kwong, RN

## 2021-07-08 ENCOUNTER — OFFICE VISIT (OUTPATIENT)
Dept: FAMILY MEDICINE | Facility: CLINIC | Age: 51
End: 2021-07-08
Payer: COMMERCIAL

## 2021-07-08 VITALS
BODY MASS INDEX: 48.44 KG/M2 | HEIGHT: 63 IN | WEIGHT: 273.4 LBS | TEMPERATURE: 98 F | OXYGEN SATURATION: 96 % | SYSTOLIC BLOOD PRESSURE: 133 MMHG | DIASTOLIC BLOOD PRESSURE: 86 MMHG | HEART RATE: 85 BPM

## 2021-07-08 DIAGNOSIS — E66.01 MORBID OBESITY WITH BODY MASS INDEX (BMI) OF 45.0 TO 49.9 IN ADULT (H): ICD-10-CM

## 2021-07-08 DIAGNOSIS — K43.9 VENTRAL HERNIA WITHOUT OBSTRUCTION OR GANGRENE: ICD-10-CM

## 2021-07-08 DIAGNOSIS — R11.2 NAUSEA AND VOMITING, INTRACTABILITY OF VOMITING NOT SPECIFIED, UNSPECIFIED VOMITING TYPE: ICD-10-CM

## 2021-07-08 DIAGNOSIS — E11.9 TYPE 2 DIABETES MELLITUS WITHOUT COMPLICATION, WITHOUT LONG-TERM CURRENT USE OF INSULIN (H): Primary | ICD-10-CM

## 2021-07-08 DIAGNOSIS — R10.84 ABDOMINAL PAIN, GENERALIZED: ICD-10-CM

## 2021-07-08 DIAGNOSIS — F32.0 MILD MAJOR DEPRESSION (H): ICD-10-CM

## 2021-07-08 LAB
BASOPHILS # BLD AUTO: 0 10E9/L (ref 0–0.2)
BASOPHILS NFR BLD AUTO: 0.3 %
DIFFERENTIAL METHOD BLD: NORMAL
EOSINOPHIL # BLD AUTO: 0.2 10E9/L (ref 0–0.7)
EOSINOPHIL NFR BLD AUTO: 2.4 %
ERYTHROCYTE [DISTWIDTH] IN BLOOD BY AUTOMATED COUNT: 13.8 % (ref 10–15)
HBA1C MFR BLD: 6.6 % (ref 0–5.6)
HCT VFR BLD AUTO: 42.7 % (ref 35–47)
HGB BLD-MCNC: 13.7 G/DL (ref 11.7–15.7)
LYMPHOCYTES # BLD AUTO: 3 10E9/L (ref 0.8–5.3)
LYMPHOCYTES NFR BLD AUTO: 29.8 %
MCH RBC QN AUTO: 29.5 PG (ref 26.5–33)
MCHC RBC AUTO-ENTMCNC: 32.1 G/DL (ref 31.5–36.5)
MCV RBC AUTO: 92 FL (ref 78–100)
MONOCYTES # BLD AUTO: 0.7 10E9/L (ref 0–1.3)
MONOCYTES NFR BLD AUTO: 6.6 %
NEUTROPHILS # BLD AUTO: 6 10E9/L (ref 1.6–8.3)
NEUTROPHILS NFR BLD AUTO: 60.9 %
PLATELET # BLD AUTO: 327 10E9/L (ref 150–450)
RBC # BLD AUTO: 4.65 10E12/L (ref 3.8–5.2)
WBC # BLD AUTO: 9.9 10E9/L (ref 4–11)

## 2021-07-08 PROCEDURE — 99214 OFFICE O/P EST MOD 30 MIN: CPT | Performed by: FAMILY MEDICINE

## 2021-07-08 PROCEDURE — 83036 HEMOGLOBIN GLYCOSYLATED A1C: CPT | Performed by: FAMILY MEDICINE

## 2021-07-08 PROCEDURE — 80053 COMPREHEN METABOLIC PANEL: CPT | Performed by: FAMILY MEDICINE

## 2021-07-08 PROCEDURE — 85025 COMPLETE CBC W/AUTO DIFF WBC: CPT | Performed by: FAMILY MEDICINE

## 2021-07-08 PROCEDURE — 36415 COLL VENOUS BLD VENIPUNCTURE: CPT | Performed by: FAMILY MEDICINE

## 2021-07-08 ASSESSMENT — MIFFLIN-ST. JEOR: SCORE: 1819.26

## 2021-07-08 NOTE — PATIENT INSTRUCTIONS
[facility] Scheduling  Phone:[phone]  Toll free: [phone]    Patient Education     Hernia (Adult)    A hernia can happen when there is a weakness or defect in the wall of the abdomen or groin. Intestines or nearby tissues may move from their usual location and push through the weakness in the wall. This can cause a hernia (bulge) you may see or feel.  Causes and risk factors   A hernia may be present at birth. Or it may be caused by the wear and tear of daily living. Certain factors can make a hernia more likely. These can include:    Heavy lifting    Straining, whether from lifting, movement, or constipation    Chronic cough    Injury to the abdominal wall    Excess weight    Pregnancy    Prior surgery    Older age    Family history of hernia  Symptoms  Symptoms of a hernia may come on suddenly. Or they may appear slowly over time. Some common symptoms include:    Bulge in the groin area, around the navel, or in the scrotum (the bulge may get bigger when you stand and go away when you lie down)    Pain or pressure around the bulge    Pain during activities such as lifting, coughing, or sneezing    A feeling of weakness or pressure in the groin    Pain or swelling in the scrotum  Types of hernias  There are different types of hernia. The type you have depends on its location:    Inguinal. This type is in the groin or scrotum. It is more common in men. But, women can get this hernia, too.    Femoral. This type is in the groin, upper thigh (where the leg bends), or labia. It is more common in women.    Ventral. This type is in the abdominal wall.    Umbilical. This type occurs around the navel (belly button).    Incisional. This type occurs at the site of a previous surgery.  The condition of the hernia can help determine how urgently it needs to be treated.    Reducible. It goes back in by itself, or it can be pushed back in.    Irreducible. It can t be pushed back in.    Incarcerated/strangulated. The  intestine is trapped (incarcerated). If this happens, you won t be able to push the bulge back in. If the incarcerated hernia isn t treated, it may become strangulated. This means the area loses blood supply and the tissue may die. This requires emergency surgery. You need treatment right away.  In most cases, a hernia will not heal on its own.You may need surgery to repair the defect in the abdominal wall or groin. You ll be told more about surgery, if needed.  If your symptoms are not severe, treatment may sometimes be delayed. In such cases, you will need regular follow-up visits with the provider. You ll be asked to keep track of your symptoms and to watch for signs of more serious problems. You may also be given guidelines similar to the home care instructions below.  Home care  To help keep a hernia from getting worse, you may be advised to:    Avoid heavy lifting and straining as directed.    Take steps to prevent constipation, such as eating more fiber and drinking more water. This may help reduce straining that can occur when having a bowel movement. Reducing straining may help keep your symptoms from getting worse.    Maintain a healthy weight or lose excess weight. This can help reduce strain on abdominal muscles and tissues.    Stop smoking. This can help prevent coughing that may also strain abdominal muscles and tissues.  Follow-up care  Follow up with your healthcare provider, or as directed. If imaging tests were done, they will be reviewed a doctor. You will be told the results and any new findings that may affect your care.  When to seek medical advice  Call your healthcare provider right away if any of these occur:    Hernia hardens, swells, or grows larger    Hernia can no longer be pushed back in    Pain moves to the lower right abdomen (just below the waistline), or spreads to the back  Call 911  Call 911 if any of these occur:    Severe pain, redness, or tenderness in the area near the  hernia    Pain worsens quickly and doesn t get better    Inability to have a bowel movement or pass gas    Fever of 100.4 F (38 C) or higher, or as directed by your healthcare provider  Sayra last reviewed this educational content on 3/1/2018    3807-0237 [employer]. All rights reserved. This information is not intended as a substitute for professional medical care. Always follow your healthcare professional's instructions.

## 2021-07-08 NOTE — PROGRESS NOTES
Assessment & Plan     Type 2 diabetes mellitus without complication, without long-term current use of insulin (H)  - a1c stable. Continue with diet control.   - Hemoglobin A1c  - JUST IN CASE    Morbid obesity with body mass index (BMI) of 45.0 to 49.9 in adult (H)  - diet and lifestyle changes     Abdominal pain, generalized  - new onset abdominal   - CT Abdomen w/o & w Contrast; Future  - CBC with platelets and differential  - Comprehensive metabolic panel (BMP + Alb, Alk Phos, ALT, AST, Total. Bili, TP)    Ventral hernia without obstruction or gangrene  - CT Abdomen w/o & w Contrast; Future    Nausea and vomiting, intractability of vomiting not specified, unspecified vomiting type   - related to above    Mild major depression (H)  -stable. Continue current regimen              See Patient Instructions    Return in about 3 months (around 10/8/2021) for medication recheck, in person, , diabetes.    Lianna Cleveland MD  [facility] LATONYA Young is a 51 year old who presents for the following health issues     History of Present Illness       Diabetes:   She presents for follow up of diabetes.  She is not checking blood glucose. She has no concerns regarding her diabetes at this time.  She is having burning in feet and weight gain.         She eats 2-3 servings of fruits and vegetables daily.She consumes 2 sweetened beverage(s) daily.She exercises with enough effort to increase her heart rate 10 to 19 minutes per day.  She exercises with enough effort to increase her heart rate 4 days per week. She is missing 1 dose(s) of medications per week.  She is not taking prescribed medications regularly due to remembering to take.       Abdominal/Flank Pain  Onset/Duration: started a couple weeks   Description:   Character: lump, hurts to touch  Location: middle region  Radiation: None  Intensity: moderate, severe  Progression of Symptoms:  same  Accompanying  "Signs & Symptoms:  Fever/Chills: no  Gas/Bloating: no  Nausea: YES  Vomitting: YES- clear mucous  Diarrhea: no  Constipation: no  Dysuria or Hematuria: no  History:   Trauma: no  Previous similar pain: no  Previous tests done: none  Precipitating factors:   Does the pain change with:     Food: YES    Bowel Movement: no    Urination: no   Other factors:  no  Therapies tried and outcome: None    Per patient she has nausea and some vomiting each time she eats regardless of what kind of food she eats. She states her pain is not associated with a specific aggravating event.     Wt Readings from Last 4 Encounters:   07/08/21 124 kg (273 lb 6.4 oz)   04/08/21 122.9 kg (271 lb)   01/04/21 122 kg (269 lb)   11/13/20 122.5 kg (270 lb)         Review of Systems   Constitutional, HEENT, cardiovascular, pulmonary, GI, , musculoskeletal, neuro, skin, endocrine and psych systems are negative, except as otherwise noted.      Objective    /86 (BP Location: Right arm, Patient Position: Sitting, Cuff Size: Adult Large)   Pulse 85   Temp 98  F (36.7  C) (Oral)   Ht 1.6 m (5' 3\")   Wt 124 kg (273 lb 6.4 oz)   SpO2 96%   BMI 48.43 kg/m    Body mass index is 48.43 kg/m .  Physical Exam   GENERAL: healthy, alert and no distress  RESP: lungs clear to auscultation - no rales, rhonchi or wheezes  CV: regular rate and rhythm, normal S1 S2, no S3 or S4, no murmur, click or rub, no peripheral edema and peripheral pulses strong  ABDOMEN: tenderness epigastric and bowel sounds normal, temitope/palpable ventral hernia, small periumbilical hernia noted- easily reducible.   MS: no gross musculoskeletal defects noted, no edema  PSYCH: mentation appears normal, affect normal/bright    Results for orders placed or performed in visit on 07/08/21   Hemoglobin A1c     Status: Abnormal   Result Value Ref Range    Hemoglobin A1C 6.6 (H) 0 - 5.6 %   CBC with platelets and differential     Status: None   Result Value Ref Range    WBC 9.9 4.0 - 11.0 " 10e9/L    RBC Count 4.65 3.8 - 5.2 10e12/L    Hemoglobin 13.7 11.7 - 15.7 g/dL    Hematocrit 42.7 35.0 - 47.0 %    MCV 92 78 - 100 fl    MCH 29.5 26.5 - 33.0 pg    MCHC 32.1 31.5 - 36.5 g/dL    RDW 13.8 10.0 - 15.0 %    Platelet Count 327 150 - 450 10e9/L    % Neutrophils 60.9 %    % Lymphocytes 29.8 %    % Monocytes 6.6 %    % Eosinophils 2.4 %    % Basophils 0.3 %    Absolute Neutrophil 6.0 1.6 - 8.3 10e9/L    Absolute Lymphocytes 3.0 0.8 - 5.3 10e9/L    Absolute Monocytes 0.7 0.0 - 1.3 10e9/L    Absolute Eosinophils 0.2 0.0 - 0.7 10e9/L    Absolute Basophils 0.0 0.0 - 0.2 10e9/L    Diff Method Automated Method                Answers for HPI/ROS submitted by the patient on 7/1/2021   Chronic problems general questions HPI Form  If you checked off any problems, how difficult have these problems made it for you to do your work, take care of things at home, or get along with other people?: Somewhat difficult  PHQ9 TOTAL SCORE: 10

## 2021-07-09 LAB
ALBUMIN SERPL-MCNC: 3.9 G/DL (ref 3.4–5)
ALP SERPL-CCNC: 108 U/L (ref 40–150)
ALT SERPL W P-5'-P-CCNC: 114 U/L (ref 0–50)
ANION GAP SERPL CALCULATED.3IONS-SCNC: 3 MMOL/L (ref 3–14)
AST SERPL W P-5'-P-CCNC: 73 U/L (ref 0–45)
BILIRUB SERPL-MCNC: 0.8 MG/DL (ref 0.2–1.3)
BUN SERPL-MCNC: 14 MG/DL (ref 7–30)
CALCIUM SERPL-MCNC: 9.1 MG/DL (ref 8.5–10.1)
CHLORIDE SERPL-SCNC: 104 MMOL/L (ref 94–109)
CO2 SERPL-SCNC: 29 MMOL/L (ref 20–32)
CREAT SERPL-MCNC: 0.85 MG/DL (ref 0.52–1.04)
GFR SERPL CREATININE-BSD FRML MDRD: 79 ML/MIN/{1.73_M2}
GLUCOSE SERPL-MCNC: 146 MG/DL (ref 70–99)
POTASSIUM SERPL-SCNC: 4.5 MMOL/L (ref 3.4–5.3)
PROT SERPL-MCNC: 7.3 G/DL (ref 6.8–8.8)
SODIUM SERPL-SCNC: 136 MMOL/L (ref 133–144)

## 2021-07-13 ENCOUNTER — HOSPITAL ENCOUNTER (OUTPATIENT)
Dept: CT IMAGING | Facility: CLINIC | Age: 51
Discharge: HOME | End: 2021-07-13
Attending: FAMILY MEDICINE | Admitting: FAMILY MEDICINE
Payer: COMMERCIAL

## 2021-07-13 DIAGNOSIS — R10.84 ABDOMINAL PAIN, GENERALIZED: Primary | ICD-10-CM

## 2021-07-13 DIAGNOSIS — R10.84 ABDOMINAL PAIN, GENERALIZED: ICD-10-CM

## 2021-07-13 DIAGNOSIS — K43.9 VENTRAL HERNIA WITHOUT OBSTRUCTION OR GANGRENE: ICD-10-CM

## 2021-07-13 DIAGNOSIS — K46.9 ABDOMINAL HERNIA WITHOUT OBSTRUCTION AND WITHOUT GANGRENE, RECURRENCE NOT SPECIFIED, UNSPECIFIED HERNIA TYPE: ICD-10-CM

## 2021-07-13 PROCEDURE — 250N000011 HC RX IP 250 OP 636: Performed by: FAMILY MEDICINE

## 2021-07-13 PROCEDURE — 74177 CT ABD & PELVIS W/CONTRAST: CPT

## 2021-07-13 PROCEDURE — 250N000009 HC RX 250: Performed by: FAMILY MEDICINE

## 2021-07-13 RX ORDER — IOPAMIDOL 755 MG/ML
500 INJECTION, SOLUTION INTRAVASCULAR ONCE
Status: COMPLETED | OUTPATIENT
Start: 2021-07-13 | End: 2021-07-13

## 2021-07-13 RX ADMIN — SODIUM CHLORIDE 55 ML: 9 INJECTION, SOLUTION INTRAVENOUS at 15:33

## 2021-07-13 RX ADMIN — IOPAMIDOL 100 ML: 755 INJECTION, SOLUTION INTRAVENOUS at 15:32

## 2021-07-19 ENCOUNTER — OFFICE VISIT (OUTPATIENT)
Dept: SURGERY | Facility: CLINIC | Age: 51
End: 2021-07-19
Payer: COMMERCIAL

## 2021-07-19 VITALS
WEIGHT: 271 LBS | OXYGEN SATURATION: 99 % | DIASTOLIC BLOOD PRESSURE: 84 MMHG | HEIGHT: 63 IN | SYSTOLIC BLOOD PRESSURE: 130 MMHG | HEART RATE: 92 BPM | RESPIRATION RATE: 16 BRPM | BODY MASS INDEX: 48.02 KG/M2

## 2021-07-19 DIAGNOSIS — Z11.59 ENCOUNTER FOR SCREENING FOR OTHER VIRAL DISEASES: ICD-10-CM

## 2021-07-19 DIAGNOSIS — K43.2 INCISIONAL HERNIA, WITHOUT OBSTRUCTION OR GANGRENE: Primary | ICD-10-CM

## 2021-07-19 PROCEDURE — 99203 OFFICE O/P NEW LOW 30 MIN: CPT | Performed by: SURGERY

## 2021-07-19 ASSESSMENT — MIFFLIN-ST. JEOR: SCORE: 1808.38

## 2021-07-19 NOTE — LETTER
2021    RE: Hannah Meade, : 1970      Hannah is a 51 year old White female who presents for hernia evaluation. The patient has noticed a bulge. Pain has been present.  Symptoms began 3 weeks ago.  Symptoms are described as aching and pressure  located in the periumbilical region and in the right upper quadrant.  Associated with this  is nausea and vomiting.  Pt has had previous ABD surgery including multiple GYN surgeries including .  Patient does report that increased activity/lifting causes pain. Employment does not require lifting.     Constipation No  Dysuria No  Cough yes, asthma  Smoking No  Diabetes No     Pt's chart has been reviewed for FH,  PMH, PSH, allergies, medications and social history.     ROS:  Pulm:  No shortness of breath, dyspnea on exertion, cough, or hemoptysis  CV:  negative  ABD:  See chief complaint  :  Negative  All other systems negative     Physical exam:   Body mass index is 48.01 kg/m .   Patient able to get up on table with mild difficulty.  Head eyes, nose and mouth within normal limits.  Skin - no jaundice  Sclera are clear  No supraclavicular or cervical adenopathy noted.  Neck shows no gross mass  Neurologic: alert, speech is clear, moves all extremities with good strength  Psychiatric: Mood and affect are appropriate  Respirations are regular and non labored  Abdomen is abdomen is soft without significant tenderness, masses, organomegaly or guarding  bowel sounds are positive and no caput medusa noted.  Hernia is present at the umbilicus and a second hernia is present cephalad to this     Imaging  CT Yes, there is a hernia at the umbilicus and a second hernia cephalad to this in the midline, scans were reviewed with the patient      Assesment: upper ventral wall hernia and umbilical hernia     Plan: Discussed observation, external support, possible progression, incarceration and strangulation signs and symptoms and need for immediate treatment if they  develop.  Discussed surgery in detail, including risk, benefits, complications, incision/cosmetics, mesh, infection (possibly requiring removal of the mesh), chronic pain, involvement of inta-abdominal organs, lifting and activity limits after surgery.  She understands that ideally, weight loss would be achieved prior to surgery however, with her persistent significant symptoms, she wishes to proceed immediately with surgery.  We also discussed her right upper quadrant pain and the possibility of gallstones (not seen on CT).  I offered right upper quadrant ultrasound to evaluate for this however she declines.  Gave literature to review. Will schedule surgery in near future.          Rhys Lyons MD

## 2021-07-19 NOTE — PROGRESS NOTES
Hannah is a 51 year old White female who presents for hernia evaluation. The patient has noticed a bulge. Pain has been present.  Symptoms began 3 weeks ago.  Symptoms are described as aching and pressure  located in the periumbilical region and in the right upper quadrant.  Associated with this  is nausea and vomiting.  Pt has had previous ABD surgery including multiple GYN surgeries including .  Patient does report that increased activity/lifting causes pain. Employment does not require lifting.    Constipation No  Dysuria No  Cough yes, asthma  Smoking No  Diabetes No    Pt's chart has been reviewed for FH,  PMH, PSH, allergies, medications and social history.    ROS:  Pulm:  No shortness of breath, dyspnea on exertion, cough, or hemoptysis  CV:  negative  ABD:  See chief complaint  :  Negative  All other systems negative    Physical exam:   Body mass index is 48.01 kg/m .   Patient able to get up on table with mild difficulty.  Head eyes, nose and mouth within normal limits.  Skin - no jaundice  Sclera are clear  No supraclavicular or cervical adenopathy noted.  Neck shows no gross mass  Neurologic: alert, speech is clear, moves all extremities with good strength  Psychiatric: Mood and affect are appropriate  Respirations are regular and non labored  Abdomen is abdomen is soft without significant tenderness, masses, organomegaly or guarding  bowel sounds are positive and no caput medusa noted.  Hernia is present at the umbilicus and a second hernia is present cephalad to this    Imaging  CT Yes, there is a hernia at the umbilicus and a second hernia cephalad to this in the midline, scans were reviewed with the patient      Assesment: upper ventral wall hernia and umbilical hernia    Plan: Discussed observation, external support, possible progression, incarceration and strangulation signs and symptoms and need for immediate treatment if they develop.  Discussed surgery in detail, including risk,  benefits, complications, incision/cosmetics, mesh, infection (possibly requiring removal of the mesh), chronic pain, involvement of inta-abdominal organs, lifting and activity limits after surgery.  She understands that ideally, weight loss would be achieved prior to surgery however, with her persistent significant symptoms, she wishes to proceed immediately with surgery.  We also discussed her right upper quadrant pain and the possibility of gallstones (not seen on CT).  I offered right upper quadrant ultrasound to evaluate for this however she declines.  Gave literature to review. Will schedule surgery in near future.    30   minutes spent on the date of the encounter in chart review, review of test results, patient visit, physical exam, education, counseling, development of care plan and documentation.    Rhys Lyons MD  7/19/2021 11:42 AM    Please route or send letter to:  Primary Care Provider (PCP) and Include Progress Note    Level 4

## 2021-07-20 ENCOUNTER — TELEPHONE (OUTPATIENT)
Dept: SURGERY | Facility: CLINIC | Age: 51
End: 2021-07-20

## 2021-07-20 NOTE — TELEPHONE ENCOUNTER
Type of surgery: OPEN REPAIR VENTRAL HERNIA WITH MESH      Location of surgery: [facility]  Date and time of surgery: 8/19/2021 @ 9:55 am   Surgeon: CHEPE MEJIA MD    Pre-Op Appt Date: PATIENT TO SCHEDULE    Post-Op Appt Date: PATIENT TO SCHEDULE     Packet sent out: Yes  Pre-cert/Authorization completed:  Not Applicable  Date: 7/19/2021       OPEN REPAIR VENTRAL HERNIA WITH MESH    GENERAL PT INST TO HAVE H&P WITH DR JOSE 90 MIN REQ PA ASSIST DJM NMS  NMS

## 2021-07-31 ENCOUNTER — HOSPITAL ENCOUNTER (EMERGENCY)
Facility: CLINIC | Age: 51
Discharge: HOME | End: 2021-07-31
Attending: PHYSICIAN ASSISTANT | Admitting: PHYSICIAN ASSISTANT
Payer: COMMERCIAL

## 2021-07-31 ENCOUNTER — APPOINTMENT (OUTPATIENT)
Dept: GENERAL RADIOLOGY | Facility: CLINIC | Age: 51
End: 2021-07-31
Attending: PHYSICIAN ASSISTANT
Payer: COMMERCIAL

## 2021-07-31 VITALS
SYSTOLIC BLOOD PRESSURE: 125 MMHG | HEART RATE: 75 BPM | RESPIRATION RATE: 18 BRPM | DIASTOLIC BLOOD PRESSURE: 75 MMHG | TEMPERATURE: 97.6 F | OXYGEN SATURATION: 94 %

## 2021-07-31 DIAGNOSIS — S90.30XA CONTUSION OF DORSUM OF FOOT: ICD-10-CM
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
## 2021-07-31 DIAGNOSIS — S61.211A LACERATION OF LEFT INDEX FINGER WITHOUT FOREIGN BODY WITHOUT DAMAGE TO NAIL, INITIAL ENCOUNTER: ICD-10-CM

## 2021-07-31 PROCEDURE — 73630 X-RAY EXAM OF FOOT: CPT | Mod: LT

## 2021-07-31 PROCEDURE — 73610 X-RAY EXAM OF ANKLE: CPT | Mod: LT

## 2021-07-31 PROCEDURE — 12001 RPR S/N/AX/GEN/TRNK 2.5CM/<: CPT

## 2021-07-31 PROCEDURE — 99285 EMERGENCY DEPT VISIT HI MDM: CPT | Mod: 25

## 2021-07-31 PROCEDURE — 73130 X-RAY EXAM OF HAND: CPT | Mod: LT

## 2021-07-31 RX ORDER — LIDOCAINE HYDROCHLORIDE 10 MG/ML
INJECTION, SOLUTION INFILTRATION; PERINEURAL
Status: DISCONTINUED
Start: 2021-07-31 | End: 2021-07-31 | Stop reason: HOSPADM

## 2021-07-31 ASSESSMENT — ENCOUNTER SYMPTOMS
HEADACHES: 0
MYALGIAS: 1
NECK PAIN: 0
SHORTNESS OF BREATH: 0
ABDOMINAL PAIN: 0
BACK PAIN: 0
NAUSEA: 0
VOMITING: 0

## 2021-07-31 NOTE — ED PROVIDER NOTES
History   Chief Complaint:  Fall       HPI   Hannah Meade is a 51 year old female with history of diabetes mellitus type II and asthma who presents for evaluation after a fall. Approximately one hour ago, the patient reports that she experienced a mechanical fall when she tripped while walking out of her garage, injuring her left foot and landing on her left hand. She reports an immediate onset of left foot and hand pain and that she sustained a laceration to her left hand from the concrete as well. She reports that she took two Aleve for her pain and states that she was able to ambulate with assistance before presenting to the emergency department. She denies any head trauma or syncope. She denies any nausea, vomiting, abdominal pain, neck pain, back pain, chest pain, shortness of breath, or any other symptoms. Of note, her tetanus was last updated in 2019.    Review of Systems   Respiratory: Negative for shortness of breath.    Cardiovascular: Negative for chest pain.   Gastrointestinal: Negative for abdominal pain, nausea and vomiting.   Musculoskeletal: Positive for myalgias. Negative for back pain and neck pain.   Neurological: Negative for syncope and headaches.   All other systems reviewed and are negative.    Allergies:  Ampicillin  Codeine  Oxymetazoline  Sulfa Drugs    Medications:  Albuterol  Lipitor  Lexapro  Lasix  Zestril  Singulair    Past Medical History:    Anxiety  Depression  Obesity   Diabetes mellitus type II    JOSE J  Plantar fasciitis  Incisional hernia  Asthma   Multiple lipomas  Ovarian cyst  Insomnia     Past Surgical History:    Excise mass upper extremity  Lung surgery  Thoracotomy  Hysterectomy  Excise mass back     Family History:    Parkinson's - mother  Diabetes mellitus - mother  Hypertension - father, sister  Gallbladder disease - father    Social History:  PCP: Lianna Cleveland  Presents to the ED with her .    Physical Exam     Patient Vitals for the past 24  hrs:   BP Temp Pulse Resp SpO2   07/31/21 1655 (!) 142/88 97.6  F (36.4  C) 88 18 95 %       Physical Exam  General: Alert and interactive. Appears well.   Head: Atraumatic, without obvious lesion, abrasion, hematoma.   Eyes: The pupils are equal and round. No scleral icterus.   ENT: No obvious abnormalities to the ears or nose. Mucous membranes moist.   Neck:Trachea is in the midline. No obvious swelling to the neck. Full range of motion.   CV: Regular rate. Extremities well perfused. Capillary refill brisk in fingers. Radial pulses normal.   Resp: Non-labored, no retractions or accessory muscle use.     GI: Abdomen is not distended.   MS: Moving all extremities well. Swelling to the dorsum of the left foot with minimal TTP, but patient does have pain with dorsiflexion and plantarflexion. There is tenderness to the dorsum of the left hand, near the second MC and thumb. No tenderness to the wrist itself.   Skin: 1.5 cm laceration to the PIP joint of the left index finger.   Neuro: Alert and oriented x 3. Non-focal examination.    Psych: Awake. Alert. Normal affect. Appropriate interactions.    Emergency Department Course     Imaging:    XR Hand 3 views, Left:   Soft tissue swelling over the dorsum of the hand. No radiopaque foreign body. No acute fracture. Normal joint alignment. Minimal degenerative arthritic changes at the base of the thumb in the first CMC and STT joints. Chronic tiny rounded ossifications along the dorsal aspect of the proximal carpal row, and adjacent to the trapezium, unrelated to the acute injury. As per radiology.     XR Foot 3 views, Left:   Moderate soft tissue swelling throughout the foot. No fracture visualized. Normal joint alignment with maintained joint spacing. Tiny plantar and Achilles enthesophyte incidentally noted. As per radiology.     XR Ankle 3 views, Left:   Moderate soft tissue swelling throughout the ankle and foot. Negative for acute fracture. Rounded, chronic appearing  tiny 3 mm ossification along the anterior aspect of the tibial plafond. Normal joint alignment with maintained joint spacing. Tiny Achilles and plantar calcaneal enthesophyte. As per radiology.     Procedures    Laceration Repair        LACERATION:  A simple clean 1.5 cm laceration.      LOCATION:  PIP joint of the left index finger      FUNCTION:  Distally sensation, circulation, motor and tendon function are intact.      ANESTHESIA:  Local using 1% Lidocaine, total of 4 mLs      PREPARATION:  Irrigation with Normal Saline and Shur Clens      DEBRIDEMENT:  no debridement      CLOSURE:  Wound was closed with One Layer.  Skin closed with 3 x 5.0 Ethylon using interrupted sutures.    Emergency Department Course:    Reviewed:  I reviewed nursing notes, vitals, past medical history and care everywhere.    Assessments:  1711 I obtained history and examined the patient as noted above.     1817 I performed a laceration repair on the patient, details above.    Disposition:  The patient was discharged to home.       Impression & Plan     Medical Decision Making:  Hannah Meade is a 51 year old female who presents with left ankle and foot pain and left hand pain after a trip and fall in her garage.  She denies any other injuries to her head, neck, chest, abdomen, pelvis, or extremities. She has a small laceration to the index finger of the left hand, which was repaired as noted above. X-rays show no fracture to foot/ankle and no foreign body in hand. Upon wound exploration, no signs of deep space infection, or neurovascular injury.  Follow up in 2-3 days if concerns over healing. I dicussed the risk of infection and scarring.The patient is to follow-up for suture removal in 10-14 days. Indications for immediate return to ER/UR were reviewed and included, but are not limited to, redness, fevers, drainage, increasing pain, high fevers, or other concerns.       Covid-19  Hannah Meade was evaluated during a global COVID-19  pandemic, which necessitated consideration that the patient might be at risk for infection with the SARS-CoV-2 virus that causes COVID-19.   Applicable protocols for evaluation were followed during the patient's care.     Diagnosis:    ICD-10-CM    1. Fall, initial encounter  W19.XXXA    2. Contusion of dorsum of foot  S90.30XA    3. Laceration of left index finger without foreign body without damage to nail, initial encounter  S61.211A        Scribe Disclosure:  I, Joey Hernandez, am serving as a scribe at 5:01 PM on 7/31/2021 to document services personally performed by Aliya Apodaca PA-C based on my observations and the provider's statements to me.          Aliya Apodaca PA-C  07/31/21 1902

## 2021-07-31 NOTE — ED TRIAGE NOTES
Patient presents to the ED with left foot and hand pain following a fall. States also has a small laceration to the left 2nd finger. Reports missed the last step while walking into garage.

## 2021-08-03 ENCOUNTER — PATIENT OUTREACH (OUTPATIENT)
Dept: FAMILY MEDICINE | Facility: CLINIC | Age: 51
End: 2021-08-03

## 2021-08-03 NOTE — TELEPHONE ENCOUNTER
July 31 ER note reviewed. PAL will call patient ~8/9/21 to check if suture removal RN visit needed 10-14 days.   Celestino Kwong RN - Patient Advocate and Liaison (PAL)  [facility]   [phone]

## 2021-08-10 NOTE — TELEPHONE ENCOUNTER
Patient reports ankle pain continues, no fx but is concerned so would like to see provider for f/up and sutures removed at same visit. Scheduled 8/12.   Preop scheduled 8/13/21  Celestino Kwong RN - Patient Advocate and Liaison (PAL)

## 2021-08-10 NOTE — PROGRESS NOTES
Pre-Visit Planning   ug 13, 2021  7:15 AM  (Arrive by 6:55 AM)  Pre-Operative Physical with Price Garvey PA-C  [facility] ([facility] ) [address] [facility] [id number] [phone]     Appointment Notes for this encounter:   hernia repair DOS 8/19/21    Questionnaires Reviewed/Assigned  No additional questionnaires are needed      Patient preferred phone number: [phone]    Patient contact not needed.   [employer] message sent requesting med list review prior to visit.   Celestino Kwong RN - Patient Advocate and Liaison (PAL)  [facility]   [phone]

## 2021-08-10 NOTE — TELEPHONE ENCOUNTER
Left message to call back.   7/31/21 ER sutures. Plan for suture removal?   8/19/21 hernia surgery. Does she need a preop with PCP?   Celestino Kwong RN - Patient Advocate and Liaison (PAL)  [facility]   [phone]

## 2021-08-10 NOTE — PROGRESS NOTES
Pre-Visit Planning   Next 5 appointments (look out 90 days)    Aug 12, 2021  9:40 AM  (Arrive by 9:20 AM)  Office Visit with Price Garvey PA-C  [facility] ([facility] ) [address] [place] [id number] [phone]     Appointment Notes for this encounter:   left ankle pain-f/up ER 7/31, suture removal left finger    Questionnaires Reviewed/Assigned  No additional questionnaires are needed        Patient preferred phone number: [phone]    Patient contact not needed.    [employer] message sent requesting med list review prior to visit.   Celestino Kwong RN - Patient Advocate and Liaison (PAL)  [facility]   [phone]

## 2021-08-12 ENCOUNTER — OFFICE VISIT (OUTPATIENT)
Dept: FAMILY MEDICINE | Facility: CLINIC | Age: 51
End: 2021-08-12
Payer: COMMERCIAL

## 2021-08-12 VITALS
OXYGEN SATURATION: 98 % | TEMPERATURE: 97.6 F | WEIGHT: 273.31 LBS | BODY MASS INDEX: 48.42 KG/M2 | HEART RATE: 76 BPM | SYSTOLIC BLOOD PRESSURE: 118 MMHG | DIASTOLIC BLOOD PRESSURE: 76 MMHG

## 2021-08-12 DIAGNOSIS — Z48.02 ENCOUNTER FOR REMOVAL OF SUTURES: ICD-10-CM

## 2021-08-12 DIAGNOSIS — S93.602A SPRAIN OF LEFT FOOT, INITIAL ENCOUNTER: Primary | ICD-10-CM

## 2021-08-12 PROCEDURE — 99207 PR REMOVAL OF SUTURES: CPT | Mod: 25 | Performed by: PHYSICIAN ASSISTANT

## 2021-08-12 PROCEDURE — 99213 OFFICE O/P EST LOW 20 MIN: CPT | Performed by: PHYSICIAN ASSISTANT

## 2021-08-12 NOTE — PROGRESS NOTES
Assessment & Plan     Sprain of left foot, initial encounter    Use post-op shoe for support while healing. Continue conservative treatments. Explained expected healing times.    - Ankle/Foot Bracing Supplies Order for DME - ONLY FOR DME      Encounter for removal of sutures    3 sutures removed from finger easily. Patient tolerated procedure well.    - REMOVAL OF SUTURES             There are no Patient Instructions on file for this visit.    No follow-ups on file.    Price Garvey PA-C  [facility] JAVIER Young is a 51 year old who presents for the following health issues     HPI     ED/UC Followup:    Facility:  [facility] Emergency Dept  Date of visit: 07/31/21  Reason for visit: fall   Current Status: pt is still sore and experiencing pain/discomfort        Review of Systems   Constitutional, HEENT, cardiovascular, pulmonary, gi and gu systems are negative, except as otherwise noted.        Objective    /76 (BP Location: Right arm, Patient Position: Chair, Cuff Size: Adult Regular)   Pulse 76   Temp 97.6  F (36.4  C) (Oral)   Wt 124 kg (273 lb 5 oz)   SpO2 98%   BMI 48.42 kg/m    Body mass index is 48.42 kg/m .       Physical Exam   GENERAL: healthy, alert and no distress  EYES: Eyes grossly normal to inspection, PERRL and conjunctivae and sclerae normal  MS: no gross musculoskeletal defects noted, no edema  SKIN: no suspicious lesions or rashes  NEURO: Normal strength and tone, mentation intact and speech normal  PSYCH: mentation appears normal, affect normal/bright  Left foot: There is still mild to moderate swelling of the distal foot. Mildly tender to palpation of distal 4th metatarsal. Otherwise non-tender. ROM intact. CMS intact.      Reviewed x-rays from ER visit.

## 2021-08-13 ENCOUNTER — OFFICE VISIT (OUTPATIENT)
Dept: FAMILY MEDICINE | Facility: CLINIC | Age: 51
End: 2021-08-13
Payer: COMMERCIAL

## 2021-08-13 VITALS
TEMPERATURE: 98.5 F | HEART RATE: 84 BPM | BODY MASS INDEX: 48.89 KG/M2 | SYSTOLIC BLOOD PRESSURE: 127 MMHG | RESPIRATION RATE: 16 BRPM | DIASTOLIC BLOOD PRESSURE: 85 MMHG | WEIGHT: 276 LBS

## 2021-08-13 DIAGNOSIS — Z01.818 PREOP GENERAL PHYSICAL EXAM: Primary | ICD-10-CM

## 2021-08-13 LAB — HGB BLD-MCNC: 12.9 G/DL (ref 11.7–15.7)

## 2021-08-13 PROCEDURE — 99214 OFFICE O/P EST MOD 30 MIN: CPT | Performed by: PHYSICIAN ASSISTANT

## 2021-08-13 PROCEDURE — 36415 COLL VENOUS BLD VENIPUNCTURE: CPT | Performed by: PHYSICIAN ASSISTANT

## 2021-08-13 PROCEDURE — 85018 HEMOGLOBIN: CPT | Performed by: PHYSICIAN ASSISTANT

## 2021-08-13 ASSESSMENT — ANXIETY QUESTIONNAIRES
GAD7 TOTAL SCORE: 10
4. TROUBLE RELAXING: SEVERAL DAYS
2. NOT BEING ABLE TO STOP OR CONTROL WORRYING: MORE THAN HALF THE DAYS
GAD7 TOTAL SCORE: 10
5. BEING SO RESTLESS THAT IT IS HARD TO SIT STILL: SEVERAL DAYS
7. FEELING AFRAID AS IF SOMETHING AWFUL MIGHT HAPPEN: MORE THAN HALF THE DAYS
7. FEELING AFRAID AS IF SOMETHING AWFUL MIGHT HAPPEN: MORE THAN HALF THE DAYS
3. WORRYING TOO MUCH ABOUT DIFFERENT THINGS: MORE THAN HALF THE DAYS
8. IF YOU CHECKED OFF ANY PROBLEMS, HOW DIFFICULT HAVE THESE MADE IT FOR YOU TO DO YOUR WORK, TAKE CARE OF THINGS AT HOME, OR GET ALONG WITH OTHER PEOPLE?: SOMEWHAT DIFFICULT
GAD7 TOTAL SCORE: 10
1. FEELING NERVOUS, ANXIOUS, OR ON EDGE: SEVERAL DAYS
6. BECOMING EASILY ANNOYED OR IRRITABLE: SEVERAL DAYS

## 2021-08-13 ASSESSMENT — PATIENT HEALTH QUESTIONNAIRE - PHQ9
SUM OF ALL RESPONSES TO PHQ QUESTIONS 1-9: 5
10. IF YOU CHECKED OFF ANY PROBLEMS, HOW DIFFICULT HAVE THESE PROBLEMS MADE IT FOR YOU TO DO YOUR WORK, TAKE CARE OF THINGS AT HOME, OR GET ALONG WITH OTHER PEOPLE: SOMEWHAT DIFFICULT
SUM OF ALL RESPONSES TO PHQ QUESTIONS 1-9: 5

## 2021-08-13 NOTE — H&P (VIEW-ONLY)
[facility]  [id number] [place] [address]  Phone: [phone]  Primary Provider: Lianna Cleveland  Pre-op Performing Provider: PENG ANDINO      PREOPERATIVE EVALUATION:  Today's date: 8/13/2021    Hannah Meade is a 51 year old female who presents for a preoperative evaluation.    Surgical Information:  Surgery/Procedure: HERNIORRHAPHY, INCISIONAL, OPEN  Surgery Location: [facility]  Surgeon: Dr. Lyons  Surgery Date: 8/19/21  Time of Surgery: 0730  Where patient plans to recover: At home with family  Fax number for surgical facility: Note does not need to be faxed, will be available electronically in Epic.    Type of Anesthesia Anticipated: General    Assessment & Plan     The proposed surgical procedure is considered INTERMEDIATE risk.    Preop general physical exam    OK to proceed with surgery.    - Hemoglobin; Future  - Hemoglobin         Risks and Recommendations:  The patient has the following additional risks and recommendations for perioperative complications:   - No identified additional risk factors other than previously addressed    Medication Instructions:   - ACE/ARB: May be continued on the day of surgery.    - Statins: Continue taking on the day of surgery.    - SSRIs, SNRIs, TCAs, Antipsychotics: Continue without modification.     RECOMMENDATION:  APPROVAL GIVEN to proceed with proposed procedure, without further diagnostic evaluation.                      Subjective     HPI related to upcoming procedure: Hernia- started bothering her recently    Preop Questions 8/13/2021   1. Have you ever had a heart attack or stroke? No   2. Have you ever had surgery on your heart or blood vessels, such as a stent placement, a coronary artery bypass, or surgery on an artery in your head, neck, heart, or legs? No   3. Do you have chest pain with activity? No   4. Do you have a history of  heart failure? No   5. Do you currently have a cold, bronchitis or  symptoms of other infection? No   6. Do you have a cough, shortness of breath, or wheezing? YES - chronic   7. Do you or anyone in your family have previous history of blood clots? YES - father   8. Do you or does anyone in your family have a serious bleeding problem such as prolonged bleeding following surgeries or cuts? No   9. Have you ever had problems with anemia or been told to take iron pills? No   10. Have you had any abnormal blood loss such as black, tarry or bloody stools, or abnormal vaginal bleeding? No   11. Have you ever had a blood transfusion? No   12. Are you willing to have a blood transfusion if it is medically needed before, during, or after your surgery? Yes   13. Have you or any of your relatives ever had problems with anesthesia? No   14. Do you have sleep apnea, excessive snoring or daytime drowsiness? YES - sleep apnea   14a. Do you have a CPAP machine? Yes   15. Do you have any artifical heart valves or other implanted medical devices like a pacemaker, defibrillator, or continuous glucose monitor? No   16. Do you have artificial joints? No   17. Are you allergic to latex? No   18. Is there any chance that you may be pregnant? No       Health Care Directive:  Patient does not have a Health Care Directive or Living Will: Discussed advance care planning with patient; however, patient declined at this time.    Preoperative Review of :   reviewed - no record of controlled substances prescribed.      Status of Chronic Conditions:  ASTHMA - Patient has a longstanding history of moderate-severe Asthma . Patient has been doing well overall noting NO SYMPTOMS and continues on medication regimen consisting of albuterol and singulair without adverse reactions or side effects.     HYPERTENSION - Patient has longstanding history of HTN , currently denies any symptoms referable to elevated blood pressure. Specifically denies chest pain, palpitations, dyspnea, orthopnea, PND or peripheral edema.  Blood pressure readings have been in normal range. Current medication regimen is as listed below. Patient denies any side effects of medication.       Review of Systems  CONSTITUTIONAL: NEGATIVE for fever, chills, change in weight  INTEGUMENTARY/SKIN: NEGATIVE for worrisome rashes, moles or lesions  EYES: NEGATIVE for vision changes or irritation  ENT/MOUTH: NEGATIVE for ear, mouth and throat problems  RESP: NEGATIVE for significant cough or SOB  CV: NEGATIVE for chest pain, palpitations or peripheral edema  GI: NEGATIVE for nausea, abdominal pain, heartburn, or change in bowel habits  : NEGATIVE for frequency, dysuria, or hematuria  MUSCULOSKELETAL: NEGATIVE for significant arthralgias or myalgia  NEURO: NEGATIVE for weakness, dizziness or paresthesias  ENDOCRINE: NEGATIVE for temperature intolerance, skin/hair changes  HEME: NEGATIVE for bleeding problems  PSYCHIATRIC: NEGATIVE for changes in mood or affect    Patient Active Problem List    Diagnosis Date Noted     Incisional hernia, without obstruction or gangrene 07/19/2021     Priority: Medium     Added automatically from request for surgery [id number]       Type 2 diabetes mellitus without complication, without long-term current use of insulin (H) 10/13/2020     Priority: Medium     Elevated glucose 09/13/2018     Priority: Medium     Mild major depression (H) 09/10/2018     Priority: Medium     JOSE J (obstructive sleep apnea) 09/10/2018     Priority: Medium     Lymphedema 09/10/2018     Priority: Medium     Asthma 12/20/2017     Priority: Medium     Other nonspecific abnormal finding of lung field (CODE) 12/20/2017     Priority: Medium     Multiple lipomas 12/20/2017     Priority: Medium     BMI 40.0-44.9, adult (H) 08/16/2017     Priority: Medium     bmi over 40 09/25/2016     Priority: Medium     Morbid obesity (H) 11/26/2015     Priority: Medium     High triglycerides, recurrent severe low back pain due to overload       Right-sided low back pain with  left-sided sciatica 10/06/2015     Priority: Medium     Chest wall pain 09/09/2015     Priority: Medium     Bronchogenic cyst 08/25/2015     Priority: Medium     Ovarian cyst 08/17/2015     Priority: Medium     WILL NEED US FOLLOW UP AFTER CHEST SX       Plantar fasciitis 02/23/2015     Priority: Medium     Anxiety 12/31/2014     Priority: Medium     Insomnia 08/08/2011     Priority: Medium     CARDIOVASCULAR SCREENING; LDL GOAL LESS THAN 160 10/31/2010     Priority: Medium     Carbuncle and furuncle of trunk 05/31/2005     Priority: Medium     Backache 09/17/2004     Priority: Medium     Problem list name updated by automated process. Provider to review        Past Medical History:   Diagnosis Date     Anxiety      Depressive disorder      Diabetes (H)      Elevated glucose 9/13/2018     Hypertension     Unsure on the date     Lymphedema 9/10/2018     Mild major depression (H) 9/10/2018     Obesity, unspecified      JOSE J (obstructive sleep apnea) 9/10/2018     Plantar fasciitis 2/23/2015     Sleep apnea, unspecified 12/20/2017     Tuberculin test reaction      Uncomplicated asthma      Past Surgical History:   Procedure Laterality Date     ABDOMEN SURGERY       APPENDECTOMY       COLONOSCOPY  06/13/2019    Dr. Leon [facility]     COLONOSCOPY N/A 6/13/2019    Procedure: COLONOSCOPY;  Surgeon: Mauro Leon MD;  Location:  GI     EXCISE MASS BACK Left 9/17/2014    Procedure: EXCISE MASS BACK;  Surgeon: Yaz Avilez MD;  Location: [facility] OR     EXCISE MASS UPPER EXTREMITY Left 9/17/2014    Procedure: EXCISE MASS UPPER EXTREMITY;  Surgeon: Yaz Avilez MD;  Location: [facility] OR     HYSTERECTOMY VAGINAL  1992    ovaries intact     other      lump on right arm removed      THORACIC SURGERY      lung surgery     THORACOTOMY Right 8/25/2015    Procedure: THORACOTOMY;  Surgeon: Andrew Gordon MD;  Location:  OR     [facility] NONSPECIFIC PROCEDURE      2 C- SECTIONS     Current Outpatient Medications  "  Medication Sig Dispense Refill     albuterol (PROAIR HFA) 108 (90 Base) MCG/ACT inhaler INHALE 2 PUFFS INTO THE LUNGS EVERY 6 HOURS 18 g 3     atorvastatin (LIPITOR) 20 MG tablet Take 1 tablet (20 mg) by mouth daily 90 tablet 3     DiphenhydrAMINE HCl (BENADRYL PO) Take 25 mg by mouth daily as needed        escitalopram (LEXAPRO) 20 MG tablet TAKE 1 TABLET BY MOUTH ONE TIME DAILY 90 tablet 1     fluticasone (FLONASE) 50 MCG/ACT nasal spray Spray 2 sprays into both nostrils daily 16 g 0     furosemide (LASIX) 20 MG tablet TAKE 2 TABLETS BY MOUTH DAILY 180 tablet 1     lisinopril (ZESTRIL) 5 MG tablet Take 1 tablet (5 mg) by mouth daily 90 tablet 1     montelukast (SINGULAIR) 10 MG tablet TAKE 1 TABLET BY MOUTH EVERYDAY AT BEDTIME 90 tablet 1       Allergies   Allergen Reactions     Seasonal Allergies      Ampicillin Rash     \"sunburn\"     Codeine Rash     \"sunburn\"     Decongestant [Oxymetazoline] Rash     \"sunburn\"     Sulfa Drugs Rash     \"sunburn\"     Unknown [No Clinical Screening - See Comments] Rash        Social History     Tobacco Use     Smoking status: Never Smoker     Smokeless tobacco: Never Used   Substance Use Topics     Alcohol use: No     Alcohol/week: 0.0 standard drinks     Family History   Problem Relation Age of Onset     Neurologic Disorder Mother         Parkinsons     Diabetes Mother      Osteoporosis Mother      Genetic Disorder Mother      Hypertension Father      Cardiovascular Father      Gallbladder Disease Father      Hyperlipidemia Father      Cerebrovascular Disease Father      Obesity Father      Diabetes Maternal Grandmother      Other Cancer Maternal Grandmother      Osteoporosis Maternal Grandmother      Obesity Maternal Grandmother      Diabetes Maternal Grandfather      Diabetes Paternal Grandmother      Osteoporosis Paternal Grandmother      C.A.D. Paternal Grandfather      Cardiovascular Paternal Grandfather      Coronary Artery Disease Paternal Grandfather      " Cerebrovascular Disease Paternal Grandfather      Prostate Cancer Paternal Grandfather      Hypertension Sister      Polycystic Kidney Diease Other      Diabetes Other      Hyperlipidemia Other      Anxiety Disorder Other      Obesity Other      Diabetes Other      Obesity Other      Hypertension Cousin      Hypertension Sister      Hyperlipidemia Sister      Asthma Sister      Obesity Sister      Hyperlipidemia Brother      Other Cancer Other      Other Cancer Cousin      Anxiety Disorder Son      Anxiety Disorder Cousin      Anxiety Disorder Daughter      Genetic Disorder Daughter         PKD     Colon Cancer No family hx of      History   Drug Use No         Objective     /85 (BP Location: Right arm, Patient Position: Chair, Cuff Size: Adult Large)   Pulse 84   Temp 98.5  F (36.9  C) (Oral)   Resp 16   Wt 125.2 kg (276 lb)   BMI 48.89 kg/m      Physical Exam    GENERAL APPEARANCE: healthy, alert and no distress     EYES: EOMI, PERRL     HENT: ear canals and TM's normal and nose and mouth without ulcers or lesions     NECK: no adenopathy, no asymmetry, masses, or scars and thyroid normal to palpation     RESP: lungs clear to auscultation - no rales, rhonchi or wheezes     CV: regular rates and rhythm, normal S1 S2, no S3 or S4 and no murmur, click or rub     ABDOMEN:  soft, nontender, no HSM or masses and bowel sounds normal     MS: extremities normal- no gross deformities noted, no evidence of inflammation in joints, FROM in all extremities.     SKIN: no suspicious lesions or rashes     NEURO: Normal strength and tone, sensory exam grossly normal, mentation intact and speech normal     PSYCH: mentation appears normal. and affect normal/bright     LYMPHATICS: No cervical adenopathy    Recent Labs   Lab Test 07/08/21  0958 07/08/21  0905 04/08/21  1613 10/01/20  0839   HGB 13.7  --   --  13.3     --   --  279     --  140 138   POTASSIUM 4.5  --  4.2 3.9   CR 0.85  --  0.87 0.67   A1C  --   6.6* 6.5* 6.9*        Diagnostics:  Recent Results (from the past 24 hour(s))   Hemoglobin    Collection Time: 08/13/21  7:34 AM   Result Value Ref Range    Hemoglobin 12.9 11.7 - 15.7 g/dL      No EKG required, no history of coronary heart disease, significant arrhythmia, peripheral arterial disease or other structural heart disease.    Revised Cardiac Risk Index (RCRI):  The patient has the following serious cardiovascular risks for perioperative complications:   - No serious cardiac risks = 0 points     RCRI Interpretation: 0 points: Class I (very low risk - 0.4% complication rate)           Signed Electronically by: Price Garvey PA-C  Copy of this evaluation report is provided to requesting physician.    Answers for HPI/ROS submitted by the patient on 8/13/2021  If you checked off any problems, how difficult have these problems made it for you to do your work, take care of things at home, or get along with other people?: Somewhat difficult  PHQ9 TOTAL SCORE: 5  MAURISIO 7 TOTAL SCORE: 10       [Fm Hx of Colon Ca/Polyps] : family history of colon cancer and/or polyps [Allergies Reviewed] : allergies reviewed. [Procedure Explained] : The procedure was explained [Risks] : Risks [Benefits] : benefits [Alternatives] : alternatives [Infection] : risk of infection [Bleeding] : bleeding risk [Consent Obtained] : written consent was obtained prior to the procedure and is detailed in the patient's record [Patient] : the patient [Approved Diet Followed] : the patient avoided solid foods and adhered to the approved diet list for 24 hours prior to the procedure [Bowel Prep Kit] : the patient took the appropriate bowel preparation kit as directed [Cardiac Monitor] : cardiac monitor [Automated Blood Pressure Cuff] : automated blood pressure cuff [Pulse Oximeter] : pulse oximeter [___ L/min Oxygen via NC] : [unfilled] ~Uliters/minute oxygen via nasal cannula [Performed By: ___] : Performed by:  CASSIDY [Sedation Clearance] : the patient was cleared for moderate sedation [Propofol ___ mg IV] : Propofol [unfilled] ~Umg intravenously [2] : 2 [Time started: ___] : Start Time:  [unfilled] [Prep Qualtiy: ___] : Prep Quality:  [unfilled] [Withdrawal Time: ___] : Withdrawal Time:  [unfilled] [Time Completed: ___] : Completion Time:  [unfilled] [Left Lateral Decubitus] : The patient was positioned in the left lateral decubitus position [Normal Prostate] : a normal prostate [Cecum (Landmarks/Transillum)] : and guided to the cecum which was identified by the anatomic landmarks of the appendiceal orifice and ileocecal valve and by transillumination in the right lower quadrant [No Difficulty] : without difficulty [Insufflated] : insufflated [Retroflex View] : a retroflex view of the rectum was performed [Single Pass Needed] : after a single pass [Normal] : Normal [Tolerated Well] : the patient tolerated the procedure well [Vital Signs Stable] : the vital signs were stable [No Complications] : There were no complications [Abnormal Rectum] : a normal rectum [External Hemorrhoids] : no external hemorrhoids [Patient Rotated Into Alternating Positions] : the patient was not rotated [de-identified] : [facility] [id number] [de-identified] : Normal colonoscopy to the cecum.

## 2021-08-13 NOTE — PROGRESS NOTES
[facility]  [id number] [place] [id number]  Phone: [phone]  Primary Provider: Lianna Cleveland  Pre-op Performing Provider: PENG ANDINO      PREOPERATIVE EVALUATION:  Today's date: 8/13/2021    Hannah Meade is a 51 year old female who presents for a preoperative evaluation.    Surgical Information:  Surgery/Procedure: HERNIORRHAPHY, INCISIONAL, OPEN  Surgery Location: [facility]  Surgeon: Dr. Lyons  Surgery Date: 8/19/21  Time of Surgery: 0730  Where patient plans to recover: At home with family  Fax number for surgical facility: Note does not need to be faxed, will be available electronically in Epic.    Type of Anesthesia Anticipated: General    Assessment & Plan     The proposed surgical procedure is considered INTERMEDIATE risk.    Preop general physical exam    OK to proceed with surgery.    - Hemoglobin; Future  - Hemoglobin         Risks and Recommendations:  The patient has the following additional risks and recommendations for perioperative complications:   - No identified additional risk factors other than previously addressed    Medication Instructions:   - ACE/ARB: May be continued on the day of surgery.    - Statins: Continue taking on the day of surgery.    - SSRIs, SNRIs, TCAs, Antipsychotics: Continue without modification.     RECOMMENDATION:  APPROVAL GIVEN to proceed with proposed procedure, without further diagnostic evaluation.                      Subjective     HPI related to upcoming procedure: Hernia- started bothering her recently    Preop Questions 8/13/2021   1. Have you ever had a heart attack or stroke? No   2. Have you ever had surgery on your heart or blood vessels, such as a stent placement, a coronary artery bypass, or surgery on an artery in your head, neck, heart, or legs? No   3. Do you have chest pain with activity? No   4. Do you have a history of  heart failure? No   5. Do you currently have a cold, bronchitis or  symptoms of other infection? No   6. Do you have a cough, shortness of breath, or wheezing? YES - chronic   7. Do you or anyone in your family have previous history of blood clots? YES - father   8. Do you or does anyone in your family have a serious bleeding problem such as prolonged bleeding following surgeries or cuts? No   9. Have you ever had problems with anemia or been told to take iron pills? No   10. Have you had any abnormal blood loss such as black, tarry or bloody stools, or abnormal vaginal bleeding? No   11. Have you ever had a blood transfusion? No   12. Are you willing to have a blood transfusion if it is medically needed before, during, or after your surgery? Yes   13. Have you or any of your relatives ever had problems with anesthesia? No   14. Do you have sleep apnea, excessive snoring or daytime drowsiness? YES - sleep apnea   14a. Do you have a CPAP machine? Yes   15. Do you have any artifical heart valves or other implanted medical devices like a pacemaker, defibrillator, or continuous glucose monitor? No   16. Do you have artificial joints? No   17. Are you allergic to latex? No   18. Is there any chance that you may be pregnant? No       Health Care Directive:  Patient does not have a Health Care Directive or Living Will: Discussed advance care planning with patient; however, patient declined at this time.    Preoperative Review of :   reviewed - no record of controlled substances prescribed.      Status of Chronic Conditions:  ASTHMA - Patient has a longstanding history of moderate-severe Asthma . Patient has been doing well overall noting NO SYMPTOMS and continues on medication regimen consisting of albuterol and singulair without adverse reactions or side effects.     HYPERTENSION - Patient has longstanding history of HTN , currently denies any symptoms referable to elevated blood pressure. Specifically denies chest pain, palpitations, dyspnea, orthopnea, PND or peripheral edema.  Blood pressure readings have been in normal range. Current medication regimen is as listed below. Patient denies any side effects of medication.       Review of Systems  CONSTITUTIONAL: NEGATIVE for fever, chills, change in weight  INTEGUMENTARY/SKIN: NEGATIVE for worrisome rashes, moles or lesions  EYES: NEGATIVE for vision changes or irritation  ENT/MOUTH: NEGATIVE for ear, mouth and throat problems  RESP: NEGATIVE for significant cough or SOB  CV: NEGATIVE for chest pain, palpitations or peripheral edema  GI: NEGATIVE for nausea, abdominal pain, heartburn, or change in bowel habits  : NEGATIVE for frequency, dysuria, or hematuria  MUSCULOSKELETAL: NEGATIVE for significant arthralgias or myalgia  NEURO: NEGATIVE for weakness, dizziness or paresthesias  ENDOCRINE: NEGATIVE for temperature intolerance, skin/hair changes  HEME: NEGATIVE for bleeding problems  PSYCHIATRIC: NEGATIVE for changes in mood or affect    Patient Active Problem List    Diagnosis Date Noted     Incisional hernia, without obstruction or gangrene 07/19/2021     Priority: Medium     Added automatically from request for surgery [id number]       Type 2 diabetes mellitus without complication, without long-term current use of insulin (H) 10/13/2020     Priority: Medium     Elevated glucose 09/13/2018     Priority: Medium     Mild major depression (H) 09/10/2018     Priority: Medium     JOSE J (obstructive sleep apnea) 09/10/2018     Priority: Medium     Lymphedema 09/10/2018     Priority: Medium     Asthma 12/20/2017     Priority: Medium     Other nonspecific abnormal finding of lung field (CODE) 12/20/2017     Priority: Medium     Multiple lipomas 12/20/2017     Priority: Medium     BMI 40.0-44.9, adult (H) 08/16/2017     Priority: Medium     bmi over 40 09/25/2016     Priority: Medium     Morbid obesity (H) 11/26/2015     Priority: Medium     High triglycerides, recurrent severe low back pain due to overload       Right-sided low back pain with  left-sided sciatica 10/06/2015     Priority: Medium     Chest wall pain 09/09/2015     Priority: Medium     Bronchogenic cyst 08/25/2015     Priority: Medium     Ovarian cyst 08/17/2015     Priority: Medium     WILL NEED US FOLLOW UP AFTER CHEST SX       Plantar fasciitis 02/23/2015     Priority: Medium     Anxiety 12/31/2014     Priority: Medium     Insomnia 08/08/2011     Priority: Medium     CARDIOVASCULAR SCREENING; LDL GOAL LESS THAN 160 10/31/2010     Priority: Medium     Carbuncle and furuncle of trunk 05/31/2005     Priority: Medium     Backache 09/17/2004     Priority: Medium     Problem list name updated by automated process. Provider to review        Past Medical History:   Diagnosis Date     Anxiety      Depressive disorder      Diabetes (H)      Elevated glucose 9/13/2018     Hypertension     Unsure on the date     Lymphedema 9/10/2018     Mild major depression (H) 9/10/2018     Obesity, unspecified      JOSE J (obstructive sleep apnea) 9/10/2018     Plantar fasciitis 2/23/2015     Sleep apnea, unspecified 12/20/2017     Tuberculin test reaction      Uncomplicated asthma      Past Surgical History:   Procedure Laterality Date     ABDOMEN SURGERY       APPENDECTOMY       COLONOSCOPY  06/13/2019    Dr. Leon [facility]     COLONOSCOPY N/A 6/13/2019    Procedure: COLONOSCOPY;  Surgeon: Mauro Leon MD;  Location:  GI     EXCISE MASS BACK Left 9/17/2014    Procedure: EXCISE MASS BACK;  Surgeon: Yaz Avilez MD;  Location: [facility] OR     EXCISE MASS UPPER EXTREMITY Left 9/17/2014    Procedure: EXCISE MASS UPPER EXTREMITY;  Surgeon: Yaz Avilez MD;  Location: [facility] OR     HYSTERECTOMY VAGINAL  1992    ovaries intact     other      lump on right arm removed      THORACIC SURGERY      lung surgery     THORACOTOMY Right 8/25/2015    Procedure: THORACOTOMY;  Surgeon: Andrew Gordon MD;  Location:  OR     [facility] NONSPECIFIC PROCEDURE      2 C- SECTIONS     Current Outpatient Medications  "  Medication Sig Dispense Refill     albuterol (PROAIR HFA) 108 (90 Base) MCG/ACT inhaler INHALE 2 PUFFS INTO THE LUNGS EVERY 6 HOURS 18 g 3     atorvastatin (LIPITOR) 20 MG tablet Take 1 tablet (20 mg) by mouth daily 90 tablet 3     DiphenhydrAMINE HCl (BENADRYL PO) Take 25 mg by mouth daily as needed        escitalopram (LEXAPRO) 20 MG tablet TAKE 1 TABLET BY MOUTH ONE TIME DAILY 90 tablet 1     fluticasone (FLONASE) 50 MCG/ACT nasal spray Spray 2 sprays into both nostrils daily 16 g 0     furosemide (LASIX) 20 MG tablet TAKE 2 TABLETS BY MOUTH DAILY 180 tablet 1     lisinopril (ZESTRIL) 5 MG tablet Take 1 tablet (5 mg) by mouth daily 90 tablet 1     montelukast (SINGULAIR) 10 MG tablet TAKE 1 TABLET BY MOUTH EVERYDAY AT BEDTIME 90 tablet 1       Allergies   Allergen Reactions     Seasonal Allergies      Ampicillin Rash     \"sunburn\"     Codeine Rash     \"sunburn\"     Decongestant [Oxymetazoline] Rash     \"sunburn\"     Sulfa Drugs Rash     \"sunburn\"     Unknown [No Clinical Screening - See Comments] Rash        Social History     Tobacco Use     Smoking status: Never Smoker     Smokeless tobacco: Never Used   Substance Use Topics     Alcohol use: No     Alcohol/week: 0.0 standard drinks     Family History   Problem Relation Age of Onset     Neurologic Disorder Mother         Parkinsons     Diabetes Mother      Osteoporosis Mother      Genetic Disorder Mother      Hypertension Father      Cardiovascular Father      Gallbladder Disease Father      Hyperlipidemia Father      Cerebrovascular Disease Father      Obesity Father      Diabetes Maternal Grandmother      Other Cancer Maternal Grandmother      Osteoporosis Maternal Grandmother      Obesity Maternal Grandmother      Diabetes Maternal Grandfather      Diabetes Paternal Grandmother      Osteoporosis Paternal Grandmother      C.A.D. Paternal Grandfather      Cardiovascular Paternal Grandfather      Coronary Artery Disease Paternal Grandfather      " Cerebrovascular Disease Paternal Grandfather      Prostate Cancer Paternal Grandfather      Hypertension Sister      Polycystic Kidney Diease Other      Diabetes Other      Hyperlipidemia Other      Anxiety Disorder Other      Obesity Other      Diabetes Other      Obesity Other      Hypertension Cousin      Hypertension Sister      Hyperlipidemia Sister      Asthma Sister      Obesity Sister      Hyperlipidemia Brother      Other Cancer Other      Other Cancer Cousin      Anxiety Disorder Son      Anxiety Disorder Cousin      Anxiety Disorder Daughter      Genetic Disorder Daughter         PKD     Colon Cancer No family hx of      History   Drug Use No         Objective     /85 (BP Location: Right arm, Patient Position: Chair, Cuff Size: Adult Large)   Pulse 84   Temp 98.5  F (36.9  C) (Oral)   Resp 16   Wt 125.2 kg (276 lb)   BMI 48.89 kg/m      Physical Exam    GENERAL APPEARANCE: healthy, alert and no distress     EYES: EOMI, PERRL     HENT: ear canals and TM's normal and nose and mouth without ulcers or lesions     NECK: no adenopathy, no asymmetry, masses, or scars and thyroid normal to palpation     RESP: lungs clear to auscultation - no rales, rhonchi or wheezes     CV: regular rates and rhythm, normal S1 S2, no S3 or S4 and no murmur, click or rub     ABDOMEN:  soft, nontender, no HSM or masses and bowel sounds normal     MS: extremities normal- no gross deformities noted, no evidence of inflammation in joints, FROM in all extremities.     SKIN: no suspicious lesions or rashes     NEURO: Normal strength and tone, sensory exam grossly normal, mentation intact and speech normal     PSYCH: mentation appears normal. and affect normal/bright     LYMPHATICS: No cervical adenopathy    Recent Labs   Lab Test 07/08/21  0958 07/08/21  0905 04/08/21  1613 10/01/20  0839   HGB 13.7  --   --  13.3     --   --  279     --  140 138   POTASSIUM 4.5  --  4.2 3.9   CR 0.85  --  0.87 0.67   A1C  --   6.6* 6.5* 6.9*        Diagnostics:  Recent Results (from the past 24 hour(s))   Hemoglobin    Collection Time: 08/13/21  7:34 AM   Result Value Ref Range    Hemoglobin 12.9 11.7 - 15.7 g/dL      No EKG required, no history of coronary heart disease, significant arrhythmia, peripheral arterial disease or other structural heart disease.    Revised Cardiac Risk Index (RCRI):  The patient has the following serious cardiovascular risks for perioperative complications:   - No serious cardiac risks = 0 points     RCRI Interpretation: 0 points: Class I (very low risk - 0.4% complication rate)           Signed Electronically by: Price Garvey PA-C  Copy of this evaluation report is provided to requesting physician.    Answers for HPI/ROS submitted by the patient on 8/13/2021  If you checked off any problems, how difficult have these problems made it for you to do your work, take care of things at home, or get along with other people?: Somewhat difficult  PHQ9 TOTAL SCORE: 5  MAURISIO 7 TOTAL SCORE: 10

## 2021-08-13 NOTE — PATIENT INSTRUCTIONS
You may take all medications the morning of surgery. No ibuprofen, Aleve, or aspirin starting today.      Preparing for Your Surgery  Getting started  A nurse will call you to review your health history and instructions. They will give you an arrival time based on your scheduled surgery time.  Please be ready to share the following:    Your doctor's clinic name and phone number    Your medical, surgical and anesthesia history    A list of allergies and sensitivities    A list of medicines, including herbal treatments and over-the-counter drugs    Whether the patient has a legal guardian (ask how to send us the papers in advance)  If you have a child who's having surgery, please ask for a copy of Preparing for Your Child's Surgery.    Preparing for surgery    Within 30 days of surgery: Have a pre-op exam (sometimes called an H&P, or History and Physical). This can be done at a clinic or pre-operative center.  ? If you're having a , you may not need this exam. Talk to your care team    At your pre-op exam, talk to your care team about all medicines you take. If you need to stop any medicines before surgery, ask when to start taking them again.  ? We do this for your safety. Many medicines can make you bleed too much during surgery. Some change how well surgery (anesthesia) drugs work.    Call your insurance company to let them know you're having surgery. (If you don't have insurance, call [phone].)    Call your clinic if there's any change in your health. This includes signs of a cold or flu (sore throat, runny nose, cough, rash, fever). It also includes a scrape or scratch near the surgery site.    If you have questions on the day of surgery, call your hospital or surgery center.  Eating and drinking guidelines  For your safety: Unless your surgeon tells you otherwise, follow the guidelines below.    Eat and drink as usual until 8 hours before surgery. After that, no food or milk.    Drink clear liquids  until 2 hours before surgery. These are liquids you can see through, like water, Gatorade and Propel Water. You may also have black coffee and tea (no cream or milk).    Nothing by mouth within 2 hours of surgery. This includes gum, candy and breath mints.    If you drink, stop drinking alcohol the night before surgery.    If your care team tells you to take medicine on the morning of surgery, it's okay to take it with a sip of water.  Preventing infection    Shower or bathe the night before and morning of your surgery. Follow the instructions your clinic gave you. (If no instructions, use regular soap.)    Don't shave or clip hair near your surgery site. We'll remove the hair if needed.    Don't smoke or vape the morning of surgery. You may chew nicotine gum up to 2 hours before surgery. A nicotine patch is okay.  ? Note: Some surgeries require you to completely quit smoking and nicotine. Check with your surgeon.    Your care team will make every effort to keep you safe from infection. We will:  ? Clean our hands often with soap and water (or an alcohol-based hand rub).  ? Clean the skin at your surgery site with a special soap that kills germs.  ? Give you a special gown to keep you warm. (Cold raises the risk of infection.)  ? Wear special hair covers, masks, gowns and gloves during surgery.  ? Give antibiotic medicine, if prescribed. Not all surgeries need antibiotics.  What to bring on the day of surgery    Photo ID and insurance card    Copy of your health care directive, if you have one    Glasses and hearing aides (bring cases)  ? You can't wear contacts during surgery    Inhaler and eye drops, if you use them (tell us about these when you arrive)    CPAP machine or breathing device, if you use them    A few personal items, if spending the night    If you have . . .  ? A pacemaker or ICD (cardiac defibrillator): Bring the ID card.  ? An implanted stimulator: Bring the remote control.  ? A legal guardian:  Bring a copy of the certified (court-stamped) guardianship papers.  Please remove any jewelry, including body piercings. Leave jewelry and other valuables at home.  If you're going home the day of surgery  Important: If you don't follow the rules below, we must cancel your surgery.     Arrange for someone to drive you home after surgery. You may not drive, take a taxi or take public transportation by yourself (unless you'll have local anesthesia only).    Arrange for a responsible adult to stay with you overnight. If you don't, we may keep you in the hospital overnight, and you may need to pay the costs yourself.  Questions?   If you have any questions for your care team, list them here: _________________________________________________________________________________________________________________________________________________________________________________________________________________________________________________________________________________________________________________________  For informational purposes only. Not to replace the advice of your health care provider. Copyright   2003, 2019 [facility]. All rights reserved. Clinically reviewed by Wendy De Los Santos MD. [employer] [id number] - REV 4/20.

## 2021-08-14 ASSESSMENT — PATIENT HEALTH QUESTIONNAIRE - PHQ9: SUM OF ALL RESPONSES TO PHQ QUESTIONS 1-9: 5

## 2021-08-14 ASSESSMENT — ANXIETY QUESTIONNAIRES: GAD7 TOTAL SCORE: 10

## 2021-08-17 ENCOUNTER — LAB (OUTPATIENT)
Dept: LAB | Facility: CLINIC | Age: 51
End: 2021-08-17
Payer: COMMERCIAL

## 2021-08-17 DIAGNOSIS — Z11.59 ENCOUNTER FOR SCREENING FOR OTHER VIRAL DISEASES: ICD-10-CM

## 2021-08-17 PROCEDURE — U0003 INFECTIOUS AGENT DETECTION BY NUCLEIC ACID (DNA OR RNA); SEVERE ACUTE RESPIRATORY SYNDROME CORONAVIRUS 2 (SARS-COV-2) (CORONAVIRUS DISEASE [COVID-19]), AMPLIFIED PROBE TECHNIQUE, MAKING USE OF HIGH THROUGHPUT TECHNOLOGIES AS DESCRIBED BY CMS-2020-01-R: HCPCS

## 2021-08-17 PROCEDURE — U0005 INFEC AGEN DETEC AMPLI PROBE: HCPCS

## 2021-08-17 ASSESSMENT — MIFFLIN-ST. JEOR: SCORE: 1817.45

## 2021-08-18 LAB — SARS-COV-2 RNA RESP QL NAA+PROBE: NEGATIVE

## 2021-08-19 ENCOUNTER — APPOINTMENT (OUTPATIENT)
Dept: SURGERY | Facility: PHYSICIAN GROUP | Age: 51
End: 2021-08-19
Payer: COMMERCIAL

## 2021-08-19 ENCOUNTER — ANESTHESIA (OUTPATIENT)
Dept: SURGERY | Facility: CLINIC | Age: 51
DRG: 353 | End: 2021-08-19
Payer: COMMERCIAL

## 2021-08-19 ENCOUNTER — HOSPITAL ENCOUNTER (INPATIENT)
Facility: CLINIC | Age: 51
LOS: 1 days | Discharge: HOME | DRG: 353 | End: 2021-08-21
Attending: SURGERY | Admitting: SURGERY
Payer: COMMERCIAL

## 2021-08-19 ENCOUNTER — ANESTHESIA EVENT (OUTPATIENT)
Dept: SURGERY | Facility: CLINIC | Age: 51
DRG: 353 | End: 2021-08-19
Payer: COMMERCIAL

## 2021-08-19 DIAGNOSIS — G47.33 OSA (OBSTRUCTIVE SLEEP APNEA): Primary | ICD-10-CM

## 2021-08-19 DIAGNOSIS — J96.22 ACUTE AND CHRONIC RESPIRATORY FAILURE WITH HYPERCAPNIA (H): ICD-10-CM

## 2021-08-19 DIAGNOSIS — K43.2 INCISIONAL HERNIA, WITHOUT OBSTRUCTION OR GANGRENE: ICD-10-CM

## 2021-08-19 LAB
CREAT SERPL-MCNC: 0.95 MG/DL (ref 0.52–1.04)
GFR SERPL CREATININE-BSD FRML MDRD: 69 ML/MIN/1.73M2

## 2021-08-19 PROCEDURE — 250N000009 HC RX 250: Performed by: SURGERY

## 2021-08-19 PROCEDURE — 82565 ASSAY OF CREATININE: CPT | Performed by: ANESTHESIOLOGY

## 2021-08-19 PROCEDURE — 999N000141 HC STATISTIC PRE-PROCEDURE NURSING ASSESSMENT: Performed by: SURGERY

## 2021-08-19 PROCEDURE — 0WUF0JZ SUPPLEMENT ABDOMINAL WALL WITH SYNTHETIC SUBSTITUTE, OPEN APPROACH: ICD-10-PCS | Performed by: SURGERY

## 2021-08-19 PROCEDURE — 360N000075 HC SURGERY LEVEL 2, PER MIN: Performed by: SURGERY

## 2021-08-19 PROCEDURE — 99207 PR CONSULT E&M CHANGED TO INITIAL LEVEL: CPT | Performed by: INTERNAL MEDICINE

## 2021-08-19 PROCEDURE — 250N000013 HC RX MED GY IP 250 OP 250 PS 637: Performed by: SURGERY

## 2021-08-19 PROCEDURE — 49560 PR REPAIR INCISIONAL HERNIA,REDUCIBLE: CPT | Mod: AS | Performed by: PHYSICIAN ASSISTANT

## 2021-08-19 PROCEDURE — 49568 PR REPAIR HERNIA WITH MESH: CPT | Mod: AS | Performed by: PHYSICIAN ASSISTANT

## 2021-08-19 PROCEDURE — 82374 ASSAY BLOOD CARBON DIOXIDE: CPT | Performed by: PHYSICIAN ASSISTANT

## 2021-08-19 PROCEDURE — 49560 PR REPAIR INCISIONAL HERNIA,REDUCIBLE: CPT | Performed by: SURGERY

## 2021-08-19 PROCEDURE — 250N000011 HC RX IP 250 OP 636: Performed by: NURSE ANESTHETIST, CERTIFIED REGISTERED

## 2021-08-19 PROCEDURE — 710N000009 HC RECOVERY PHASE 1, LEVEL 1, PER MIN: Performed by: SURGERY

## 2021-08-19 PROCEDURE — 250N000013 HC RX MED GY IP 250 OP 250 PS 637: Performed by: ANESTHESIOLOGY

## 2021-08-19 PROCEDURE — C1781 MESH (IMPLANTABLE): HCPCS | Performed by: SURGERY

## 2021-08-19 PROCEDURE — 250N000009 HC RX 250: Performed by: NURSE ANESTHETIST, CERTIFIED REGISTERED

## 2021-08-19 PROCEDURE — 36415 COLL VENOUS BLD VENIPUNCTURE: CPT | Performed by: ANESTHESIOLOGY

## 2021-08-19 PROCEDURE — 710N000012 HC RECOVERY PHASE 2, PER MINUTE: Performed by: SURGERY

## 2021-08-19 PROCEDURE — 258N000003 HC RX IP 258 OP 636: Performed by: ANESTHESIOLOGY

## 2021-08-19 PROCEDURE — 370N000017 HC ANESTHESIA TECHNICAL FEE, PER MIN: Performed by: SURGERY

## 2021-08-19 PROCEDURE — 250N000011 HC RX IP 250 OP 636: Performed by: SURGERY

## 2021-08-19 PROCEDURE — 99222 1ST HOSP IP/OBS MODERATE 55: CPT | Mod: AI | Performed by: INTERNAL MEDICINE

## 2021-08-19 PROCEDURE — 49568 PR REPAIR HERNIA WITH MESH: CPT | Performed by: SURGERY

## 2021-08-19 PROCEDURE — 272N000001 HC OR GENERAL SUPPLY STERILE: Performed by: SURGERY

## 2021-08-19 DEVICE — MESH VENTRALEX HERNIA 3.2" CIRCLE LG W/STRAP 5950009: Type: IMPLANTABLE DEVICE | Site: ABDOMEN | Status: FUNCTIONAL

## 2021-08-19 RX ORDER — DEXAMETHASONE SODIUM PHOSPHATE 4 MG/ML
INJECTION, SOLUTION INTRA-ARTICULAR; INTRALESIONAL; INTRAMUSCULAR; INTRAVENOUS; SOFT TISSUE PRN
Status: DISCONTINUED | OUTPATIENT
Start: 2021-08-19 | End: 2021-08-19

## 2021-08-19 RX ORDER — FLUTICASONE PROPIONATE 50 MCG
2 SPRAY, SUSPENSION (ML) NASAL DAILY PRN
Status: DISCONTINUED | OUTPATIENT
Start: 2021-08-20 | End: 2021-08-21 | Stop reason: HOSPADM

## 2021-08-19 RX ORDER — ONDANSETRON 2 MG/ML
4 INJECTION INTRAMUSCULAR; INTRAVENOUS EVERY 30 MIN PRN
Status: DISCONTINUED | OUTPATIENT
Start: 2021-08-19 | End: 2021-08-19 | Stop reason: HOSPADM

## 2021-08-19 RX ORDER — HYDROMORPHONE HCL IN WATER/PF 6 MG/30 ML
0.2 PATIENT CONTROLLED ANALGESIA SYRINGE INTRAVENOUS
Status: DISCONTINUED | OUTPATIENT
Start: 2021-08-19 | End: 2021-08-21 | Stop reason: HOSPADM

## 2021-08-19 RX ORDER — LIDOCAINE HYDROCHLORIDE 10 MG/ML
INJECTION, SOLUTION INFILTRATION; PERINEURAL PRN
Status: DISCONTINUED | OUTPATIENT
Start: 2021-08-19 | End: 2021-08-19

## 2021-08-19 RX ORDER — NALOXONE HYDROCHLORIDE 0.4 MG/ML
0.2 INJECTION, SOLUTION INTRAMUSCULAR; INTRAVENOUS; SUBCUTANEOUS
Status: DISCONTINUED | OUTPATIENT
Start: 2021-08-19 | End: 2021-08-21 | Stop reason: HOSPADM

## 2021-08-19 RX ORDER — PROPOFOL 10 MG/ML
INJECTION, EMULSION INTRAVENOUS CONTINUOUS PRN
Status: DISCONTINUED | OUTPATIENT
Start: 2021-08-19 | End: 2021-08-19

## 2021-08-19 RX ORDER — MONTELUKAST SODIUM 10 MG/1
10 TABLET ORAL AT BEDTIME
Status: DISCONTINUED | OUTPATIENT
Start: 2021-08-19 | End: 2021-08-21 | Stop reason: HOSPADM

## 2021-08-19 RX ORDER — OXYCODONE HYDROCHLORIDE 5 MG/1
5 TABLET ORAL EVERY 4 HOURS PRN
Status: DISCONTINUED | OUTPATIENT
Start: 2021-08-19 | End: 2021-08-21 | Stop reason: HOSPADM

## 2021-08-19 RX ORDER — GLYCOPYRROLATE 0.2 MG/ML
INJECTION, SOLUTION INTRAMUSCULAR; INTRAVENOUS PRN
Status: DISCONTINUED | OUTPATIENT
Start: 2021-08-19 | End: 2021-08-19

## 2021-08-19 RX ORDER — ACETAMINOPHEN 325 MG/1
975 TABLET ORAL ONCE
Status: COMPLETED | OUTPATIENT
Start: 2021-08-19 | End: 2021-08-19

## 2021-08-19 RX ORDER — ONDANSETRON 4 MG/1
4 TABLET, ORALLY DISINTEGRATING ORAL EVERY 6 HOURS PRN
Status: DISCONTINUED | OUTPATIENT
Start: 2021-08-19 | End: 2021-08-21 | Stop reason: HOSPADM

## 2021-08-19 RX ORDER — SODIUM CHLORIDE, SODIUM LACTATE, POTASSIUM CHLORIDE, CALCIUM CHLORIDE 600; 310; 30; 20 MG/100ML; MG/100ML; MG/100ML; MG/100ML
INJECTION, SOLUTION INTRAVENOUS CONTINUOUS
Status: DISCONTINUED | OUTPATIENT
Start: 2021-08-19 | End: 2021-08-19 | Stop reason: HOSPADM

## 2021-08-19 RX ORDER — CLINDAMYCIN PHOSPHATE 900 MG/50ML
900 INJECTION, SOLUTION INTRAVENOUS
Status: DISCONTINUED | OUTPATIENT
Start: 2021-08-19 | End: 2021-08-19 | Stop reason: HOSPADM

## 2021-08-19 RX ORDER — OXYCODONE HYDROCHLORIDE 5 MG/1
5-10 TABLET ORAL EVERY 4 HOURS PRN
Qty: 10 TABLET | Refills: 0 | Status: SHIPPED | OUTPATIENT
Start: 2021-08-19 | End: 2021-08-22

## 2021-08-19 RX ORDER — GLYCINE 1.5 G/100ML
SOLUTION IRRIGATION PRN
Status: DISCONTINUED | OUTPATIENT
Start: 2021-08-19 | End: 2021-08-19 | Stop reason: HOSPADM

## 2021-08-19 RX ORDER — LISINOPRIL 5 MG/1
5 TABLET ORAL DAILY
Status: DISCONTINUED | OUTPATIENT
Start: 2021-08-20 | End: 2021-08-21 | Stop reason: HOSPADM

## 2021-08-19 RX ORDER — ONDANSETRON 4 MG/1
4 TABLET, ORALLY DISINTEGRATING ORAL EVERY 30 MIN PRN
Status: DISCONTINUED | OUTPATIENT
Start: 2021-08-19 | End: 2021-08-19 | Stop reason: HOSPADM

## 2021-08-19 RX ORDER — OXYCODONE HYDROCHLORIDE 5 MG/1
5 TABLET ORAL EVERY 4 HOURS PRN
Status: DISCONTINUED | OUTPATIENT
Start: 2021-08-19 | End: 2021-08-19

## 2021-08-19 RX ORDER — ONDANSETRON 2 MG/ML
INJECTION INTRAMUSCULAR; INTRAVENOUS PRN
Status: DISCONTINUED | OUTPATIENT
Start: 2021-08-19 | End: 2021-08-19

## 2021-08-19 RX ORDER — ATORVASTATIN CALCIUM 20 MG/1
20 TABLET, FILM COATED ORAL DAILY
Status: DISCONTINUED | OUTPATIENT
Start: 2021-08-20 | End: 2021-08-21 | Stop reason: HOSPADM

## 2021-08-19 RX ORDER — FENTANYL CITRATE 50 UG/ML
INJECTION, SOLUTION INTRAMUSCULAR; INTRAVENOUS PRN
Status: DISCONTINUED | OUTPATIENT
Start: 2021-08-19 | End: 2021-08-19

## 2021-08-19 RX ORDER — SODIUM CHLORIDE 9 MG/ML
INJECTION, SOLUTION INTRAVENOUS CONTINUOUS
Status: DISCONTINUED | OUTPATIENT
Start: 2021-08-19 | End: 2021-08-20

## 2021-08-19 RX ORDER — OXYCODONE HYDROCHLORIDE 5 MG/1
10 TABLET ORAL EVERY 4 HOURS PRN
Status: DISCONTINUED | OUTPATIENT
Start: 2021-08-19 | End: 2021-08-21 | Stop reason: HOSPADM

## 2021-08-19 RX ORDER — ALBUTEROL SULFATE 90 UG/1
2 AEROSOL, METERED RESPIRATORY (INHALATION) EVERY 6 HOURS PRN
Status: DISCONTINUED | OUTPATIENT
Start: 2021-08-19 | End: 2021-08-21 | Stop reason: HOSPADM

## 2021-08-19 RX ORDER — HYDROMORPHONE HCL IN WATER/PF 6 MG/30 ML
0.4 PATIENT CONTROLLED ANALGESIA SYRINGE INTRAVENOUS EVERY 5 MIN PRN
Status: DISCONTINUED | OUTPATIENT
Start: 2021-08-19 | End: 2021-08-19 | Stop reason: HOSPADM

## 2021-08-19 RX ORDER — LIDOCAINE 40 MG/G
CREAM TOPICAL
Status: DISCONTINUED | OUTPATIENT
Start: 2021-08-19 | End: 2021-08-21 | Stop reason: HOSPADM

## 2021-08-19 RX ORDER — MEPERIDINE HYDROCHLORIDE 25 MG/ML
12.5 INJECTION INTRAMUSCULAR; INTRAVENOUS; SUBCUTANEOUS
Status: DISCONTINUED | OUTPATIENT
Start: 2021-08-19 | End: 2021-08-19 | Stop reason: HOSPADM

## 2021-08-19 RX ORDER — NEOSTIGMINE METHYLSULFATE 1 MG/ML
VIAL (ML) INJECTION PRN
Status: DISCONTINUED | OUTPATIENT
Start: 2021-08-19 | End: 2021-08-19

## 2021-08-19 RX ORDER — ONDANSETRON 2 MG/ML
4 INJECTION INTRAMUSCULAR; INTRAVENOUS EVERY 6 HOURS PRN
Status: DISCONTINUED | OUTPATIENT
Start: 2021-08-19 | End: 2021-08-21 | Stop reason: HOSPADM

## 2021-08-19 RX ORDER — NALOXONE HYDROCHLORIDE 0.4 MG/ML
0.4 INJECTION, SOLUTION INTRAMUSCULAR; INTRAVENOUS; SUBCUTANEOUS
Status: DISCONTINUED | OUTPATIENT
Start: 2021-08-19 | End: 2021-08-21 | Stop reason: HOSPADM

## 2021-08-19 RX ORDER — FUROSEMIDE 40 MG
40 TABLET ORAL DAILY
Status: DISCONTINUED | OUTPATIENT
Start: 2021-08-20 | End: 2021-08-21 | Stop reason: HOSPADM

## 2021-08-19 RX ORDER — HYDROMORPHONE HCL IN WATER/PF 6 MG/30 ML
0.4 PATIENT CONTROLLED ANALGESIA SYRINGE INTRAVENOUS
Status: DISCONTINUED | OUTPATIENT
Start: 2021-08-19 | End: 2021-08-21 | Stop reason: HOSPADM

## 2021-08-19 RX ORDER — ESCITALOPRAM OXALATE 20 MG/1
20 TABLET ORAL DAILY
Status: DISCONTINUED | OUTPATIENT
Start: 2021-08-20 | End: 2021-08-21 | Stop reason: HOSPADM

## 2021-08-19 RX ORDER — FENTANYL CITRATE 50 UG/ML
50 INJECTION, SOLUTION INTRAMUSCULAR; INTRAVENOUS EVERY 5 MIN PRN
Status: DISCONTINUED | OUTPATIENT
Start: 2021-08-19 | End: 2021-08-19 | Stop reason: HOSPADM

## 2021-08-19 RX ORDER — LIDOCAINE 40 MG/G
CREAM TOPICAL
Status: DISCONTINUED | OUTPATIENT
Start: 2021-08-19 | End: 2021-08-19 | Stop reason: HOSPADM

## 2021-08-19 RX ORDER — LABETALOL HYDROCHLORIDE 5 MG/ML
10 INJECTION, SOLUTION INTRAVENOUS
Status: DISCONTINUED | OUTPATIENT
Start: 2021-08-19 | End: 2021-08-19 | Stop reason: HOSPADM

## 2021-08-19 RX ORDER — ALBUTEROL SULFATE 0.83 MG/ML
2.5 SOLUTION RESPIRATORY (INHALATION) EVERY 4 HOURS PRN
Status: DISCONTINUED | OUTPATIENT
Start: 2021-08-19 | End: 2021-08-19 | Stop reason: HOSPADM

## 2021-08-19 RX ORDER — FENTANYL CITRATE 50 UG/ML
25 INJECTION, SOLUTION INTRAMUSCULAR; INTRAVENOUS
Status: DISCONTINUED | OUTPATIENT
Start: 2021-08-19 | End: 2021-08-19 | Stop reason: HOSPADM

## 2021-08-19 RX ORDER — PROPOFOL 10 MG/ML
INJECTION, EMULSION INTRAVENOUS PRN
Status: DISCONTINUED | OUTPATIENT
Start: 2021-08-19 | End: 2021-08-19

## 2021-08-19 RX ORDER — ACETAMINOPHEN 325 MG/1
650 TABLET ORAL EVERY 6 HOURS PRN
Status: DISCONTINUED | OUTPATIENT
Start: 2021-08-19 | End: 2021-08-21 | Stop reason: HOSPADM

## 2021-08-19 RX ORDER — BUPIVACAINE HYDROCHLORIDE AND EPINEPHRINE 2.5; 5 MG/ML; UG/ML
INJECTION, SOLUTION EPIDURAL; INFILTRATION; INTRACAUDAL; PERINEURAL PRN
Status: DISCONTINUED | OUTPATIENT
Start: 2021-08-19 | End: 2021-08-19 | Stop reason: HOSPADM

## 2021-08-19 RX ORDER — CLINDAMYCIN PHOSPHATE 900 MG/50ML
900 INJECTION, SOLUTION INTRAVENOUS SEE ADMIN INSTRUCTIONS
Status: DISCONTINUED | OUTPATIENT
Start: 2021-08-19 | End: 2021-08-19 | Stop reason: HOSPADM

## 2021-08-19 RX ADMIN — FENTANYL CITRATE 100 MCG: 50 INJECTION, SOLUTION INTRAMUSCULAR; INTRAVENOUS at 07:31

## 2021-08-19 RX ADMIN — OXYCODONE HYDROCHLORIDE 5 MG: 5 TABLET ORAL at 15:22

## 2021-08-19 RX ADMIN — ONDANSETRON HYDROCHLORIDE 4 MG: 2 INJECTION, SOLUTION INTRAVENOUS at 08:06

## 2021-08-19 RX ADMIN — PROPOFOL 200 MG: 10 INJECTION, EMULSION INTRAVENOUS at 07:31

## 2021-08-19 RX ADMIN — MIDAZOLAM 2 MG: 1 INJECTION INTRAMUSCULAR; INTRAVENOUS at 07:24

## 2021-08-19 RX ADMIN — OXYCODONE HYDROCHLORIDE 5 MG: 5 TABLET ORAL at 10:45

## 2021-08-19 RX ADMIN — OXYCODONE HYDROCHLORIDE 5 MG: 5 TABLET ORAL at 22:30

## 2021-08-19 RX ADMIN — NEOSTIGMINE METHYLSULFATE 5 MG: 1 INJECTION, SOLUTION INTRAVENOUS at 08:11

## 2021-08-19 RX ADMIN — LIDOCAINE HYDROCHLORIDE 30 MG: 10 INJECTION, SOLUTION INFILTRATION; PERINEURAL at 07:31

## 2021-08-19 RX ADMIN — ROCURONIUM BROMIDE 50 MG: 10 INJECTION INTRAVENOUS at 07:32

## 2021-08-19 RX ADMIN — GLYCOPYRROLATE 0.2 MCG: 0.2 INJECTION, SOLUTION INTRAMUSCULAR; INTRAVENOUS at 07:31

## 2021-08-19 RX ADMIN — DEXAMETHASONE SODIUM PHOSPHATE 4 MG: 4 INJECTION, SOLUTION INTRA-ARTICULAR; INTRALESIONAL; INTRAMUSCULAR; INTRAVENOUS; SOFT TISSUE at 07:31

## 2021-08-19 RX ADMIN — FENTANYL CITRATE 100 MCG: 50 INJECTION, SOLUTION INTRAMUSCULAR; INTRAVENOUS at 07:47

## 2021-08-19 RX ADMIN — CLINDAMYCIN PHOSPHATE 900 MG: 900 INJECTION, SOLUTION INTRAVENOUS at 07:24

## 2021-08-19 RX ADMIN — ACETAMINOPHEN 650 MG: 325 TABLET, FILM COATED ORAL at 18:13

## 2021-08-19 RX ADMIN — GLYCOPYRROLATE 0.8 MCG: 0.2 INJECTION, SOLUTION INTRAMUSCULAR; INTRAVENOUS at 08:10

## 2021-08-19 RX ADMIN — PROPOFOL 40 MCG/KG/MIN: 10 INJECTION, EMULSION INTRAVENOUS at 07:37

## 2021-08-19 RX ADMIN — SODIUM CHLORIDE, POTASSIUM CHLORIDE, SODIUM LACTATE AND CALCIUM CHLORIDE: 600; 310; 30; 20 INJECTION, SOLUTION INTRAVENOUS at 07:07

## 2021-08-19 RX ADMIN — ACETAMINOPHEN 975 MG: 325 TABLET, FILM COATED ORAL at 10:45

## 2021-08-19 ASSESSMENT — MIFFLIN-ST. JEOR
SCORE: 1814.27
SCORE: 1830.15

## 2021-08-19 NOTE — OP NOTE
General Surgery Operative Note    Pre-operative diagnosis:  Incisional hernia, without obstruction or gangrene [K43.2]   Post-operative diagnosis:  Same, also with an umbilical hernia   Procedure: Incisional Herniorrhaphy with Bard Ventralex ST Hernia Patch, large size, increased difficulty due to morbid obesity with BMI of 48   Surgeon: Rhys Lyons MD   Assistant(s): Yvon Espinosa PA-C The physicians assistant was medically necessary for their expertise in retraction, suturing, hemostasis, and suctioning.     Anesthesia: General    Estimated blood loss: 5 cc's   Drains placed: None   Complications:  None   Findings:     Specimens: * No specimens in log *    Indications: This 51-year-old female has developed a painful hernia in her lower epigastrium.  This is at the site of her previous laparoscopic surgery.  CT scanning confirms the surgery and also identifies a small umbilical hernia as well.  He was advised that she lose weight prior to herniorrhaphy however, she feels this cannot be accomplished and she wishes to proceed with herniorrhaphy immediately.  She understands that her morbid obesity increases the risk of complications and recurrence.  The procedure, risk, benefits, complications, use of mesh, risk of mesh infection and the potential need to remove the mesh if it would become infected were all reviewed with her preoperatively.  She is reviewed literature on the subject, all her questions have been answered and she seems understand the proposed procedure well.    Description: Patient is brought the operating room placed supine on the table and after induction of anesthetic the abdomen is prepped and draped in a sterile manner.  A pause is performed, the site had been marked with her in preinduction.  A midline incision is made and extended down to the midline fascia where the expected hernia is identified.  The herniated tissue was mobilized from the surrounding subcutaneous tissues and reduced  through the fascial defect.  We then dissected caudally to the umbilical hernia and similarly mobilized and reduced this.  The intervening fascial bridge was then opened.  We then developed the preperitoneal space for placement of mesh.  Once this dissection was completed, the operative site and the mesh were both soaked with irrisept and rinsed.  Marcaine was placed into the fascia for postop pain relief.  The mesh was placed into the preperitoneal space and carefully and flatly deployed.  The fascia is then closed with heavy PDS sutures, incorporating the mesh into the closure.  Prior to tying the final suture, additional Marcaine is instilled into the cavity containing the mesh.  With the fascia closed, the operative site is again irrigated with irrisept and rinsed.  Local anesthetic is placed in the subcutaneous tissues for postop pain relief.  The umbilicus is reanchored subcutaneous tissues are closed with 3-0 Vicryl and skin with 4-0 Vicryl.  Steri-Strips dressings and an abdominal binder are placed and she is returned to the recovery room in excellent condition with all sponge and needle counts correct, having tolerated the procedure well.    Throughout the procedure, the patient's morbid obesity with a BMI just under 50 made exposure, retraction, dissection, fascial closure and eventual wound closure more difficult, adding 30% to the operative time.  Rhys Lyons MD

## 2021-08-19 NOTE — ANESTHESIA CARE TRANSFER NOTE
Patient: Hannah Meade    Procedure(s):  HERNIORRHAPHY, INCISIONAL, OPEN    Diagnosis: Incisional hernia, without obstruction or gangrene [K43.2]  Diagnosis Additional Information: No value filed.    Anesthesia Type:   General     Note:    Oropharynx: nasal airway in place  Level of Consciousness: drowsy  Oxygen Supplementation: face mask  Level of Supplemental Oxygen (L/min / FiO2): 8  Independent Airway: airway patency satisfactory and stable  Dentition: dentition unchanged  Vital Signs Stable: post-procedure vital signs reviewed and stable  Report to RN Given: handoff report given  Patient transferred to: PACU    Handoff Report: Identifed the Patient, Identified the Reponsible Provider, Reviewed the pertinent medical history, Discussed the surgical course, Reviewed Intra-OP anesthesia mangement and issues during anesthesia, Set expectations for post-procedure period and Allowed opportunity for questions and acknowledgement of understanding      Vitals:  Vitals Value Taken Time   /91 08/19/21 0827   Temp     Pulse 94 08/19/21 0830   Resp 13 08/19/21 0830   SpO2 92 % 08/19/21 0830   Vitals shown include unvalidated device data.    Electronically Signed By: SUSIE Cat CRNA  August 19, 2021  8:31 AM

## 2021-08-19 NOTE — ANESTHESIA PROCEDURE NOTES
Airway       Patient location during procedure: OR       Procedure Start/Stop Times: 8/19/2021 7:35 AM  Staff -        CRNA: Gail San APRN CRNA       Performed By: CRNA  Consent for Airway        Urgency: elective  Indications and Patient Condition       Indications for airway management: anny-procedural       Induction type:intravenous       Mask difficulty assessment: 2 - vent by mask + OA or adjuvant +/- NMBA (stiff neck limited ROM)    Final Airway Details       Final airway type: endotracheal airway       Successful airway: ETT - single and Oral  Endotracheal Airway Details        ETT size (mm): 7.0       Cuffed: yes       Successful intubation technique: direct laryngoscopy       DL Blade Type: Saldaña 2       Grade View of Cords: 1 (small glottic opening straight down view reduntant tissue )       Adjucts: stylet       Position: Right       Measured from: gums/teeth       Secured at (cm): 21       Bite block used: Soft    Post intubation assessment        Placement verified by: capnometry, equal breath sounds and chest rise        Number of attempts at approach: 1       Number of other approaches attempted: 0       Secured with: plastic tape       Ease of procedure: easy       Dentition: Unchanged (very poor dentition, chipped, missing , pt verbalized just bit down on food and front broke off no bleeding )

## 2021-08-19 NOTE — DISCHARGE INSTRUCTIONS
"HOME CARE FOLLOWING UMBILICAL/VENTRAL HERNIA REPAIR  ТАТЬЯНА Kwok, JACK Pickard, ASHER Roberson, NAWAF Bunch    DIET:  Start with liquids and gradually resume your regular diet as tolerated.  Increased fluid intake is recommended. While taking pain medications, consider use of a stool softener, increase your fiber in your diet, or add a fiber supplement (like Metamucil, Citrucel) to help prevent constipation - a possible side effect of pain medications.    NAUSEA:  If nauseated from the anesthetic/pain meds; rest in bed, get up cautiously with assistance, and drink clear liquids (juice, tea, broth).    ACTIVITY:  Light Activity -- you may immediately be up and about as tolerated.  Walking is encouraged, increase as tolerated.  Driving/Light Work-- when comfortable and off narcotic pain medications.  Strenuous Work/Activity -- limit lifting to 20 pounds for 3 weeks.  Active Sports (running, biking, etc.) -- cautiously resume after 4 weeks.    INCISIONAL CARE:    If you have a dressing in place, keep clean and dry for 48 hours after surgery.  After this timeframe, you may replace the gauze daily if it becomes soiled.    You may remove the dressing and shower 48 hours after surgery.  Do not submerse incision in water for 1 week.    Wear binder for comfort    Sutures will absorb and need not be removed.    If present, leave the steri-strips (white paper tapes) in place for 14 days after surgery.    Do not apply lotions, creams, or ointments to incisions.    Expect a variable amount of swelling/bruising/discoloration that may appear around or below the repair site.    Some numbness around the incision is common.    A lump/\"healing ridge\" under the incision is normal and will gradually resolve over the following 1-2 months.    DISCOMFORT:  Local anesthetic placed at surgery should provide relief for 4-8 hours.  Begin taking pain pills before discomfort is severe.  Take the pain medication with some " food, when possible, to minimize side effects.  Intermittent use of ice packs to the hernia repair site may help during the first 1-3 weeks after surgery.  Expect gradual improvement.    Over-the-counter anti-inflammatory medications (i.e. Ibuprofen/Advil/Motrin or Naprosyn/Aleve) may be used per package instructions in addition to or while tapering off the narcotic pain medications to decrease swelling and sensitivity at the repair site.  DO NOT TAKE these Anti-inflammatory medications if your primary physician has advised against doing so, or if you have acid reflux, ulcer, or bleeding disorder, or take blood-thinner medications.  Call your primary physician or the surgery office if you have medication questions.      FOLLOW-UP AFTER SURGERY:  -Our office will contact you approximately 2-3 weeks after surgery to check on your progress and answer any questions you may have.  If you are doing well, you will not need to return for an office appointment.  If any concerns are identified over the phone, we will help you make an appointment to see a provider.    -If you have not received a phone call, have any questions or concerns, or would like to be seen, please call us at [phone].  We are located at: [address], [address]; [place] [address]    -CONTACT US IF THE FOLLOWING DEVELOPS:   1. A fever that is above 101     2. Increased redness, warmth, drainage, bleeding, or swelling.   3. Pain that is not relieved by rest/ice and your prescription.   4.  Increasing pain after 48 hours.   5. Drainage that is thick, cloudy, yellow, green or white.   6. Any other questions or concerns.        GENERAL ANESTHESIA OR SEDATION ADULT DISCHARGE INSTRUCTIONS   SPECIAL PRECAUTIONS FOR 24 HOURS AFTER SURGERY    IT IS NOT UNUSUAL TO FEEL LIGHT-HEADED OR FAINT, UP TO 24 HOURS AFTER SURGERY OR WHILE TAKING PAIN MEDICATION.  IF YOU HAVE THESE SYMPTOMS; SIT FOR A FEW MINUTES BEFORE STANDING AND HAVE SOMEONE ASSIST YOU WHEN  YOU GET UP TO WALK OR USE THE BATHROOM.    YOU SHOULD REST AND RELAX FOR THE NEXT 24 HOURS AND YOU MUST MAKE ARRANGEMENTS TO HAVE SOMEONE STAY WITH YOU FOR AT LEAST 24 HOURS AFTER YOUR DISCHARGE.  AVOID HAZARDOUS AND STRENUOUS ACTIVITIES.  DO NOT MAKE IMPORTANT DECISIONS FOR 24 HOURS.    DO NOT DRIVE ANY VEHICLE OR OPERATE MECHANICAL EQUIPMENT FOR 24 HOURS FOLLOWING THE END OF YOUR SURGERY.  EVEN THOUGH YOU MAY FEEL NORMAL, YOUR REACTIONS MAY BE AFFECTED BY THE MEDICATION YOU HAVE RECEIVED.    DO NOT DRINK ALCOHOLIC BEVERAGES FOR 24 HOURS FOLLOWING YOUR SURGERY.    DRINK CLEAR LIQUIDS (APPLE JUICE, GINGER ALE, 7-UP, BROTH, ETC.).  PROGRESS TO YOUR REGULAR DIET AS YOU FEEL ABLE.    YOU MAY HAVE A DRY MOUTH, A SORE THROAT, MUSCLES ACHES OR TROUBLE SLEEPING.  THESE SHOULD GO AWAY AFTER 24 HOURS.    CALL YOUR DOCTOR FOR ANY OF THE FOLLOWING:  SIGNS OF INFECTION (FEVER, GROWING TENDERNESS AT THE SURGERY SITE, A LARGE AMOUNT OF DRAINAGE OR BLEEDING, SEVERE PAIN, FOUL-SMELLING DRAINAGE, REDNESS OR SWELLING.    IT HAS BEEN OVER 8 TO 10 HOURS SINCE SURGERY AND YOU ARE STILL NOT ABLE TO URINATE (PASS WATER).

## 2021-08-19 NOTE — PLAN OF CARE
ROOM # 232    Living Situation (if not independent, order SW consult): independent  Facility name:  : [facility] [phone]    Activity level at baseline: Independent   Activity level on admit: SBA      Patient registered to observation; given Patient Bill of Rights; given the opportunity to ask questions about observation status and their plan of care.  Patient has been oriented to the observation room, bathroom and call light is in place.    Discussed discharge goals and expectations with patient/family.

## 2021-08-19 NOTE — PROVIDER NOTIFICATION
Patient has been unable to be weaned from oxygen despite several trials on room air.   Patient desats on room air to approximately 86-89 % and then 2 lpm via nasal cannula applied.  See VS flowsheet.  Patient has been up in hallway ambulating, IS has been given to patient and used with encouragement to level 500.  MDA notified regarding hypoxia and stated to give Dr. Lyons an update.  MD notified and stated he will be putting in orders for patient admit.

## 2021-08-19 NOTE — ANESTHESIA POSTPROCEDURE EVALUATION
Patient: Hannah Meade    Procedure(s):  HERNIORRHAPHY open incisional and umbilical repair with mesh    Diagnosis:Incisional hernia, without obstruction or gangrene [K43.2]  Diagnosis Additional Information: No value filed.    Anesthesia Type:  General    Note:  Disposition: Outpatient   Postop Pain Control: Uneventful            Sign Out: Well controlled pain   PONV: No   Neuro/Psych: Uneventful            Sign Out: Acceptable/Baseline neuro status   Airway/Respiratory: Uneventful            Sign Out: Acceptable/Baseline resp. status   CV/Hemodynamics: Uneventful            Sign Out: Acceptable CV status; No obvious hypovolemia; No obvious fluid overload   Other NRE: NONE   DID A NON-ROUTINE EVENT OCCUR? No           Last vitals:  Vitals Value Taken Time   /60 08/19/21 0840   Temp 97.2  F (36.2  C) 08/19/21 0823   Pulse 80 08/19/21 0843   Resp 18 08/19/21 0843   SpO2 95 % 08/19/21 0843   Vitals shown include unvalidated device data.    Electronically Signed By: Jt Peters MD  August 19, 2021  8:44 AM

## 2021-08-19 NOTE — ANESTHESIA PREPROCEDURE EVALUATION
"Anesthesia Pre-Procedure Evaluation    Patient: Hannah Meade   MRN: [id number] : 1970        Preoperative Diagnosis: Incisional hernia, without obstruction or gangrene [K43.2]   Procedure : Procedure(s):  HERNIORRHAPHY, INCISIONAL, OPEN     Past Medical History:   Diagnosis Date     Anxiety      Depressive disorder      Diabetes (H)     prediabetic     Elevated glucose 2018     Hypertension     Unsure on the date     Lymphedema 9/10/2018     Mild major depression (H) 9/10/2018     Obesity, unspecified      JOSE J (obstructive sleep apnea) 9/10/2018    cpap recalled     Other chronic pain     criss knees     Plantar fasciitis 2015     Sleep apnea, unspecified 2017     Tuberculin test reaction      Uncomplicated asthma       Past Surgical History:   Procedure Laterality Date     ABDOMEN SURGERY      tubes removed     APPENDECTOMY       COLONOSCOPY  2019    Dr. Leon [facility]     COLONOSCOPY N/A 2019    Procedure: COLONOSCOPY;  Surgeon: Mauro Leon MD;  Location: [facility]     EXCISE MASS BACK Left 2014    Procedure: EXCISE MASS BACK;  Surgeon: Yaz Avilez MD;  Location: [facility] OR     EXCISE MASS UPPER EXTREMITY Left 2014    Procedure: EXCISE MASS UPPER EXTREMITY;  Surgeon: Yaz Avilez MD;  Location: [facility] OR     HYSTERECTOMY VAGINAL  1992    ovaries intact     other      lump on right arm removed      THORACIC SURGERY      lung surgery     THORACOTOMY Right 2015    Procedure: THORACOTOMY;  Surgeon: Andrew Gordon MD;  Location:  OR     [facility] NONSPECIFIC PROCEDURE      2 C- SECTIONS      Allergies   Allergen Reactions     Seasonal Allergies      Ampicillin Rash     \"sunburn\"     Codeine Rash     \"sunburn\"     Decongestant [Oxymetazoline] Rash     \"sunburn\"     Sulfa Drugs Rash     \"sunburn\"      Social History     Tobacco Use     Smoking status: Never Smoker     Smokeless tobacco: Never Used   Substance Use Topics     Alcohol use: No     " Alcohol/week: 0.0 standard drinks      Wt Readings from Last 1 Encounters:   08/19/21 123.5 kg (272 lb 4.8 oz)        Anesthesia Evaluation   Pt has had prior anesthetic. Type: General.    No history of anesthetic complications       ROS/MED HX  ENT/Pulmonary:     (+) sleep apnea, uses CPAP, Mild Persistent, asthma     Neurologic:  - neg neurologic ROS     Cardiovascular:     (+) hypertension-----    METS/Exercise Tolerance:     Hematologic:  - neg hematologic  ROS     Musculoskeletal:   (+) arthritis,     GI/Hepatic:  - neg GI/hepatic ROS     Renal/Genitourinary:  - neg Renal ROS     Endo:     (+) type II DM, Obesity,     Psychiatric/Substance Use:  - neg psychiatric ROS     Infectious Disease:       Malignancy:       Other:      (+) , H/O Chronic Pain,        Physical Exam    Airway        Mallampati: III   TM distance: > 3 FB   Neck ROM: full   Mouth opening: > 3 cm    Respiratory Devices and Support         Dental  no notable dental history         Cardiovascular   cardiovascular exam normal          Pulmonary   pulmonary exam normal                OUTSIDE LABS:  CBC:   Lab Results   Component Value Date    WBC 9.9 07/08/2021    WBC 9.3 10/01/2020    HGB 12.9 08/13/2021    HGB 13.7 07/08/2021    HCT 42.7 07/08/2021    HCT 40.6 10/01/2020     07/08/2021     10/01/2020     BMP:   Lab Results   Component Value Date     07/08/2021     04/08/2021    POTASSIUM 4.5 07/08/2021    POTASSIUM 4.2 04/08/2021    CHLORIDE 104 07/08/2021    CHLORIDE 106 04/08/2021    CO2 29 07/08/2021    CO2 32 04/08/2021    BUN 14 07/08/2021    BUN 17 04/08/2021    CR 0.85 07/08/2021    CR 0.87 04/08/2021     (H) 07/08/2021     (H) 04/08/2021     COAGS:   Lab Results   Component Value Date    INR 0.90 09/09/2015     POC:   Lab Results   Component Value Date    BGM 85 08/27/2015     HEPATIC:   Lab Results   Component Value Date    ALBUMIN 3.9 07/08/2021    PROTTOTAL 7.3 07/08/2021     (H)  07/08/2021    AST 73 (H) 07/08/2021    ALKPHOS 108 07/08/2021    BILITOTAL 0.8 07/08/2021     OTHER:   Lab Results   Component Value Date    A1C 6.6 (H) 07/08/2021    SERGO 9.1 07/08/2021    TSH 1.40 09/17/2018    CRP 13.0 (H) 05/09/2016    SED 9 08/08/2011       Anesthesia Plan    ASA Status:  3      Anesthesia Type: General.     - Airway: ETT   Induction: Intravenous.   Maintenance: Balanced.        Consents    Anesthesia Plan(s) and associated risks, benefits, and realistic alternatives discussed. Questions answered and patient/representative(s) expressed understanding.     - Discussed with:  Patient      - Extended Intubation/Ventilatory Support Discussed: No.      - Patient is DNR/DNI Status: No    Use of blood products discussed: No .     Postoperative Care    Pain management: Oral pain medications, IV analgesics.   PONV prophylaxis: Ondansetron (or other 5HT-3), Dexamethasone or Solumedrol     Comments:                Jt Peters MD

## 2021-08-20 ENCOUNTER — APPOINTMENT (OUTPATIENT)
Dept: GENERAL RADIOLOGY | Facility: CLINIC | Age: 51
DRG: 353 | End: 2021-08-20
Attending: PHYSICIAN ASSISTANT
Payer: COMMERCIAL

## 2021-08-20 LAB
ANION GAP SERPL CALCULATED.3IONS-SCNC: 6 MMOL/L (ref 3–14)
BASE EXCESS BLDV CALC-SCNC: 5.1 MMOL/L (ref -7.7–1.9)
BUN SERPL-MCNC: 17 MG/DL (ref 7–30)
CALCIUM SERPL-MCNC: 9 MG/DL (ref 8.5–10.1)
CHLORIDE BLD-SCNC: 105 MMOL/L (ref 94–109)
CO2 SERPL-SCNC: 28 MMOL/L (ref 20–32)
CREAT SERPL-MCNC: 0.92 MG/DL (ref 0.52–1.04)
GFR SERPL CREATININE-BSD FRML MDRD: 72 ML/MIN/1.73M2
GLUCOSE BLD-MCNC: 129 MG/DL (ref 70–99)
GLUCOSE BLDC GLUCOMTR-MCNC: 135 MG/DL (ref 70–99)
HCO3 BLDV-SCNC: 32 MMOL/L (ref 21–28)
O2/TOTAL GAS SETTING VFR VENT: 2 %
PCO2 BLDV: 56 MM HG (ref 40–50)
PH BLDV: 7.36 [PH] (ref 7.32–7.43)
PO2 BLDV: 36 MM HG (ref 25–47)
POTASSIUM BLD-SCNC: 4.3 MMOL/L (ref 3.4–5.3)
SODIUM SERPL-SCNC: 139 MMOL/L (ref 133–144)

## 2021-08-20 PROCEDURE — 94660 CPAP INITIATION&MGMT: CPT

## 2021-08-20 PROCEDURE — 71046 X-RAY EXAM CHEST 2 VIEWS: CPT

## 2021-08-20 PROCEDURE — 250N000013 HC RX MED GY IP 250 OP 250 PS 637: Performed by: SURGERY

## 2021-08-20 PROCEDURE — 250N000011 HC RX IP 250 OP 636: Performed by: SURGERY

## 2021-08-20 PROCEDURE — 250N000013 HC RX MED GY IP 250 OP 250 PS 637: Performed by: INTERNAL MEDICINE

## 2021-08-20 PROCEDURE — 99232 SBSQ HOSP IP/OBS MODERATE 35: CPT | Performed by: PHYSICIAN ASSISTANT

## 2021-08-20 PROCEDURE — 999N000157 HC STATISTIC RCP TIME EA 10 MIN

## 2021-08-20 PROCEDURE — 36415 COLL VENOUS BLD VENIPUNCTURE: CPT | Performed by: PHYSICIAN ASSISTANT

## 2021-08-20 PROCEDURE — 82803 BLOOD GASES ANY COMBINATION: CPT | Performed by: PHYSICIAN ASSISTANT

## 2021-08-20 PROCEDURE — 120N000004 HC R&B MS OVERFLOW

## 2021-08-20 RX ADMIN — OXYCODONE HYDROCHLORIDE 5 MG: 5 TABLET ORAL at 14:23

## 2021-08-20 RX ADMIN — ESCITALOPRAM OXALATE 20 MG: 20 TABLET ORAL at 08:40

## 2021-08-20 RX ADMIN — HYDROMORPHONE HYDROCHLORIDE 0.4 MG: 0.2 INJECTION, SOLUTION INTRAMUSCULAR; INTRAVENOUS; SUBCUTANEOUS at 01:41

## 2021-08-20 RX ADMIN — OXYCODONE HYDROCHLORIDE 5 MG: 5 TABLET ORAL at 19:00

## 2021-08-20 RX ADMIN — ACETAMINOPHEN 650 MG: 325 TABLET, FILM COATED ORAL at 22:34

## 2021-08-20 RX ADMIN — OXYCODONE HYDROCHLORIDE 5 MG: 5 TABLET ORAL at 20:29

## 2021-08-20 RX ADMIN — ATORVASTATIN CALCIUM 20 MG: 20 TABLET, FILM COATED ORAL at 09:50

## 2021-08-20 RX ADMIN — MONTELUKAST 10 MG: 10 TABLET, FILM COATED ORAL at 22:06

## 2021-08-20 RX ADMIN — ACETAMINOPHEN 650 MG: 325 TABLET, FILM COATED ORAL at 03:28

## 2021-08-20 RX ADMIN — OXYCODONE HYDROCHLORIDE 5 MG: 5 TABLET ORAL at 04:50

## 2021-08-20 RX ADMIN — ACETAMINOPHEN 650 MG: 325 TABLET, FILM COATED ORAL at 09:52

## 2021-08-20 NOTE — PLAN OF CARE
PRIMARY DIAGNOSIS: HERNIORRHAPHY open incisional and umbilical repair with mesh  OUTPATIENT/OBSERVATION GOALS TO BE MET BEFORE DISCHARGE:  1. Stable vital signs Yes ex for needed supplemental O2  2. Tolerating diet:Yes  3. Pain controlled with oral pain medications:  Yes  4. Positive bowel sounds:  Yes  5. Voiding without difficulty:  Yes  6. Able to ambulate:  Yes  7. Provider specific discharge goals met:  No needs to be wean from O2    Discharge Planner Nurse   Safe discharge environment identified: Yes  Barriers to discharge: Yes       Entered by: YANCI CHANDLER 08/20/2021   Vital signs:  Temp: 98.1  F (36.7  C) Temp src: Oral BP: 105/67 Pulse: 63   Resp: 16 SpO2: 92 % O2 Device: Nasal cannula Oxygen Delivery: 1 LPM Alert and oriented x4. Reported pain 4/10 and said it is tolerable. On 1L O2 over night. Wean off from O2 this morning. Noted  pt sat ranging 90% -88% and dropped to 88% when talking. Ambulated pt in [place] and sat dropped to 84% and quickly dropped to 78% in RA, pt also complained SOB. Placed on 4L O2 while walking to maintain angel luis > 92%. Provider aware. IS at bedside and encouraged to use every hour. Pt now sitting in a chair and weaned to 1L with sat >90%. Pt said they haven't passed gas yet since surgery but tolerated regular diet. BS hypoactive. IV saline locked. Abdominal dressing C.D.I. Abdominal binder in place. Up stand by assist.  Will continue to monitor.     Please review provider order for any additional goals.   Nurse to notify provider when observation goals have been met and patient is ready for discharge.

## 2021-08-20 NOTE — PROGRESS NOTES
Ambulated pt in [place] for a 2nd time. Pt dropped to 84% in 1.5L O2 while walking and needed to bump up to 3L to keep sat >90%. Pt reported mild SOB but said better than this morning. Pt then sent down for chest x-ray. Will continue to monitor.

## 2021-08-20 NOTE — PROGRESS NOTES
[facility]  Medicine Progress Note - Hospitalist Service       Date of Admission:  8/19/2021  Assessment & Plan   50 yo with prediabetes, HTN, JOSE J, uncomplicated asthma, obesity, and depression who presented to [facility] on 8/19/2021 for an elective incisional hernia repair with Dr Lyons.  Hospitalists consulted for postoperative hypoxia.       Acute on chronic respiratory failure, multifactorial:  Atelectasis, Obesity hypoventilation syndrome, untreated JOSE J, mild intermittent asthma  O2 sats dropping into 70s on RA while ambulating to the bathroom; symptomatic.  No wheezing on exam or air hunger to suggest asthmatic component.  Is not on O2 at home, however, she does have underlying JOSE J noncompliant with CPAP for about 1 year.  Spirometry 2018 suggested mild restrictive disease, likely related to body habitus.  H/o bronchogenic cyst.  Also, has known bilateral pulmonary nodules on yearly surveillance, last imaged 7/2019, when they were noted to be increasingly slightly.  Does not look toxic but severity of hypoxia is disproportionate to what would be expected in the postoperative setting.  - Obtain CXR and VBG  - RCAT  - Pulm toilet  - CPAP for naps and at bedtime  - Ambulate  - May need home O2  - Outpatient follow-up with Pulmonologist (Dr Cortez)     HTN  - Continue PTA lisinopril and Lasix     DLD  - Continue PTA atorvastatin     Asthma  - Continue PTA singulair, albuterol inhaler      COVID status: Negative  Diet: Advance Diet as Tolerated: Regular Diet Adult    DVT Prophylaxis: as per primary team  Code Status: Full Code      DISPO:  Home tomorrow?? May need O2 at home.  Additional work-up today for persistent hypoxia    VIANCA Sosa  Hospitalist Service  [facility]     Text Page (7AM - 5PM, M-F)  ______________________________________________________________________    Interval History   Doing OK.  Sats dropping into 70s with ambulation and reported being very  short of breath.  Vitals otherwise stable.      Data reviewed today: I reviewed all medications, new labs and imaging results over the last 24 hours. I personally reviewed no images or EKG's today.    Physical Exam   Vital Signs: Temp: 98.3  F (36.8  C) Temp src: Oral BP: 127/67 Pulse: 62   Resp: 16 SpO2: 95 % O2 Device: Nasal cannula Oxygen Delivery: 1.5 LPM  Weight: 275 lbs 12.8 oz    GENERAL:  Pleasant, cooperative, alert. Well developed, well nourished.   HEENT: Normocephalic, atraumatic.  Extra occular mm intact.  Sclera clear. PER   PULMONOLOGY: Clear to auscultation bilaterally with good exchange at the bases and no wheeze  CARDIAC: Regular rate and rhythm.  No appreciated murmur.     MUSCULOSKELETAL:  Moving x 4 spontaneously with CMS intact x4.  Normal bulk and tone.     NEURO: Alert and oriented x3.  CN II-XII grossly intact and symmetric.  No ataxia or tremor.  Nonfocal.       Data   Recent Labs   Lab 08/20/21  0604 08/19/21  0643   CR  --  0.95   *  --      No results found for this or any previous visit (from the past 24 hour(s)).  Medications       atorvastatin  20 mg Oral Daily     escitalopram  20 mg Oral Daily     furosemide  40 mg Oral Daily     lisinopril  5 mg Oral Daily     montelukast  10 mg Oral At Bedtime     sodium chloride (PF)  3 mL Intracatheter Q8H

## 2021-08-20 NOTE — PLAN OF CARE
PRIMARY DIAGNOSIS: Incisional Hernia with mesh  OUTPATIENT/OBSERVATION GOALS TO BE MET BEFORE DISCHARGE:  1. Stable vital signs No, need to wean O2  2. Tolerating diet:Yes  3. Pain controlled with oral pain medications:  Yes  4. Positive bowel sounds:  Yes  5. Voiding without difficulty:  Yes  6. Able to ambulate:  Yes  7. Provider specific discharge goals met:  No    Discharge Planner Nurse   Safe discharge environment identified: Yes  Barriers to discharge: Yes       Entered by: Rosenda Mane 08/20/2021 5:09 PM     Please review provider order for any additional goals.   Nurse to notify provider when observation goals have been met and patient is ready for discharge.    Temp: 98.6  F (37  C) Temp src: Oral BP: 118/59 Pulse: 62   Resp: 19 SpO2: 94 % O2 Device: Nasal cannula Oxygen Delivery: 2 LPM     Pt up SBA. Complains of abdominal pain, prn oxy given x2 and prn tylenol given x1. Ice pack in place for abdominal incision, pt also wearing abdominal binder. Dressing clean dry and intact. Can be sob at rest and with exertion. Pt ambulated in hallway x1 per support staff pt tolerated well at first sating in lower 90s on 2L of O2 then pts O2 sats dropped as low as 86% on 2L of O2 and per support staff it took her a while to recover and needed to stop walking to get her breath. Plan- pain control, wean O2 as able, encourage activity, outpatient follow-up with a pulmonologist, pt to wear cpap tonight- paging respiratory.

## 2021-08-20 NOTE — PHARMACY-ADMISSION MEDICATION HISTORY
Pharmacy reviewed prior to admission med list from pre-admitting rn, CORKY Romero.    Prior to Admission medications    Medication Sig Last Dose Taking? Auth Provider   albuterol (PROAIR HFA) 108 (90 Base) MCG/ACT inhaler INHALE 2 PUFFS INTO THE LUNGS EVERY 6 HOURS  Yes Lianna Cleveland MD   atorvastatin (LIPITOR) 20 MG tablet Take 1 tablet (20 mg) by mouth daily 8/18/2021 at Unknown time Yes Lianna Cleveland MD   escitalopram (LEXAPRO) 20 MG tablet TAKE 1 TABLET BY MOUTH ONE TIME DAILY 8/18/2021 at Unknown time Yes Lianna Cleveland MD   fluticasone (FLONASE) 50 MCG/ACT nasal spray Spray 2 sprays into both nostrils daily Past Month at Unknown time Yes Lianna Cleveland MD   furosemide (LASIX) 20 MG tablet TAKE 2 TABLETS BY MOUTH DAILY 8/18/2021 at Unknown time Yes Lianna Cleveland MD   lisinopril (ZESTRIL) 5 MG tablet Take 1 tablet (5 mg) by mouth daily 8/18/2021 at Unknown time Yes Lianna Cleveland MD   montelukast (SINGULAIR) 10 MG tablet TAKE 1 TABLET BY MOUTH EVERYDAY AT BEDTIME  Yes Lianna Cleveland MD   oxyCODONE (ROXICODONE) 5 MG tablet Take 1-2 tablets (5-10 mg) by mouth every 4 hours as needed for moderate to severe pain  Yes Rhys Lyons MD   DiphenhydrAMINE HCl (BENADRYL PO) Take 25 mg by mouth daily as needed  More than a month at Unknown time  Reported, Patient

## 2021-08-20 NOTE — CONSULTS
[facility]  Consult Note - Hospitalist Service     Date of Admission:  8/19/2021  Consult Requested by:Rhys Lyons MD  Reason for Consult: Postoperative hypoxia    Assessment & Plan   Hannah Meade is a 51 year old female admitted on 8/19/2021.      Hypoxia  Acute hypoxic respiratory failure  This is probably the result of anesthesia in a morbidly obese lady with obstructive sleep apnea who is not on CPAP  Will ask respiratory therapy to evaluate the patient and keep her saturations above 90%  If the hypoxia does not improve by tomorrow chest x-ray needs to be done initially    Asthma  We will continue albuterol inhaler as well as Singulair montelukast    Essential hypertension  Patient is on lisinopril and furosemide at home which will be continued    Type 2 diabetes mellitus  Not on oral medication or insulin  Will check sugars 4 times daily and monitor sugars    Depression  We will continue Lexapro    She needs to do early ambulation    Mario Mcknight MD  [facility]  Securely message with the Vocera Web Console (learn more here)  Text page via [employer] Paging/Directory      Clinically Significant Risk Factors Present on Admission                   ______________________________________________________________________    Chief Complaint   Hypoxia    History is obtained from the patient    History of Present Illness   Hannah Meade is a 51 year old lady with past medical history of essential hypertension, type 2 diabetes mellitus, morbid obesity, asthma, obstructive sleep apnea, major depression, plantar fasciitis, was admitted to the [facility] on 8/19/2021 with incisional hernia for the incisional herniorrhaphy by Dr. Rhys Rocha MD.     Postop patient's oxygen saturation decreased to 86% on 2 L nasal cannula O2 with eventually improvement to 97% on 1.5 L nasal cannula O2.  Patient denies any chest pain shortness of breath cough or increased sputum  production.  She was supposed to use CPAP for her obstructive sleep apnea but does not use it.       Review of Systems   Denies any headache, blurring of vision or double vision, neck pain jaw pain or shoulder pain, chest pain, shortness of breath, cough or increased sputum production, nausea or vomiting, her abdominal pain.  Is not significant.  Denies any urinary symptoms of burning or increased frequency. Denies any weakness of upper or lower extremities or any seizure activity. Fever or chills. Bleeding from anywhere else.  No rashes or cellulitis.      Past Medical History    I have reviewed this patient's medical history and updated it with pertinent information if needed.   Past Medical History:   Diagnosis Date     Anxiety      Depressive disorder      Diabetes (H)     prediabetic     Elevated glucose 9/13/2018     Hypertension     Unsure on the date     Lymphedema 9/10/2018     Mild major depression (H) 9/10/2018     Obesity, unspecified      JOSE J (obstructive sleep apnea) 9/10/2018    cpap recalled     Other chronic pain     criss knees     Plantar fasciitis 2/23/2015     Sleep apnea, unspecified 12/20/2017     Tuberculin test reaction      Uncomplicated asthma        Past Surgical History   I have reviewed this patient's surgical history and updated it with pertinent information if needed.  Past Surgical History:   Procedure Laterality Date     ABDOMEN SURGERY      tubes removed     APPENDECTOMY       COLONOSCOPY  06/13/2019    Dr. Leon [facility]     COLONOSCOPY N/A 6/13/2019    Procedure: COLONOSCOPY;  Surgeon: Mauro Leon MD;  Location: [facility]     EXCISE MASS BACK Left 9/17/2014    Procedure: EXCISE MASS BACK;  Surgeon: Yaz Avilez MD;  Location: [facility] OR     EXCISE MASS UPPER EXTREMITY Left 9/17/2014    Procedure: EXCISE MASS UPPER EXTREMITY;  Surgeon: Yaz Avilez MD;  Location: [facility] OR     HYSTERECTOMY VAGINAL  1992    ovaries intact     other      lump on right arm removed       THORACIC SURGERY      lung surgery     THORACOTOMY Right 8/25/2015    Procedure: THORACOTOMY;  Surgeon: Andrew Gordon MD;  Location:  OR     [facility] NONSPECIFIC PROCEDURE      2 C- SECTIONS       Social History   I have reviewed this patient's social history and updated it with pertinent information if needed.  Social History     Tobacco Use     Smoking status: Never Smoker     Smokeless tobacco: Never Used   Substance Use Topics     Alcohol use: No     Alcohol/week: 0.0 standard drinks     Drug use: No       Family History   I have reviewed this patient's family history and updated it with pertinent information if needed.  Family History   Problem Relation Age of Onset     Neurologic Disorder Mother         Parkinsons     Diabetes Mother      Osteoporosis Mother      Genetic Disorder Mother      Hypertension Father      Cardiovascular Father      Gallbladder Disease Father      Hyperlipidemia Father      Cerebrovascular Disease Father      Obesity Father      Diabetes Maternal Grandmother      Other Cancer Maternal Grandmother      Osteoporosis Maternal Grandmother      Obesity Maternal Grandmother      Diabetes Maternal Grandfather      Diabetes Paternal Grandmother      Osteoporosis Paternal Grandmother      C.A.D. Paternal Grandfather      Cardiovascular Paternal Grandfather      Coronary Artery Disease Paternal Grandfather      Cerebrovascular Disease Paternal Grandfather      Prostate Cancer Paternal Grandfather      Hypertension Sister      Polycystic Kidney Diease Other      Diabetes Other      Hyperlipidemia Other      Anxiety Disorder Other      Obesity Other      Diabetes Other      Obesity Other      Hypertension Cousin      Hypertension Sister      Hyperlipidemia Sister      Asthma Sister      Obesity Sister      Hyperlipidemia Brother      Other Cancer Other      Other Cancer Cousin      Anxiety Disorder Son      Anxiety Disorder Cousin      Anxiety Disorder Daughter      Genetic Disorder  "Daughter         PKD     Colon Cancer No family hx of        Medications   Medications Prior to Admission   Medication Sig Dispense Refill Last Dose     albuterol (PROAIR HFA) 108 (90 Base) MCG/ACT inhaler INHALE 2 PUFFS INTO THE LUNGS EVERY 6 HOURS 18 g 3      atorvastatin (LIPITOR) 20 MG tablet Take 1 tablet (20 mg) by mouth daily 90 tablet 3 8/18/2021 at Unknown time     escitalopram (LEXAPRO) 20 MG tablet TAKE 1 TABLET BY MOUTH ONE TIME DAILY 90 tablet 1 8/18/2021 at Unknown time     fluticasone (FLONASE) 50 MCG/ACT nasal spray Spray 2 sprays into both nostrils daily 16 g 0 Past Month at Unknown time     furosemide (LASIX) 20 MG tablet TAKE 2 TABLETS BY MOUTH DAILY 180 tablet 1 8/18/2021 at Unknown time     lisinopril (ZESTRIL) 5 MG tablet Take 1 tablet (5 mg) by mouth daily 90 tablet 1 8/18/2021 at Unknown time     montelukast (SINGULAIR) 10 MG tablet TAKE 1 TABLET BY MOUTH EVERYDAY AT BEDTIME 90 tablet 1      DiphenhydrAMINE HCl (BENADRYL PO) Take 25 mg by mouth daily as needed    More than a month at Unknown time       Allergies   Allergies   Allergen Reactions     Seasonal Allergies      Ampicillin Rash     \"sunburn\"     Codeine Rash     \"sunburn\"     Decongestant [Oxymetazoline] Rash     \"sunburn\"     Sulfa Drugs Rash     \"sunburn\"       Physical Exam   Vital Signs: Temp: 98.3  F (36.8  C) Temp src: Oral BP: 109/54 Pulse: 79   Resp: 16 SpO2: 91 % O2 Device: Nasal cannula Oxygen Delivery: 2 LPM  Weight: 275 lbs 12.8 oz       GENERAL:  Patient does not look in any acute distress she is morbidly obese  HEENT:  EOM+, Conjunctiva is clear    NECK: I do not see jugular Venous distention  HEART: S1 S2  regular  Rate and Rhythm,   no murmur,    LUNGS: Respirations are not laboured, Lungs are clear to auscultation though there are decreased breath sounds in the lung bases without Crepitations or Wheezing   ABDOMEN: Soft, there is no significant tenderness  , Bowel Sounds are   Positive   LOWER LIMBS:   Pedal Edema   " Bilaterally   CNS:   Alert,   Oriented x 3, Moving all the Four Limbs        Data   Results for orders placed or performed during the hospital encounter of 08/19/21 (from the past 24 hour(s))   Creatinine   Result Value Ref Range    Creatinine 0.95 0.52 - 1.04 mg/dL    GFR Estimate 69 >60 mL/min/1.73m2

## 2021-08-20 NOTE — PLAN OF CARE
PRIMARY DIAGNOSIS: ACUTE ABD PAIN POST PROCEDURE  OUTPATIENT/OBSERVATION GOALS TO BE MET BEFORE DISCHARGE:  1. Pain Status: Improved with use of alternative comfort measures i.e.: ice and abs band    2. Return to near baseline physical activity: No    3. Cleared for discharge by consultants (if involved): No    Discharge Planner Nurse   Safe discharge environment identified: Yes  Barriers to discharge: Yes       Entered by: Dacia Carter 08/20/2021 4:30 AM     Please review provider order for any additional goals.   Nurse to notify provider when observation goals have been met and patient is ready for discharge.    A&O x 4 2L on O2 NC and cap. Sats 90-93%. Increased pain. At 0141, pain 8/10. Gave IV dilaudid 0.4mg at 0141. Within 5 minutes pain down to 5/10. At 0328, patient called asking for pain medications. Only asked for Tylenol as she said pain was 5/10. Tylenol given. Pt currently sleeping, unable to reassess pain relief.

## 2021-08-20 NOTE — UTILIZATION REVIEW
"Admission Status; Secondary Review Determination     Under the authority of the Utilization Management Commitee, the utilization review process indicated a secondary review on the above patient. The review outcome is based on review of the medical records, discussions with staff, and applying clinical experience noted on the date of the review.     (x) Inpatient Status Appropriate - This patient's medical care is consistent with medical management for inpatient care and reasonable inpatient medical practice.     RATIONALE FOR DETERMINATION:  51-year-old female with a hx of morbid obesity (BMI of 48) who developed a painful hernia in her lower epigastrium at the site of her previous laparoscopic surgery.  The patient was admitted for elective incisional Herniorrhaphy with Bard Ventralex ST Hernia Patch.      Post-surgical course notable for hypoxia.  Per nursing notes from this morning:  \"pt sat ranging 90% -88% and dropped to 88% when talking. Ambulated pt in [place] and sat dropped to 84% and quickly dropped to 78% on RA, pt also complained SOB. Placed on 4L O2 while walking to maintain sats > 92%.\"    I spoke with the provider RUFINO Cedeno today.  CPAP, CXR, supplemental oxygen are being planned today.  If symptoms not improving by tomorrow further work up with chest CT is being considered.  The patient does not use oxygen at home    Given significant hypoxia with symptomatic acute hypoxic respiratory failure and work up and treatment above inpatient status is appropriate.      At the time of admission with the information available to the attending physician more than 2 nights Hospital complex care was anticipated, based on patient risk of adverse outcome if treated as outpatient and complex care required. Inpatient admission is appropriate based on the Medicare guidelines.    The information on this document is developed by the utilization review team in order for the business office to ensure compliance. " This only denotes the appropriateness of proper admission status and does not reflect the quality of care rendered.   The definitions of Inpatient Status and Observation Status used in making the determination above are those provided in the CMS Coverage Manual, Chapter 1 and Chapter 6, section 70.4.     Sincerely,     Luciano Collado MD  Utilization Review   Physician Advisor   [facility]

## 2021-08-20 NOTE — PROGRESS NOTES
[facility]    General Surgery Progress Note          Assessment and Plan:   Assessment:    Hannah Meade is a 51 year old female 1 Day Post-Op from Procedure(s):  HERNIORRHAPHY open incisional and umbilical repair with mesh   - doing well  - working on weaning oxygen        Plan:   Pain mgmt: Tylenol and Oxycodone  Diet: regular  IVFs: may saline lock  Activity: encourage ambulation 3-4 x daily   Dispo: ok to discharge home today if able to wean oxygen           Interval History:   Pt resting in bed.  Some incisional pain, well controlled with oral medications.  Tolerating diet.  No difficulties breathing.  Has cpap at home, but hasn't used as she states it has been recalled.  Discharge instructions discussed.          Physical Exam:   Temp: 98.1  F (36.7  C) Temp src: Oral BP: 105/67 Pulse: 63   Resp: 16   SpO2: 92 % O2 Device: Nasal cannula Oxygen Delivery: 1 LPM      I/O last 3 completed shifts:  In: 1240 [P.O.:240; I.V.:1000]  Out: 150 [Urine:150]    Constitutional: alert and no distress   Abdomen: soft, obese, mild tenderness around incision site.  Dressing lifted: steri strips in place - clean/dry/intact.  Wearing abdominal binder.            Data:   Data   Recent Labs   Lab 08/20/21  0604 08/19/21  0643   CR  --  0.95   *  --         Farhana Newberry PA-C       Seen and agree. CXR with atelectasis. Encouraged more activity and IS. If still on O2 tomorrow, will arrange for home O2.     Sandra Gudino MD

## 2021-08-20 NOTE — PLAN OF CARE
PRIMARY DIAGNOSIS: ACUTE ABD PAIN POST PROCEDURE  OUTPATIENT/OBSERVATION GOALS TO BE MET BEFORE DISCHARGE:  1. Pain Status: Improved with use of alternative comfort measures i.e.: ice and abs band    2. Return to near baseline physical activity: No    3. Cleared for discharge by consultants (if involved): No    Discharge Planner Nurse   Safe discharge environment identified: Yes  Barriers to discharge: Yes       Entered by: Dacia Carter 08/19/2021 8:01 PM     Please review provider order for any additional goals.   Nurse to notify provider when observation goals have been met and patient is ready for discharge.    A&O x 4 2L on O2 NC and cap. Sats 90-93%. Resting comfortably.

## 2021-08-20 NOTE — PLAN OF CARE
PRIMARY DIAGNOSIS: ACUTE ABD PAIN POST PROCEDURE  OUTPATIENT/OBSERVATION GOALS TO BE MET BEFORE DISCHARGE:  1. Pain Status: Improved with use of alternative comfort measures i.e.: ice and abs band    2. Return to near baseline physical activity: No    3. Cleared for discharge by consultants (if involved): No    Discharge Planner Nurse   Safe discharge environment identified: Yes  Barriers to discharge: Yes       Entered by: Dacia Carter 08/20/2021 4:28 AM     Please review provider order for any additional goals.   Nurse to notify provider when observation goals have been met and patient is ready for discharge.    A&O x 4 2L on O2 NC and cap. Sats 90-93%. Increased pain. 5/10. Gave oxycodone 5mg at 2230. At 2330, unable to reassess, patient sleeping.

## 2021-08-21 VITALS
RESPIRATION RATE: 18 BRPM | WEIGHT: 275.8 LBS | BODY MASS INDEX: 48.87 KG/M2 | HEIGHT: 63 IN | OXYGEN SATURATION: 93 % | TEMPERATURE: 98.2 F | DIASTOLIC BLOOD PRESSURE: 78 MMHG | SYSTOLIC BLOOD PRESSURE: 127 MMHG | HEART RATE: 92 BPM

## 2021-08-21 LAB
ANION GAP SERPL CALCULATED.3IONS-SCNC: 4 MMOL/L (ref 3–14)
BUN SERPL-MCNC: 18 MG/DL (ref 7–30)
CALCIUM SERPL-MCNC: 8.5 MG/DL (ref 8.5–10.1)
CHLORIDE BLD-SCNC: 107 MMOL/L (ref 94–109)
CO2 SERPL-SCNC: 29 MMOL/L (ref 20–32)
CREAT SERPL-MCNC: 0.84 MG/DL (ref 0.52–1.04)
GFR SERPL CREATININE-BSD FRML MDRD: 80 ML/MIN/1.73M2
GLUCOSE BLD-MCNC: 117 MG/DL (ref 70–99)
GLUCOSE BLDC GLUCOMTR-MCNC: 109 MG/DL (ref 70–99)
POTASSIUM BLD-SCNC: 4.1 MMOL/L (ref 3.4–5.3)
SODIUM SERPL-SCNC: 140 MMOL/L (ref 133–144)

## 2021-08-21 PROCEDURE — 80048 BASIC METABOLIC PNL TOTAL CA: CPT | Performed by: PHYSICIAN ASSISTANT

## 2021-08-21 PROCEDURE — 999N000157 HC STATISTIC RCP TIME EA 10 MIN

## 2021-08-21 PROCEDURE — 94660 CPAP INITIATION&MGMT: CPT

## 2021-08-21 PROCEDURE — 99232 SBSQ HOSP IP/OBS MODERATE 35: CPT | Performed by: PHYSICIAN ASSISTANT

## 2021-08-21 PROCEDURE — 36415 COLL VENOUS BLD VENIPUNCTURE: CPT | Performed by: PHYSICIAN ASSISTANT

## 2021-08-21 PROCEDURE — 250N000013 HC RX MED GY IP 250 OP 250 PS 637: Performed by: SURGERY

## 2021-08-21 PROCEDURE — 250N000013 HC RX MED GY IP 250 OP 250 PS 637: Performed by: INTERNAL MEDICINE

## 2021-08-21 RX ADMIN — ATORVASTATIN CALCIUM 20 MG: 20 TABLET, FILM COATED ORAL at 07:56

## 2021-08-21 RX ADMIN — OXYCODONE HYDROCHLORIDE 5 MG: 5 TABLET ORAL at 08:27

## 2021-08-21 RX ADMIN — ESCITALOPRAM OXALATE 20 MG: 20 TABLET ORAL at 07:56

## 2021-08-21 NOTE — PLAN OF CARE
Patient alert and oriented x4. Vitals are Temp: 98.2  F (36.8  C) Temp src: Axillary BP: 127/78 Pulse: 92   Resp: 18 SpO2: 93 % RA. Reports 4-6/10 tolerable pain at surgical site. PRN Oxycodone given x1 for pain after ambulation. Patient ambulated the halls O2 >95% RA. Dressing to abdominal incision is clean, dry, and intact. Voiding without difficulty. Tolerating regular diet. Plan to discharge today.

## 2021-08-21 NOTE — PROGRESS NOTES
Patient ambulated in halls with support staff. Maintained >95% on room air. Denies shortness of breath and dizziness.

## 2021-08-21 NOTE — PLAN OF CARE
"Care from [id number]    Inpatient Progress Note:  For complete assessment see flow sheet documentation.    /57 (BP Location: Left arm)   Pulse 57   Temp 97.3  F (36.3  C) (Axillary)   Resp 12   Ht 1.6 m (5' 3\")   Wt 125.1 kg (275 lb 12.8 oz)   SpO2 96%   BMI 48.86 kg/m         Orientation: Alert and oriented x4   Neuro: Intact   Pain status: Denies any pain with interview.   Activity: SBA    Peripheral edema: None noted   Resp: Lung sounds diminished. CPAP of +6 @ 30% was applied to the pt via the mask for JOSE J via RT. On NC at 2 LPM prior to CPAP placement.   Cardiac: WNL   GI: WNL   : WNL    Skin: Abdominal Incision   LDA: Peripheral IV   Infusions: Saline locked   Diet: Regular   Consults:Hospitalist    Discharge Plan: Possible discharge home today with oxygen if un-able to be weaned off and work-up is complete.     Will continue to monitor and provide cares.     Devika Aguilar RN    "

## 2021-08-21 NOTE — PLAN OF CARE
Patient's After Visit Summary was reviewed with patient and/or spouse.   Patient verbalized understanding of After Visit Summary, recommended follow up and was given an opportunity to ask questions.   Discharge medications sent home with patient/family: YES Oxycodone  Discharged with spouse

## 2021-08-21 NOTE — PROGRESS NOTES
[facility]  Medicine Progress Note - Hospitalist Service       Date of Admission:  8/19/2021    Assessment & Plan   52 yo with prediabetes, HTN, JOSE J, uncomplicated asthma, obesity, and depression who presented to [facility] on 8/19/2021 for an elective incisional hernia repair with Dr Lyons.  Hospitalists consulted for postoperative hypoxia.       Acute on chronic respiratory failure, multifactorial:  Atelectasis, Obesity hypoventilation syndrome, untreated JOES J, mild intermittent asthma  Yesterday, O2 sats dropped into 70s on RA while ambulating to the bathroom; symptomatic.  Nothing to suggest asthmatic component.  JOSE J noncompliant with CPAP for about 1 year.  Spirometry 2018 suggested mild restrictive disease, likely related to body habitus.  H/o bronchogenic cyst.  Also, has known bilateral pulmonary nodules on yearly surveillance, last imaged 7/2019, when they were noted to be increasingly slightly.  CXR suggested atelectasis.  VBG revealed hypercapnea.    - Doing better after CPAP with naps and overnight  - Satting well on ambulation  - No cough, fever, or pleuritic pain to raise suspicion for evolving PNA  - Referral for repeat Sleep Study; reviewed with patient that it is very important she start using a CPAP again  - She should follow-up with Pulmonologist (Dr Cortez) for surveillance of known lung nodules        COVID status: Negative  Diet: Advance Diet as Tolerated: Regular Diet Adult    DVT Prophylaxis: as per primary team  Code Status: Full Code      DISPO:  Home today.    VIANCA Sosa  Hospitalist Service  [facility]     Text Page (7AM - 5PM, M-F)  ______________________________________________________________________    Interval History   Doing better today.  Satting well on RA    Data reviewed today: I reviewed all medications, new labs and imaging results over the last 24 hours. I personally reviewed the chest x-ray image(s) showing no acute infiltrate  or consolidation; suspect atelectasis.    Physical Exam   Vital Signs: Temp: 97.8  F (36.6  C) Temp src: Axillary BP: 120/73 Pulse: 73   Resp: 15 SpO2: 92 % O2 Device: None (Room air) Oxygen Delivery: 2 LPM  Weight: 275 lbs 12.8 oz    GENERAL:  Pleasant, cooperative, alert. Well developed, well nourished.   HEENT: Normocephalic, atraumatic.  Extra occular mm intact.  Sclera clear. PER   PULMONOLOGY: Clear to auscultation bilaterally with good exchange at the bases and no wheeze  CARDIAC: Regular       MUSCULOSKELETAL:  Moving x 4 spontaneously with CMS intact x4.  Normal bulk and tone.     NEURO: Alert and oriented x3.  CN II-XII grossly intact and symmetric.  Nonfocal.       Data   Recent Labs   Lab 08/21/21  0804 08/21/21  0624 08/20/21  0604 08/19/21  0643   NA  --  140  --  139   POTASSIUM  --  4.1  --  4.3   CHLORIDE  --  107  --  105   CO2  --  29  --  28   BUN  --  18  --  17   CR  --  0.84  --  0.92  0.95   ANIONGAP  --  4  --  6   SERGO  --  8.5  --  9.0   * 117* 135* 129*     Recent Results (from the past 24 hour(s))   XR Chest 2 Views    Narrative    XR CHEST 2 VW   8/20/2021 1:16 PM     HISTORY: respiratory failure after surgery.    COMPARISON: Chest CT 7/10/2019      Impression    IMPRESSION: Low lung volumes. New somewhat linear bilateral lower lung  opacities, favored to represent atelectasis but infectious process not  excluded. No pleural effusion. Normal heart size and pulmonary  vascularity.       KAIA PEDERSEN MD         SYSTEM ID:  [id number]     Medications       atorvastatin  20 mg Oral Daily     escitalopram  20 mg Oral Daily     furosemide  40 mg Oral Daily     lisinopril  5 mg Oral Daily     montelukast  10 mg Oral At Bedtime     sodium chloride (PF)  3 mL Intracatheter Q8H

## 2021-08-21 NOTE — PROGRESS NOTES
A CPAP of +6 @ 30% was applied to the pt via the mask for JOSE J. Skin integrity is good. Pt is tolerating it well. Will continue to monitor and assess the pt's current respiratory status and needs.    RT Mckinley on 8/21/2021 at 4:40 AM

## 2021-08-21 NOTE — PROGRESS NOTES
[facility]    General Surgery Progress Note          Assessment and Plan:   Assessment:    Hannah Meade is a 51 year old female 2 Days Post-Op from Procedure(s):  HERNIORRHAPHY open incisional and umbilical repair with mesh   - postop hypoxia, CXR 8/20 showing atelectasis  - untreated JOSE J, CPAP placed by RT.      Plan:   Dispo: ok to discharge home today.  Rx done for Oxycodone. Recommend Miralax daily until + BM.  F/u Pulmonologist as outpatient  Surgery PA will follow up by telephone 2-3 weeks          Interval History:   Feels well, now fully weaned off supplemental O2.  Pain controlled, tolerating diet, and passing flatus.  Ready for home.         Physical Exam:   Temp: 97.8  F (36.6  C) Temp src: Axillary BP: 120/73 Pulse: 73   Resp: 15   SpO2: 92 % O2 Device: None (Room air) Oxygen Delivery: 2 LPM      No intake/output data recorded.    Constitutional: alert and no distress   Abdomen: soft, obese, mild tenderness around incision site.  Dressing lifted: steri strips in place - clean/dry/intact.  Wearing abdominal binder.            Data:   Data   Recent Labs   Lab 08/21/21  0804 08/21/21  0624 08/20/21  0604 08/19/21  0643   NA  --  140  --  139   POTASSIUM  --  4.1  --  4.3   CHLORIDE  --  107  --  105   CO2  --  29  --  28   BUN  --  18  --  17   CR  --  0.84  --  0.92  0.95   ANIONGAP  --  4  --  6   SERGO  --  8.5  --  9.0   * 117* 135* 129*        Akua Landon PA-C

## 2021-08-23 ENCOUNTER — PATIENT OUTREACH (OUTPATIENT)
Dept: CARE COORDINATION | Facility: CLINIC | Age: 51
End: 2021-08-23

## 2021-08-23 ENCOUNTER — MYC MEDICAL ADVICE (OUTPATIENT)
Dept: FAMILY MEDICINE | Facility: CLINIC | Age: 51
End: 2021-08-23

## 2021-08-23 DIAGNOSIS — Z71.89 OTHER SPECIFIED COUNSELING: ICD-10-CM

## 2021-08-23 NOTE — TELEPHONE ENCOUNTER
Patient is scheduled with NWD 9/2/21. OK to wait? Or video visit for f/up tomorrow?   Celestino Kwong RN - Patient Advocate and Liaison (PAL)  [facility]   [phone]

## 2021-08-23 NOTE — PROGRESS NOTES
Clinic Care Coordination Contact  [facility]: Post-Discharge Note  SITUATION                                                      Admission:    Admission Date: 08/19/21   Reason for Admission: Incisional hernia, without obstruction or gangrene  Discharge:   Discharge Date: 08/21/21  Discharge Diagnosis: Incisional hernia, without obstruction or gangrene    BACKGROUND                                                      Hannah Meade is a 51 year old lady with past medical history of essential hypertension, type 2 diabetes mellitus, morbid obesity, asthma, obstructive sleep apnea, major depression, plantar fasciitis, was admitted to the [facility] on 8/19/2021 with incisional hernia for the incisional herniorrhaphy by Dr. Rhys Rocha MD.    ASSESSMENT      Enrollment  Primary Care Care Coordination Status: Declined    Discharge Assessment  How are you doing now that you are home?: fine  How are your symptoms? (Red Flag symptoms escalate to triage hotline per guidelines): Improved  Do you feel your condition is stable enough to be safe at home until your provider visit?: Yes  Does the patient have their discharge instructions? : Yes  Does the patient have questions regarding their discharge instructions? : No  Were you started on any new medications or were there changes to any of your previous medications? : Yes  Does the patient have all of their medications?: Yes  Do you have questions regarding any of your medications? : No  Do you have all of your needed medical supplies or equipment (DME)?  (i.e. oxygen tank, CPAP, cane, etc.): Yes  Discharge follow-up appointment scheduled within 14 calendar days? : No  Is patient agreeable to assistance with scheduling? : No (patient would like to call clinic and set up appointment)    Post-op ([facility] Only)  If the patient had a surgery or procedure, do they have any questions for a nurse?: No             PLAN                                                       Outpatient Plan:  Please follow-up with your PMD in 3-5 days. Please see a sleep specialist for a repeat sleep study. You need to start using  your CPAP again.    No future appointments.      For any urgent concerns, please contact our 24 hour nurse triage line: [phone] ([phone])       DANG Abraham  [phone]  [facility]

## 2021-08-30 NOTE — PROGRESS NOTES
Pre-Visit Planning     Appointment Notes for this encounter:   This is a follow up from a surgery/hospital visit    Questionnaires Reviewed/Assigned  No additional questionnaires are needed        Patient preferred phone number: [phone]    Patient contact not needed. e-Check in complete.  SILVERIO Tirado - PAL (Patient Advocate and Liaison)

## 2021-09-02 ENCOUNTER — OFFICE VISIT (OUTPATIENT)
Dept: FAMILY MEDICINE | Facility: CLINIC | Age: 51
End: 2021-09-02
Payer: COMMERCIAL

## 2021-09-02 VITALS
RESPIRATION RATE: 20 BRPM | DIASTOLIC BLOOD PRESSURE: 85 MMHG | BODY MASS INDEX: 48.2 KG/M2 | HEIGHT: 63 IN | SYSTOLIC BLOOD PRESSURE: 127 MMHG | WEIGHT: 272 LBS | OXYGEN SATURATION: 96 % | TEMPERATURE: 98.5 F | HEART RATE: 79 BPM

## 2021-09-02 DIAGNOSIS — I10 BENIGN ESSENTIAL HYPERTENSION: ICD-10-CM

## 2021-09-02 DIAGNOSIS — E66.01 MORBID OBESITY WITH BODY MASS INDEX (BMI) OF 40.0 TO 49.9 (H): ICD-10-CM

## 2021-09-02 DIAGNOSIS — K43.2 INCISIONAL HERNIA, WITHOUT OBSTRUCTION OR GANGRENE: Primary | ICD-10-CM

## 2021-09-02 DIAGNOSIS — R13.10 DYSPHAGIA, UNSPECIFIED TYPE: ICD-10-CM

## 2021-09-02 DIAGNOSIS — G47.33 OSA (OBSTRUCTIVE SLEEP APNEA): ICD-10-CM

## 2021-09-02 DIAGNOSIS — R05.9 COUGH: ICD-10-CM

## 2021-09-02 PROCEDURE — 99495 TRANSJ CARE MGMT MOD F2F 14D: CPT | Performed by: FAMILY MEDICINE

## 2021-09-02 RX ORDER — LOSARTAN POTASSIUM 25 MG/1
25 TABLET ORAL DAILY
Qty: 90 TABLET | Refills: 0 | Status: SHIPPED | OUTPATIENT
Start: 2021-09-02 | End: 2022-01-13

## 2021-09-02 ASSESSMENT — ANXIETY QUESTIONNAIRES
4. TROUBLE RELAXING: SEVERAL DAYS
6. BECOMING EASILY ANNOYED OR IRRITABLE: NOT AT ALL
GAD7 TOTAL SCORE: 6
5. BEING SO RESTLESS THAT IT IS HARD TO SIT STILL: SEVERAL DAYS
7. FEELING AFRAID AS IF SOMETHING AWFUL MIGHT HAPPEN: SEVERAL DAYS
1. FEELING NERVOUS, ANXIOUS, OR ON EDGE: SEVERAL DAYS
GAD7 TOTAL SCORE: 6
7. FEELING AFRAID AS IF SOMETHING AWFUL MIGHT HAPPEN: SEVERAL DAYS
2. NOT BEING ABLE TO STOP OR CONTROL WORRYING: SEVERAL DAYS
GAD7 TOTAL SCORE: 6
8. IF YOU CHECKED OFF ANY PROBLEMS, HOW DIFFICULT HAVE THESE MADE IT FOR YOU TO DO YOUR WORK, TAKE CARE OF THINGS AT HOME, OR GET ALONG WITH OTHER PEOPLE?: SOMEWHAT DIFFICULT
3. WORRYING TOO MUCH ABOUT DIFFERENT THINGS: SEVERAL DAYS

## 2021-09-02 ASSESSMENT — PATIENT HEALTH QUESTIONNAIRE - PHQ9
10. IF YOU CHECKED OFF ANY PROBLEMS, HOW DIFFICULT HAVE THESE PROBLEMS MADE IT FOR YOU TO DO YOUR WORK, TAKE CARE OF THINGS AT HOME, OR GET ALONG WITH OTHER PEOPLE: VERY DIFFICULT
SUM OF ALL RESPONSES TO PHQ QUESTIONS 1-9: 10
SUM OF ALL RESPONSES TO PHQ QUESTIONS 1-9: 10

## 2021-09-02 ASSESSMENT — MIFFLIN-ST. JEOR: SCORE: 1812.91

## 2021-09-02 NOTE — PROGRESS NOTES
Assessment & Plan     Incisional hernia, without obstruction or gangrene  - well healed incision.     Morbid obesity with body mass index (BMI) of 40.0 to 49.9 (H)  - diet and lifestyle changes stressed.     JOSE J (obstructive sleep apnea)  - due for recheck in a few weeks     Dysphagia, unspecified type  - will start on trial of PPI   - omeprazole (PRILOSEC) 20 MG DR capsule; Take 1 capsule (20 mg) by mouth daily    Benign essential hypertension  - stable BP. Lisinopril could be causing cough switch to ARB and recheck   - losartan (COZAAR) 25 MG tablet; Take 1 tablet (25 mg) by mouth daily    Cough  - likely related to lisinopril. Will stop and switch to losartan.         30 minutes spent on the date of the encounter doing chart review, history and exam, documentation and further activities per the note       See Patient Instructions    Return in about 2 months (around 11/2/2021) for in person, medication recheck, .    Lianna Cleveland MD  [facility] JAVIER Young is a 51 year old who presents for the following health issues     History of Present Illness       She eats 2-3 servings of fruits and vegetables daily.She consumes 2 sweetened beverage(s) daily.She exercises with enough effort to increase her heart rate 10 to 19 minutes per day.  She exercises with enough effort to increase her heart rate 3 or less days per week. She is missing 1 dose(s) of medications per week.  She is not taking prescribed medications regularly due to remembering to take.         Hospital Follow-up Visit:    Hospital/Nursing Home/IP Rehab Facility: [facility]  Date of Admission: 08/19/2021  Date of Discharge: 08/21/2021  Reason(s) for Admission: complications after hernia surgery      Was your hospitalization related to COVID-19? No   Problems taking medications regularly:  None  Medication changes since discharge: None  Problems adhering to  "non-medication therapy:  None    Summary of hospitalization:  [facility] discharge summary reviewed  Diagnostic Tests/Treatments reviewed.  Follow up needed: none  Other Healthcare Providers Involved in Patient s Care:         None  Update since discharge: stable.       Post Discharge Medication Reconciliation: discharge medications reconciled, continue medications without change.  Plan of care communicated with patient              Walked into patient's exam room with her coughing significantly. Reports she has had this cough for many months- it is dry in nature and feels there is something constantly stuck in her throat.Per patient each time she eats she coughs and gags.  She used to also have abdominal pain but this has improved with the surgery.    In the post op period she was unable to be weaned off oxygen and was admitted for oxygen therapy. She does have a history of JOSE J but admits she has not used her machine for quite some time due to her her autistic son always interfering with her machine.       Wt Readings from Last 4 Encounters:   09/02/21 123.4 kg (272 lb)   08/19/21 125.1 kg (275 lb 12.8 oz)   08/13/21 125.2 kg (276 lb)   08/12/21 124 kg (273 lb 5 oz)       Review of Systems   Constitutional, HEENT, cardiovascular, pulmonary, GI, , musculoskeletal, neuro, skin, endocrine and psych systems are negative, except as otherwise noted.      Objective    /85 (BP Location: Right arm, Patient Position: Chair, Cuff Size: Adult Large)   Pulse 79   Temp 98.5  F (36.9  C) (Oral)   Resp 20   Ht 1.6 m (5' 3\")   Wt 123.4 kg (272 lb)   SpO2 96%   Breastfeeding No   BMI 48.18 kg/m    Body mass index is 48.18 kg/m .  Physical Exam   GENERAL: healthy, alert and no distress  NECK: no adenopathy, no asymmetry, masses, or scars and thyroid normal to palpation  RESP: lungs clear to auscultation - no rales, rhonchi or wheezes  CV: regular rate and rhythm, normal S1 S2, no S3 or S4, no murmur, " click or rub, no peripheral edema and peripheral pulses strong  ABDOMEN: soft, nontender, bowel sounds normal and well-healed incision   MS: no gross musculoskeletal defects noted, no edema  PSYCH: mentation appears normal, affect normal/bright              Answers for HPI/ROS submitted by the patient on 9/2/2021  If you checked off any problems, how difficult have these problems made it for you to do your work, take care of things at home, or get along with other people?: Very difficult  PHQ9 TOTAL SCORE: 10  MAURISIO 7 TOTAL SCORE: 6

## 2021-09-02 NOTE — PATIENT INSTRUCTIONS
Stop lisinopril and switch to losartan   Start on omeprazole         [email or web address]

## 2021-09-02 NOTE — DISCHARGE SUMMARY
[facility]    Discharge Summary  Surgery    Date of Admission:  8/19/2021  Date of Discharge:  8/21/2021 10:47 AM  Discharging Provider: Akua Landon PA-C  Discharge Summary Note completed by: Radha Monk PA-C on 9/2/2021  Date of Service: The patient was personally seen by Discharging Providers on the day of discharge.    Discharge Diagnoses       Procedure/Surgery Information   Incisional Herniorrhaphy with Bard Ventralex ST Hernia Patch, large size, increased difficulty due to morbid obesity with BMI of 48   Surgeon(s) and Role:     * Rhys Lyons MD - Primary     * Yvon Espinosa PA-C     History of Present Illness   Hannah Meade is a 51 year old female who has developed a painful hernia in her lower epigastrium.  This is at the site of her previous laparoscopic surgery.  CT scanning confirms the surgery and also identifies a small umbilical hernia as well.  He was advised that she lose weight prior to herniorrhaphy however, she feels this cannot be accomplished and she wishes to proceed with herniorrhaphy immediately.    Hospital Course   Hannah Meade was admitted on 8/19/2021.  The following problems were addressed during her hospitalization:  Patient Active Problem List   Diagnosis     Carbuncle and furuncle of trunk     CARDIOVASCULAR SCREENING; LDL GOAL LESS THAN 160     Insomnia     Anxiety     Plantar fasciitis     Ovarian cyst     Bronchogenic cyst     Morbid obesity (H)     bmi over 40     BMI 40.0-44.9, adult (H)     Asthma     Other nonspecific abnormal finding of lung field (CODE)     Multiple lipomas     Mild major depression (H)     JOSE J (obstructive sleep apnea)     Lymphedema     Elevated glucose     Type 2 diabetes mellitus without complication, without long-term current use of insulin (H)     Incisional hernia, without obstruction or gangrene     Bailee-operative antibiotic therapy included: Clindamycin.  Post-operative pain control: was  via IV until able to tolerate PO intake and transitioned to PO pain meds.    Remarkable hospital course events: Patient was unable to wean off oxygen to room air in the postoperative recovery unit.  She was therefore admitted for oxygen therapy and seen in consultation by Hospitalist.  It was determined she likely has undiagnosed JOSE J, also atelectasis noted on imaging.  Respiratory therapy initiated; IS use, o2, CPAP.  Improved by POD#2, encouraged to use CPAP at home, repeat sleep study, and make follow-up appt with outpatient pulmonology.  Please see Hospitalist notes for further details if needed.    Hannah met all criteria for release on 8/21/2021 10:47 AM.  She was afebrile, tolerating diet, pain controlled on PO meds, ambulating well, and had return of bowel function.    Medications discontinued or adjusted during this hospitalization: see discharge med list below.    Antibiotics prescribed at discharge: None prescribed     Imaging study follow up needs:   -per Pulmonologist    Discharge Instructions and Follow-Up:  Discharge diet: Regular   Discharge activity: Lifting restricted to 20 pounds  No heavy lifting, pushing, pulling for 4 week(s)  No driving or operating machinery while on narcotic analgesics   Discharge follow-up: Follow up with Dr. Cortez, repeat sleep study  Follow up call with surgery PA in 2-3 weeks   Wound/Incision care: Daily dressing changes  Ice to area for comfort  May get incision wet in shower but do not soak or scrub  Steri-strips off in 14 days       Radha Monk PA-C      Discharge Disposition   Discharged to home   Condition at discharge: Stable    Pending Results   Unresulted Labs Ordered in the Past 30 Days of this Admission     No orders found from 7/20/2021 to 8/20/2021.          Primary Care Physician   Lianna Cleveland    Consultations This Hospital Stay   HOSPITALIST IP CONSULT    Discharge Orders      SLEEP EVALUATION & MANAGEMENT REFERRAL - ADULT -       When to call - Contact Surgeon Team    You may experience symptoms that require follow-up before your scheduled appointment. Contact your Surgeon Team if you are concerned about pain control, large amount of bleeding, blood clots, constipation, or if you experience signs of infection (fever, growing tenderness at the surgery site, a large amount of drainage, severe pain, foul-smelling drainage, redness or swelling.     When to call - Reach out to Urgent Care    If you are experiencing uncontrolled Nausea and Vomiting, uncontrolled pain, inability to urinate and uncomfortable, and in need of immediate care, and you are NOT able to reach your Surgeon Team, go to an Urgent Care clinic. Do NOT go to the Emergency Room unless you have shortness of breath, chest pain, or other signs of a medical emergency.     When to call - Reasons to Call 911    Call 911 immediately if you experience sudden-onset chest pain, arm weakness/numbness, slurred speech, or shortness of breath     Symptoms - Fever Management    A low grade fever can be expected after surgery. Your Provider many have prescribed an Opioid pain medication that also contains acetaminophen (TYLENOL) that may help with Fever management.  Do NOT take additional acetaminophen (TYLENOL) in combination with an Opioid/acetaminophen (TYLENOL) product. Read the labels on your Over The Counter (OTC) medications with care.     Symptoms - Reduced Urine Output    If it has been greater than 8 hours since you have urinated despite drinking plenty of water, call your Surgeon Team.     No driving or operating machinery    Do NOT drive any vehicle or operate mechanical equipment for 24 hours following the end of your surgery.  Even though you may feel normal, your reactions may be affected by Anesthesia medication you received.     No Alcohol    Do NOT drink alcoholic beverages for 24 hours following your surgery and while taking pain medications.     Diet Instructions    Follow  your surgeon's orders for any diet restrictions.  If you did not receive any diet restrictions, you may drink clear liquids (apple juice, ginger ale, 7-up, broth, etc.), and progress to your regular diet as you feel able. It is important to stay well-hydrated after surgery and drink plenty of water.     Discharge Instructions - Comfort and Pain Management    Pain after surgery is normal and expected. You will have some amount of pain after surgery. Your pain will improve with time. There are several things you can do to help reduce your pain including: rest, ice, and using pain medications as needed. Use pain interventions and don't wait until pain level is out of control. Contact your Surgeon Team if you have pain that persists or worsens after surgery despite rest, ice, and taking your medication(s) as prescribed. You may have a dry mouth, a sore throat, muscles aches or trouble sleeping, and these symptoms should go away after 24 hours.     Discharge Instructions - Rest    Rest and relax for the next 24 hours. Make arrangements to have someone stay with you overnight, and avoid hazardous and strenuous activities.  Do NOT make any important decisions for the next 24 hours.     When to call - Contact Surgeon Team    You may experience symptoms that require follow-up before your scheduled appointment. Contact your Surgeon Team if you are concerned about pain control, large amount of bleeding, blood clots, constipation, or if you experience signs of infection (fever, growing tenderness at the surgery site, a large amount of drainage, severe pain, foul-smelling drainage, redness or swelling.     When to call - Reach out to Urgent Care    If you are experiencing uncontrolled Nausea and Vomiting, uncontrolled pain, inability to urinate and uncomfortable, and in need of immediate care, and you are NOT able to reach your Surgeon Team, go to an Urgent Care clinic. Do NOT go to the Emergency Room unless you have shortness  of breath, chest pain, or other signs of a medical emergency.     When to call - Reasons to Call 911    Call 911 immediately if you experience sudden-onset chest pain, arm weakness/numbness, slurred speech, or shortness of breath     Symptoms - Fever Management    A low grade fever can be expected after surgery. Your Provider many have prescribed an Opioid pain medication that also contains acetaminophen (TYLENOL) that may help with Fever management.  Do NOT take additional acetaminophen (TYLENOL) in combination with an Opioid/acetaminophen (TYLENOL) product. Read the labels on your Over The Counter (OTC) medications with care.     Symptoms - Reduced Urine Output    If it has been greater than 8 hours since you have urinated despite drinking plenty of water, call your Surgeon Team.     No driving or operating machinery    Do NOT drive any vehicle or operate mechanical equipment for 24 hours following the end of your surgery.  Even though you may feel normal, your reactions may be affected by Anesthesia medication you received.     No Alcohol    Do NOT drink alcoholic beverages for 24 hours following your surgery and while taking pain medications.     Diet Instructions    Follow your surgeon's orders for any diet restrictions.  If you did not receive any diet restrictions, you may drink clear liquids (apple juice, ginger ale, 7-up, broth, etc.), and progress to your regular diet as you feel able. It is important to stay well-hydrated after surgery and drink plenty of water.     Discharge Instructions - Comfort and Pain Management    Pain after surgery is normal and expected. You will have some amount of pain after surgery. Your pain will improve with time. There are several things you can do to help reduce your pain including: rest, ice, and using pain medications as needed. Use pain interventions and don't wait until pain level is out of control. Contact your Surgeon Team if you have pain that persists or worsens  after surgery despite rest, ice, and taking your medication(s) as prescribed. You may have a dry mouth, a sore throat, muscles aches or trouble sleeping, and these symptoms should go away after 24 hours.     Discharge Instructions - Rest    Rest and relax for the next 24 hours. Make arrangements to have someone stay with you overnight, and avoid hazardous and strenuous activities.  Do NOT make any important decisions for the next 24 hours.     Follow-up and recommended labs and tests     Please follow-up with your PMD in 3-5 days.  Please see a sleep specialist for a repeat sleep study.  You need to start using your CPAP again.     Discharge Medications   Discharge Medication List as of 8/21/2021 10:00 AM      START taking these medications    Details   oxyCODONE (ROXICODONE) 5 MG tablet Take 1-2 tablets (5-10 mg) by mouth every 4 hours as needed for moderate to severe pain, Disp-10 tablet, R-0, E-Prescribe         CONTINUE these medications which have NOT CHANGED    Details   albuterol (PROAIR HFA) 108 (90 Base) MCG/ACT inhaler INHALE 2 PUFFS INTO THE LUNGS EVERY 6 HOURS, Disp-18 g, R-3, E-PrescribePharmacy may dispense brand covered by insurance (Proair, or proventil or ventolin or generic albuterol inhaler)      atorvastatin (LIPITOR) 20 MG tablet Take 1 tablet (20 mg) by mouth daily, Disp-90 tablet, R-3, E-Prescribe      escitalopram (LEXAPRO) 20 MG tablet TAKE 1 TABLET BY MOUTH ONE TIME DAILY, Disp-90 tablet, R-1, E-Prescribe      fluticasone (FLONASE) 50 MCG/ACT nasal spray Spray 2 sprays into both nostrils daily, Disp-16 g, R-0, E-Prescribe      furosemide (LASIX) 20 MG tablet TAKE 2 TABLETS BY MOUTH DAILY, Disp-180 tablet, R-1, E-Prescribe      montelukast (SINGULAIR) 10 MG tablet TAKE 1 TABLET BY MOUTH EVERYDAY AT BEDTIME, Disp-90 tablet, R-1, E-Prescribe      lisinopril (ZESTRIL) 5 MG tablet Take 1 tablet (5 mg) by mouth daily, Disp-90 tablet, R-1, E-Prescribe      DiphenhydrAMINE HCl (BENADRYL PO) Take 25  "mg by mouth daily as needed , Historical           Allergies   Allergies   Allergen Reactions     Seasonal Allergies      Ampicillin Rash     \"sunburn\"     Codeine Rash     \"sunburn\"     Decongestant [Oxymetazoline] Rash     \"sunburn\"     Sulfa Drugs Rash     \"sunburn\"     Data   Most Recent 3 CBC's:  Recent Labs   Lab Test 08/13/21  0734 07/08/21  0958 10/01/20  0839 10/01/19  0852   WBC  --  9.9 9.3 8.5   HGB 12.9 13.7 13.3 12.9   MCV  --  92 90 91   PLT  --  327 279 299      Most Recent 3 BMP's:  Recent Labs   Lab Test 08/21/21  0804 08/21/21  0624 08/20/21  0604 08/19/21  0643 07/08/21  0958   NA  --  140  --  139 136   POTASSIUM  --  4.1  --  4.3 4.5   CHLORIDE  --  107  --  105 104   CO2  --  29  --  28 29   BUN  --  18  --  17 14   CR  --  0.84  --  0.92  0.95 0.85   ANIONGAP  --  4  --  6 3   SERGO  --  8.5  --  9.0 9.1   * 117* 135* 129* 146*     Most Recent 2 LFT's:  Recent Labs   Lab Test 07/08/21  0958 08/16/17  0831   AST 73* 42   * 49   ALKPHOS 108 90   BILITOTAL 0.8 0.5     Most Recent INR's and Anticoagulation Dosing History:  Anticoagulation Dose History     Recent Dosing and Labs Latest Ref Rng & Units 8/25/2015 9/9/2015    INR 0.86 - 1.14 0.94 0.90        Most Recent 3 Troponin's:  Recent Labs   Lab Test 12/20/17  1715 06/07/17  1725 09/09/15  0849   TROPI <0.015 <0.015  The 99th percentile for upper reference range is 0.045 ug/L.  Troponin values in   the range of 0.045 - 0.120 ug/L may be associated with risks of adverse   clinical events.    --    TROPONIN  --   --  0.01     Most Recent Cholesterol Panel:  Recent Labs   Lab Test 10/01/20  0839   CHOL 210*   *   HDL 56   TRIG 227*     Most Recent 6 Bacteria Isolates From Any Culture (See EPIC Reports for Culture Details):  Recent Labs   Lab Test 03/16/16  1039   CULT No Beta Streptococcus isolated     Most Recent TSH, T4 and A1c Labs:  Recent Labs   Lab Test 07/08/21  0905 09/17/18  0820   TSH  --  1.40   A1C 6.6* 5.6 "     Results for orders placed or performed during the hospital encounter of 08/19/21   XR Chest 2 Views    Narrative    XR CHEST 2 VW   8/20/2021 1:16 PM     HISTORY: respiratory failure after surgery.    COMPARISON: Chest CT 7/10/2019      Impression    IMPRESSION: Low lung volumes. New somewhat linear bilateral lower lung  opacities, favored to represent atelectasis but infectious process not  excluded. No pleural effusion. Normal heart size and pulmonary  vascularity.       KAIA PEDERSEN MD         SYSTEM ID:  [id number]

## 2021-09-03 ASSESSMENT — PATIENT HEALTH QUESTIONNAIRE - PHQ9: SUM OF ALL RESPONSES TO PHQ QUESTIONS 1-9: 10

## 2021-09-03 ASSESSMENT — ANXIETY QUESTIONNAIRES: GAD7 TOTAL SCORE: 6

## 2021-09-03 ASSESSMENT — ASTHMA QUESTIONNAIRES: ACT_TOTALSCORE: 21

## 2021-09-07 ENCOUNTER — TELEPHONE (OUTPATIENT)
Dept: SURGERY | Facility: CLINIC | Age: 51
End: 2021-09-07

## 2021-09-07 NOTE — TELEPHONE ENCOUNTER
[facility] Postoperative Call Note:     Hannah Meade was called for an update regarding her recovery. She underwent a incisional hernia repair with mesh by Dr. Lyons on 8/19/21. Today she tells me she is doing well and denies any complaints. She is eating a normal diet and her bowels are regular. She states her incision is healing well without signs of infection. She does not take any pain medications.    Lifting and activity restrictions were discussed. Patient was instructed to slowly and gradually resume all normal activities.The patient states all of her questions were answered. She understands our discussion. She agrees to follow up as needed or to call our office with any concerns.    Yvon Espinosa PA-C      Please route or send letter to:  Primary Care Provider (PCP)

## 2021-10-04 ENCOUNTER — VIRTUAL VISIT (OUTPATIENT)
Dept: SLEEP MEDICINE | Facility: CLINIC | Age: 51
End: 2021-10-04
Attending: PHYSICIAN ASSISTANT
Payer: COMMERCIAL

## 2021-10-04 VITALS — HEIGHT: 63 IN | WEIGHT: 274 LBS | BODY MASS INDEX: 48.55 KG/M2

## 2021-10-04 DIAGNOSIS — G47.33 OSA (OBSTRUCTIVE SLEEP APNEA): Primary | ICD-10-CM

## 2021-10-04 DIAGNOSIS — J96.22 ACUTE AND CHRONIC RESPIRATORY FAILURE WITH HYPERCAPNIA (H): ICD-10-CM

## 2021-10-04 PROCEDURE — 99204 OFFICE O/P NEW MOD 45 MIN: CPT | Mod: 95 | Performed by: PHYSICIAN ASSISTANT

## 2021-10-04 ASSESSMENT — MIFFLIN-ST. JEOR: SCORE: 1821.99

## 2021-10-04 NOTE — PATIENT INSTRUCTIONS
"      MY TREATMENT INFORMATION FOR SLEEP APNEA-  Hannah Meade    DOCTOR : Bennett Ezra Goltz, PA-C, TR    Am I having a sleep study at a sleep center?  --->Due to insurance clearance delays, you will be contacted within 2-4 weeks to schedule    Am I having a home sleep study?  --->Watch the video for the device you are using:    -/drop off device-   https://www.[employer].com/watch?v=yGGFBdELGhk    -Disposable device sent out require phone/computer application-   https://www.[employer].com/watch?v=BCce_vbiwxE      Frequently asked questions:  1. What is Obstructive Sleep Apnea (JOSE J)? JOSE J is the most common type of sleep apnea. Apnea means, \"without breath.\"  Apnea is most often caused by narrowing or collapse of the upper airway as muscles relax during sleep.   Almost everyone has occasional apneas. Most people with sleep apnea have had brief interruptions at night frequently for many years.  The severity of sleep apnea is related to how frequent and severe the events are.   2. What are the consequences of JOSE J? Symptoms include: feeling sleepy during the day, snoring loudly, gasping or stopping of breathing, trouble sleeping, and occasionally morning headaches or heartburn at night.  Sleepiness can be serious and even increase the risk of falling asleep while driving. Other health consequences may include development of high blood pressure and other cardiovascular disease in persons who are susceptible. Untreated JOSE J  can contribute to heart disease, stroke and diabetes.   3. What are the treatment options? In most situations, sleep apnea is a lifelong disease that must be managed with daily therapy. Medications are not effective for sleep apnea and surgery is generally not considered until other therapies have been tried. Your treatment is your choice . Continuous Positive Airway (CPAP) works right away and is the therapy that is effective in nearly everyone. An oral device to hold your jaw forward is usually " the next most reliable option. Other options include postioning devices (to keep you off your back), weight loss, and surgery including a tongue pacing device. There is more detail about some of these options below.  4. Are my sleep studies covered by insurance? Although we will request verification of coverage, we advise you also check in advance of the study to ensure there is coverage.    Important tips for those choosing CPAP and similar devices   Know your equipment:  CPAP is continuous positive airway pressure that prevents obstructive sleep apnea by keeping the throat from collapsing while you are sleeping. In most cases, the device is  smart  and can slowly self-adjusts if your throat collapses and keeps a record every day of how well you are treated-this information is available to you and your care team.  BPAP is bilevel positive airway pressure that keeps your throat open and also assists each breath with a pressure boost to maintain adequate breathing.  Special kinds of BPAP are used in patients who have inadequate breathing from lung or heart disease. In most cases, the device is  smart  and can slowly self-adjusts to assist breathing. Like CPAP, the device keeps a record of how well you are treated.  Your mask is your connection to the device. You get to choose what feels most comfortable and the staff will help to make sure if fits. Here: are some examples of the different masks that are available:       Key points to remember on your journey with sleep apnea:  1. Sleep study.  PAP devices often need to be adjusted during a sleep study to show that they are effective and adjusted right.  2. Good tips to remember: Try wearing just the mask during a quiet time during the day so your body adapts to wearing it. A humidifier is recommended for comfort in most cases to prevent drying of your nose and throat. Allergy medication from your provider may help you if you are having nasal congestion.  3. Getting  settled-in. It takes more than one night for most of us to get used to wearing a mask. Try wearing just the mask during a quiet time during the day so your body adapts to wearing it. A humidifier is recommended for comfort in most cases. Our team will work with you carefully on the first day and will be in contact within 4 days and again at 2 and 4 weeks for advice and remote device adjustments. Your therapy is evaluated by the device each day.   4. Use it every night. The more you are able to sleep naturally for 7-8 hours, the more likely you will have good sleep and to prevent health risks or symptoms from sleep apnea. Even if you use it 4 hours it helps. Occasionally all of us are unable to use a medical therapy, in sleep apnea, it is not dangerous to miss one night.   5. Communicate. Call our skilled team on the number provided on the first day if your visit for problems that make it difficult to wear the device. Over 2 out of 3 patients can learn to wear the device long-term with help from our team. Remember to call our team or your sleep providers if you are unable to wear the device as we may have other solutions for those who cannot adapt to mask CPAP therapy. It is recommended that you sleep your sleep provider within the first 3 months and yearly after that if you are not having problems.   6. Use it for your health. We encourage use of CPAP masks during daytime quiet periods to allow your face and brain to adapt to the sensation of CPAP so that it will be a more natural sensation to awaken to at night or during naps. This can be very useful during the first few weeks or months of adapting to CPAP though it does not help medically to wear CPAP during wakefulness and  should not be used as a strategy just to meet guidelines.  7. Take care of your equipment. Make sure you clean your mask and tubing using directions every day and that your filter and mask are replaced as recommended or if they are not  working.     BESIDES CPAP, WHAT OTHER THERAPIES ARE THERE?    Positioning Device  Positioning devices are generally used when sleep apnea is mild and only occurs on your back.This example shows a pillow that straps around the waist. It may be appropriate for those whose sleep study shows milder sleep apnea that occurs primarily when lying flat on one's back. Preliminary studies have shown benefit but effectiveness at home may need to be verified by a home sleep test. These devices are generally not covered by medical insurance.  Examples of devices that maintain sleeping on the back to prevent snoring and mild sleep apnea.    Belt type body positioner  http://[employer]/    Electronic reminder  http://nightshifttherapy.com/  http://www.[employer].au/      Oral Appliance  What is oral appliance therapy?  An oral appliance device fits on your teeth at night like a retainer used after having braces. The device is made by a specialized dentist and requires several visits over 1-2 months before a manufactured device is made to fit your teeth and is adjusted to prevent your sleep apnea. Once an oral device is working properly, snoring should be improved. A home sleep test may be recommended at that time if to determine whether the sleep apnea is adequately treated.       Some things to remember:  -Oral devices are often, but not always, covered by your medical insurance. Be sure to check with your insurance provider.   -If you are referred for oral therapy, you will be given a list of specialized dentists to consider or you may choose to visit the Web site of the American Academy of Dental Sleep Medicine  -Oral devices are less likely to work if you have severe sleep apnea or are extremely overweight.     More detailed information  An oral appliance is a small acrylic device that fits over the upper and lower teeth  (similar to a retainer or a mouth guard). This device slightly moves jaw forward, which moves the base  of the tongue forward, opens the airway, improves breathing for effective treat snoring and obstructive sleep apnea in perhaps 7 out of 10 people .  The best working devices are custom-made by a dental device  after a mold is made of the teeth 1, 2, 3.  When is an oral appliance indicated?  Oral appliance therapy is recommended as a first-line treatment for patients with primary snoring, mild sleep apnea, and for patients with moderate sleep apnea who prefer appliance therapy to use of CPAP4, 5. Severity of sleep apnea is determined by sleep testing and is based on the number of respiratory events per hour of sleep.   How successful is oral appliance therapy?  The success rate of oral appliance therapy in patients with mild sleep apnea is 75-80% while in patients with moderate sleep apnea it is 50-70%. The chance of success in patients with severe sleep apnea is 40-50%. The research also shows that oral appliances have a beneficial effect on the cardiovascular health of JOSE J patients at the same magnitude as CPAP therapy7.  Oral appliances should be a second-line treatment in cases of severe sleep apnea, but if not completely successful then a combination therapy utilizing CPAP plus oral appliance therapy may be effective. Oral appliances tend to be effective in a broad range of patients although studies show that the patients who have the highest success are females, younger patients, those with milder disease, and less severe obesity. 3, 6.   Finding a dentist that practices dental sleep medicine  Specific training is available through the American Academy of Dental Sleep Medicine for dentists interested in working in the field of sleep. To find a dentist who is educated in the field of sleep and the use of oral appliances, near you, visit the Web site of the American Academy of Dental Sleep Medicine.    References  1. shakir Milian al. Objectively measured vs self-reported compliance during oral  appliance therapy for sleep-disordered breathing. Chest 2013; 144(5): 2434-4605.  2. Elvia, et al. Objective measurement of compliance during oral appliance therapy for sleep-disordered breathing. Thorax 2013; 68(1): 91-96.  3. Magdalena et al. Mandibular advancement devices in 620 men and women with JOSE J and snoring: tolerability and predictors of treatment success. Chest 2004; 125: 4810-8077.  4. Gertrude, et al. Oral appliances for snoring and JOSE J: a review. Sleep 2006; 29: 244-262.  5. Chadd et al. Oral appliance treatment for JOSE J: an update. J Clin Sleep Med 2014; 10(2): 215-227.  6. Dixie et al. Predictors of OSAH treatment outcome. J Dent Res 2007; 86: 4523-7751.      Weight Loss:    Weight loss is a long-term strategy that may improve sleep apnea in some patients.    Weight management is a personal decision and the decision should be based on your interest and the potential benefits.  If you are interested in exploring weight loss strategies, the following discussion covers the impact on weight loss on sleep apnea and the approaches that may be successful.    Being overweight does not necessarily mean you will have health consequences.  Those who have BMI over 35 or over 27 with existing medical conditions carries greater risk.   Weight loss decreases severity of sleep apnea in most people with obesity. For those with mild obesity who have developed snoring with weight gain, even 15-30 pound weight loss can improve and occasionally eliminate sleep apnea.  Structured and life-long dietary and health habits are necessary to lose weight and keep healthier weight levels.     Though there may be significant health benefits from weight loss, long-term weight loss is very difficult to achieve- studies show success with dietary management in less than 10% of people. In addition, substantial weight loss may require years of dietary control and may be difficult if patients have severe obesity. In these  cases, surgical management may be considered.  Finally, older individuals who have tolerated obesity without health complications may be less likely to benefit from weight loss strategies.        Your BMI is Body mass index is 48.54 kg/m .  Weight management is a personal decision.  If you are interested in exploring weight loss strategies, the following discussion covers the approaches that may be successful. Body mass index (BMI) is one way to tell whether you are at a healthy weight, overweight, or obese. It measures your weight in relation to your height.  A BMI of 18.5 to 24.9 is in the healthy range. A person with a BMI of 25 to 29.9 is considered overweight, and someone with a BMI of 30 or greater is considered obese. More than two-thirds of American adults are considered overweight or obese.  Being overweight or obese increases the risk for further weight gain. Excess weight may lead to heart disease and diabetes.  Creating and following plans for healthy eating and physical activity may help you improve your health.  Weight control is part of healthy lifestyle and includes exercise, emotional health, and healthy eating habits. Careful eating habits lifelong are the mainstay of weight control. Though there are significant health benefits from weight loss, long-term weight loss with diet alone may be very difficult to achieve- studies show long-term success with dietary management in less than 10% of people. Attaining a healthy weight may be especially difficult to achieve in those with severe obesity. In some cases, medications, devices and surgical management might be considered.  What can you do?  If you are overweight or obese and are interested in methods for weight loss, you should discuss this with your provider.     Consider reducing daily calorie intake by 500 calories.     Keep a food journal.     Avoiding skipping meals, consider cutting portions instead.    Diet combined with exercise helps  maintain muscle while optimizing fat loss. Strength training is particularly important for building and maintaining muscle mass. Exercise helps reduce stress, increase energy, and improves fitness. Increasing exercise without diet control, however, may not burn enough calories to loose weight.       Start walking three days a week 10-20 minutes at a time    Work towards walking thirty minutes five days a week     Eventually, increase the speed of your walking for 1-2 minutes at time    In addition, we recommend that you review healthy lifestyles and methods for weight loss available through the National Institutes of Health patient information sites:  http://win.niddk.nih.gov/publications/index.htm    And look into health and wellness programs that may be available through your health insurance provider, employer, local community center, or [employer].    Weight management plan: Patient was referred to their PCP to discuss a diet and exercise plan.    Surgery:    Surgery for obstructive sleep apnea is considered generally only when other therapies fail to work. Surgery may be discussed with you if you are having a difficult time tolerating CPAP and or when there is an abnormal structure that requires surgical correction.  Nose and throat surgeries often enlarge the airway to prevent collapse.  Most of these surgeries create pain for 1-2 weeks and up to half of the most common surgeries are not effective throughout life.  You should carefully discuss the benefits and drawbacks to surgery with your sleep provider and surgeon to determine if it is the best solution for you.   More information  Surgery for JOSE J is directed at areas that are responsible for narrowing or complete obstruction of the airway during sleep.  There are a wide range of procedures available to enlarge and/or stabilize the airway to prevent blockage of breathing in the three major areas where it can occur: the palate, tongue, and nasal regions.   Successful surgical treatment depends on the accurate identification of the factors responsible for obstructive sleep apnea in each person.  A personalized approach is required because there is no single treatment that works well for everyone.  Because of anatomic variation, consultation with an examination by a sleep surgeon is a critical first step in determining what surgical options are best for each patient.  In some cases, examination during sedation may be recommended in order to guide the selection of procedures.  Patients will be counseled about risks and benefits as well as the typical recovery course after surgery. Surgery is typically not a cure for a person s JOSE J.  However, surgery will often significantly improve one s JOSE J severity (termed  success rate ).  Even in the absence of a cure, surgery will decrease the cardiovascular risk associated with OSA7; improve overall quality of life8 (sleepiness, functionality, sleep quality, etc).      Palate Procedures:  Patients with JOSE J often have narrowing of their airway in the region of their tonsils and uvula.  The goals of palate procedures are to widen the airway in this region as well as to help the tissues resist collapse.  Modern palate procedure techniques focus on tissue conservation and soft tissue rearrangement, rather than tissue removal.  Often the uvula is preserved in this procedure. Residual sleep apnea is common in patient after pharyngoplasty with an average reduction in sleep apnea events of 33%2.    Tongue Procedures:  ExamWhile patients are awake, the muscles that surround the throat are active and keep this region open for breathing. These muscles relax during sleep, allowing the tongue and other structures to collapse and block breathing.  There are several different tongue procedures available.  Selection of a tongue base procedure depends on characteristics seen on physical exam.  Generally, procedures are aimed at removing bulky  tissues in this area or preventing the back of the tongue from falling back during sleep.  Success rates for tongue surgery range from 50-62%3.  Hypoglossal Nerve Stimulation:  Hypoglossal nerve stimulation has recently received approval from the [place] Food and Drug Administration for the treatment of obstructive sleep apnea.  This is based on research showing that the system was safe and effective in treating sleep apnea6.  Results showed that the median AHI score decreased 68%, from 29.3 to 9.0. This therapy uses an implant system that senses breathing patterns and delivers mild stimulation to airway muscles, which keeps the airway open during sleep.  The system consists of three fully implanted components: a small generator (similar in size to a pacemaker), a breathing sensor, and a stimulation lead.  Using a small handheld remote, a patient turns the therapy on before bed and off upon awakening.    Candidates for this device must be greater than 22 years of age, have moderate to severe JOSE J (AHI between 20-65), BMI less than 32, have tried CPAP/oral appliance without success, and have appropriate upper airway anatomy (determined by a sleep endoscopy performed by Dr. Sheth).  Hypoglossal Nerve Stimulation Pathway:    The sleep surgeon s office will work with the patient through the insurance prior-authorization process (including communications and appeals).    Nasal Procedures:  Nasal obstruction can interfere with nasal breathing during the day and night.  Studies have shown that relief of nasal obstruction can improve the ability of some patients to tolerate positive airway pressure therapy for obstructive sleep apnea1.  Treatment options include medications such as nasal saline, topical corticosteroid and antihistamine sprays, and oral medications such as antihistamines or decongestants. Non-surgical treatments can include external nasal dilators for selected patients. If these are not successful by  themselves, surgery can improve the nasal airway either alone or in combination with these other options.  Combination Procedures:  Combination of surgical procedures and other treatments may be recommended, particularly if patients have more than one area of narrowing or persistent positional disease.  The success rate of combination surgery ranges from 66-80%2,3.    References  1. Abebe MENEZES. The Role of the Nose in Snoring and Obstructive Sleep Apnoea: An Update.  Eur Arch Otorhinolaryngol. 2011; 268: 1365-73.  2.  Bridgett SM; Elizabeth JA; Francis JR; Pallanch JF; Zaid MB; Haja SG; Sandhya PEREZ. Surgical modifications of the upper airway for obstructive sleep apnea in adults: a systematic review and meta-analysis. SLEEP 2010;33(10):7806-7803. Matt RICHARD. Hypopharyngeal surgery in obstructive sleep apnea: an evidence-based medicine review.  Arch Otolaryngol Head Neck Surg. 2006 Feb;132(2):206-13.  3. Jarrod YH1, Kelley Y, Didier ULISSES. The efficacy of anatomically based multilevel surgery for obstructive sleep apnea. Otolaryngol Head Neck Surg. 2003 Oct;129(4):327-35.  4. Matt RICHARD, Goldberg A. Hypopharyngeal Surgery in Obstructive Sleep Apnea: An Evidence-Based Medicine Review. Arch Otolaryngol Head Neck Surg. 2006 Feb;132(2):206-13.  5. Malinda ARIAS et al. Upper-Airway Stimulation for Obstructive Sleep Apnea.  N Engl J Med. 2014 Jan 9;370(2):139-49.  6. Akbar Y et al. Increased Incidence of Cardiovascular Disease in Middle-aged Men with Obstructive Sleep Apnea. Am J Respir Crit Care Med; 2002 166: 159-165  7. Mcdaniel EM et al. Studying Life Effects and Effectiveness of Palatopharyngoplasty (SLEEP) study: Subjective Outcomes of Isolated Uvulopalatopharyngoplasty. Otolaryngol Head Neck Surg. 2011; 144: 623-631.  Drive Safe... Drive Alive     Sleep health profoundly affects your health, mood, and your safety.  Thirty three percent of the population (one in three of us) is not getting enough sleep and many have a sleep  disorder. Not getting enough sleep or having an untreated / undertreated sleep condition may make us sleepy without even knowing it. In fact, our driving could be dramatically impaired due to our sleep health. As your provider, here are some things I would like you to know about driving:     Here are some warning signs for impairment and dangerous drowsy driving:              -Having been awake more than 16 hours               -Looking tired               -Eyelid drooping              -Head nodding (it could be too late at this point)              -Driving for more than 30 minutes     Some things you could do to make the driving safer if you are experiencing some drowsiness:              -Stop driving and rest              -Call for transportation              -Make sure your sleep disorder is adequately treated     Some things that have been shown NOT to work when experiencing drowsiness while driving:              -Turning on the radio              -Opening windows              -Eating any  distracting  /  entertaining  foods (e.g., sunflower seeds, candy, or any other)              -Talking on the phone      Your decision may not only impact your life, but also the life of others. Please, remember to drive safe for yourself and all of us.

## 2021-10-04 NOTE — PROGRESS NOTES
Sleep Consultation:    Date on this visit: 10/4/2021    Hannah Meade is sent by Kirsten Vázquez for a sleep consultation regarding apnea.    Primary Physician: Lianna Cleveland     Hannah Meade presents with presumed apnea and difficulty weaning off of oxygen post-operatively. Her medical history is significant for insomnia, BMI >40, anxiety/depression, HTN, DM 2, asthma.     Hannah had a PSG at [facility] on 12/15/2017. AHI was 14/hr (restricted AHI 11/hr), RDI 17/hr, REM AHI 59/hr. Her O2 jerman was 72%. 12 min below 88%. Wt 240#.    Hannah had a VBG in the hospital and 8/20 and her CO2 was 56 mmHg.    She got a CPAP, but it was hard to use because she has custody of her autistic grandchild who slept on her and would remove her mask. Her grandchild now sleeps in their own bed and she would like to try again. She used a nasal pillows mask. She was comfortable with that mask. She denied mask leak aside from what was caused by her grandchild. I had her plug the machine in to try to get settings off of it, but it did not turn on. After several minutes, it turned on and and started beeping, reading a message that service is required. She has not used the machine in a couple of years.      Her machine is under recall. She has not registered it yet.    She sleeps in a recliner. She can't sleep laying flat. She gets really uncomfortable. She wakes herself up gagging. She does snore a lot, quite loudly. She is not told that she has pauses in breathing, gasping or snorting, but she wakes herself up with gasps/snorts.    Hannah goes to sleep at 9:00 PM during the week. She wakes up at 6:00 AM, before her alarm. She falls asleep in 10-60 minutes.  Hannah has difficulty falling asleep and staying asleep. She denies being bothered by difficulty falling asleep. About 4-5 nights per week, she wakes around 2-3 AM and then can't get back to sleep. Hannah wakes up to uncertain reasons.  On weekends, her sleep schedule  "is the same.  Patient gets an average of 5 hours of sleep per night.     Patient does use electronics in bed and does not watch TV in bed and sometimes will worry in bed. She does not watch TV in bed or read in bed.    Hannah does not do shift work.  She works day shifts.  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 She lives with her  and grandson.    Hannah has occasional morning dry mouth, denies morning headaches and morning confusion. She does not have restless legs. Hannah denies any bruxism, sleep walking, sleep talking, dream enactment, sleep paralysis, cataplexy and hypnogogic/hypnopompic hallucinations.    Hannah denies difficulty breathing through her nose, claustrophobia, reflux at night and heartburn (did not have heartburn, but has a cough, now on omeprazole trial).  She is on diphenhydramine (a few days per week), Allegra. She has prescriptions for montelukast and fluticasone. She did not really care for them.    Hannah has gained 30-40 pounds in the last 4 years.  Patient describes themself as a morning person.  She would prefer to go to sleep at 9:00 PM and wake up at 7:00 AM.  Patient's [place] Sleepiness score 9/24 inconsistent with daytime sleepiness.      Hannah does not have time to take naps. She takes some inadvertant naps, as a passenger or when work gets boring. She can't read for very long without getting drowsy. She denies dozing while driving. She does not drive often.  Patient was counseled on the importance of driving while alert, to pull over if drowsy, or nap before getting into the vehicle if sleepy.  She uses 0-2 sodas/day. Last caffeine intake is usually before noon.    Allergies:    Allergies   Allergen Reactions     Seasonal Allergies      Ampicillin Rash     \"sunburn\"     Codeine Rash     \"sunburn\"     Decongestant [Oxymetazoline] Rash     \"sunburn\"     Sulfa Drugs Rash     \"sunburn\"       Medications:    Current Outpatient Medications   Medication Sig " Dispense Refill     albuterol (PROAIR HFA) 108 (90 Base) MCG/ACT inhaler INHALE 2 PUFFS INTO THE LUNGS EVERY 6 HOURS 18 g 3     atorvastatin (LIPITOR) 20 MG tablet Take 1 tablet (20 mg) by mouth daily 90 tablet 3     DiphenhydrAMINE HCl (BENADRYL PO) Take 25 mg by mouth daily as needed        escitalopram (LEXAPRO) 20 MG tablet TAKE 1 TABLET BY MOUTH ONE TIME DAILY 90 tablet 1     furosemide (LASIX) 20 MG tablet TAKE 2 TABLETS BY MOUTH DAILY 180 tablet 1     losartan (COZAAR) 25 MG tablet Take 1 tablet (25 mg) by mouth daily 90 tablet 0     omeprazole (PRILOSEC) 20 MG DR capsule Take 1 capsule (20 mg) by mouth daily 90 capsule 0       Problem List:  Patient Active Problem List    Diagnosis Date Noted     Incisional hernia, without obstruction or gangrene 07/19/2021     Priority: Medium     Added automatically from request for surgery [id number]       Type 2 diabetes mellitus without complication, without long-term current use of insulin (H) 10/13/2020     Priority: Medium     Elevated glucose 09/13/2018     Priority: Medium     Mild major depression (H) 09/10/2018     Priority: Medium     JOSE J (obstructive sleep apnea) 09/10/2018     Priority: Medium     Lymphedema 09/10/2018     Priority: Medium     Asthma 12/20/2017     Priority: Medium     Other nonspecific abnormal finding of lung field (CODE) 12/20/2017     Priority: Medium     Multiple lipomas 12/20/2017     Priority: Medium     BMI 40.0-44.9, adult (H) 08/16/2017     Priority: Medium     bmi over 40 09/25/2016     Priority: Medium     Morbid obesity (H) 11/26/2015     Priority: Medium     High triglycerides, recurrent severe low back pain due to overload       Bronchogenic cyst 08/25/2015     Priority: Medium     Ovarian cyst 08/17/2015     Priority: Medium     WILL NEED US FOLLOW UP AFTER CHEST SX       Plantar fasciitis 02/23/2015     Priority: Medium     Anxiety 12/31/2014     Priority: Medium     Insomnia 08/08/2011     Priority: Medium     CARDIOVASCULAR  SCREENING; LDL GOAL LESS THAN 160 10/31/2010     Priority: Medium     Carbuncle and furuncle of trunk 05/31/2005     Priority: Medium        Past Medical/Surgical History:  Past Medical History:   Diagnosis Date     Anxiety      Depressive disorder      Diabetes (H)     prediabetic     Elevated glucose 9/13/2018     Hypertension     Unsure on the date     Lymphedema 9/10/2018     Mild major depression (H) 9/10/2018     Obesity, unspecified      JOSE J (obstructive sleep apnea) 9/10/2018    cpap recalled     Other chronic pain     criss knees     Plantar fasciitis 2/23/2015     Sleep apnea, unspecified 12/20/2017     Tuberculin test reaction      Uncomplicated asthma      Past Surgical History:   Procedure Laterality Date     ABDOMEN SURGERY      tubes removed     APPENDECTOMY       COLONOSCOPY  06/13/2019    Dr. Leon [facility]     COLONOSCOPY N/A 6/13/2019    Procedure: COLONOSCOPY;  Surgeon: Mauro Leon MD;  Location: [facility]     EXCISE MASS BACK Left 9/17/2014    Procedure: EXCISE MASS BACK;  Surgeon: Yaz Avilez MD;  Location: [facility] OR     EXCISE MASS UPPER EXTREMITY Left 9/17/2014    Procedure: EXCISE MASS UPPER EXTREMITY;  Surgeon: Yaz Avilez MD;  Location: [facility] OR     HERNIORRHAPHY INCISIONAL (LOCATION) N/A 8/19/2021    Procedure: HERNIORRHAPHY open incisional and umbilical repair with mesh;  Surgeon: Rhys Lyons MD;  Location: [facility] OR     HYSTERECTOMY VAGINAL  1992    ovaries intact     other      lump on right arm removed      THORACIC SURGERY      lung surgery     THORACOTOMY Right 8/25/2015    Procedure: THORACOTOMY;  Surgeon: Andrew Gordon MD;  Location: [facility] OR     ZZC NONSPECIFIC PROCEDURE      2 C- SECTIONS       Social History:  Social History     Socioeconomic History     Marital status:      Spouse name: Arvind     Number of children: 2     Years of education: Not on file     Highest education level: Some college, no degree   Occupational History     Occupation:  Enrollment     Employer: [employer]   Tobacco Use     Smoking status: Never Smoker     Smokeless tobacco: Never Used   Vaping Use     Vaping Use: Never used   Substance and Sexual Activity     Alcohol use: No     Alcohol/week: 0.0 standard drinks     Drug use: No     Sexual activity: Yes     Partners: Male     Birth control/protection: None   Other Topics Concern     Parent/sibling w/ CABG, MI or angioplasty before 65F 55M? No   Social History Narrative     Not on file     Social Determinants of Health     Financial Resource Strain:      Difficulty of Paying Living Expenses:    Food Insecurity:      Worried About Running Out of Food in the Last Year:      Ran Out of Food in the Last Year:    Transportation Needs:      Lack of Transportation (Medical):      Lack of Transportation (Non-Medical):    Physical Activity:      Days of Exercise per Week:      Minutes of Exercise per Session:    Stress:      Feeling of Stress :    Social Connections:      Frequency of Communication with Friends and Family:      Frequency of Social Gatherings with Friends and Family:      Attends [employer] Services:      Active Member of Clubs or Organizations:      Attends Club or Organization Meetings:      Marital Status:    Intimate Partner Violence:      Fear of Current or Ex-Partner:      Emotionally Abused:      Physically Abused:      Sexually Abused:        Family History:  Family History   Problem Relation Age of Onset     Neurologic Disorder Mother         Parkinsons     Diabetes Mother      Osteoporosis Mother      Genetic Disorder Mother      Hypertension Father      Cardiovascular Father      Gallbladder Disease Father      Hyperlipidemia Father      Cerebrovascular Disease Father      Obesity Father      Diabetes Maternal Grandmother      Other Cancer Maternal Grandmother      Osteoporosis Maternal Grandmother      Obesity Maternal Grandmother      Diabetes Maternal Grandfather      Diabetes Paternal Grandmother       Osteoporosis Paternal Grandmother      C.A.D. Paternal Grandfather      Cardiovascular Paternal Grandfather      Coronary Artery Disease Paternal Grandfather      Cerebrovascular Disease Paternal Grandfather      Prostate Cancer Paternal Grandfather      Hypertension Sister      Polycystic Kidney Diease Other      Diabetes Other      Hyperlipidemia Other      Anxiety Disorder Other      Obesity Other      Diabetes Other      Obesity Other      Hypertension Cousin      Hypertension Sister      Hyperlipidemia Sister      Asthma Sister      Obesity Sister      Hyperlipidemia Brother      Other Cancer Other      Other Cancer Cousin      Anxiety Disorder Son      Anxiety Disorder Cousin      Anxiety Disorder Daughter      Genetic Disorder Daughter         PKD     Colon Cancer No family hx of        Review of Systems:  A complete review of systems reviewed by me is negative with the exeption of what has been mentioned in the history of present illness.  CONSTITUTIONAL: NEGATIVE for weight gain/loss, fever, chills, sweats or night sweats, drug allergies.  EYES: NEGATIVE for changes in vision, blind spots, double vision.  ENT: NEGATIVE for ear pain, sore throat, post-nasal drip, runny nose, bloody nose  ENT:  POSITIVE for  sinus pain  CARDIAC: NEGATIVE for fast heartbeats or fluttering in chest, chest pressure, breathlessness when lying flat.  CARDIAC:  POSITIVE for  chest pain (from coughing), swollen legs and swollen feet  NEUROLOGIC: NEGATIVE weakness or numbness in the arms or legs.  NEUROLOGIC:  POSITIVE for  headaches  PULMONARY: NEGATIVE SOB at rest, productive cough, coughing up blood, wheezing or whistling when breathing.    PULMONARY:  POSITIVE for  SOB with activity and dry cough  GASTROINTESTINAL: NEGATIVE for vomitting, loose or watery stools, fat or grease in stools, constipation, abdominal pain, bowel movements black in color or blood noted.  GASTROINTESTINAL:  POSITIVE for  Nausea-from  "coughing  GENITOURINARY: NEGATIVE for pain during urination, blood in urine, urinating more frequently than usual, irregular menstrual periods.  MUSCULOSKELETAL: NEGATIVE for  joint pain, swollen joints.  MUSCULOSKELETAL:  POSITIVE for  muscle pain or bone pain- feels a scraping pain in limbs at end of day.    Physical Examination:  Vitals: Ht 1.6 m (5' 3\")   Wt 124.3 kg (274 lb)   BMI 48.54 kg/m           [place] Total Score 10/4/2021   Total score - Savannah 9       ANGELA Total Score: 22 (10/04/21 0800)    GENERAL APPEARANCE: healthy, alert, no distress and cooperative  EYES: Eyes grossly normal to inspection  HENT: oropharynx very small and tongue base enlarged  NECK: no asymmetry, masses, or scars  RESP: no respiratory distress, dry cough once during the interview, no wheeze  Mallampati Class: I.  Tonsillar Stage: 1  hidden by pillars.    Impression/Plan:    (G47.33) JOSE J (obstructive sleep apnea)  (primary encounter diagnosis), (J96.22) Acute and chronic respiratory failure with hypercapnia (H), (Z68.42) BMI 45.0-49.9, adult (H)  Comment: Hannah was diagnosed with mild JOSE J in 2017 at [facility]. Her AHI was 11/hr with O2 jerman 72%. Her REM AHI was 59/hr. She tried CPAP at home but could not use it consistently because her grandson who has autism would co-sleep with her and pull her mask off at night. She has not used CPAP in a couple of years. She used a nasal pillows mask. She returns because she had acute hypoxic and hypercapnic respiratory failure after a recent surgery. Her CO2 on VBG was 56 mmHg during that hospital stay in August and she had difficulty weaning off of O2. She sleeps in a recliner because she wakes herself gagging if she tries to lay flat. She has gained 30+ pounds since her last study, BMI now 48. She is observed to snore loudly and wakes herself with a gasp even in the recliner. She is not observed to have pauses in breathing. Her ESS is 9/24. She does have some inadvertent dozing in quiet " situations, but denied drowsy driving. She is treated for HTN. Her age is over 50 (51).  Plan: Comprehensive Sleep Study        PSG with TCM. Split the study if she qualifies and if a CPAP/BiPAP machine is available (currently our PAP devices in the lab are under recall-so cannot be used).      Literature provided regarding sleep apnea.      She will follow up with me in approximately two weeks after her sleep study has been competed to review the results and discuss plan of care.       Polysomnography reviewed.  Obstructive sleep apnea reviewed.  Complications of untreated sleep apnea were reviewed.    58 minutes were spent on the date of the encounter doing chart review, history and exam, documentation and further activities as noted above.  Bennett Goltz, PA-C     CC: TR Mancia MD

## 2021-10-04 NOTE — PROGRESS NOTES
Hannah is a 51 year old who is being evaluated via a billable video visit.      How would you like to obtain your AVS? MyChart  If the video visit is dropped, the invitation should be resent by: Text to cell phone: [phone]  Will anyone else be joining your video visit? No    Louise Beltrán CMA  Video Start Time: 8:32 AM  Video-Visit Details    Type of service:  Video Visit    Video End Time:9:27 AM    Originating Location (pt. Location): Home    Distant Location (provider location):  [facility] SLEEP [facility]     Platform used for Video Visit: Maaguzi

## 2021-10-19 PROBLEM — F32.9 MAJOR DEPRESSION: Status: ACTIVE | Noted: 2018-09-10

## 2021-10-24 ENCOUNTER — HEALTH MAINTENANCE LETTER (OUTPATIENT)
Age: 51
End: 2021-10-24

## 2021-10-24 ENCOUNTER — MYC MEDICAL ADVICE (OUTPATIENT)
Dept: FAMILY MEDICINE | Facility: CLINIC | Age: 51
End: 2021-10-24

## 2021-11-02 ENCOUNTER — MYC MEDICAL ADVICE (OUTPATIENT)
Dept: FAMILY MEDICINE | Facility: CLINIC | Age: 51
End: 2021-11-02

## 2021-11-02 NOTE — TELEPHONE ENCOUNTER
Patient informed and agrees to plan.   Celestino Kwong RN - Patient Advocate and Liaison (PAL)  [facility]   [phone]

## 2021-11-02 NOTE — TELEPHONE ENCOUNTER
Patient could have hit it against something If flattening out today.   Apply ice as needed and tylenol for pain if worsening to be seen in urgent care.     LUCINDA

## 2021-11-02 NOTE — TELEPHONE ENCOUNTER
Call to patient. Photo of right calf was taken yesterday AM but the lump has flattened out. Can only see the little red vickey in center, the red area is sore but only if she touches it.    No known injury or known bites.   Denies fever.   Denies warmth over affected area. Temp feels same both calves.   Has not tried OTC.   No swelling below or above affected calf.   Dog has had a staph infection for a couple of weeks. Applying antibiotic foam their vet prescribed 4 days ago.     Dr. Cleveland. Per triage RN we have no access in clinic for several days. Please advise.   SILVERIO Tirado - PAL (Patient Advocate and Liaison)  [facility]  [phone]

## 2021-11-16 DIAGNOSIS — J45.40 MODERATE PERSISTENT ASTHMA WITHOUT COMPLICATION: ICD-10-CM

## 2021-11-17 NOTE — TELEPHONE ENCOUNTER
Routing refill request to provider for review/approval because:  Drug not active on patient's medication list, labeled as discontinued  Monique Qiu RN, BSN  Message handled by CLINIC NURSE.

## 2021-11-18 RX ORDER — MONTELUKAST SODIUM 10 MG/1
TABLET ORAL
Qty: 90 TABLET | Refills: 0 | Status: SHIPPED | OUTPATIENT
Start: 2021-11-18 | End: 2022-01-13

## 2021-11-29 DIAGNOSIS — I89.0 LYMPHEDEMA: ICD-10-CM

## 2021-11-30 RX ORDER — FUROSEMIDE 20 MG
TABLET ORAL
Qty: 180 TABLET | Refills: 1 | Status: SHIPPED | OUTPATIENT
Start: 2021-11-30 | End: 2022-07-07

## 2021-12-03 ENCOUNTER — THERAPY VISIT (OUTPATIENT)
Dept: SLEEP MEDICINE | Facility: CLINIC | Age: 51
End: 2021-12-03
Payer: COMMERCIAL

## 2021-12-03 ENCOUNTER — OFFICE VISIT (OUTPATIENT)
Dept: FAMILY MEDICINE | Facility: CLINIC | Age: 51
End: 2021-12-03
Payer: COMMERCIAL

## 2021-12-03 ENCOUNTER — HOSPITAL ENCOUNTER (OUTPATIENT)
Dept: CT IMAGING | Facility: CLINIC | Age: 51
Discharge: HOME | End: 2021-12-03
Attending: PHYSICIAN ASSISTANT | Admitting: PHYSICIAN ASSISTANT
Payer: COMMERCIAL

## 2021-12-03 VITALS
DIASTOLIC BLOOD PRESSURE: 80 MMHG | WEIGHT: 273 LBS | RESPIRATION RATE: 12 BRPM | TEMPERATURE: 98.3 F | SYSTOLIC BLOOD PRESSURE: 122 MMHG | HEART RATE: 90 BPM | BODY MASS INDEX: 48.36 KG/M2

## 2021-12-03 DIAGNOSIS — R51.9 ACUTE NONINTRACTABLE HEADACHE, UNSPECIFIED HEADACHE TYPE: ICD-10-CM

## 2021-12-03 DIAGNOSIS — R51.9 ACUTE NONINTRACTABLE HEADACHE, UNSPECIFIED HEADACHE TYPE: Primary | ICD-10-CM

## 2021-12-03 DIAGNOSIS — S06.0X0A CONCUSSION WITHOUT LOSS OF CONSCIOUSNESS, INITIAL ENCOUNTER: ICD-10-CM

## 2021-12-03 DIAGNOSIS — J96.22 ACUTE AND CHRONIC RESPIRATORY FAILURE WITH HYPERCAPNIA (H): ICD-10-CM

## 2021-12-03 DIAGNOSIS — G47.33 OSA (OBSTRUCTIVE SLEEP APNEA): Primary | ICD-10-CM

## 2021-12-03 PROCEDURE — 70450 CT HEAD/BRAIN W/O DYE: CPT

## 2021-12-03 PROCEDURE — 95811 POLYSOM 6/>YRS CPAP 4/> PARM: CPT | Mod: 52 | Performed by: PSYCHIATRY & NEUROLOGY

## 2021-12-03 PROCEDURE — 99213 OFFICE O/P EST LOW 20 MIN: CPT | Performed by: PHYSICIAN ASSISTANT

## 2021-12-03 NOTE — PATIENT INSTRUCTIONS
"  Patient Education   Concussion Discharge Instructions  You were seen today for signs of a concussion. The symptoms will vary, depending on the nature of your injury and your health. You may have: headache, confusion, nausea (feel sick to your stomach), vomiting (throwing up) and problems with memory, concentrating or sleep. You may feel dizzy, irritable, and tired.   Children and teens may need help from their parents, teachers and coaches to watch for symptoms as they recover.  Follow-up  It is important for you to see a doctor for follow-up care to see how you are recovering. Please see your primary doctor within the next 5 to 7 days. You may have also been referred to the Concussion  service. They will contact you and arrange a follow-up visit if needed. If you need help sooner, you may call them at [phone].  Warning signs  Call your doctor or come back to Emergency if you suddenly have any of these symptoms:    Headaches that get worse    Feeling more and more drowsy    You keep repeating yourself    Strange behavior    Seizures    Repeat vomiting (throwing up)    Trouble walking    Growing confusion    Feeling more irritable    Neck pain that gets worse    Slurred speech    Weakness or numbness    Loss of consciousness    Fluid or blood coming from ears or nose  Self-care    Get lots of rest and get enough sleep at night. Take daytime naps or rest if you feel tired.    Limit physical activity and \"thinking\" activities. These can make symptoms worse.  ? Physical activity includes gym, sports, weight training, running, exercise and heavy lifting.  ? Thinking activities include homework, class work, job-related work and screen time (phone, computer, tablet, TV and video games).    Stick to a healthy diet and drink lots of fluids.    As symptoms improve, you may slowly return to your daily activities. If symptoms get worse   or return, reduce your activity.    Know that it is normal to feel sad and " frustrated when you do not feel right and are less active.  Going back to work    Your care team will tell you when you are ready to return to work.    Limit the amount of work you do soon after your injury. This may speed healing. Take breaks if your symptoms get worse. You should also reduce your physical activity as well as activities that require a lot of thinking until you see your doctor.    You may need shorter work days and a lighter workload.    Avoid heavy lifting, working with machinery, driving and working at heights until your symptoms are gone or you are cleared by a doctor.  Returning to sports    Never return to play if you have any symptoms. A full recovery will reduce the chances of getting hurt again. Remember, it is better to miss one or two games than a whole season.    You should rest from all physical activity until you see your doctor. Generally, if all symptoms have completely cleared, your doctor can help guide you to slowly return to sports. If symptoms return or worsen, stop the activity and see your doctor.    Important: If you are in an organized sport and under age 18, you will need written consent from a healthcare provider before you return to sports. Typically, this will be your primary care or sports medicine doctor. Please make an appointment.  Going back to school    If you are still having symptoms, you may need extra help at school.    Tell your teachers and school nurse about your injury and symptoms. Ask them to watch for problems with learning, memory and concentrating. Symptoms may get worse when you do schoolwork, and you may become more irritable.    You may need shorter school days, a reduced workload, and to postpone testing.    Do not drive or take gym class (physical activity) until cleared by a doctor.    For informational purposes only. Not to replace the advice of your health care provider.   2009 [employer]. Used with permission.  "This form is adapted from the \"Heads Up: Brain Injury in Your Practice\" tool kit developed by the Centers for Disease Control and Prevention (CDC). All rights reserved. [facility]. Clinically reviewed by JUVENCIO Johnson ATC. [employer] 008622 - Rev 11/20.       "

## 2021-12-03 NOTE — PROGRESS NOTES
Assessment & Plan     Acute nonintractable headache, unspecified headache type  CT ordered to rule out skull fracture or bleeding in the brain. I felt these were not likely however due to the severity of patient's symptoms I did obtain. Was negative as noted below.  - CT Head w/o Contrast; Future    Concussion without loss of consciousness, initial encounter  Signs of concussion present. Reviewed recommendations with patient regarding concussion precautions and given information on AVS.      Review of the result(s) of each unique test - CT  Ordering of each unique test  20 minutes spent on the date of the encounter doing chart review, history and exam, documentation and further activities per the note       TR Garner [facility] JAVIER Young is a 51 year old who presents for the following health issues     HPI      Headache  Onset/Duration: 11/28/2021  Description  Location: unilateral in the right frontal area   Character: sharp pain  Frequency:  Constant since hitting head  Duration:  All day  Wake with headaches: YES  Able to do daily activities when headache present: YES  Intensity:  Moderate but severe at times- 5/10 in severity currently, 8/10 in severity at its worst  She describes the pain as being the worst headache of her life (though not currently)   Progression of Symptoms:  improving and constant  Accompanying signs and symptoms:  Stiff neck: no  Neck or upper back pain: no  Sinus or URI symptoms YES  Fever: no  Nausea or vomiting: YES- nausea, no vomiting  Dizziness: YES  Numbness/tingling: no  Weakness: no  Visual changes: none  History  Head trauma: YES- lifting head and hit on the edges of the cabinet 11/28/21  Family history of migraines: no  Daily pain medication use: no  Previous tests for headaches: no  Neurologist evaluation: no  Precipitating or Alleviating factors (light/sound/sleep/caffeine): none  Therapies tried and outcome: Aleve- 2  tablets twice daily.        Review of Systems   GENERAL:  No fevers   GI:  As noted in HPI  NEURO:  As noted in HPI        Objective    /80 (BP Location: Right arm, Patient Position: Chair, Cuff Size: Adult Large)   Pulse 90   Temp 98.3  F (36.8  C) (Oral)   Resp 12   Wt 123.8 kg (273 lb)   BMI 48.36 kg/m    Body mass index is 48.36 kg/m .  Physical Exam   GENERAL: No acute distress  HEENT: Normocephalic, Tenderness over the right frontal scalp along hairline. Small divot in area. No crepitus. PERRL, EOMI, No nystagmus. Canals patent, bilateral TM's non-erythematous and non-bulging. Turbinates normal in appearance bilaterally. Posterior oropharynx non-erythematous and without exudate.  NEURO: Alert and oriented x 3. Cranial nerves II-XII grossly intact.    CT Head w/o Contrast    Result Date: 12/3/2021  CT SCAN OF THE HEAD WITHOUT CONTRAST   12/3/2021 12:52 PM HISTORY: Trauma - head injury; hit head 11/28/2021, has had worst headache of life (though not currently), small indent where hit head right frontal scalp. TECHNIQUE:  Axial images of the head and coronal reformations without IV contrast material. Radiation dose for this scan was reduced using automated exposure control, adjustment of the mA and/or kV according to patient size, or iterative reconstruction technique. COMPARISON: None. FINDINGS: There is no evidence of intracranial hemorrhage, mass, acute infarct or anomaly. The ventricles are normal in size, shape and configuration. The brain parenchyma and subarachnoid spaces are normal. The visualized portions of the sinuses and mastoids appear normal. The bony calvarium and bones of the skull base appear intact.     IMPRESSION:   No evidence of acute intracranial hemorrhage, mass, or herniation. SHELLY ALMAZAN MD   SYSTEM ID:  [id number]

## 2021-12-04 NOTE — PATIENT INSTRUCTIONS
Completed a split night PSG per provider order.    Preliminary AHI >30.  A final therapeutic PAP pressure was achieved.    Supine REM was not seen on therapeutic pressure. Patient is a side sleeper.    Patient reports feeling refreshed in AM.

## 2021-12-06 LAB — SLPCOMP: NORMAL

## 2021-12-06 NOTE — PROGRESS NOTES
I called patient to give her the results of her PSG.     I encouraged her to get started on CPAP sooner rather then later.     She agreed and I have put in an order.

## 2021-12-06 NOTE — PROCEDURES
" SLEEP STUDY INTERPRETATION  SPLIT NIGHT STUDY      Patient: MAXINE PEREZ  YOB: 1970  Study Date: 12/3/2021  MRN: [id number]  Referring Provider: VIANCA Vázquez Sarah  Ordering Provider: MD Lyn Michael    Indications for Polysomnography: The patient is a 51 year old Female who is 5' 3\" and weighs 274.0 lbs. Her BMI is 48.5, [place] sleepiness scale - and neck circumference is 40 cm. Relevant medical history includes snoring, witnessed apneas. A diagnostic polysomnogram was performed to evaluate for sleep apnea. After 132.5 minutes of sleep time the patient exhibited sufficient respiratory events qualifying her for a CPAP trial which was then initiated.    Polysomnogram Data: A full night polysomnogram recorded the standard physiologic parameters including EEG, EOG, EMG, ECG, nasal and oral airflow. Respiratory parameters of chest and abdominal movements were recorded with respiratory inductance plethysmography. Oxygen saturation was recorded by pulse oximetry.  Hypopnea scoring rule used: 1B 4%    Diagnostic PSG  Sleep Architecture: Sleep fragmentation, no REM sleep  The total recording time of the polysomnogram was 176.2 minutes. The total sleep time was 132.5 minutes. Sleep latency was 10.2 minutes. REM latency was - minutes. Arousal index was 107.8 arousals per hour. Sleep efficiency was 75.2%. Wake after sleep onset was 22.0 minutes. The patient spent 17.7% of total sleep time in Stage N1, 82.3% in Stage N2, 0.0% in Stage N3, and 0.0% in REM. Time in REM supine was 0 minutes.    Respiration: Severe JOSE J with hypoxemia    Events ? The polysomnogram revealed a presence of - obstructive, - central, and - mixed apneas resulting in an apnea index of - events per hour. There were 189 obstructive hypopneas and - central hypopneas resulting in an obstructive hypopnea index of 85.6 and central hypopnea index of - events per hour. The combined apnea/hypopnea index was 85.6 events per hour (central " apnea/hypopnea index was - events per hour).  The REM AHI was - events per hour. The supine AHI was - events per hour. The RERA index was 6.3 events per hour. The RDI was 91.9 events per hour.    Snoring - was reported as loud.    Respiratory rate and pattern - was notable for normal respiratory rate and pattern.    Sustained Sleep Associated Hypoventilation - Transcutaneous carbon dioxide monitoring was used, however significant hypoventilation was not present with a maximum change from 37.7 to 42.5 mmHg and 0 minutes at or greater than 55 mmHg.    Sleep Associated Hypoxemia - (Greater than 5 minutes O2 sat at or below 88%) was present. Baseline oxygen saturation was 91.9%. Lowest oxygen saturation was 82.7%. Time spent less than or equal to 88% was 9.2 minutes. Time spent less than or equal to 89% was 18.3 minutes.     Treatment PSG  Sleep Architecture:   At 01:33:02 AM the patient was placed on PAP treatment and was titrated at pressures ranging from CPAP 5 cmH2O up to CPAP 8 cmH2O. The total recording time of the treatment portion of the study was 236.5 minutes. The total sleep time was 206.5 minutes. During the treatment portion of the study the sleep latency was 2.0 minutes. REM latency was 202.0 minutes. Arousal index was 44.5 arousals per hour. Sleep efficiency was 87.3%. Wake after sleep onset was 13.5 minutes. The patient spent 10.9% of total sleep time in Stage N1, 64.4% in Stage N2, 16.0% in Stage N3, and 8.7% in REM. Time in REM supine was 0 minutes.     Respiration: Summary assessment, + REM sleep    The final pressure was CPAP 8 cmH2O with an AHI of - events per hour. Time in REM supine on final pressure was - minutes.     Movement Activity: Increased PLMs were noted on both the baseline potion of the study.  The majority of these movements were not associated with cortical arousal.    Periodic Limb Movements  o During the diagnostic portion of the study, there were 71 PLMs recorded. The PLM index was  32.2 movements per hour. The PLM Arousal Index was 5.0 per hour.  o During the treatment portion of the study, there were 49 PLMs recorded. The PLM index was 14.2 movements per hour. The PLM Arousal Index was 2.9 per hour.    REM EMG Activity - Excessive muscle activity was not present.    Nocturnal Behavior - Abnormal sleep related behaviors were not noted.    Bruxism - None apparent.    Cardiac Summary: Limited 1 lead EKG.  Demonstrated predominantly narrow QRS complexes preceded by P waves suggestive of sinus rhythm. Intermittent tachycardia.  During the diagnostic portion of the study, the average pulse rate was 86.2 bpm. The minimum pulse rate was 75.8 bpm while the maximum pulse rate was 102.2 bpm.    During the treatment portion of the study, the average pulse rate was 81.8 bpm. The minimum pulse rate was 71.9 bpm while the maximum pulse rate was 106.4 bpm.     Assessment:     Severe JOSE J with hypoxemia     Increased PLMs were noted on both the baseline potion of the study.  The majority of these movements were not associated with cortical arousal.     Limited 1 lead EKG.  Demonstrated predominantly narrow QRS complexes preceded by P waves suggestive of sinus rhythm. Intermittent tachycardia.    Recommendations:    Treatment of JOSE J with Auto?titrating PAP therapy. Recommend clinical follow up with sleep management team, including review of compliance measures.    Advice regarding the risks of drowsy driving.    Suggest optimizing sleep schedule and avoiding sleep deprivation.    Weight management     Treatment of PLMs (alpha 2 delta ligands, dopaminergics) should be focused upon symptoms of motor restlessness, a high suspicion for PLM disorder and not for PLMs alone.      Diagnostic Codes: G47.33, G47.36, G47.9      Huber Lyn MD 12-6-21  Diplomate, ABPN Sleep Medicine

## 2021-12-09 ENCOUNTER — TELEPHONE (OUTPATIENT)
Dept: SLEEP MEDICINE | Facility: CLINIC | Age: 51
End: 2021-12-09
Payer: COMMERCIAL

## 2021-12-09 NOTE — TELEPHONE ENCOUNTER
Called and spoke with patient. She wants to transfer care to [facility] for CPAP. Patient currently have a Dreamstation machine. I advised patient to register her Dreamstation for the recall to get a replacement because most likely her device is less than 5 yrs since her previous PSG was done in 2017. Patient stated she doesn't recall getting replacement supplies from [facility] because they are not very good with returning phone calls or sending out supplies. I let her know that if our transfer team have questions they will reach out to her. Patient has a PSG follow with Bennett Goltz on 12/16/21 to discuss results and would most likely need to continue use with CPAP if she gets the ok from her provider.

## 2021-12-16 ENCOUNTER — VIRTUAL VISIT (OUTPATIENT)
Dept: SLEEP MEDICINE | Facility: CLINIC | Age: 51
End: 2021-12-16
Payer: COMMERCIAL

## 2021-12-16 DIAGNOSIS — G47.33 OSA (OBSTRUCTIVE SLEEP APNEA): Primary | ICD-10-CM

## 2021-12-16 PROCEDURE — 99214 OFFICE O/P EST MOD 30 MIN: CPT | Mod: 95 | Performed by: PHYSICIAN ASSISTANT

## 2021-12-16 ASSESSMENT — SLEEP AND FATIGUE QUESTIONNAIRES
HOW LIKELY ARE YOU TO NOD OFF OR FALL ASLEEP WHEN YOU ARE A PASSENGER IN A CAR FOR AN HOUR WITHOUT A BREAK: HIGH CHANCE OF DOZING
HOW LIKELY ARE YOU TO NOD OFF OR FALL ASLEEP WHILE LYING DOWN TO REST IN THE AFTERNOON WHEN CIRCUMSTANCES PERMIT: SLIGHT CHANCE OF DOZING
HOW LIKELY ARE YOU TO NOD OFF OR FALL ASLEEP WHILE SITTING QUIETLY AFTER LUNCH WITHOUT ALCOHOL: MODERATE CHANCE OF DOZING
HOW LIKELY ARE YOU TO NOD OFF OR FALL ASLEEP IN A CAR, WHILE STOPPED FOR A FEW MINUTES IN TRAFFIC: MODERATE CHANCE OF DOZING
HOW LIKELY ARE YOU TO NOD OFF OR FALL ASLEEP WHILE SITTING AND TALKING TO SOMEONE: SLIGHT CHANCE OF DOZING
HOW LIKELY ARE YOU TO NOD OFF OR FALL ASLEEP WHILE SITTING INACTIVE IN A PUBLIC PLACE: SLIGHT CHANCE OF DOZING
HOW LIKELY ARE YOU TO NOD OFF OR FALL ASLEEP WHILE SITTING AND READING: HIGH CHANCE OF DOZING
HOW LIKELY ARE YOU TO NOD OFF OR FALL ASLEEP WHILE WATCHING TV: HIGH CHANCE OF DOZING

## 2021-12-16 NOTE — PATIENT INSTRUCTIONS
Your sleep apnea treatment may be affected by device recall    Our records show that you may have a [employer] Respironics CPAP for the treatment of sleep apnea. Many of these devices have been recalled* by the  for replacement. [facility] recommends:     1) If you are using a Resmed device, continue using the device.  2) If you have a [employer] Respironics device, register your device for confirmation of type of device and repair of the device at [email or web address] -if you cannot use link, call [phone].  The website will assist you in obtaining the serial number for registration.   3) If you are using a [employer] Respironics CPAP or Bilevel PAP device and you do not have immediate breathing, driving or cardiovascular risks without the device, consider stopping use of the device after verification that is has been recalled. Discuss this decision with your medical provider if you are uncertain about your medical risks.  4) If you are not using Respironics CPAP but are using a Respironics advanced device for breathing support (AVAPS, ASV, Bilevel PAP), continue using the device and review 5 and 6 below).     5) If you continue the device, do not include ozone generating  connected to PAP devices.  6) Bacterial filters to reduce exposure to particulates are sometimes cumbersome to use and are not currently recommended by the .    ?       You may also choose discuss with your provider alternative approaches to treatment.      *[employer] is voluntarily recalling the below devices due to two (2) issues related to the polyester-based polyurethane (PE-PUR) sound abatement foam used in [employer] Continuous and Non-Continuous Ventilators: 1) PE-PUR foam may degrade into particles which may enter the device's the air pathway and be ingested or inhaled by the user, and 2) the PE-PUR foam may off-gas certain chemicals.  The foam degradation may be exacerbated by use of unapproved cleaning methods, such as ozone (see FDA safety communication on use of Ozone ), and off-gassing may occur during initial operation and may possibly continue throughout the device's useful life.   These issues can result in serious injury which can be life-threatening, cause permanent impairment, and/or require medical intervention to preclude permanent impairment. To date, [employer] has received several complaints regarding the presence of black debris/particles within the airpath circuit (extending from the device outlet, humidifier, tubing, and mask). [employer] also has received reports of headache, upper airway irritation, cough, chest pressure and sinus infection. The potential risks of particulate exposure include: Irritation (skin, eye, and respiratory tract), inflammatory response, headache, asthma, adverse effects to other organs (e.g. kidneys and liver) and toxic carcinogenic affects. The potential risks of chemical exposure due to off-gassing include: headache/dizziness, irritation (eyes, nose, respiratory tract, skin), hypersensitivity, nausea/vomiting, toxic and carcinogenic effects. There have been no reports of death as a result of these issues.    Actions to be taken:  Discontinue the use of your device.  Do not continue to use ozone  with the device.     [employer] affected devices on the recall website, www.[employer].com/SRC-update    i. The website provides current information on the status of the recall and how  to receive permanent corrective action to address the two issues.    ii. The website also provides instructions on how to locate an affected device  Serial Number and will guide users through the registration process.    iii. In the , call [phone] Service Hotline if you cannot visit the website

## 2021-12-16 NOTE — PROGRESS NOTES
Hannah is a 51 year old who is being evaluated via a billable video visit.      How would you like to obtain your AVS? MyChart  If the video visit is dropped, the invitation should be resent by: Text to cell phone: [phone]  Will anyone else be joining your video visit? No      Video Start Time: 9:37 AM  Video-Visit Details    Type of service:  Video Visit    Video End Time:10:07 AM    Originating Location (pt. Location): Home    Distant Location (provider location):  [facility]     Platform used for Video Visit: Zoodig

## 2021-12-16 NOTE — PROGRESS NOTES
Sleep Follow-Up Visit:    Date on this visit: 12/16/2021    Hannah Meade comes in today for follow-up of her sleep study done on 12/3/21. Hannah Meade was initially seen for presumed apnea and difficulty weaning off of oxygen post-operatively. Her medical history is significant for insomnia, BMI >40, anxiety/depression, HTN, DM 2, asthma.      Hannah had a PSG at [facility] on 12/15/2017. AHI was 14/hr (restricted AHI 11/hr), RDI 17/hr, REM AHI 59/hr. Her O2 jerman was 72%. 12 min below 88%. Wt 240#.     Hannah had a VBG in the hospital and 8/20 and her CO2 was 56 mmHg..     PSG Results:  Weight: 274 pounds  Diagnostic PSG  Sleep Architecture: Sleep fragmentation, no REM sleep  The total recording time of the polysomnogram was 176.2 minutes. The total sleep time was 132.5 minutes. Sleep latency was 10.2 minutes. REM latency was - minutes. Arousal index was 107.8 arousals per hour. Sleep efficiency was 75.2%. Wake after sleep onset was 22.0 minutes. The patient spent 17.7% of total sleep time in Stage N1, 82.3% in Stage N2, 0.0% in Stage N3, and 0.0% in REM. Time in REM supine was 0 minutes.     Respiration: Severe JOSE J with hypoxemia    Events ? The polysomnogram revealed a presence of - obstructive, - central, and - mixed apneas resulting in an apnea index of - events per hour. There were 189 obstructive hypopneas and - central hypopneas resulting in an obstructive hypopnea index of 85.6 and central hypopnea index of - events per hour. The combined apnea/hypopnea index was 85.6 events per hour (central apnea/hypopnea index was - events per hour).  The REM AHI was - events per hour. The supine AHI was - events per hour. The RERA index was 6.3 events per hour. The RDI was 91.9 events per hour.    Snoring - was reported as loud.    Respiratory rate and pattern - was notable for normal respiratory rate and pattern.    Sustained Sleep Associated Hypoventilation - Transcutaneous carbon dioxide monitoring was used, however  significant hypoventilation was not present with a maximum change from 37.7 to 42.5 mmHg and 0 minutes at or greater than 55 mmHg.    Sleep Associated Hypoxemia - (Greater than 5 minutes O2 sat at or below 88%) was present. Baseline oxygen saturation was 91.9%. Lowest oxygen saturation was 82.7%. Time spent less than or equal to 88% was 9.2 minutes. Time spent less than or equal to 89% was 18.3 minutes.      Treatment PSG  Sleep Architecture:   At 01:33:02 AM the patient was placed on PAP treatment and was titrated at pressures ranging from CPAP 5 cmH2O up to CPAP 8 cmH2O. The total recording time of the treatment portion of the study was 236.5 minutes. The total sleep time was 206.5 minutes. During the treatment portion of the study the sleep latency was 2.0 minutes. REM latency was 202.0 minutes. Arousal index was 44.5 arousals per hour. Sleep efficiency was 87.3%. Wake after sleep onset was 13.5 minutes. The patient spent 10.9% of total sleep time in Stage N1, 64.4% in Stage N2, 16.0% in Stage N3, and 8.7% in REM. Time in REM supine was 0 minutes.      Respiration: Summary assessment, + REM sleep    The final pressure was CPAP 8 cmH2O with an AHI of - events per hour. Time in REM supine on final pressure was - minutes.      Movement Activity: Increased PLMs were noted on both the baseline potion of the study.  The majority of these movements were not associated with cortical arousal.    Periodic Limb Movements  ? During the diagnostic portion of the study, there were 71 PLMs recorded. The PLM index was 32.2 movements per hour. The PLM Arousal Index was 5.0 per hour.  ? During the treatment portion of the study, there were 49 PLMs recorded. The PLM index was 14.2 movements per hour. The PLM Arousal Index was 2.9 per hour.    REM EMG Activity - Excessive muscle activity was not present.    Nocturnal Behavior - Abnormal sleep related behaviors were not noted.    Bruxism - None apparent.     Cardiac Summary: Limited  1 lead EKG.  Demonstrated predominantly narrow QRS complexes preceded by P waves suggestive of sinus rhythm. Intermittent tachycardia.  During the diagnostic portion of the study, the average pulse rate was 86.2 bpm. The minimum pulse rate was 75.8 bpm while the maximum pulse rate was 102.2 bpm.     During the treatment portion of the study, the average pulse rate was 81.8 bpm. The minimum pulse rate was 71.9 bpm while the maximum pulse rate was 106.4 bpm.     She sleeps in a recliner because of pain (from hernias that were surgically addressed) and breathing. She was on her right side all night on the sleep study. She says she has never slept on her back.    She has a recalled CPAP. It is packed away because they are moving. She has been contacted by [facility] for supplies, so she already has new supplies.     Past medical/surgical history, family history, social history, medications and allergies were reviewed.      Problem List:  Patient Active Problem List    Diagnosis Date Noted     Incisional hernia, without obstruction or gangrene 07/19/2021     Priority: Medium     Added automatically from request for surgery [id number]       Type 2 diabetes mellitus without complication, without long-term current use of insulin (H) 10/13/2020     Priority: Medium     Elevated glucose 09/13/2018     Priority: Medium     Mild major depression (H) 09/10/2018     Priority: Medium     JOSE J (obstructive sleep apnea) 09/10/2018     Priority: Medium     Lymphedema 09/10/2018     Priority: Medium     Asthma 12/20/2017     Priority: Medium     Other nonspecific abnormal finding of lung field (CODE) 12/20/2017     Priority: Medium     Multiple lipomas 12/20/2017     Priority: Medium     BMI 40.0-44.9, adult (H) 08/16/2017     Priority: Medium     bmi over 40 09/25/2016     Priority: Medium     Morbid obesity (H) 11/26/2015     Priority: Medium     High triglycerides, recurrent severe low back pain due to overload        Bronchogenic cyst 08/25/2015     Priority: Medium     Ovarian cyst 08/17/2015     Priority: Medium     WILL NEED US FOLLOW UP AFTER CHEST SX       Plantar fasciitis 02/23/2015     Priority: Medium     Anxiety 12/31/2014     Priority: Medium     Insomnia 08/08/2011     Priority: Medium     CARDIOVASCULAR SCREENING; LDL GOAL LESS THAN 160 10/31/2010     Priority: Medium     Carbuncle and furuncle of trunk 05/31/2005     Priority: Medium        Impression/Plan:    (G47.33) JOSE J (obstructive sleep apnea)  (primary encounter diagnosis)  Comment: severe JOSE J, AHI 89/hr, all hypopneas. CPAP 8 cm was effective when sleeping laterally. She has been sleeping in a recliner but would like to eventually get back in bed. She has a machine packed away. It is under recall. She has not registered it yet.  Plan:  She will start using her old machine. Order placed for a pressure change on her prior machine to 8-12 cm. Bring machine to [facility] for pressure adjustment. We discussed the recall. She will register her machine with [employer].      She will follow up with me in about 2 month(s).     35 minutes were spent on the date of the encounter doing chart review, history and exam, documentation and further activities as noted above.     Bennett Goltz, PA-C    CC: Lianna Cleveland MD

## 2021-12-19 ENCOUNTER — HEALTH MAINTENANCE LETTER (OUTPATIENT)
Age: 51
End: 2021-12-19

## 2021-12-23 ENCOUNTER — MYC MEDICAL ADVICE (OUTPATIENT)
Dept: FAMILY MEDICINE | Facility: CLINIC | Age: 51
End: 2021-12-23
Payer: COMMERCIAL

## 2021-12-23 NOTE — NURSING NOTE
Pt called and message left for pt to call back to schedule 2 month follow up after cpap pressure change has been made.    JOSUE London

## 2022-01-12 ENCOUNTER — E-VISIT (OUTPATIENT)
Dept: FAMILY MEDICINE | Facility: CLINIC | Age: 52
End: 2022-01-12
Payer: COMMERCIAL

## 2022-01-12 DIAGNOSIS — I10 BENIGN ESSENTIAL HYPERTENSION: ICD-10-CM

## 2022-01-12 DIAGNOSIS — J45.40 MODERATE PERSISTENT ASTHMA WITHOUT COMPLICATION: ICD-10-CM

## 2022-01-12 DIAGNOSIS — U07.1 INFECTION DUE TO 2019 NOVEL CORONAVIRUS: Primary | ICD-10-CM

## 2022-01-12 PROCEDURE — 99421 OL DIG E/M SVC 5-10 MIN: CPT | Performed by: FAMILY MEDICINE

## 2022-01-13 RX ORDER — ALBUTEROL SULFATE 90 UG/1
AEROSOL, METERED RESPIRATORY (INHALATION)
Qty: 18 G | Refills: 0 | Status: SHIPPED | OUTPATIENT
Start: 2022-01-13 | End: 2022-03-14

## 2022-01-13 RX ORDER — LOSARTAN POTASSIUM 25 MG/1
25 TABLET ORAL DAILY
Qty: 90 TABLET | Refills: 0 | Status: SHIPPED | OUTPATIENT
Start: 2022-01-13 | End: 2022-03-14

## 2022-01-13 RX ORDER — MONTELUKAST SODIUM 10 MG/1
TABLET ORAL
Qty: 90 TABLET | Refills: 0 | Status: SHIPPED | OUTPATIENT
Start: 2022-01-13 | End: 2022-03-14

## 2022-01-13 NOTE — PATIENT INSTRUCTIONS
Thank you for choosing us for your care. I have placed an order for a prescription so that you can start treatment. View your full visit summary for details by clicking on the link below. Your pharmacist will able to address any questions you may have about the medication.     If you're not feeling better within 5-7 days, please schedule an appointment.  You can schedule an appointment right here in [facility], or call [phone]  If the visit is for the same symptoms as your eVisit, we'll refund the cost of your eVisit if seen within seven days.

## 2022-01-13 NOTE — TELEPHONE ENCOUNTER
Provider E-Visit time total (minutes): 10 minutes    Lianna Stone MD, [facility]

## 2022-01-21 ENCOUNTER — E-VISIT (OUTPATIENT)
Dept: FAMILY MEDICINE | Facility: CLINIC | Age: 52
End: 2022-01-21
Payer: COMMERCIAL

## 2022-01-21 DIAGNOSIS — R05.9 COUGH: Primary | ICD-10-CM

## 2022-01-21 PROCEDURE — 99207 PR NON-BILLABLE SERV PER CHARTING: CPT | Performed by: FAMILY MEDICINE

## 2022-01-21 NOTE — TELEPHONE ENCOUNTER
Provider E-Visit time total (minutes): 5 minutes    Patient needs to be triaged given SOB.  Will have triage contact her, but she needs more than an e-visit, at least a virtual or office visit.  Likely will need urgent care given weekend.

## 2022-01-21 NOTE — TELEPHONE ENCOUNTER
Called patient to inform of message below. Patient states unable to go to  today due to having a child at home that needs watching. RN and Patient agreed patient to be seen tomorrow at [facility]. Patient to be seen sooner should symptoms change and/or worsen.     JADEN JoN, RN  [facility]

## 2022-01-21 NOTE — PATIENT INSTRUCTIONS
Thank you for choosing us for your care. I think a video or phone visit would be best next steps based on your symptoms. Someone from the clinic will call you to speak with you to make sure the clinic is the appropriate place to be seen, as you may need to be seen in urgent care because of your shortness of breath.  You won t be charged for this eVisit.

## 2022-01-22 ENCOUNTER — ANCILLARY PROCEDURE (OUTPATIENT)
Dept: GENERAL RADIOLOGY | Facility: CLINIC | Age: 52
End: 2022-01-22
Attending: FAMILY MEDICINE
Payer: COMMERCIAL

## 2022-01-22 ENCOUNTER — OFFICE VISIT (OUTPATIENT)
Dept: URGENT CARE | Facility: URGENT CARE | Age: 52
End: 2022-01-22
Payer: COMMERCIAL

## 2022-01-22 VITALS
DIASTOLIC BLOOD PRESSURE: 71 MMHG | OXYGEN SATURATION: 96 % | HEART RATE: 80 BPM | RESPIRATION RATE: 18 BRPM | TEMPERATURE: 96.6 F | SYSTOLIC BLOOD PRESSURE: 134 MMHG

## 2022-01-22 DIAGNOSIS — J01.90 ACUTE SINUSITIS WITH COEXISTING CONDITION REQUIRING PROPHYLACTIC TREATMENT: ICD-10-CM

## 2022-01-22 DIAGNOSIS — J45.21 MILD INTERMITTENT ASTHMA WITH EXACERBATION: ICD-10-CM

## 2022-01-22 DIAGNOSIS — J22 LOWER RESPIRATORY TRACT INFECTION: ICD-10-CM

## 2022-01-22 DIAGNOSIS — R05.9 COUGH: ICD-10-CM

## 2022-01-22 DIAGNOSIS — R06.02 SOB (SHORTNESS OF BREATH): Primary | ICD-10-CM

## 2022-01-22 PROCEDURE — 71046 X-RAY EXAM CHEST 2 VIEWS: CPT | Performed by: RADIOLOGY

## 2022-01-22 PROCEDURE — 99214 OFFICE O/P EST MOD 30 MIN: CPT | Performed by: FAMILY MEDICINE

## 2022-01-22 RX ORDER — PREDNISONE 20 MG/1
40 TABLET ORAL DAILY
Qty: 10 TABLET | Refills: 0 | Status: SHIPPED | OUTPATIENT
Start: 2022-01-22 | End: 2022-01-27

## 2022-01-22 RX ORDER — BENZONATATE 200 MG/1
200 CAPSULE ORAL 3 TIMES DAILY PRN
Qty: 30 CAPSULE | Refills: 0 | Status: SHIPPED | OUTPATIENT
Start: 2022-01-22 | End: 2022-03-14

## 2022-01-22 RX ORDER — DOXYCYCLINE 100 MG/1
100 CAPSULE ORAL 2 TIMES DAILY
Qty: 20 CAPSULE | Refills: 0 | Status: SHIPPED | OUTPATIENT
Start: 2022-01-22 | End: 2022-02-01

## 2022-01-22 NOTE — PROGRESS NOTES
SUBJECTIVE:   Hannah Meade is a 51 year old female presenting with a chief complaint of cough, SOB, sinus congestion.  Onset of symptoms was 3 week(s) ago.  Course of illness is worsening.    Severity moderate  Current and Associated symptoms: sinus congestion, SOB, wheezing, voice hoarseness, cough  Treatment measures tried include Tylenol/Ibuprofen, Inhaler (name: albuterol), Fluids, Rest, benadryl.  Predisposing factors include HX of asthma and JOSE J, HTN, .    Fever intermittent  Sinus worsening over the past 1 week  Increase in SOB, wheezing, albuterol not helping    COVID positive 1/8, has been sick since 1/1  E-visit on 1/12 - given RX albuterol inhaler  Repeat E-visit on 1/21 and told to be seen in     Completed J&J COVID vaccination in June      Past Medical History:   Diagnosis Date     Anxiety      Depressive disorder      Diabetes (H)     prediabetic     Elevated glucose 9/13/2018     Hypertension     Unsure on the date     Lymphedema 9/10/2018     Mild major depression (H) 9/10/2018     Obesity, unspecified      JOSE J (obstructive sleep apnea) 9/10/2018    cpap recalled     JOSE J (obstructive sleep apnea)      Other chronic pain     criss knees     Plantar fasciitis 2/23/2015     Sleep apnea, unspecified 12/20/2017     Sleep apnea, unspecified      Tuberculin test reaction      Uncomplicated asthma      Current Outpatient Medications   Medication Sig Dispense Refill     albuterol (PROAIR HFA) 108 (90 Base) MCG/ACT inhaler INHALE 2 PUFFS INTO THE LUNGS EVERY 6 HOURS 18 g 0     atorvastatin (LIPITOR) 20 MG tablet Take 1 tablet (20 mg) by mouth daily 90 tablet 3     DiphenhydrAMINE HCl (BENADRYL PO) Take 25 mg by mouth daily as needed        escitalopram (LEXAPRO) 20 MG tablet TAKE 1 TABLET BY MOUTH ONE TIME DAILY  90 tablet 2     furosemide (LASIX) 20 MG tablet TAKE 2 TABLETS BY MOUTH DAILY 180 tablet 1     losartan (COZAAR) 25 MG tablet Take 1 tablet (25 mg) by mouth daily 90 tablet 0     montelukast  (SINGULAIR) 10 MG tablet TAKE ONE TABLET BY MOUTH EVERY NIGHT AT BEDTIME 90 tablet 0     Social History     Tobacco Use     Smoking status: Never Smoker     Smokeless tobacco: Never Used   Substance Use Topics     Alcohol use: No     Alcohol/week: 0.0 standard drinks       ROS:  Review of systems negative except as stated above.    OBJECTIVE:  /71   Pulse 80   Temp (!) 96.6  F (35.9  C)   Resp 18   SpO2 96%   GENERAL APPEARANCE: healthy, alert and no distress  EYES: EOMI,  PERRL, conjunctiva clear  RESP: lungs clear to auscultation - no rales, rhonchi or wheezes  CV: regular rates and rhythm, normal S1 S2, no murmur noted  PSYCH: mentation appears normal and affect normal/bright    CXR - no acute infiltrate, faint haziness in lateral view, no pneumothorax, no pleural effusion personally viewed by me    ASSESSMENT/PLAN:  (R06.02) SOB (shortness of breath)  (primary encounter diagnosis)  Plan: XR Chest 2 Views, predniSONE (DELTASONE) 20 MG         tablet            (J01.90) Acute sinusitis with coexisting condition requiring prophylactic treatment  Plan: doxycycline hyclate (VIBRAMYCIN) 100 MG capsule            (J22) Lower respiratory tract infection  Plan: doxycycline hyclate (VIBRAMYCIN) 100 MG capsule            (J45.21) Mild intermittent asthma with exacerbation  Plan: predniSONE (DELTASONE) 20 MG tablet        Continue with albuterol inhaler      (R05.9) Cough  Plan: benzonatate (TESSALON) 200 MG capsule            Reassurance given, reviewed symptomatic treatment with tylenol, ibuprofen, plenty of fluids and rest.  Discussed that worsening post COVID infection is concerning for possible bacterial etiology.  RX Doxycycline given for treatment for both sinus infection and lower respiratory tract infection.  RX prednisone burst given for asthma flare, continue with inhaler use.  RX tessalon perles given to ease up on cough symptoms.  Will follow up on formal Xray report and notify if any  abnormalities.    Follow up with primary provider in 1-2 weeks for recheck    Dangelo Guan MD  January 22, 2022 10:08 AM

## 2022-01-22 NOTE — PATIENT INSTRUCTIONS
Okay to take ibuprofen 200 mg - 4 tablets (800 mg) every 8 hours as needed.  Okay to take tylenol 500 mg - 2 tablets (1000 mg) every 6-8 hours as needed, do not exceed 3000 mg in 24 hours.  Take full course of Doxycycline (antibiotic ) for both sinus infection and bronchitis    Take prednisone burst for asthma flare  Continue with albuterol inhaler  Okay to use tessalon perles to help with cough

## 2022-03-14 ENCOUNTER — OFFICE VISIT (OUTPATIENT)
Dept: FAMILY MEDICINE | Facility: CLINIC | Age: 52
End: 2022-03-14
Payer: COMMERCIAL

## 2022-03-14 VITALS
HEIGHT: 62 IN | HEART RATE: 84 BPM | DIASTOLIC BLOOD PRESSURE: 82 MMHG | BODY MASS INDEX: 49.28 KG/M2 | SYSTOLIC BLOOD PRESSURE: 120 MMHG | OXYGEN SATURATION: 97 % | WEIGHT: 267.8 LBS

## 2022-03-14 DIAGNOSIS — Z12.31 VISIT FOR SCREENING MAMMOGRAM: ICD-10-CM

## 2022-03-14 DIAGNOSIS — J45.40 MODERATE PERSISTENT ASTHMA WITHOUT COMPLICATION: ICD-10-CM

## 2022-03-14 DIAGNOSIS — E11.9 TYPE 2 DIABETES MELLITUS WITHOUT COMPLICATION, WITHOUT LONG-TERM CURRENT USE OF INSULIN (H): Primary | ICD-10-CM

## 2022-03-14 DIAGNOSIS — Z13.220 SCREENING FOR HYPERLIPIDEMIA: ICD-10-CM

## 2022-03-14 DIAGNOSIS — I10 BENIGN ESSENTIAL HYPERTENSION: ICD-10-CM

## 2022-03-14 LAB
HBA1C MFR BLD: 7.2 % (ref 0–5.6)
HOLD SPECIMEN: NORMAL
HOLD SPECIMEN: NORMAL

## 2022-03-14 PROCEDURE — 80048 BASIC METABOLIC PNL TOTAL CA: CPT | Performed by: FAMILY MEDICINE

## 2022-03-14 PROCEDURE — 36415 COLL VENOUS BLD VENIPUNCTURE: CPT | Performed by: FAMILY MEDICINE

## 2022-03-14 PROCEDURE — 99214 OFFICE O/P EST MOD 30 MIN: CPT | Performed by: FAMILY MEDICINE

## 2022-03-14 PROCEDURE — 83036 HEMOGLOBIN GLYCOSYLATED A1C: CPT | Performed by: FAMILY MEDICINE

## 2022-03-14 PROCEDURE — 80061 LIPID PANEL: CPT | Performed by: FAMILY MEDICINE

## 2022-03-14 RX ORDER — FEXOFENADINE HCL 180 MG/1
180 TABLET ORAL DAILY
COMMUNITY

## 2022-03-14 RX ORDER — MONTELUKAST SODIUM 10 MG/1
TABLET ORAL
Qty: 90 TABLET | Refills: 1 | Status: SHIPPED | OUTPATIENT
Start: 2022-03-14 | End: 2022-12-08

## 2022-03-14 RX ORDER — ALBUTEROL SULFATE 90 UG/1
AEROSOL, METERED RESPIRATORY (INHALATION)
Qty: 18 G | Refills: 1 | Status: SHIPPED | OUTPATIENT
Start: 2022-03-14 | End: 2023-11-17

## 2022-03-14 RX ORDER — LOSARTAN POTASSIUM 25 MG/1
25 TABLET ORAL DAILY
Qty: 90 TABLET | Refills: 1 | Status: SHIPPED | OUTPATIENT
Start: 2022-03-14 | End: 2022-10-14

## 2022-03-14 ASSESSMENT — ASTHMA QUESTIONNAIRES
QUESTION_4 LAST FOUR WEEKS HOW OFTEN HAVE YOU USED YOUR RESCUE INHALER OR NEBULIZER MEDICATION (SUCH AS ALBUTEROL): TWO OR THREE TIMES PER WEEK
ACT_TOTALSCORE: 16
QUESTION_3 LAST FOUR WEEKS HOW OFTEN DID YOUR ASTHMA SYMPTOMS (WHEEZING, COUGHING, SHORTNESS OF BREATH, CHEST TIGHTNESS OR PAIN) WAKE YOU UP AT NIGHT OR EARLIER THAN USUAL IN THE MORNING: ONCE OR TWICE
ACT_TOTALSCORE: 16
QUESTION_1 LAST FOUR WEEKS HOW MUCH OF THE TIME DID YOUR ASTHMA KEEP YOU FROM GETTING AS MUCH DONE AT WORK, SCHOOL OR AT HOME: SOME OF THE TIME
QUESTION_5 LAST FOUR WEEKS HOW WOULD YOU RATE YOUR ASTHMA CONTROL: WELL CONTROLLED
QUESTION_2 LAST FOUR WEEKS HOW OFTEN HAVE YOU HAD SHORTNESS OF BREATH: ONCE A DAY

## 2022-03-14 ASSESSMENT — ANXIETY QUESTIONNAIRES
2. NOT BEING ABLE TO STOP OR CONTROL WORRYING: MORE THAN HALF THE DAYS
6. BECOMING EASILY ANNOYED OR IRRITABLE: NOT AT ALL
GAD7 TOTAL SCORE: 9
3. WORRYING TOO MUCH ABOUT DIFFERENT THINGS: MORE THAN HALF THE DAYS
5. BEING SO RESTLESS THAT IT IS HARD TO SIT STILL: SEVERAL DAYS
7. FEELING AFRAID AS IF SOMETHING AWFUL MIGHT HAPPEN: MORE THAN HALF THE DAYS
IF YOU CHECKED OFF ANY PROBLEMS ON THIS QUESTIONNAIRE, HOW DIFFICULT HAVE THESE PROBLEMS MADE IT FOR YOU TO DO YOUR WORK, TAKE CARE OF THINGS AT HOME, OR GET ALONG WITH OTHER PEOPLE: NOT DIFFICULT AT ALL
1. FEELING NERVOUS, ANXIOUS, OR ON EDGE: SEVERAL DAYS

## 2022-03-14 ASSESSMENT — PATIENT HEALTH QUESTIONNAIRE - PHQ9
5. POOR APPETITE OR OVEREATING: SEVERAL DAYS
SUM OF ALL RESPONSES TO PHQ QUESTIONS 1-9: 9

## 2022-03-14 NOTE — PROGRESS NOTES
Assessment & Plan     Type 2 diabetes mellitus without complication, without long-term current use of insulin (H)  - A1c up from last visit. Diet and lifestyle changes discussed   - Hemoglobin A1c; Future  - Extra Tube; Future  - Hemoglobin A1c  - Extra Tube  - Albumin Random Urine Quantitative with Creat Ratio; Future  - BASIC METABOLIC PANEL; Future  - BASIC METABOLIC PANEL    Screening for hyperlipidemia  - Lipid panel reflex to direct LDL Non-fasting; Future  - Lipid panel reflex to direct LDL Non-fasting    Visit for screening mammogram  - MA SCREENING DIGITAL BILAT - Future  (s+30); Future    Moderate persistent asthma without complication  -stable on current regimen   - montelukast (SINGULAIR) 10 MG tablet; TAKE ONE TABLET BY MOUTH EVERY NIGHT AT BEDTIME  - albuterol (PROAIR HFA) 108 (90 Base) MCG/ACT inhaler; INHALE 2 PUFFS INTO THE LUNGS EVERY 6 HOURS    Benign essential hypertension  -stable   - losartan (COZAAR) 25 MG tablet; Take 1 tablet (25 mg) by mouth daily                 See Patient Instructions    Return in about 3 months (around 6/14/2022) for diabetes, in person, .    Lianna Cleveland MD  [facility] LATONYA Young is a 51 year old who presents for the following health issues     History of Present Illness       Diabetes:   She presents for follow up of diabetes.  She is not checking blood glucose. She has no concerns regarding her diabetes at this time.  She is having burning in feet and redness, sores, or blisters on feet. The patient has had a diabetic eye exam in the last 12 months. Eye exam performed on Unsure. Location of last eye exam [place].        She eats 2-3 servings of fruits and vegetables daily.She consumes 2 sweetened beverage(s) daily.She exercises with enough effort to increase her heart rate 20 to 29 minutes per day.  She exercises with enough effort to increase her heart rate 3 or less days per week. She is  "missing 1 dose(s) of medications per week.  She is not taking prescribed medications regularly due to remembering to take.     Wt Readings from Last 4 Encounters:   03/14/22 121.5 kg (267 lb 12.8 oz)   12/03/21 123.8 kg (273 lb)   10/04/21 124.3 kg (274 lb)   09/02/21 123.4 kg (272 lb)       Review of Systems   Constitutional, HEENT, cardiovascular, pulmonary, GI, , musculoskeletal, neuro, skin, endocrine and psych systems are negative, except as otherwise noted.      Objective    /82 (BP Location: Right arm, Patient Position: Sitting, Cuff Size: Adult Large)   Pulse 84   Ht 1.575 m (5' 2\")   Wt 121.5 kg (267 lb 12.8 oz)   SpO2 97%   BMI 48.98 kg/m    Body mass index is 48.98 kg/m .  Physical Exam   GENERAL: healthy, alert and no distress  RESP: lungs clear to auscultation - no rales, rhonchi or wheezes  CV: regular rate and rhythm, normal S1 S2, no S3 or S4, no murmur, click or rub, no peripheral edema and peripheral pulses strong  PSYCH: mentation appears normal, affect normal/bright  Diabetic foot exam: normal DP and PT pulses, no trophic changes or ulcerative lesions and normal sensory exam    Results for orders placed or performed in visit on 03/14/22   Hemoglobin A1c     Status: Abnormal   Result Value Ref Range    Hemoglobin A1C 7.2 (H) 0.0 - 5.6 %   Extra Tube     Status: None    Narrative    The following orders were created for panel order Extra Tube.  Procedure                               Abnormality         Status                     ---------                               -----------         ------                     Extra Red Top Tube[[id number]]                               Final result               Extra Green Top (Lithium...[[id number]]                      Final result                 Please view results for these tests on the individual orders.   Extra Red Top Tube     Status: None   Result Value Ref Range    Hold Specimen JIC    Extra Green Top (Lithium Heparin) Tube     Status: " None   Result Value Ref Range    Hold Specimen JIC

## 2022-03-15 LAB
ANION GAP SERPL CALCULATED.3IONS-SCNC: 9 MMOL/L (ref 3–14)
BUN SERPL-MCNC: 11 MG/DL (ref 7–30)
CALCIUM SERPL-MCNC: 9.6 MG/DL (ref 8.5–10.1)
CHLORIDE BLD-SCNC: 103 MMOL/L (ref 94–109)
CHOLEST SERPL-MCNC: 244 MG/DL
CO2 SERPL-SCNC: 26 MMOL/L (ref 20–32)
CREAT SERPL-MCNC: 0.78 MG/DL (ref 0.52–1.04)
GFR SERPL CREATININE-BSD FRML MDRD: >90 ML/MIN/1.73M2
GLUCOSE BLD-MCNC: 157 MG/DL (ref 70–99)
HDLC SERPL-MCNC: 55 MG/DL
LDLC SERPL CALC-MCNC: 125 MG/DL
NONHDLC SERPL-MCNC: 189 MG/DL
POTASSIUM BLD-SCNC: 3.8 MMOL/L (ref 3.4–5.3)
SODIUM SERPL-SCNC: 138 MMOL/L (ref 133–144)
TRIGL SERPL-MCNC: 321 MG/DL

## 2022-03-15 ASSESSMENT — ANXIETY QUESTIONNAIRES: GAD7 TOTAL SCORE: 9

## 2022-05-10 ENCOUNTER — PATIENT OUTREACH (OUTPATIENT)
Dept: FAMILY MEDICINE | Facility: CLINIC | Age: 52
End: 2022-05-10
Payer: COMMERCIAL

## 2022-05-10 NOTE — TELEPHONE ENCOUNTER
Call to patient with reminder to schedule 3 mos diabetes f/up with PCP ~6/14/22. Patient agrees to schedule through [employer].   Celestino Kwong RN - Patient Advocate and Liaison (PAL)  [facility]   [phone]

## 2022-07-06 ASSESSMENT — PATIENT HEALTH QUESTIONNAIRE - PHQ9
SUM OF ALL RESPONSES TO PHQ QUESTIONS 1-9: 11
SUM OF ALL RESPONSES TO PHQ QUESTIONS 1-9: 11
10. IF YOU CHECKED OFF ANY PROBLEMS, HOW DIFFICULT HAVE THESE PROBLEMS MADE IT FOR YOU TO DO YOUR WORK, TAKE CARE OF THINGS AT HOME, OR GET ALONG WITH OTHER PEOPLE: SOMEWHAT DIFFICULT

## 2022-07-07 ENCOUNTER — OFFICE VISIT (OUTPATIENT)
Dept: FAMILY MEDICINE | Facility: CLINIC | Age: 52
End: 2022-07-07
Payer: COMMERCIAL

## 2022-07-07 VITALS
RESPIRATION RATE: 12 BRPM | WEIGHT: 269.5 LBS | HEART RATE: 83 BPM | BODY MASS INDEX: 49.59 KG/M2 | SYSTOLIC BLOOD PRESSURE: 110 MMHG | DIASTOLIC BLOOD PRESSURE: 82 MMHG | OXYGEN SATURATION: 95 % | TEMPERATURE: 98 F | HEIGHT: 62 IN

## 2022-07-07 DIAGNOSIS — E66.01 MORBID OBESITY (H): ICD-10-CM

## 2022-07-07 DIAGNOSIS — I89.0 LYMPHEDEMA: ICD-10-CM

## 2022-07-07 DIAGNOSIS — G47.33 OSA (OBSTRUCTIVE SLEEP APNEA): ICD-10-CM

## 2022-07-07 DIAGNOSIS — E78.2 MIXED HYPERLIPIDEMIA: ICD-10-CM

## 2022-07-07 DIAGNOSIS — E11.9 TYPE 2 DIABETES MELLITUS WITHOUT COMPLICATION, WITHOUT LONG-TERM CURRENT USE OF INSULIN (H): Primary | ICD-10-CM

## 2022-07-07 DIAGNOSIS — F41.9 ANXIETY: ICD-10-CM

## 2022-07-07 LAB
HBA1C MFR BLD: 7.4 % (ref 0–5.6)
HOLD SPECIMEN: NORMAL
HOLD SPECIMEN: NORMAL

## 2022-07-07 PROCEDURE — 99214 OFFICE O/P EST MOD 30 MIN: CPT | Performed by: FAMILY MEDICINE

## 2022-07-07 PROCEDURE — 83036 HEMOGLOBIN GLYCOSYLATED A1C: CPT | Performed by: FAMILY MEDICINE

## 2022-07-07 PROCEDURE — 36415 COLL VENOUS BLD VENIPUNCTURE: CPT | Performed by: FAMILY MEDICINE

## 2022-07-07 RX ORDER — FUROSEMIDE 20 MG
40 TABLET ORAL DAILY
Qty: 180 TABLET | Refills: 1 | Status: SHIPPED | OUTPATIENT
Start: 2022-07-07 | End: 2022-12-08

## 2022-07-07 RX ORDER — ESCITALOPRAM OXALATE 20 MG/1
TABLET ORAL
Qty: 90 TABLET | Refills: 1 | Status: SHIPPED | OUTPATIENT
Start: 2022-07-07 | End: 2022-12-08

## 2022-07-07 RX ORDER — METFORMIN HCL 500 MG
500 TABLET, EXTENDED RELEASE 24 HR ORAL
Qty: 90 TABLET | Refills: 1 | Status: SHIPPED | OUTPATIENT
Start: 2022-07-07 | End: 2022-12-08

## 2022-07-07 RX ORDER — ATORVASTATIN CALCIUM 20 MG/1
20 TABLET, FILM COATED ORAL DAILY
Qty: 90 TABLET | Refills: 3 | Status: SHIPPED | OUTPATIENT
Start: 2022-07-07 | End: 2023-05-17

## 2022-07-07 ASSESSMENT — PATIENT HEALTH QUESTIONNAIRE - PHQ9
SUM OF ALL RESPONSES TO PHQ QUESTIONS 1-9: 11
10. IF YOU CHECKED OFF ANY PROBLEMS, HOW DIFFICULT HAVE THESE PROBLEMS MADE IT FOR YOU TO DO YOUR WORK, TAKE CARE OF THINGS AT HOME, OR GET ALONG WITH OTHER PEOPLE: SOMEWHAT DIFFICULT

## 2022-07-07 NOTE — PROGRESS NOTES
"  Assessment & Plan     Type 2 diabetes mellitus without complication, without long-term current use of insulin (H)  - A1c up today. There has been a steady trend up- will start on metformin. Recheck in 3 months or sooner if needed.   - HEMOGLOBIN A1C; Future  - HEMOGLOBIN A1C  - Albumin Random Urine Quantitative with Creat Ratio; Future  - atorvastatin (LIPITOR) 20 MG tablet; Take 1 tablet (20 mg) by mouth daily  - metFORMIN (GLUCOPHAGE XR) 500 MG 24 hr tablet; Take 1 tablet (500 mg) by mouth daily (with dinner)    JOSE J (obstructive sleep apnea)  - continues to struggle with using her CPAP.     Anxiety  - stable on lexapro   - escitalopram (LEXAPRO) 20 MG tablet; TAKE 1 TABLET BY MOUTH ONE TIME DAILY    Lymphedema  - stable with lasix   - furosemide (LASIX) 20 MG tablet; Take 2 tablets (40 mg) by mouth daily    Mixed hyperlipidemia  - atorvastatin (LIPITOR) 20 MG tablet; Take 1 tablet (20 mg) by mouth daily    Morbid obesity (H)  - reviewed diet and lifestyle changes                BMI:   Estimated body mass index is 49.29 kg/m  as calculated from the following:    Height as of this encounter: 1.575 m (5' 2\").    Weight as of this encounter: 122.2 kg (269 lb 8 oz).   Weight management plan: Discussed healthy diet and exercise guidelines    See Patient Instructions    Return in about 3 months (around 10/7/2022) for diabetes, medication recheck, in person, .    Lianna Cleveland MD  [facility] LATONYA Young is a 52 year old, presenting for the following health issues:  Diabetes      History of Present Illness       Diabetes:   She presents for follow up of diabetes.  She is not checking blood glucose. She has no concerns regarding her diabetes at this time.  She is not experiencing numbness or burning in feet, excessive thirst, blurry vision, weight changes or redness, sores or blisters on feet. The patient has not had a diabetic eye exam in the last 12 " "months.         She eats 2-3 servings of fruits and vegetables daily.She consumes 1 sweetened beverage(s) daily.She exercises with enough effort to increase her heart rate 20 to 29 minutes per day.  She exercises with enough effort to increase her heart rate 6 days per week. She is missing 2 dose(s) of medications per week.  She is not taking prescribed medications regularly due to remembering to take.    Today's PHQ-9         PHQ-9 Total Score: 11    PHQ-9 Q9 Thoughts of better off dead/self-harm past 2 weeks :   Not at all    How difficult have these problems made it for you to do your work, take care of things at home, or get along with other people: Somewhat difficult       JOSE J- had wrong accessories sent so has not been able to use her device.     Breakfast- fruit   Lunch- turkey sandwich w/ salad  Dinner- salad w/ protein and pasta or potato   Snack- fruit       Wt Readings from Last 4 Encounters:   07/07/22 122.2 kg (269 lb 8 oz)   03/14/22 121.5 kg (267 lb 12.8 oz)   12/03/21 123.8 kg (273 lb)   10/04/21 124.3 kg (274 lb)           Review of Systems   Constitutional, HEENT, cardiovascular, pulmonary, GI, , musculoskeletal, neuro, skin, endocrine and psych systems are negative, except as otherwise noted.      Objective    /82 (BP Location: Right arm, Patient Position: Sitting, Cuff Size: Adult Large)   Pulse 83   Temp 98  F (36.7  C) (Oral)   Resp 12   Ht 1.575 m (5' 2\")   Wt 122.2 kg (269 lb 8 oz)   SpO2 95%   BMI 49.29 kg/m    Body mass index is 49.29 kg/m .  Physical Exam   GENERAL: healthy, alert and no distress  RESP: lungs clear to auscultation - no rales, rhonchi or wheezes  CV: regular rate and rhythm, normal S1 S2, no S3 or S4, no murmur, click or rub, no peripheral edema and peripheral pulses strong  MS: no gross musculoskeletal defects noted, no edema  PSYCH: mentation appears normal, affect normal/bright      Hemoglobin A1C POCT   Date Value Ref Range Status   07/08/2021 6.6 (H) 0 - " 5.6 % Final     Comment:     Normal <5.7% Prediabetes 5.7-6.4%  Diabetes 6.5% or higher - adopted from ADA   consensus guidelines.     04/08/2021 6.5 (H) 0 - 5.6 % Final     Comment:     Normal <5.7% Prediabetes 5.7-6.4%  Diabetes 6.5% or higher - adopted from ADA   consensus guidelines.     01/04/2021 6.4 (H) 0 - 5.6 % Final     Comment:     Normal <5.7% Prediabetes 5.7-6.4%  Diabetes 6.5% or higher - adopted from ADA   consensus guidelines.       Hemoglobin A1C   Date Value Ref Range Status   07/07/2022 7.4 (H) 0.0 - 5.6 % Final     Comment:     Normal <5.7%   Prediabetes 5.7-6.4%    Diabetes 6.5% or higher     Note: Adopted from ADA consensus guidelines.   03/14/2022 7.2 (H) 0.0 - 5.6 % Final     Comment:     Normal <5.7%   Prediabetes 5.7-6.4%    Diabetes 6.5% or higher     Note: Adopted from ADA consensus guidelines.                   .  ..

## 2022-07-22 ENCOUNTER — ANCILLARY PROCEDURE (OUTPATIENT)
Dept: MAMMOGRAPHY | Facility: CLINIC | Age: 52
End: 2022-07-22
Attending: FAMILY MEDICINE
Payer: COMMERCIAL

## 2022-07-22 DIAGNOSIS — Z12.31 VISIT FOR SCREENING MAMMOGRAM: ICD-10-CM

## 2022-07-22 PROCEDURE — 77063 BREAST TOMOSYNTHESIS BI: CPT | Mod: TC | Performed by: RADIOLOGY

## 2022-07-22 PROCEDURE — 77067 SCR MAMMO BI INCL CAD: CPT | Mod: TC | Performed by: RADIOLOGY

## 2022-10-13 DIAGNOSIS — I10 BENIGN ESSENTIAL HYPERTENSION: ICD-10-CM

## 2022-10-14 RX ORDER — LOSARTAN POTASSIUM 25 MG/1
TABLET ORAL
Qty: 90 TABLET | Refills: 1 | Status: SHIPPED | OUTPATIENT
Start: 2022-10-14 | End: 2023-04-14

## 2022-10-15 ENCOUNTER — HEALTH MAINTENANCE LETTER (OUTPATIENT)
Age: 52
End: 2022-10-15

## 2022-11-27 ENCOUNTER — HEALTH MAINTENANCE LETTER (OUTPATIENT)
Age: 52
End: 2022-11-27

## 2022-12-08 ENCOUNTER — OFFICE VISIT (OUTPATIENT)
Dept: FAMILY MEDICINE | Facility: CLINIC | Age: 52
End: 2022-12-08
Payer: COMMERCIAL

## 2022-12-08 VITALS
TEMPERATURE: 97.5 F | HEART RATE: 81 BPM | OXYGEN SATURATION: 97 % | DIASTOLIC BLOOD PRESSURE: 68 MMHG | BODY MASS INDEX: 48.2 KG/M2 | SYSTOLIC BLOOD PRESSURE: 128 MMHG | WEIGHT: 272 LBS | HEIGHT: 63 IN

## 2022-12-08 DIAGNOSIS — J45.40 MODERATE PERSISTENT ASTHMA WITHOUT COMPLICATION: ICD-10-CM

## 2022-12-08 DIAGNOSIS — F41.9 ANXIETY: ICD-10-CM

## 2022-12-08 DIAGNOSIS — F33.0 MILD EPISODE OF RECURRENT MAJOR DEPRESSIVE DISORDER (H): ICD-10-CM

## 2022-12-08 DIAGNOSIS — E11.9 TYPE 2 DIABETES MELLITUS WITHOUT COMPLICATION, WITHOUT LONG-TERM CURRENT USE OF INSULIN (H): Primary | ICD-10-CM

## 2022-12-08 DIAGNOSIS — I89.0 LYMPHEDEMA: ICD-10-CM

## 2022-12-08 DIAGNOSIS — E66.01 MORBID OBESITY (H): ICD-10-CM

## 2022-12-08 LAB
ANION GAP SERPL CALCULATED.3IONS-SCNC: 9 MMOL/L (ref 7–15)
BUN SERPL-MCNC: 12.3 MG/DL (ref 6–20)
CALCIUM SERPL-MCNC: 9.2 MG/DL (ref 8.6–10)
CHLORIDE SERPL-SCNC: 101 MMOL/L (ref 98–107)
CREAT SERPL-MCNC: 0.71 MG/DL (ref 0.51–0.95)
CREAT UR-MCNC: 210 MG/DL
DEPRECATED HCO3 PLAS-SCNC: 29 MMOL/L (ref 22–29)
GFR SERPL CREATININE-BSD FRML MDRD: >90 ML/MIN/1.73M2
GLUCOSE SERPL-MCNC: 163 MG/DL (ref 70–99)
HBA1C MFR BLD: 7.9 % (ref 0–5.6)
HOLD SPECIMEN: NORMAL
HOLD SPECIMEN: NORMAL
MICROALBUMIN UR-MCNC: <12 MG/L
MICROALBUMIN/CREAT UR: NORMAL MG/G{CREAT}
POTASSIUM SERPL-SCNC: 4.2 MMOL/L (ref 3.4–5.3)
SODIUM SERPL-SCNC: 139 MMOL/L (ref 136–145)

## 2022-12-08 PROCEDURE — 90682 RIV4 VACC RECOMBINANT DNA IM: CPT | Performed by: FAMILY MEDICINE

## 2022-12-08 PROCEDURE — 82043 UR ALBUMIN QUANTITATIVE: CPT | Performed by: FAMILY MEDICINE

## 2022-12-08 PROCEDURE — 99215 OFFICE O/P EST HI 40 MIN: CPT | Mod: 25 | Performed by: FAMILY MEDICINE

## 2022-12-08 PROCEDURE — 83036 HEMOGLOBIN GLYCOSYLATED A1C: CPT | Performed by: FAMILY MEDICINE

## 2022-12-08 PROCEDURE — 90471 IMMUNIZATION ADMIN: CPT | Performed by: FAMILY MEDICINE

## 2022-12-08 PROCEDURE — 80048 BASIC METABOLIC PNL TOTAL CA: CPT | Performed by: FAMILY MEDICINE

## 2022-12-08 PROCEDURE — 36415 COLL VENOUS BLD VENIPUNCTURE: CPT | Performed by: FAMILY MEDICINE

## 2022-12-08 RX ORDER — FUROSEMIDE 20 MG
40 TABLET ORAL DAILY
Qty: 180 TABLET | Refills: 1 | Status: SHIPPED | OUTPATIENT
Start: 2022-12-08 | End: 2023-05-17

## 2022-12-08 RX ORDER — ESCITALOPRAM OXALATE 20 MG/1
TABLET ORAL
Qty: 90 TABLET | Refills: 1 | Status: SHIPPED | OUTPATIENT
Start: 2022-12-08 | End: 2023-05-17

## 2022-12-08 RX ORDER — MONTELUKAST SODIUM 10 MG/1
TABLET ORAL
Qty: 90 TABLET | Refills: 1 | Status: SHIPPED | OUTPATIENT
Start: 2022-12-08 | End: 2023-05-17

## 2022-12-08 RX ORDER — METFORMIN HCL 500 MG
1000 TABLET, EXTENDED RELEASE 24 HR ORAL
Qty: 180 TABLET | Refills: 1 | Status: SHIPPED | OUTPATIENT
Start: 2022-12-08 | End: 2023-05-17

## 2022-12-08 ASSESSMENT — ASTHMA QUESTIONNAIRES
QUESTION_4 LAST FOUR WEEKS HOW OFTEN HAVE YOU USED YOUR RESCUE INHALER OR NEBULIZER MEDICATION (SUCH AS ALBUTEROL): ONCE A WEEK OR LESS
QUESTION_5 LAST FOUR WEEKS HOW WOULD YOU RATE YOUR ASTHMA CONTROL: WELL CONTROLLED
ACT_TOTALSCORE: 19
QUESTION_3 LAST FOUR WEEKS HOW OFTEN DID YOUR ASTHMA SYMPTOMS (WHEEZING, COUGHING, SHORTNESS OF BREATH, CHEST TIGHTNESS OR PAIN) WAKE YOU UP AT NIGHT OR EARLIER THAN USUAL IN THE MORNING: TWO OR THREE NIGHTS A WEEK
ACT_TOTALSCORE: 19
QUESTION_1 LAST FOUR WEEKS HOW MUCH OF THE TIME DID YOUR ASTHMA KEEP YOU FROM GETTING AS MUCH DONE AT WORK, SCHOOL OR AT HOME: NONE OF THE TIME
QUESTION_2 LAST FOUR WEEKS HOW OFTEN HAVE YOU HAD SHORTNESS OF BREATH: ONCE OR TWICE A WEEK

## 2022-12-08 ASSESSMENT — ANXIETY QUESTIONNAIRES
GAD7 TOTAL SCORE: 9
IF YOU CHECKED OFF ANY PROBLEMS ON THIS QUESTIONNAIRE, HOW DIFFICULT HAVE THESE PROBLEMS MADE IT FOR YOU TO DO YOUR WORK, TAKE CARE OF THINGS AT HOME, OR GET ALONG WITH OTHER PEOPLE: SOMEWHAT DIFFICULT
5. BEING SO RESTLESS THAT IT IS HARD TO SIT STILL: NOT AT ALL
2. NOT BEING ABLE TO STOP OR CONTROL WORRYING: NEARLY EVERY DAY
3. WORRYING TOO MUCH ABOUT DIFFERENT THINGS: MORE THAN HALF THE DAYS
GAD7 TOTAL SCORE: 9
7. FEELING AFRAID AS IF SOMETHING AWFUL MIGHT HAPPEN: SEVERAL DAYS
7. FEELING AFRAID AS IF SOMETHING AWFUL MIGHT HAPPEN: SEVERAL DAYS
GAD7 TOTAL SCORE: 9
8. IF YOU CHECKED OFF ANY PROBLEMS, HOW DIFFICULT HAVE THESE MADE IT FOR YOU TO DO YOUR WORK, TAKE CARE OF THINGS AT HOME, OR GET ALONG WITH OTHER PEOPLE?: SOMEWHAT DIFFICULT
1. FEELING NERVOUS, ANXIOUS, OR ON EDGE: SEVERAL DAYS
4. TROUBLE RELAXING: SEVERAL DAYS
6. BECOMING EASILY ANNOYED OR IRRITABLE: SEVERAL DAYS

## 2022-12-08 ASSESSMENT — PATIENT HEALTH QUESTIONNAIRE - PHQ9
SUM OF ALL RESPONSES TO PHQ QUESTIONS 1-9: 8
10. IF YOU CHECKED OFF ANY PROBLEMS, HOW DIFFICULT HAVE THESE PROBLEMS MADE IT FOR YOU TO DO YOUR WORK, TAKE CARE OF THINGS AT HOME, OR GET ALONG WITH OTHER PEOPLE: SOMEWHAT DIFFICULT
SUM OF ALL RESPONSES TO PHQ QUESTIONS 1-9: 8

## 2022-12-08 NOTE — PROGRESS NOTES
Assessment & Plan     Type 2 diabetes mellitus without complication, without long-term current use of insulin (H)  - A1c continues to trend up. Currently the highest it has ever been. Will increase metformin to 2 tablets daily. Also stressed importance of diet and lifestyle changes. Addition of health  will be a good idea   - BASIC METABOLIC PANEL; Future  - HEMOGLOBIN A1C; Future  - HEMOGLOBIN A1C  - BASIC METABOLIC PANEL  - metFORMIN (GLUCOPHAGE XR) 500 MG 24 hr tablet; Take 2 tablets (1,000 mg) by mouth daily (with dinner)  - Albumin Random Urine Quantitative with Creat Ratio; Future  - Albumin Random Urine Quantitative with Creat Ratio    Morbid obesity (H)  - up 3 lbs from last visit. Stressed importance of weight loss for her overall health and also for better control of her diabetes.     Mild episode of recurrent major depressive disorder (H)  Anxiety  -stable mood with current regimen   - escitalopram (LEXAPRO) 20 MG tablet; TAKE 1 TABLET BY MOUTH ONE TIME DAILY    Lymphedema  - stable   - furosemide (LASIX) 20 MG tablet; Take 2 tablets (40 mg) by mouth daily    Moderate persistent asthma without complication  - stable.   - montelukast (SINGULAIR) 10 MG tablet; TAKE ONE TABLET BY MOUTH EVERY NIGHT AT BEDTIME        45 minutes spent on the date of the encounter doing chart review, history and exam, documentation and further activities per the note       See Patient Instructions    No follow-ups on file.    Lianna Cleveland MD  [facility] LATONYA Young is a 52 year old, presenting for the following health issues:  Diabetes      History of Present Illness       Diabetes:   She presents for follow up of diabetes.  She is not checking blood glucose. She has no concerns regarding her diabetes at this time.  She is having redness, sores, or blisters on feet. The patient has not had a diabetic eye exam in the last 12 months.         She eats 2-3 servings of  "fruits and vegetables daily.She consumes 2 sweetened beverage(s) daily.She exercises with enough effort to increase her heart rate 30 to 60 minutes per day.  She exercises with enough effort to increase her heart rate 5 days per week. She is missing 2 dose(s) of medications per week.  She is not taking prescribed medications regularly due to remembering to take.    Today's PHQ-9         PHQ-9 Total Score: 8    PHQ-9 Q9 Thoughts of better off dead/self-harm past 2 weeks :   Not at all    How difficult have these problems made it for you to do your work, take care of things at home, or get along with other people: Somewhat difficult  Today's MAURISIO-7 Score: 9      Using health  services through her insurance. Has been making some changes this week including no bread. Also bought an exercise bike- has been riding this 30 minutes a day over the last few days.       Breakfast- toast with peanut butter, eggs  Lunch- salad and bread   Dinner- salad, protein and a carb   Snacks- ritz crackers with cheese           Wt Readings from Last 4 Encounters:   12/08/22 123.4 kg (272 lb)   07/07/22 122.2 kg (269 lb 8 oz)   03/14/22 121.5 kg (267 lb 12.8 oz)   12/03/21 123.8 kg (273 lb)           Review of Systems   Constitutional, HEENT, cardiovascular, pulmonary, GI, , musculoskeletal, neuro, skin, endocrine and psych systems are negative, except as otherwise noted.      Objective    /68 (BP Location: Right arm, Patient Position: Sitting, Cuff Size: Adult Large)   Pulse 81   Temp 97.5  F (36.4  C) (Oral)   Ht 1.6 m (5' 3\")   Wt 123.4 kg (272 lb)   SpO2 97%   BMI 48.18 kg/m    Body mass index is 48.18 kg/m .  Physical Exam   GENERAL: healthy, alert and no distress  RESP: lungs clear to auscultation - no rales, rhonchi or wheezes  CV: regular rate and rhythm, normal S1 S2, no S3 or S4, no murmur, click or rub, no peripheral edema and peripheral pulses strong  MS: no gross musculoskeletal defects noted, no edema  PSYCH: " mentation appears normal, affect normal/bright    Results for orders placed or performed in visit on 12/08/22   HEMOGLOBIN A1C     Status: Abnormal   Result Value Ref Range    Hemoglobin A1C 7.9 (H) 0.0 - 5.6 %   Extra Tube     Status: None    Narrative    The following orders were created for panel order Extra Tube.  Procedure                               Abnormality         Status                     ---------                               -----------         ------                     Extra Serum Separator Tu...[[id number]]                      Final result               Extra Green Top (Lithium...[[id number]]                      Final result                 Please view results for these tests on the individual orders.   Extra Serum Separator Tube (SST)     Status: None   Result Value Ref Range    Hold Specimen JIC    Extra Green Top (Lithium Heparin) Tube     Status: None   Result Value Ref Range    Hold Specimen JIC

## 2022-12-08 NOTE — PATIENT INSTRUCTIONS
Metformin dose increased to 2 tablets with dinner   Cut back on cheese intake and increase vegetables   Continue with bike riding   Follow up in 3 months or sooner if needed         [email or web address]

## 2023-01-30 ENCOUNTER — MYC MEDICAL ADVICE (OUTPATIENT)
Dept: FAMILY MEDICINE | Facility: CLINIC | Age: 53
End: 2023-01-30
Payer: COMMERCIAL

## 2023-02-06 ENCOUNTER — TRANSFERRED RECORDS (OUTPATIENT)
Dept: HEALTH INFORMATION MANAGEMENT | Facility: CLINIC | Age: 53
End: 2023-02-06

## 2023-02-06 ENCOUNTER — NURSE TRIAGE (OUTPATIENT)
Dept: FAMILY MEDICINE | Facility: CLINIC | Age: 53
End: 2023-02-06

## 2023-02-06 NOTE — TELEPHONE ENCOUNTER
"S-(situation): Patient calling regarding left knee pain.     B-(background): Patient notes her knee \"has been giving her problems for awhile\", has been sore. Never been like this before.     A-(assessment): Patient experiencing \"excrutiating pain\" in left knee, \"feels like it's underneath the knee cap\". Pain started last night around 6pm. Patient denies any injury or exercise that could have caused pain. Patient states area may be slightly swollen, \"can't really tell\". No redness. Patient unable to bear weight on knee, too painful. Cannot bend leg. Patient has been icing the knee. Pain described as burning and sharp. Constant. Patient notes she experienced lower back pain last night, no longer having back pain. Patient notes low back pain is common for her. No other symptoms.     R-(recommendations): Per protocol, RN advised visit today. RN advised patient may consider walk-in orthopedic UC. RN advised if new/worsening symptoms, patient to be seen sooner. Scheduled for OV 2/6/23, received approval from [facility] for appointment slot. RN reviewed care advice under care tab. Patient verbalized understanding, agreed with plan.     Reason for Disposition    Can't move swollen joint at all    Additional Information    Negative: Thigh or calf pain and only 1 side and present > 1 hour    Negative: Thigh or calf swelling and only 1 side    Negative: Patient sounds very sick or weak to the triager    Negative: Swollen knee joint and fever    Negative: Followed a knee injury    Negative: Sounds like a life-threatening emergency to the triager    Protocols used: KNEE PAIN-CATIA VAZ RN 2/6/2023 at 8:40 AM    "

## 2023-02-10 ENCOUNTER — MYC MEDICAL ADVICE (OUTPATIENT)
Dept: FAMILY MEDICINE | Facility: CLINIC | Age: 53
End: 2023-02-10
Payer: COMMERCIAL

## 2023-02-15 ENCOUNTER — TRANSFERRED RECORDS (OUTPATIENT)
Dept: HEALTH INFORMATION MANAGEMENT | Facility: CLINIC | Age: 53
End: 2023-02-15

## 2023-03-26 ENCOUNTER — HEALTH MAINTENANCE LETTER (OUTPATIENT)
Age: 53
End: 2023-03-26

## 2023-04-14 DIAGNOSIS — I10 BENIGN ESSENTIAL HYPERTENSION: ICD-10-CM

## 2023-04-14 RX ORDER — LOSARTAN POTASSIUM 25 MG/1
25 TABLET ORAL DAILY
Qty: 90 TABLET | Refills: 0 | Status: SHIPPED | OUTPATIENT
Start: 2023-04-14 | End: 2023-05-17

## 2023-05-17 ENCOUNTER — OFFICE VISIT (OUTPATIENT)
Dept: FAMILY MEDICINE | Facility: CLINIC | Age: 53
End: 2023-05-17
Payer: COMMERCIAL

## 2023-05-17 VITALS
HEART RATE: 71 BPM | HEIGHT: 63 IN | SYSTOLIC BLOOD PRESSURE: 122 MMHG | OXYGEN SATURATION: 96 % | TEMPERATURE: 98.1 F | BODY MASS INDEX: 47.2 KG/M2 | WEIGHT: 266.4 LBS | DIASTOLIC BLOOD PRESSURE: 80 MMHG

## 2023-05-17 DIAGNOSIS — E78.2 MIXED HYPERLIPIDEMIA: ICD-10-CM

## 2023-05-17 DIAGNOSIS — F41.9 ANXIETY: ICD-10-CM

## 2023-05-17 DIAGNOSIS — F33.0 MILD EPISODE OF RECURRENT MAJOR DEPRESSIVE DISORDER (H): ICD-10-CM

## 2023-05-17 DIAGNOSIS — I89.0 LYMPHEDEMA: ICD-10-CM

## 2023-05-17 DIAGNOSIS — E66.01 MORBID OBESITY (H): ICD-10-CM

## 2023-05-17 DIAGNOSIS — R09.89 DECREASED PEDAL PULSES: ICD-10-CM

## 2023-05-17 DIAGNOSIS — I10 BENIGN ESSENTIAL HYPERTENSION: ICD-10-CM

## 2023-05-17 DIAGNOSIS — E11.9 TYPE 2 DIABETES MELLITUS WITHOUT COMPLICATION, WITHOUT LONG-TERM CURRENT USE OF INSULIN (H): Primary | ICD-10-CM

## 2023-05-17 DIAGNOSIS — J45.40 MODERATE PERSISTENT ASTHMA WITHOUT COMPLICATION: ICD-10-CM

## 2023-05-17 PROBLEM — R73.09 ELEVATED GLUCOSE: Status: RESOLVED | Noted: 2018-09-13 | Resolved: 2023-05-17

## 2023-05-17 PROBLEM — K43.2 INCISIONAL HERNIA, WITHOUT OBSTRUCTION OR GANGRENE: Status: RESOLVED | Noted: 2021-07-19 | Resolved: 2023-05-17

## 2023-05-17 LAB
ANION GAP SERPL CALCULATED.3IONS-SCNC: 12 MMOL/L (ref 7–15)
BUN SERPL-MCNC: 12.6 MG/DL (ref 6–20)
CALCIUM SERPL-MCNC: 9.7 MG/DL (ref 8.6–10)
CHLORIDE SERPL-SCNC: 103 MMOL/L (ref 98–107)
CHOLEST SERPL-MCNC: 148 MG/DL
CREAT SERPL-MCNC: 0.72 MG/DL (ref 0.51–0.95)
CREAT UR-MCNC: 196 MG/DL
DEPRECATED HCO3 PLAS-SCNC: 27 MMOL/L (ref 22–29)
GFR SERPL CREATININE-BSD FRML MDRD: >90 ML/MIN/1.73M2
GLUCOSE SERPL-MCNC: 153 MG/DL (ref 70–99)
HBA1C MFR BLD: 6.6 % (ref 0–5.6)
HDLC SERPL-MCNC: 53 MG/DL
LDLC SERPL CALC-MCNC: 51 MG/DL
MICROALBUMIN UR-MCNC: <12 MG/L
MICROALBUMIN/CREAT UR: NORMAL MG/G{CREAT}
NONHDLC SERPL-MCNC: 95 MG/DL
POTASSIUM SERPL-SCNC: 4 MMOL/L (ref 3.4–5.3)
SODIUM SERPL-SCNC: 142 MMOL/L (ref 136–145)
TRIGL SERPL-MCNC: 221 MG/DL

## 2023-05-17 PROCEDURE — 80061 LIPID PANEL: CPT | Performed by: PHYSICIAN ASSISTANT

## 2023-05-17 PROCEDURE — 90677 PCV20 VACCINE IM: CPT | Performed by: PHYSICIAN ASSISTANT

## 2023-05-17 PROCEDURE — 80048 BASIC METABOLIC PNL TOTAL CA: CPT | Performed by: PHYSICIAN ASSISTANT

## 2023-05-17 PROCEDURE — 99207 PR FOOT EXAM NO CHARGE: CPT | Mod: 25 | Performed by: PHYSICIAN ASSISTANT

## 2023-05-17 PROCEDURE — 99214 OFFICE O/P EST MOD 30 MIN: CPT | Mod: 25 | Performed by: PHYSICIAN ASSISTANT

## 2023-05-17 PROCEDURE — 82570 ASSAY OF URINE CREATININE: CPT | Performed by: PHYSICIAN ASSISTANT

## 2023-05-17 PROCEDURE — 90471 IMMUNIZATION ADMIN: CPT | Performed by: PHYSICIAN ASSISTANT

## 2023-05-17 PROCEDURE — 82043 UR ALBUMIN QUANTITATIVE: CPT | Performed by: PHYSICIAN ASSISTANT

## 2023-05-17 PROCEDURE — 83036 HEMOGLOBIN GLYCOSYLATED A1C: CPT | Performed by: PHYSICIAN ASSISTANT

## 2023-05-17 PROCEDURE — 36415 COLL VENOUS BLD VENIPUNCTURE: CPT | Performed by: PHYSICIAN ASSISTANT

## 2023-05-17 RX ORDER — ESCITALOPRAM OXALATE 20 MG/1
TABLET ORAL
Qty: 90 TABLET | Refills: 1 | Status: SHIPPED | OUTPATIENT
Start: 2023-05-17 | End: 2023-11-15

## 2023-05-17 RX ORDER — MONTELUKAST SODIUM 10 MG/1
TABLET ORAL
Qty: 90 TABLET | Refills: 1 | Status: SHIPPED | OUTPATIENT
Start: 2023-05-17 | End: 2023-11-15

## 2023-05-17 RX ORDER — FUROSEMIDE 20 MG
40 TABLET ORAL DAILY
Qty: 180 TABLET | Refills: 1 | Status: SHIPPED | OUTPATIENT
Start: 2023-05-17 | End: 2023-11-15

## 2023-05-17 RX ORDER — LOSARTAN POTASSIUM 25 MG/1
25 TABLET ORAL DAILY
Qty: 90 TABLET | Refills: 3 | Status: SHIPPED | OUTPATIENT
Start: 2023-05-17 | End: 2024-05-17

## 2023-05-17 RX ORDER — METFORMIN HCL 500 MG
1000 TABLET, EXTENDED RELEASE 24 HR ORAL
Qty: 180 TABLET | Refills: 1 | Status: SHIPPED | OUTPATIENT
Start: 2023-05-17 | End: 2023-11-17

## 2023-05-17 RX ORDER — ATORVASTATIN CALCIUM 20 MG/1
20 TABLET, FILM COATED ORAL DAILY
Qty: 90 TABLET | Refills: 3 | Status: SHIPPED | OUTPATIENT
Start: 2023-05-17 | End: 2024-05-17

## 2023-05-17 ASSESSMENT — ASTHMA QUESTIONNAIRES
QUESTION_4 LAST FOUR WEEKS HOW OFTEN HAVE YOU USED YOUR RESCUE INHALER OR NEBULIZER MEDICATION (SUCH AS ALBUTEROL): ONCE A WEEK OR LESS
QUESTION_1 LAST FOUR WEEKS HOW MUCH OF THE TIME DID YOUR ASTHMA KEEP YOU FROM GETTING AS MUCH DONE AT WORK, SCHOOL OR AT HOME: A LITTLE OF THE TIME
ACT_TOTALSCORE: 22
QUESTION_3 LAST FOUR WEEKS HOW OFTEN DID YOUR ASTHMA SYMPTOMS (WHEEZING, COUGHING, SHORTNESS OF BREATH, CHEST TIGHTNESS OR PAIN) WAKE YOU UP AT NIGHT OR EARLIER THAN USUAL IN THE MORNING: NOT AT ALL
QUESTION_2 LAST FOUR WEEKS HOW OFTEN HAVE YOU HAD SHORTNESS OF BREATH: ONCE OR TWICE A WEEK
QUESTION_5 LAST FOUR WEEKS HOW WOULD YOU RATE YOUR ASTHMA CONTROL: COMPLETELY CONTROLLED
ACT_TOTALSCORE: 22

## 2023-05-17 ASSESSMENT — ANXIETY QUESTIONNAIRES
2. NOT BEING ABLE TO STOP OR CONTROL WORRYING: SEVERAL DAYS
3. WORRYING TOO MUCH ABOUT DIFFERENT THINGS: SEVERAL DAYS
GAD7 TOTAL SCORE: 7
7. FEELING AFRAID AS IF SOMETHING AWFUL MIGHT HAPPEN: SEVERAL DAYS
1. FEELING NERVOUS, ANXIOUS, OR ON EDGE: NOT AT ALL
GAD7 TOTAL SCORE: 7
IF YOU CHECKED OFF ANY PROBLEMS ON THIS QUESTIONNAIRE, HOW DIFFICULT HAVE THESE PROBLEMS MADE IT FOR YOU TO DO YOUR WORK, TAKE CARE OF THINGS AT HOME, OR GET ALONG WITH OTHER PEOPLE: NOT DIFFICULT AT ALL
6. BECOMING EASILY ANNOYED OR IRRITABLE: SEVERAL DAYS
5. BEING SO RESTLESS THAT IT IS HARD TO SIT STILL: MORE THAN HALF THE DAYS

## 2023-05-17 ASSESSMENT — PATIENT HEALTH QUESTIONNAIRE - PHQ9
10. IF YOU CHECKED OFF ANY PROBLEMS, HOW DIFFICULT HAVE THESE PROBLEMS MADE IT FOR YOU TO DO YOUR WORK, TAKE CARE OF THINGS AT HOME, OR GET ALONG WITH OTHER PEOPLE: SOMEWHAT DIFFICULT
5. POOR APPETITE OR OVEREATING: SEVERAL DAYS
SUM OF ALL RESPONSES TO PHQ QUESTIONS 1-9: 8
SUM OF ALL RESPONSES TO PHQ QUESTIONS 1-9: 8

## 2023-05-17 ASSESSMENT — PAIN SCALES - GENERAL: PAINLEVEL: NO PAIN (0)

## 2023-05-17 NOTE — PROGRESS NOTES
Assessment & Plan     Type 2 diabetes mellitus without complication, without long-term current use of insulin (H)  A1c significantly improved over the past few months. Continue metformin 1000 mg daily. Continue healthy lifestyle changes. Follow-up for diabetes check in 6 months.  - Hemoglobin A1c; Future  - Albumin Random Urine Quantitative with Creat Ratio  - Hemoglobin A1c  - BASIC METABOLIC PANEL; Future  - FOOT EXAM  - BASIC METABOLIC PANEL  - metFORMIN (GLUCOPHAGE XR) 500 MG 24 hr tablet; Take 2 tablets (1,000 mg) by mouth daily (with dinner)  - atorvastatin (LIPITOR) 20 MG tablet; Take 1 tablet (20 mg) by mouth daily    Mixed hyperlipidemia  Chronic, due for labs. She is fasting today.  - Lipid panel reflex to direct LDL Fasting; Future  - Lipid panel reflex to direct LDL Fasting  - atorvastatin (LIPITOR) 20 MG tablet; Take 1 tablet (20 mg) by mouth daily    Anxiety  Mild episode of recurrent major depressive disorder (H)  Chronic, stable. Would like to continue with present management.  - escitalopram (LEXAPRO) 20 MG tablet; TAKE 1 TABLET BY MOUTH ONE TIME DAILY    Lymphedema  Chronic, no edema today  - furosemide (LASIX) 20 MG tablet; Take 2 tablets (40 mg) by mouth daily    Benign essential hypertension  BP controlled.  - losartan (COZAAR) 25 MG tablet; Take 1 tablet (25 mg) by mouth daily    Moderate persistent asthma without complication  Chronic, controlled.   - montelukast (SINGULAIR) 10 MG tablet; TAKE ONE TABLET BY MOUTH EVERY NIGHT AT BEDTIME    Decreased pedal pulses   DP reduced right, DP absent left and PT reduced bilateral - unable to palpate pulses but could doppler DP on right and PT bilaterally but could not identify left DP pulse with doppler. No ulcers. No symptoms of claudication. Will get KRYSTAL and follow-up per results.  - US KRYSTAL Doppler no Exercise; Future    Morbid obesity (H)  Weight loss is recommended - would help with HTN, diabetes, reduce risk of developing other chronic health  "conditions.      BMI:   Estimated body mass index is 47.19 kg/m  as calculated from the following:    Height as of this encounter: 1.6 m (5' 3\").    Weight as of this encounter: 120.8 kg (266 lb 6.4 oz).   Weight management plan: Discussed healthy diet and exercise guidelines      [facility] CLARE Young is a 53 year old, presenting for the following health issues:  Diabetes        5/17/2023     8:24 AM   Additional Questions   Roomed by JORGE Almeida     History of Present Illness       Reason for visit:  Diabetic check    She eats 4 or more servings of fruits and vegetables daily.She consumes 2 sweetened beverage(s) daily.She exercises with enough effort to increase her heart rate 9 or less minutes per day.  She exercises with enough effort to increase her heart rate 3 or less days per week. She is missing 1 dose(s) of medications per week.    Today's PHQ-9         PHQ-9 Total Score: 8    PHQ-9 Q9 Thoughts of better off dead/self-harm past 2 weeks :   Not at all    How difficult have these problems made it for you to do your work, take care of things at home, or get along with other people: Somewhat difficult       Diabetes Follow-up      How often are you checking your blood sugar? Not at all    What concerns do you have today about your diabetes? None     Do you have any of these symptoms? (Select all that apply)  No numbness or tingling in feet.  No redness, sores or blisters on feet.  No complaints of excessive thirst.  No reports of blurry vision.  No significant changes to weight.    Have you had a diabetic eye exam in the last 12 months? No    Taking metformin 1000 mg once daily - dose was increased at previous visit with PCP on 12/8/22. Doing well, no side effects or concerns.    Nieves meniscus a few months ago, following with ortho and did some PT - this has limited her exercise  Started Noom about 1 month ago for diet and seems to be going " "well.  Core greens    HTN: Taking losartan 25 mg daily, furosemide 40 mg daily (for HTN and lymphedema). No chest pain, shortness of breath, swelling in the extremities, palpitations, dizziness, headaches, blurred vision.     Lipids: taking atorvastatin 20 mg daily. No medication side effects or concerns.    BP Readings from Last 2 Encounters:   05/17/23 122/80   12/08/22 128/68     Hemoglobin A1C (%)   Date Value   05/17/2023 6.6 (H)   12/08/2022 7.9 (H)   07/08/2021 6.6 (H)   04/08/2021 6.5 (H)     LDL Cholesterol Calculated (mg/dL)   Date Value   03/14/2022 125 (H)   10/01/2020 109 (H)   10/01/2019 92     Has been stable with escitalopram 20 mg daily. No concerns. Would like to continue.      7/6/2022    11:19 PM 12/8/2022     8:03 AM 5/17/2023     8:22 AM   PHQ   PHQ-9 Total Score 11 8 8   Q9: Thoughts of better off dead/self-harm past 2 weeks Not at all Not at all Not at all         3/14/2022     3:02 PM 12/8/2022     8:04 AM 5/17/2023     8:41 AM   MAURISIO-7 SCORE   Total Score  9 (mild anxiety)    Total Score 9 9 7     Asthma - uses albuterol PRN, singulair.      3/14/2022     3:02 PM 12/8/2022     8:09 AM 5/17/2023     8:28 AM   ACT Total Scores   ACT TOTAL SCORE (Goal Greater than or Equal to 20) 16 19 22   In the past 12 months, how many times did you visit the emergency room for your asthma without being admitted to the hospital? 0 0 0   In the past 12 months, how many times were you hospitalized overnight because of your asthma? 0 0 0       Review of Systems   Constitutional, HEENT, cardiovascular, pulmonary, gi and gu systems are negative, except as otherwise noted.      Objective    /80 (BP Location: Right arm, Patient Position: Sitting, Cuff Size: Adult Large)   Pulse 71   Temp 98.1  F (36.7  C) (Oral)   Ht 1.6 m (5' 3\")   Wt 120.8 kg (266 lb 6.4 oz)   SpO2 96%   BMI 47.19 kg/m    Body mass index is 47.19 kg/m .  Physical Exam   GENERAL: healthy, alert and no distress  NECK: no adenopathy, no " asymmetry, masses, or scars and thyroid normal to palpation  RESP: lungs clear to auscultation - no rales, rhonchi or wheezes  CV: regular rate and rhythm, normal S1 S2, no S3 or S4, no murmur, click or rub, no peripheral edema  NEURO: Normal strength and tone, mentation intact and speech normal  PSYCH: mentation appears normal, affect normal/bright  Diabetic foot exam: normal DP and PT pulses, no trophic changes or ulcerative lesions, normal sensory exam, normal monofilament exam, DP reduced right, DP absent left and PT reduced bilateral - unable to palpate pulses but could doppler DP on right and PT bilaterally but could not identify left DP pulse with doppler    Results for orders placed or performed in visit on 05/17/23   Hemoglobin A1c     Status: Abnormal   Result Value Ref Range    Hemoglobin A1C 6.6 (H) 0.0 - 5.6 %

## 2023-05-17 NOTE — PATIENT INSTRUCTIONS
Call to schedule annual diabetic eye exam  [address] [address] [phone]   Jenny Vázquez OD

## 2023-06-02 ENCOUNTER — HOSPITAL ENCOUNTER (OUTPATIENT)
Dept: ULTRASOUND IMAGING | Facility: CLINIC | Age: 53
Discharge: HOME | End: 2023-06-02
Attending: PHYSICIAN ASSISTANT | Admitting: PHYSICIAN ASSISTANT
Payer: COMMERCIAL

## 2023-06-02 DIAGNOSIS — R09.89 DECREASED PEDAL PULSES: ICD-10-CM

## 2023-06-02 PROCEDURE — 93922 UPR/L XTREMITY ART 2 LEVELS: CPT

## 2023-06-22 ENCOUNTER — PATIENT OUTREACH (OUTPATIENT)
Dept: CARE COORDINATION | Facility: CLINIC | Age: 53
End: 2023-06-22
Payer: COMMERCIAL

## 2023-07-20 ENCOUNTER — PATIENT OUTREACH (OUTPATIENT)
Dept: CARE COORDINATION | Facility: CLINIC | Age: 53
End: 2023-07-20
Payer: COMMERCIAL

## 2023-08-11 ENCOUNTER — TELEPHONE (OUTPATIENT)
Dept: FAMILY MEDICINE | Facility: CLINIC | Age: 53
End: 2023-08-11

## 2023-08-11 NOTE — CONFIDENTIAL NOTE
Patient Quality Outreach    Patient is due for the following:   Diabetes -  Eye Exam  Breast Cancer Screening - Mammogram  Physical Preventive Adult Physical    Next Steps:   Patient has upcoming appointment, these items will be addressed at that time.    Type of outreach:    Sent [employer] message.      Questions for provider review:    None           Rakesh Aggarwal MA

## 2023-08-11 NOTE — LETTER
[facility]  [id number] [address] [phone]       August 28, 2023    Hannah Meade  [address] [place] [address]    Dear Hannah,    We care about your health and have reviewed your health plan and are making recommendations based on this review, to optimize your health.    You are in particular need of attention regarding:  -Diabetes  -Breast Cancer Screening    We are recommending that you:  -schedule a MAMMOGRAM which is due.    1 in 8 women will develop invasive breast cancer during her lifetime and it is the most common non-skin cancer in [place] women.  EARLY detection, new treatments, and a better understanding of the disease have increased survival rates - the 5 year survival rate in the 1960s was 63% and today it is close to 90%.    If you are under/uninsured, we recommend you contact the [facility]. They offer mammograms at no charge or on a sliding fee charge. You can schedule with them at [phone]. Please have them send us the results.      Please disregard this reminder if you have had this exam elsewhere within the last year.  It would be helpful for us to have a copy of your mammogram report in your file so that we can best coordinate your care - please contact us with when your test was done so we can update your record.             -Diabetic Eye Exam is due.  If this was done within the last 12 months then please contact the clinic with the date and location so we can update your records.    In addition, here is a list of due or overdue Health Maintenance reminders.    Health Maintenance Due   Topic Date Due    Discuss Advance Care Planning  Never done    Hepatitis B Vaccine (1 of 3 - 3-dose series) Never done    COVID-19 Vaccine (2 - Booster for Ede series) 08/10/2021    Yearly Preventive Visit  10/01/2021    Eye Exam  01/26/2022    Asthma Action Plan - yearly  04/08/2022    Mammogram  07/22/2023    Flu Vaccine  (1) 09/01/2023       To address the above recommendations, we encourage you to contact us at [phone], via [employer] or by contacting Central Scheduling toll free at [phone] 24 hours a day. They will assist you with finding the most convenient time and location.    Thank you for trusting [facility] and we appreciate the opportunity to serve you.  We look forward to supporting your healthcare needs in the future.    Healthy Regards,    Your [facility] Team

## 2023-08-28 NOTE — CONFIDENTIAL NOTE
Patient Quality Outreach    Patient is due for the following:   Diabetes -  Eye Exam  Breast Cancer Screening - Mammogram  Physical Preventive Adult Physical    Next Steps:   Patient has upcoming appointment, these items will be addressed at that time.    Type of outreach:    Sent letter.    Next Steps:  Reach out within 90 days via Letter.    Max number of attempts reached: Yes. Will try again in 90 days if patient still on fail list.    Questions for provider review:    None           Rakesh Aggarwal MA

## 2023-10-29 ENCOUNTER — HEALTH MAINTENANCE LETTER (OUTPATIENT)
Age: 53
End: 2023-10-29

## 2023-11-15 DIAGNOSIS — J45.40 MODERATE PERSISTENT ASTHMA WITHOUT COMPLICATION: ICD-10-CM

## 2023-11-15 DIAGNOSIS — I89.0 LYMPHEDEMA: ICD-10-CM

## 2023-11-15 DIAGNOSIS — F33.0 MILD EPISODE OF RECURRENT MAJOR DEPRESSIVE DISORDER (H): ICD-10-CM

## 2023-11-15 DIAGNOSIS — F41.9 ANXIETY: ICD-10-CM

## 2023-11-15 RX ORDER — MONTELUKAST SODIUM 10 MG/1
TABLET ORAL
Qty: 90 TABLET | Refills: 0 | Status: SHIPPED | OUTPATIENT
Start: 2023-11-15 | End: 2023-11-17

## 2023-11-15 RX ORDER — ESCITALOPRAM OXALATE 20 MG/1
TABLET ORAL
Qty: 90 TABLET | Refills: 0 | Status: SHIPPED | OUTPATIENT
Start: 2023-11-15 | End: 2023-11-17

## 2023-11-15 RX ORDER — FUROSEMIDE 20 MG
40 TABLET ORAL DAILY
Qty: 180 TABLET | Refills: 0 | Status: SHIPPED | OUTPATIENT
Start: 2023-11-15 | End: 2023-11-17

## 2023-11-16 ASSESSMENT — PATIENT HEALTH QUESTIONNAIRE - PHQ9
10. IF YOU CHECKED OFF ANY PROBLEMS, HOW DIFFICULT HAVE THESE PROBLEMS MADE IT FOR YOU TO DO YOUR WORK, TAKE CARE OF THINGS AT HOME, OR GET ALONG WITH OTHER PEOPLE: SOMEWHAT DIFFICULT
SUM OF ALL RESPONSES TO PHQ QUESTIONS 1-9: 11
SUM OF ALL RESPONSES TO PHQ QUESTIONS 1-9: 11

## 2023-11-16 ASSESSMENT — ENCOUNTER SYMPTOMS
SORE THROAT: 0
EYE PAIN: 0
NERVOUS/ANXIOUS: 1
HEMATOCHEZIA: 0
JOINT SWELLING: 0
DIARRHEA: 0
HEARTBURN: 0
CONSTIPATION: 0
HEMATURIA: 0
FEVER: 0
MYALGIAS: 1
FREQUENCY: 0
DYSURIA: 0
PARESTHESIAS: 0
CHILLS: 0
DIZZINESS: 0
PALPITATIONS: 0
HEADACHES: 1
COUGH: 1
ABDOMINAL PAIN: 1
ARTHRALGIAS: 1
SHORTNESS OF BREATH: 1
NAUSEA: 0
WEAKNESS: 0
BREAST MASS: 0

## 2023-11-16 ASSESSMENT — ASTHMA QUESTIONNAIRES: ACT_TOTALSCORE: 21

## 2023-11-17 ENCOUNTER — OFFICE VISIT (OUTPATIENT)
Dept: FAMILY MEDICINE | Facility: CLINIC | Age: 53
End: 2023-11-17
Attending: PHYSICIAN ASSISTANT
Payer: COMMERCIAL

## 2023-11-17 VITALS
DIASTOLIC BLOOD PRESSURE: 80 MMHG | OXYGEN SATURATION: 94 % | HEIGHT: 63 IN | WEIGHT: 266 LBS | TEMPERATURE: 98.6 F | SYSTOLIC BLOOD PRESSURE: 119 MMHG | BODY MASS INDEX: 47.13 KG/M2 | RESPIRATION RATE: 20 BRPM | HEART RATE: 83 BPM

## 2023-11-17 DIAGNOSIS — Z12.31 VISIT FOR SCREENING MAMMOGRAM: ICD-10-CM

## 2023-11-17 DIAGNOSIS — I89.0 LYMPHEDEMA: ICD-10-CM

## 2023-11-17 DIAGNOSIS — Z00.00 ROUTINE GENERAL MEDICAL EXAMINATION AT A HEALTH CARE FACILITY: Primary | ICD-10-CM

## 2023-11-17 DIAGNOSIS — F33.0 MILD EPISODE OF RECURRENT MAJOR DEPRESSIVE DISORDER (H): ICD-10-CM

## 2023-11-17 DIAGNOSIS — E11.9 TYPE 2 DIABETES MELLITUS WITHOUT COMPLICATION, WITHOUT LONG-TERM CURRENT USE OF INSULIN (H): ICD-10-CM

## 2023-11-17 DIAGNOSIS — J45.40 MODERATE PERSISTENT ASTHMA WITHOUT COMPLICATION: ICD-10-CM

## 2023-11-17 DIAGNOSIS — F41.9 ANXIETY: ICD-10-CM

## 2023-11-17 DIAGNOSIS — I10 BENIGN ESSENTIAL HYPERTENSION: ICD-10-CM

## 2023-11-17 DIAGNOSIS — G47.00 INSOMNIA, UNSPECIFIED TYPE: ICD-10-CM

## 2023-11-17 LAB
ANION GAP SERPL CALCULATED.3IONS-SCNC: 11 MMOL/L (ref 7–15)
BUN SERPL-MCNC: 12.4 MG/DL (ref 6–20)
CALCIUM SERPL-MCNC: 9.5 MG/DL (ref 8.6–10)
CHLORIDE SERPL-SCNC: 101 MMOL/L (ref 98–107)
CREAT SERPL-MCNC: 0.76 MG/DL (ref 0.51–0.95)
DEPRECATED HCO3 PLAS-SCNC: 28 MMOL/L (ref 22–29)
EGFRCR SERPLBLD CKD-EPI 2021: >90 ML/MIN/1.73M2
GLUCOSE SERPL-MCNC: 195 MG/DL (ref 70–99)
HBA1C MFR BLD: 7.3 % (ref 0–5.6)
POTASSIUM SERPL-SCNC: 4.3 MMOL/L (ref 3.4–5.3)
SODIUM SERPL-SCNC: 140 MMOL/L (ref 135–145)

## 2023-11-17 PROCEDURE — 90471 IMMUNIZATION ADMIN: CPT | Performed by: PHYSICIAN ASSISTANT

## 2023-11-17 PROCEDURE — 99214 OFFICE O/P EST MOD 30 MIN: CPT | Mod: 25 | Performed by: PHYSICIAN ASSISTANT

## 2023-11-17 PROCEDURE — 80048 BASIC METABOLIC PNL TOTAL CA: CPT | Performed by: PHYSICIAN ASSISTANT

## 2023-11-17 PROCEDURE — 90746 HEPB VACCINE 3 DOSE ADULT IM: CPT | Performed by: PHYSICIAN ASSISTANT

## 2023-11-17 PROCEDURE — 83036 HEMOGLOBIN GLYCOSYLATED A1C: CPT | Performed by: PHYSICIAN ASSISTANT

## 2023-11-17 PROCEDURE — 36415 COLL VENOUS BLD VENIPUNCTURE: CPT | Performed by: PHYSICIAN ASSISTANT

## 2023-11-17 PROCEDURE — 99396 PREV VISIT EST AGE 40-64: CPT | Mod: 25 | Performed by: PHYSICIAN ASSISTANT

## 2023-11-17 RX ORDER — METFORMIN HCL 500 MG
TABLET, EXTENDED RELEASE 24 HR ORAL
Qty: 360 TABLET | Refills: 1 | Status: SHIPPED | OUTPATIENT
Start: 2023-11-17 | End: 2024-05-17

## 2023-11-17 RX ORDER — METFORMIN HCL 500 MG
1000 TABLET, EXTENDED RELEASE 24 HR ORAL
Qty: 180 TABLET | Refills: 1 | Status: SHIPPED | OUTPATIENT
Start: 2023-11-17 | End: 2023-11-17 | Stop reason: DRUGHIGH

## 2023-11-17 RX ORDER — FUROSEMIDE 20 MG
40 TABLET ORAL DAILY
Qty: 180 TABLET | Refills: 1 | Status: SHIPPED | OUTPATIENT
Start: 2023-11-17 | End: 2024-05-17

## 2023-11-17 RX ORDER — MONTELUKAST SODIUM 10 MG/1
TABLET ORAL
Qty: 90 TABLET | Refills: 3 | Status: SHIPPED | OUTPATIENT
Start: 2023-11-17

## 2023-11-17 RX ORDER — ALBUTEROL SULFATE 90 UG/1
AEROSOL, METERED RESPIRATORY (INHALATION)
Qty: 18 G | Refills: 1 | Status: SHIPPED | OUTPATIENT
Start: 2023-11-17

## 2023-11-17 RX ORDER — TRAZODONE HYDROCHLORIDE 50 MG/1
50 TABLET, FILM COATED ORAL
Qty: 30 TABLET | Refills: 1 | Status: SHIPPED | OUTPATIENT
Start: 2023-11-17 | End: 2024-01-08

## 2023-11-17 RX ORDER — ESCITALOPRAM OXALATE 20 MG/1
TABLET ORAL
Qty: 90 TABLET | Refills: 1 | Status: SHIPPED | OUTPATIENT
Start: 2023-11-17 | End: 2024-05-17

## 2023-11-17 ASSESSMENT — ENCOUNTER SYMPTOMS
MYALGIAS: 1
NAUSEA: 0
FREQUENCY: 0
DIZZINESS: 0
HEARTBURN: 0
EYE PAIN: 0
BREAST MASS: 0
CONSTIPATION: 0
ABDOMINAL PAIN: 0
FEVER: 0
CHILLS: 0
WEAKNESS: 0
HEADACHES: 1
SHORTNESS OF BREATH: 1
NERVOUS/ANXIOUS: 1
PALPITATIONS: 0
SORE THROAT: 0
PARESTHESIAS: 0
ARTHRALGIAS: 1
HEMATURIA: 0
JOINT SWELLING: 0
COUGH: 1
DYSURIA: 0
HEMATOCHEZIA: 0
DIARRHEA: 0

## 2023-11-17 ASSESSMENT — PAIN SCALES - GENERAL: PAINLEVEL: NO PAIN (0)

## 2023-11-17 NOTE — PROGRESS NOTES
SUBJECTIVE:   Hannah is a 53 year old, presenting for the following:  Physical        11/17/2023     7:17 AM   Additional Questions   Roomed by Louise PATEL       Healthy Habits:     Getting at least 3 servings of Calcium per day:  Yes    Bi-annual eye exam:  NO    Dental care twice a year:  Yes    Sleep apnea or symptoms of sleep apnea:  Daytime drowsiness, Excessive snoring and Sleep apnea    Diet:  Other    Frequency of exercise:  None    Taking medications regularly:  Yes    Additional concerns today:  No    Diabetes Follow-up     How often are you checking your blood sugar? Not at all  What concerns do you have today about your diabetes? None   Do you have any of these symptoms? (Select all that apply)  No numbness or tingling in feet.  No redness, sores or blisters on feet.  No complaints of excessive thirst.  No reports of blurry vision.  No significant changes to weight.  Have you had a diabetic eye exam in the last 12 months? No     Taking metformin 1000 mg once daily. Doing well, no side effects or concerns.  Lab Results   Component Value Date    A1C 6.6 05/17/2023    A1C 7.9 12/08/2022    A1C 7.4 07/07/2022    A1C 7.2 03/14/2022    A1C 6.6 07/08/2021    A1C 6.5 04/08/2021    A1C 6.4 01/04/2021    A1C 6.9 10/01/2020    A1C 5.6 09/17/2018      Not doing much exercise currently but has an exercise bike and plans to start  Has 1-2 cans of pepsi daily    HTN: Taking losartan 25 mg daily, furosemide 40 mg daily (for HTN and lymphedema). No chest pain, shortness of breath, swelling in the extremities, palpitations, dizziness, headaches, blurred vision.      Lipids: taking atorvastatin 20 mg daily. No medication side effects or concerns.     LDL Cholesterol Calculated   Date Value Ref Range Status   05/17/2023 51 <=100 mg/dL Final   10/01/2020 109 (H) <100 mg/dL Final     Comment:     Above desirable:  100-129 mg/dl  Borderline High:  130-159 mg/dL  High:             160-189 mg/dL  Very high:       >189  mg/dl         Has been stable with escitalopram 20 mg daily. Increased stress due to family health issues, she is raising her grandson (turning 11 tomorrow). She feels like the medication is helpful and denies side effects. She does not want to change or add medication. Not interested in therapy at this time.    She is struggling with falling asleep and staying asleep. Racing thoughts, anxiety. Her 11 year old grandson also struggles with sleep so she is trying to help him as well. She does not nap, no caffeine after noon.       12/8/2022     8:03 AM 5/17/2023     8:22 AM 11/16/2023    10:12 PM   PHQ   PHQ-9 Total Score 8 8 11   Q9: Thoughts of better off dead/self-harm past 2 weeks Not at all Not at all Not at all         3/14/2022     3:02 PM 12/8/2022     8:04 AM 5/17/2023     8:41 AM   MAURSIIO-7 SCORE   Total Score  9 (mild anxiety)    Total Score 9 9 7       Asthma - uses albuterol PRN, singulair. Overall has been stable, sometimes has slight cough/shortness of breath but improves with albuterol.      12/8/2022     8:09 AM 5/17/2023     8:28 AM 11/16/2023    10:23 PM   ACT Total Scores   ACT TOTAL SCORE (Goal Greater than or Equal to 20) 19 22 21   In the past 12 months, how many times did you visit the emergency room for your asthma without being admitted to the hospital? 0 0 0   In the past 12 months, how many times were you hospitalized overnight because of your asthma? 0 0 0     Have you ever done Advance Care Planning? (For example, a Health Directive, POLST, or a discussion with a medical provider or your loved ones about your wishes):     Social History     Tobacco Use    Smoking status: Never    Smokeless tobacco: Never   Substance Use Topics    Alcohol use: No             11/16/2023    10:21 PM   Alcohol Use   Prescreen: >3 drinks/day or >7 drinks/week? Not Applicable     Reviewed orders with patient.  Reviewed health maintenance and updated orders accordingly - Yes  Lab work is in process  Labs reviewed  in EPIC  BP Readings from Last 3 Encounters:   11/17/23 119/80   05/17/23 122/80   12/08/22 128/68    Wt Readings from Last 3 Encounters:   11/17/23 120.7 kg (266 lb)   05/17/23 120.8 kg (266 lb 6.4 oz)   12/08/22 123.4 kg (272 lb)                  Patient Active Problem List   Diagnosis    Carbuncle and furuncle of trunk    Insomnia    Anxiety    Bronchogenic cyst    Morbid obesity (H)    Asthma    Other nonspecific abnormal finding of lung field (CODE)    Multiple lipomas    JOSE J (obstructive sleep apnea)    Lymphedema    Type 2 diabetes mellitus without complication, without long-term current use of insulin (H)    Mixed hyperlipidemia    Mild episode of recurrent major depressive disorder (H24)    Benign essential hypertension     Past Surgical History:   Procedure Laterality Date    ABDOMEN SURGERY      tubes removed    APPENDECTOMY      COLONOSCOPY  06/13/2019    Dr. Leon [facility]    COLONOSCOPY N/A 6/13/2019    Procedure: COLONOSCOPY;  Surgeon: Mauro Leon MD;  Location: [facility]    EXCISE MASS BACK Left 9/17/2014    Procedure: EXCISE MASS BACK;  Surgeon: Yaz Avilez MD;  Location: [facility] OR    EXCISE MASS UPPER EXTREMITY Left 9/17/2014    Procedure: EXCISE MASS UPPER EXTREMITY;  Surgeon: Yaz Avilez MD;  Location: [facility] OR    HERNIORRHAPHY INCISIONAL (LOCATION) N/A 8/19/2021    Procedure: HERNIORRHAPHY open incisional and umbilical repair with mesh;  Surgeon: Rhys Lyons MD;  Location: [facility] OR    HYSTERECTOMY VAGINAL  1992    ovaries intact    other      lump on right arm removed     THORACIC SURGERY      lung surgery    THORACOTOMY Right 8/25/2015    Procedure: THORACOTOMY;  Surgeon: Andrew Gordon MD;  Location:  OR    [facility] NONSPECIFIC PROCEDURE      2 C- SECTIONS       Social History     Tobacco Use    Smoking status: Never    Smokeless tobacco: Never   Substance Use Topics    Alcohol use: No     Family History   Problem Relation Age of Onset    Neurologic Disorder Mother          Parkinsons    Diabetes Mother     Osteoporosis Mother     Genetic Disorder Mother     Hypertension Father     Cardiovascular Father     Gallbladder Disease Father     Hyperlipidemia Father     Cerebrovascular Disease Father     Obesity Father     Hypertension Sister     Hypertension Sister     Hyperlipidemia Sister     Asthma Sister     Obesity Sister     Hyperlipidemia Brother     Diabetes Maternal Grandmother     Other Cancer Maternal Grandmother     Osteoporosis Maternal Grandmother     Obesity Maternal Grandmother     Diabetes Maternal Grandfather     Diabetes Paternal Grandmother     Osteoporosis Paternal Grandmother     C.A.D. Paternal Grandfather     Cardiovascular Paternal Grandfather     Coronary Artery Disease Paternal Grandfather     Cerebrovascular Disease Paternal Grandfather     Prostate Cancer Paternal Grandfather     Anxiety Disorder Daughter     Genetic Disorder Daughter         PKD    Anxiety Disorder Son     Hypertension Cousin     Other Cancer Cousin     Anxiety Disorder Cousin     Polycystic Kidney Diease Other     Diabetes Other     Hyperlipidemia Other     Anxiety Disorder Other     Obesity Other     Diabetes Other     Obesity Other     Other Cancer Other     Colon Cancer No family hx of     Breast Cancer No family hx of     Ovarian Cancer No family hx of          Current Outpatient Medications   Medication Sig Dispense Refill    albuterol (PROAIR HFA) 108 (90 Base) MCG/ACT inhaler INHALE 2 PUFFS INTO THE LUNGS EVERY 6 HOURS 18 g 1    atorvastatin (LIPITOR) 20 MG tablet Take 1 tablet (20 mg) by mouth daily 90 tablet 3    DiphenhydrAMINE HCl (BENADRYL PO) Take 25 mg by mouth daily as needed       escitalopram (LEXAPRO) 20 MG tablet TAKE 1 TABLET BY MOUTH ONE TIME DAILY 90 tablet 1    fexofenadine (ALLEGRA) 180 MG tablet Take 180 mg by mouth daily      furosemide (LASIX) 20 MG tablet Take 2 tablets (40 mg) by mouth daily 180 tablet 1    losartan (COZAAR) 25 MG tablet Take 1 tablet (25 mg)  "by mouth daily 90 tablet 3    metFORMIN (GLUCOPHAGE XR) 500 MG 24 hr tablet Take 1 tab (500 mg) in morning and 2 tabs (1000 mg) in evening with meals for 1 week. Then if tolerated, take 2 tabs (1000 mg) twice daily with meals. 360 tablet 1    montelukast (SINGULAIR) 10 MG tablet TAKE ONE TABLET BY MOUTH EVERY NIGHT AT BEDTIME 90 tablet 3    traZODone (DESYREL) 50 MG tablet Take 1 tablet (50 mg) by mouth nightly as needed for sleep Could increase to 2 tablets (100 mg) if needed, or could also try 1/2 tab (25 mg) 30 tablet 1     Allergies   Allergen Reactions    Seasonal Allergies     Ampicillin Rash     \"sunburn\"    Decongestant [Oxymetazoline] Rash     \"sunburn\"    Morphine And Related Rash     \"sunburn\"    Sulfa Antibiotics Rash     \"sunburn\"     Recent Labs   Lab Test 11/17/23  0816 05/17/23  0822 12/08/22  1058 07/07/22  0721 03/14/22  1455 08/19/21  0643 07/08/21  0958 07/08/21  0905 04/08/21  1613 01/04/21  0744 10/01/20  0839 10/01/19  0852 09/17/18  0820 12/20/17  1715 08/16/17  0831 07/31/17  0748   A1C 7.3* 6.6* 7.9*   < > 7.2*  --   --    < > 6.5*   < > 6.9*  --  5.6  --   --  5.4   LDL  --  51  --   --  125*  --   --   --   --   --  109*   < >  --    < >  --  109*   HDL  --  53  --   --  55  --   --   --   --   --  56   < >  --    < >  --  54   TRIG  --  221*  --   --  321*  --   --   --   --   --  227*   < >  --    < >  --  295*   ALT  --   --   --   --   --   --  114*  --   --   --   --   --   --   --  49 60*   CR  --  0.72 0.71  --  0.78   < > 0.85  --  0.87  --  0.67   < >  --    < >  --  0.73   GFRESTIMATED  --  >90 >90  --  >90   < > 79  --  77  --  >90   < >  --    < >  --  84   GFRESTBLACK  --   --   --   --   --   --  >90  --  89  --  >90   < >  --    < >  --  >90   GFR Calc     POTASSIUM  --  4.0 4.2  --  3.8   < > 4.5  --  4.2  --  3.9   < >  --    < >  --  4.2   TSH  --   --   --   --   --   --   --   --   --   --   --   --  1.40  --  1.84  --     < > = values in this interval " not displayed.        Breast Cancer Screening:    FHS-7:       7/22/2022     7:29 AM 11/16/2023    10:26 PM   Breast CA Risk Assessment (FHS-7)   Did any of your first-degree relatives have breast or ovarian cancer? No No   Did any of your relatives have bilateral breast cancer? No No   Did any man in your family have breast cancer? No No   Did any woman in your family have breast and ovarian cancer? No No   Did any woman in your family have breast cancer before age 50 y? No No   Do you have 2 or more relatives with breast and/or ovarian cancer? No No   Do you have 2 or more relatives with breast and/or bowel cancer? No Unknown       Mammogram Screening: Recommended annual mammography  Pertinent mammograms are reviewed under the imaging tab.    History of abnormal Pap smear: Status post benign hysterectomy. Health Maintenance and Surgical History updated.      1/9/2012     5:30 PM 5/19/2009    12:00 AM 8/25/2005    12:00 AM   PAP / HPV   PAP (Historical) NIL  NIL  NIL      Reviewed and updated as needed this visit by clinical staff   Tobacco  Allergies  Meds  Problems  Med Hx  Surg Hx  Fam Hx          Reviewed and updated as needed this visit by Provider   Tobacco  Allergies  Meds  Problems  Med Hx  Surg Hx  Fam Hx         Past Medical History:   Diagnosis Date    Anxiety     Depressive disorder     Diabetes (H)     prediabetic    Elevated glucose 9/13/2018    Hypertension     Unsure on the date    Incisional hernia, without obstruction or gangrene 7/19/2021    Added automatically from request for surgery [id number]    Lymphedema 9/10/2018    Mild major depression (H24) 9/10/2018    Obesity, unspecified     JOSE J (obstructive sleep apnea) 9/10/2018    cpap recalled    JOSE J (obstructive sleep apnea)     Other chronic pain     criss knees    Ovarian cyst 8/17/2015    WILL NEED US FOLLOW UP AFTER CHEST SX    Plantar fasciitis 2/23/2015    Sleep apnea, unspecified 12/20/2017    Sleep apnea, unspecified      Tuberculin test reaction     Uncomplicated asthma       Past Surgical History:   Procedure Laterality Date    ABDOMEN SURGERY      tubes removed    APPENDECTOMY      COLONOSCOPY  06/13/2019    Dr. Leon [facility]    COLONOSCOPY N/A 6/13/2019    Procedure: COLONOSCOPY;  Surgeon: Mauro Leon MD;  Location: [facility]    EXCISE MASS BACK Left 9/17/2014    Procedure: EXCISE MASS BACK;  Surgeon: Yaz Avilez MD;  Location: [facility] OR    EXCISE MASS UPPER EXTREMITY Left 9/17/2014    Procedure: EXCISE MASS UPPER EXTREMITY;  Surgeon: Yaz Avilez MD;  Location: [facility] OR    HERNIORRHAPHY INCISIONAL (LOCATION) N/A 8/19/2021    Procedure: HERNIORRHAPHY open incisional and umbilical repair with mesh;  Surgeon: Rhys Lyons MD;  Location: [facility] OR    HYSTERECTOMY VAGINAL  1992    ovaries intact    other      lump on right arm removed     THORACIC SURGERY      lung surgery    THORACOTOMY Right 8/25/2015    Procedure: THORACOTOMY;  Surgeon: Andrew Gordon MD;  Location:  OR    [facility] NONSPECIFIC PROCEDURE      2 C- SECTIONS     OB History   No obstetric history on file.       Review of Systems   Constitutional:  Negative for chills and fever.   HENT:  Positive for congestion. Negative for ear pain, hearing loss and sore throat.    Eyes:  Negative for pain and visual disturbance.   Respiratory:  Positive for cough and shortness of breath.    Cardiovascular:  Negative for chest pain, palpitations and peripheral edema.   Gastrointestinal:  Negative for abdominal pain, constipation, diarrhea, heartburn, hematochezia and nausea.   Breasts:  Negative for tenderness, breast mass and discharge.   Genitourinary:  Negative for dysuria, frequency, genital sores, hematuria, pelvic pain, urgency, vaginal bleeding and vaginal discharge.   Musculoskeletal:  Positive for arthralgias and myalgias. Negative for joint swelling.   Skin:  Negative for rash.   Neurological:  Positive for headaches. Negative for dizziness,  "weakness and paresthesias.   Psychiatric/Behavioral:  Negative for mood changes. The patient is nervous/anxious.           OBJECTIVE:   /80 (BP Location: Right arm, Patient Position: Sitting, Cuff Size: Adult Large)   Pulse 83   Temp 98.6  F (37  C) (Oral)   Resp 20   Ht 1.6 m (5' 3\")   Wt 120.7 kg (266 lb)   SpO2 94%   BMI 47.12 kg/m    Physical Exam  GENERAL: healthy, alert and no distress  EYES: Eyes grossly normal to inspection, PERRL and conjunctivae and sclerae normal  HENT: ear canals and TM's normal, nose and mouth without ulcers or lesions  NECK: no adenopathy, no asymmetry, masses, or scars and thyroid normal to palpation  RESP: lungs clear to auscultation - no rales, rhonchi or wheezes  CV: regular rate and rhythm, normal S1 S2, no S3 or S4, no murmur, click or rub, no peripheral edema and peripheral pulses strong  ABDOMEN: soft, nontender, no hepatosplenomegaly, no masses and bowel sounds normal  MS: no gross musculoskeletal defects noted, no edema  SKIN: no suspicious lesions or rashes  NEURO: Normal strength and tone, mentation intact and speech normal  PSYCH: mentation appears normal, affect normal/bright    Diagnostic Test Results:  Labs reviewed in Epic    ASSESSMENT/PLAN:   Routine general medical examination at a health care facility  Reviewed personal and family history. Reviewed age appropriate screenings. Reviewed healthy BP and BMI ranges. Counseled on lifestyle modifications for optimal mental and physical health.  Discussed age-appropriate health maintenance. Recommended any needed vaccinations. Continue to focus on well balanced diet and exercise. Declines flu and COVID vaccines.    Type 2 diabetes mellitus without complication, without long-term current use of insulin (H)  A1c has increased over the past 6 months, up to 7.3. Plan to titrate metformin up to 1000 mg BID. Follow-up in 3 months to recheck. Could consider adding ozempic to help with weight as well. Recommend " healthy lifestyle changes, limiting soda, increased exercise. Due for eye exam, will schedule this.  - Adult Eye  Referral; Future  - HEMOGLOBIN A1C; Future  - metFORMIN (GLUCOPHAGE XR) 500 MG 24 hr tablet; Take 1 tab (500 mg) in morning and 2 tabs (1000 mg) in evening with meals for 1 week. Then if tolerated, take 2 tabs (1000 mg) twice daily with meals.  - HEMOGLOBIN A1C    Visit for screening mammogram  - MA SCREENING DIGITAL BILAT - Future  (s+30); Future    Benign essential hypertension  Chronic, well controlled. Continue present management.  - BASIC METABOLIC PANEL; Future  - BASIC METABOLIC PANEL    Insomnia, unspecified type  She is struggling with falling asleep and staying asleep. Racing thoughts, anxiety. Her 11 year old grandson also struggles with sleep so she is trying to help him as well. She does not nap, no caffeine after noon. Discussed sleep hygiene and treatment options. She would like to try trazodone, discussed r/b/se.   - traZODone (DESYREL) 50 MG tablet; Take 1 tablet (50 mg) by mouth nightly as needed for sleep Could increase to 2 tablets (100 mg) if needed, or could also try 1/2 tab (25 mg)    Anxiety  Mild episode of recurrent major depressive disorder (H24)  Has been stable with escitalopram 20 mg daily. Increased stress due to family health issues, she is raising her grandson (turning 11 tomorrow). She feels like the medication is helpful and denies side effects. She does not want to change or add medication. Not interested in therapy at this time.  - escitalopram (LEXAPRO) 20 MG tablet; TAKE 1 TABLET BY MOUTH ONE TIME DAILY    Lymphedema  Chronic, stable.   - furosemide (LASIX) 20 MG tablet; Take 2 tablets (40 mg) by mouth daily    Moderate persistent asthma without complication  Chronic, stable.  - montelukast (SINGULAIR) 10 MG tablet; TAKE ONE TABLET BY MOUTH EVERY NIGHT AT BEDTIME  - albuterol (PROAIR HFA) 108 (90 Base) MCG/ACT inhaler; INHALE 2 PUFFS INTO THE LUNGS EVERY 6  "HOURS       COUNSELING:  Reviewed preventive health counseling, as reflected in patient instructions      BMI:   Estimated body mass index is 47.12 kg/m  as calculated from the following:    Height as of this encounter: 1.6 m (5' 3\").    Weight as of this encounter: 120.7 kg (266 lb).   Weight management plan: Discussed healthy diet and exercise guidelines      She reports that she has never smoked. She has never used smokeless tobacco.          Sandra Ochoa PA-C  [facility] [place]  Answers submitted by the patient for this visit:  Patient Health Questionnaire (Submitted on 11/16/2023)  If you checked off any problems, how difficult have these problems made it for you to do your work, take care of things at home, or get along with other people?: Somewhat difficult  PHQ9 TOTAL SCORE: 11    "

## 2023-11-17 NOTE — COMMUNITY RESOURCES LIST (ENGLISH)
11/17/2023   [facility]  N/A  For questions about this resource list or additional care needs, please contact your primary care clinic or care manager.  Phone: [phone]   Email: N/A   Address: [address] [place] [address]   Hours: N/A        Financial Stability       Rent and mortgage payment assistance  1  [employer] [facility] ([facility]) [facility] Family Homeless Prevention Assistance Program (FHPAP) Distance: 1.85 miles      In-Person   [address] [place] [address]  Language: English, [place]ese  Hours: Mon - Fri 8:00 AM - 4:30 PM  Fees: Free   Phone: (135) 860-6626 Email: info@[facility].[employer] Website: http://[email or web address] [email or web address] Ltd     2  [address] [place] [address] - Rent payment assistance Distance: 2.72 miles      In-Person, Phone/Virtual   [address] [place] [address]  Language: English  Hours: Mon 8:00 AM - 4:00 PM , Tue 8:00 AM - 7:00 PM , Wed - Thu 8:00 AM - 4:00 PM  Fees: Free   Phone: (224) 609-9270 Email: info@[employer] Website: https://[employer]/resources/resource-centers/          Important Numbers & Websites       Emergency Services   911  [facility]   311  Poison Control   (412) 449-6657  Suicide Prevention Lifeline   (821) 480-6318 (TALK)  Child Abuse Hotline   (198) 273-4948 (4-A-Child)  Sexual Assault Hotline   (950) 318-4627 (HOPE)  National Runaway Safeline   (136) 430-3671 (RUNAWAY)  All-Options Talkline   (336) 738-5049  Substance Abuse Referral   (462) 955-8750 (HELP)

## 2023-11-17 NOTE — COMMUNITY RESOURCES LIST (ENGLISH)
11/17/2023   [facility] [employer]  N/A  For questions about this resource list or additional care needs, please contact your primary care clinic or care manager.  Phone: [phone]   Email: N/A   Address: [address] [place] [address]   Hours: N/A        Financial Stability       Rent and mortgage payment assistance  1  [employer] [facility] ([facility]) [facility] Family Homeless Prevention Assistance Program (FHPAP) Distance: 1.85 miles      In-Person   [address] [place] [address]  Language: English, Uzbek  Hours: Mon - Fri 8:00 AM - 4:30 PM  Fees: Free   Phone: (203) 914-4032 Email: info@[facility].[employer] Website: http://www.[facility] CenterRelux     2  [address] [place] [address] - Rent payment assistance Distance: 2.72 miles      In-Person, Phone/Virtual   [address] [place] [address]  Language: English  Hours: Mon 8:00 AM - 4:00 PM , Tue 8:00 AM - 7:00 PM , Wed - Thu 8:00 AM - 4:00 PM  Fees: Free   Phone: (887) 357-8102 Email: info@[employer] Website: https://[employer]/resources/resource-centers/          Important Numbers & Websites       Emergency Services   911  [facility]   311  Poison Control   (187) 874-2403  Suicide Prevention Lifeline   (678) 324-1835 (TALK)  Child Abuse Hotline   (657) 606-8559 (4-A-Child)  Sexual Assault Hotline   (586) 903-9563 (HOPE)  National Runaway Safeline   (319) 199-8260 (RUNAWAY)  All-Options Talkline   (907) 927-7649  Substance Abuse Referral   (463) 684-2002 (HELP)

## 2023-11-17 NOTE — LETTER
My Asthma Action Plan    Name: Hannah Meade   YOB: 1970  Date: 11/17/2023   My doctor: Sandra Ochoa PA-C   My clinic: [facility]        My Control Medicine: Montelukast (Singulair) -  10 mg    My Rescue Medicine: Albuterol (Proair/Ventolin/Proventil HFA) 2-4 puffs EVERY 4 HOURS as needed. Use a spacer if recommended by your provider.   My Asthma Severity:   Moderate Persistent  Know your asthma triggers:   None            GREEN ZONE   Good Control  I feel good  No cough or wheeze  Can work, sleep and play without asthma symptoms       Take your asthma control medicine every day.     If exercise triggers your asthma, take your rescue medication  15 minutes before exercise or sports, and  During exercise if you have asthma symptoms  Spacer to use with inhaler: If you have a spacer, make sure to use it with your inhaler             YELLOW ZONE Getting Worse  I have ANY of these:  I do not feel good  Cough or wheeze  Chest feels tight  Wake up at night   Keep taking your Green Zone medications  Start taking your rescue medicine:  every 20 minutes for up to 1 hour. Then every 4 hours for 24-48 hours.  If you stay in the Yellow Zone for more than 12-24 hours, contact your doctor.  If you do not return to the Green Zone in 12-24 hours or you get worse, start taking your oral steroid medicine if prescribed by your provider.           RED ZONE Medical Alert - Get Help  I have ANY of these:  I feel awful  Medicine is not helping  Breathing getting harder  Trouble walking or talking  Nose opens wide to breathe       Take your rescue medicine NOW  If your provider has prescribed an oral steroid medicine, start taking it NOW  Call your doctor NOW  If you are still in the Red Zone after 20 minutes and you have not reached your doctor:  Take your rescue medicine again and  Call 911 or go to the emergency room right away    See your regular doctor within 2 weeks of an Emergency Room  or Urgent Care visit for follow-up treatment.          Annual Reminders:  Meet with Asthma Educator,  Flu Shot in the Fall, consider Pneumonia Vaccination for patients with asthma (aged 19 and older).    Pharmacy: [id number] - [address]    Electronically signed by Sandra Ochoa PA-C   Date: 11/17/23                      Asthma Triggers  How To Control Things That Make Your Asthma Worse    Triggers are things that make your asthma worse.  Look at the list below to help you find your triggers and what you can do about them.  You can help prevent asthma flare-ups by staying away from your triggers.      Trigger                                                          What you can do   Cigarette Smoke  Tobacco smoke can make asthma worse. Do not allow smoking in your home, car or around you.  Be sure no one smokes at a child s day care or school.  If you smoke, ask your health care provider for ways to help you quit.  Ask family members to quit too.  Ask your health care provider for a referral to Quit Plan to help you quit smoking, or call 5-445-904-PLAN.     Colds, Flu, Bronchitis  These are common triggers of asthma. Wash your hands often.  Don t touch your eyes, nose or mouth.  Get a flu shot every year.     Dust Mites  These are tiny bugs that live in cloth or carpet. They are too small to see. Wash sheets and blankets in hot water every week.   Encase pillows and mattress in dust mite proof covers.  Avoid having carpet if you can. If you have carpet, vacuum weekly.   Use a dust mask and HEPA vacuum.   Pollen and Outdoor Mold  Some people are allergic to trees, grass, or weed pollen, or molds. Try to keep your windows closed.  Limit time out doors when pollen count is high.   Ask you health care provider about taking medicine during allergy season.     Animal Dander  Some people are allergic to skin flakes, urine or saliva from pets with fur or feathers. Keep pets with fur or  feathers out of your home.    If you can t keep the pet outdoors, then keep the pet out of your bedroom.  Keep the bedroom door closed.  Keep pets off cloth furniture and away from stuffed toys.     Mice, Rats, and Cockroaches   Some people are allergic to the waste from these pests.   Cover food and garbage.  Clean up spills and food crumbs.  Store grease in the refrigerator.   Keep food out of the bedroom.   Indoor Mold  This can be a trigger if your home has high moisture. Fix leaking faucets, pipes, or other sources of water.   Clean moldy surfaces.  Dehumidify basement if it is damp and smelly.   Smoke, Strong Odors, and Sprays  These can reduce air quality. Stay away from strong odors and sprays, such as perfume, powder, hair spray, paints, smoke incense, paint, cleaning products, candles and new carpet.   Exercise or Sports  Some people with asthma have this trigger. Be active!  Ask your doctor about taking medicine before sports or exercise to prevent symptoms.    Warm up for 5-10 minutes before and after sports or exercise.     Other Triggers of Asthma  Cold air:  Cover your nose and mouth with a scarf.  Sometimes laughing or crying can be a trigger.  Some medicines and food can trigger asthma.

## 2024-01-07 DIAGNOSIS — G47.00 INSOMNIA, UNSPECIFIED TYPE: ICD-10-CM

## 2024-01-08 RX ORDER — TRAZODONE HYDROCHLORIDE 50 MG/1
TABLET, FILM COATED ORAL
Qty: 60 TABLET | Refills: 1 | Status: SHIPPED | OUTPATIENT
Start: 2024-01-08 | End: 2024-03-29

## 2024-01-17 ENCOUNTER — ALLIED HEALTH/NURSE VISIT (OUTPATIENT)
Dept: FAMILY MEDICINE | Facility: CLINIC | Age: 54
End: 2024-01-17
Payer: COMMERCIAL

## 2024-01-17 DIAGNOSIS — Z23 ENCOUNTER FOR IMMUNIZATION: Primary | ICD-10-CM

## 2024-01-17 PROCEDURE — 99207 PR NO CHARGE NURSE ONLY: CPT

## 2024-01-17 PROCEDURE — 90746 HEPB VACCINE 3 DOSE ADULT IM: CPT

## 2024-01-17 PROCEDURE — 90471 IMMUNIZATION ADMIN: CPT

## 2024-01-17 NOTE — PROGRESS NOTES
"Prior to immunization administration, verified patients identity using patient s name and date of birth. Please see Immunization Activity for additional information.     Screening Questionnaire for Adult Immunization    Are you sick today?   {:350778}   Do you have allergies to medications, food, a vaccine component or latex?   {:838039}   Have you ever had a serious reaction after receiving a vaccination?   {:826166}   Do you have a long-term health problem with heart, lung, kidney, or metabolic disease (e.g., diabetes), asthma, a blood disorder, no spleen, complement component deficiency, a cochlear implant, or a spinal fluid leak?  Are you on long-term aspirin therapy?   {:794640}   Do you have cancer, leukemia, HIV/AIDS, or any other immune system problem?   {:920207}   Do you have a parent, brother, or sister with an immune system problem?   {:389544}   In the past 3 months, have you taken medications that affect  your immune system, such as prednisone, other steroids, or anticancer drugs; drugs for the treatment of rheumatoid arthritis, Crohn s disease, or psoriasis; or have you had radiation treatments?   {:325587}   Have you had a seizure, or a brain or other nervous system problem?   {:571749}   During the past year, have you received a transfusion of blood or blood    products, or been given immune (gamma) globulin or antiviral drug?   {:568971}   For women: Are you pregnant or is there a chance you could become       pregnant during the next month?   {:447788}   Have you received any vaccinations in the past 4 weeks?   {:727760}     Immunization questionnaire { :481018::\"answers were all negative.\"}    I have reviewed the following standing orders: {Adult Vaccine Standing Order:159299}    Patient instructed to remain in clinic for 15 minutes afterwards, and to report any adverse reactions.     Screening performed by David Robledo MA on 1/17/2024 at 4:51 PM.    "

## 2024-02-27 ENCOUNTER — TRANSFERRED RECORDS (OUTPATIENT)
Dept: MULTI SPECIALTY CLINIC | Facility: CLINIC | Age: 54
End: 2024-02-27

## 2024-02-27 LAB — RETINOPATHY: NORMAL

## 2024-03-29 DIAGNOSIS — G47.00 INSOMNIA, UNSPECIFIED TYPE: ICD-10-CM

## 2024-03-29 RX ORDER — TRAZODONE HYDROCHLORIDE 50 MG/1
TABLET, FILM COATED ORAL
Qty: 60 TABLET | Refills: 1 | Status: SHIPPED | OUTPATIENT
Start: 2024-03-29 | End: 2024-05-17

## 2024-05-16 ASSESSMENT — ASTHMA QUESTIONNAIRES
ACT_TOTALSCORE: 18
QUESTION_3 LAST FOUR WEEKS HOW OFTEN DID YOUR ASTHMA SYMPTOMS (WHEEZING, COUGHING, SHORTNESS OF BREATH, CHEST TIGHTNESS OR PAIN) WAKE YOU UP AT NIGHT OR EARLIER THAN USUAL IN THE MORNING: ONCE OR TWICE
QUESTION_2 LAST FOUR WEEKS HOW OFTEN HAVE YOU HAD SHORTNESS OF BREATH: ONCE OR TWICE A WEEK
ACT_TOTALSCORE: 18
QUESTION_4 LAST FOUR WEEKS HOW OFTEN HAVE YOU USED YOUR RESCUE INHALER OR NEBULIZER MEDICATION (SUCH AS ALBUTEROL): ONE OR TWO TIMES PER DAY
QUESTION_1 LAST FOUR WEEKS HOW MUCH OF THE TIME DID YOUR ASTHMA KEEP YOU FROM GETTING AS MUCH DONE AT WORK, SCHOOL OR AT HOME: A LITTLE OF THE TIME
QUESTION_5 LAST FOUR WEEKS HOW WOULD YOU RATE YOUR ASTHMA CONTROL: WELL CONTROLLED

## 2024-05-16 ASSESSMENT — PATIENT HEALTH QUESTIONNAIRE - PHQ9
SUM OF ALL RESPONSES TO PHQ QUESTIONS 1-9: 6
10. IF YOU CHECKED OFF ANY PROBLEMS, HOW DIFFICULT HAVE THESE PROBLEMS MADE IT FOR YOU TO DO YOUR WORK, TAKE CARE OF THINGS AT HOME, OR GET ALONG WITH OTHER PEOPLE: SOMEWHAT DIFFICULT
SUM OF ALL RESPONSES TO PHQ QUESTIONS 1-9: 6

## 2024-05-17 ENCOUNTER — ALLIED HEALTH/NURSE VISIT (OUTPATIENT)
Dept: FAMILY MEDICINE | Facility: CLINIC | Age: 54
End: 2024-05-17
Payer: COMMERCIAL

## 2024-05-17 ENCOUNTER — VIRTUAL VISIT (OUTPATIENT)
Dept: FAMILY MEDICINE | Facility: CLINIC | Age: 54
End: 2024-05-17
Attending: PHYSICIAN ASSISTANT
Payer: COMMERCIAL

## 2024-05-17 DIAGNOSIS — G47.00 INSOMNIA, UNSPECIFIED TYPE: ICD-10-CM

## 2024-05-17 DIAGNOSIS — E78.2 MIXED HYPERLIPIDEMIA: ICD-10-CM

## 2024-05-17 DIAGNOSIS — E11.9 TYPE 2 DIABETES MELLITUS WITHOUT COMPLICATION, WITHOUT LONG-TERM CURRENT USE OF INSULIN (H): Primary | ICD-10-CM

## 2024-05-17 DIAGNOSIS — F33.0 MILD EPISODE OF RECURRENT MAJOR DEPRESSIVE DISORDER (H): ICD-10-CM

## 2024-05-17 DIAGNOSIS — I10 BENIGN ESSENTIAL HYPERTENSION: ICD-10-CM

## 2024-05-17 DIAGNOSIS — G47.33 OSA (OBSTRUCTIVE SLEEP APNEA): ICD-10-CM

## 2024-05-17 DIAGNOSIS — Z23 ENCOUNTER FOR IMMUNIZATION: Primary | ICD-10-CM

## 2024-05-17 DIAGNOSIS — I89.0 LYMPHEDEMA: ICD-10-CM

## 2024-05-17 DIAGNOSIS — F41.9 ANXIETY: ICD-10-CM

## 2024-05-17 PROCEDURE — 90471 IMMUNIZATION ADMIN: CPT

## 2024-05-17 PROCEDURE — 96127 BRIEF EMOTIONAL/BEHAV ASSMT: CPT | Mod: 95 | Performed by: PHYSICIAN ASSISTANT

## 2024-05-17 PROCEDURE — 99207 PR NO CHARGE NURSE ONLY: CPT

## 2024-05-17 PROCEDURE — G2211 COMPLEX E/M VISIT ADD ON: HCPCS | Mod: 95 | Performed by: PHYSICIAN ASSISTANT

## 2024-05-17 PROCEDURE — 99214 OFFICE O/P EST MOD 30 MIN: CPT | Mod: 95 | Performed by: PHYSICIAN ASSISTANT

## 2024-05-17 PROCEDURE — 90746 HEPB VACCINE 3 DOSE ADULT IM: CPT

## 2024-05-17 RX ORDER — ATORVASTATIN CALCIUM 20 MG/1
20 TABLET, FILM COATED ORAL DAILY
Qty: 90 TABLET | Refills: 0 | Status: SHIPPED | OUTPATIENT
Start: 2024-05-17 | End: 2024-09-12

## 2024-05-17 RX ORDER — FUROSEMIDE 20 MG
40 TABLET ORAL DAILY
Qty: 180 TABLET | Refills: 0 | Status: SHIPPED | OUTPATIENT
Start: 2024-05-17 | End: 2024-08-19

## 2024-05-17 RX ORDER — ESCITALOPRAM OXALATE 20 MG/1
TABLET ORAL
Qty: 90 TABLET | Refills: 1 | Status: SHIPPED | OUTPATIENT
Start: 2024-05-17

## 2024-05-17 RX ORDER — LANCETS
EACH MISCELLANEOUS
Qty: 100 EACH | Refills: 6 | Status: SHIPPED | OUTPATIENT
Start: 2024-05-17

## 2024-05-17 RX ORDER — LOSARTAN POTASSIUM 25 MG/1
25 TABLET ORAL DAILY
Qty: 90 TABLET | Refills: 0 | Status: SHIPPED | OUTPATIENT
Start: 2024-05-17

## 2024-05-17 RX ORDER — METFORMIN HCL 500 MG
1000 TABLET, EXTENDED RELEASE 24 HR ORAL 2 TIMES DAILY WITH MEALS
Qty: 360 TABLET | Refills: 0 | Status: SHIPPED | OUTPATIENT
Start: 2024-05-17 | End: 2024-08-19

## 2024-05-17 ASSESSMENT — ANXIETY QUESTIONNAIRES
IF YOU CHECKED OFF ANY PROBLEMS ON THIS QUESTIONNAIRE, HOW DIFFICULT HAVE THESE PROBLEMS MADE IT FOR YOU TO DO YOUR WORK, TAKE CARE OF THINGS AT HOME, OR GET ALONG WITH OTHER PEOPLE: SOMEWHAT DIFFICULT
1. FEELING NERVOUS, ANXIOUS, OR ON EDGE: SEVERAL DAYS
3. WORRYING TOO MUCH ABOUT DIFFERENT THINGS: SEVERAL DAYS
7. FEELING AFRAID AS IF SOMETHING AWFUL MIGHT HAPPEN: SEVERAL DAYS
GAD7 TOTAL SCORE: 6
2. NOT BEING ABLE TO STOP OR CONTROL WORRYING: SEVERAL DAYS
6. BECOMING EASILY ANNOYED OR IRRITABLE: SEVERAL DAYS
5. BEING SO RESTLESS THAT IT IS HARD TO SIT STILL: NOT AT ALL
GAD7 TOTAL SCORE: 6

## 2024-05-17 ASSESSMENT — ENCOUNTER SYMPTOMS: NERVOUS/ANXIOUS: 1

## 2024-05-17 ASSESSMENT — PATIENT HEALTH QUESTIONNAIRE - PHQ9: 5. POOR APPETITE OR OVEREATING: SEVERAL DAYS

## 2024-05-17 NOTE — PROGRESS NOTES
Hannah is a 54 year old who is being evaluated via a billable video visit.    How would you like to obtain your AVS? MyChart  If the video visit is dropped, the invitation should be resent by: Text to cell phone: [phone]  Will anyone else be joining your video visit? No      Assessment & Plan     Type 2 diabetes mellitus without complication, without long-term current use of insulin (H)  Continue current management for now, has been well controlled with last A1c of 6.6 in 11/2023. She would like glucose testing supplies so she can check sugar at home. She often feels tired, sometimes nauseated, in afternoons and she would like to check blood sugar at those times. This has been going on for a long time, no new changes. I suspect these symptoms may be more related to untreated sleep apnea but she can check glucose during those times to see if there is a possible blood sugar issue. Due for A1c check as well as additional labs and will come to clinic soon for fasting labs.  - blood glucose monitoring (NO BRAND SPECIFIED) meter device kit; Use to test blood sugar 1 times daily or as directed. Preferred blood glucose meter OR supplies to accompany: Blood Glucose Monitor Brands: per insurance.  - blood glucose (NO BRAND SPECIFIED) test strip; Use to test blood sugar 1 times daily or as directed. To accompany: Blood Glucose Monitor Brands: per insurance.  - thin (NO BRAND SPECIFIED) lancets; Use with lanceting device. To accompany: Blood Glucose Monitor Brands: per insurance.  - Hemoglobin A1c; Future  - Albumin Random Urine Quantitative with Creat Ratio; Future  - metFORMIN (GLUCOPHAGE XR) 500 MG 24 hr tablet; Take 2 tablets (1,000 mg) by mouth 2 times daily (with meals)  - atorvastatin (LIPITOR) 20 MG tablet; Take 1 tablet (20 mg) by mouth daily    Mixed hyperlipidemia  Scheduled to come to clinic for fasting labs.  - Lipid panel reflex to direct LDL Fasting; Future  - Comprehensive metabolic panel (BMP + Alb, Alk  Phos, ALT, AST, Total. Bili, TP); Future  - atorvastatin (LIPITOR) 20 MG tablet; Take 1 tablet (20 mg) by mouth daily    Benign essential hypertension  Has been controlled. Due for monitoring labs.   - Comprehensive metabolic panel (BMP + Alb, Alk Phos, ALT, AST, Total. Bili, TP); Future  - losartan (COZAAR) 25 MG tablet; Take 1 tablet (25 mg) by mouth daily    Lymphedema  Has been controlled. Due for monitoring labs.  - furosemide (LASIX) 20 MG tablet; Take 2 tablets (40 mg) by mouth daily    Anxiety  Mild episode of recurrent major depressive disorder (H24)  Mood overall feels stable, adequately controlled.   - escitalopram (LEXAPRO) 20 MG tablet; TAKE 1 TABLET BY MOUTH ONE TIME DAILY    JOSE J (obstructive sleep apnea)  Insomnia, unspecified type  H/o severe JOSE J, currently untreated for years. Discussed importance of treating sleep apnea and recommend following up with sleep clinic; she is agreeable. She can stop trazodone as sleep problems are likely related to untreated sleep apnea.  - Adult Sleep Eval & Management  Referral; Future        The longitudinal plan of care for the diagnosis(es)/condition(s) as documented were addressed during this visit. Due to the added complexity in care, I will continue to support Hannah in the subsequent management and with ongoing continuity of care.      Brad Young is a 54 year old, presenting for the following health issues:  Diabetes, Hypertension, and Lipids        5/17/2024     3:56 PM   Additional Questions   Roomed by Louise PATEL       Via the Health Maintenance questionnaire, the patient has reported the following services have been completed -Eye Exam: Sandra Davenport 2024-02-27, this information has been sent to the abstraction team.  History of Present Illness       Reason for visit:  Would like to talk about my diabetes    She eats 2-3 servings of fruits and vegetables daily.She consumes 1 sweetened beverage(s) daily.She exercises with enough effort  to increase her heart rate 9 or less minutes per day.  She exercises with enough effort to increase her heart rate 3 or less days per week. She is missing 2 dose(s) of medications per week.  She is not taking prescribed medications regularly due to remembering to take.     Has had myalgias for the past week  Had     Diabetes Follow-up    How often are you checking your blood sugar? Not at all  What concerns do you have today about your diabetes? None and Other: Would like to discuss being able to check blood sugar outside of the clinic.   Do you have any of these symptoms? (Select all that apply)  Redness, sores, or blisters on feet and Excessive thirst    Taking metformin 1000 mg once daily. Doing well, no side effects or concerns.     Lab Results   Component Value Date    A1C 7.3 11/17/2023    A1C 6.6 05/17/2023    A1C 7.9 12/08/2022    A1C 7.4 07/07/2022    A1C 7.2 03/14/2022    A1C 6.6 07/08/2021    A1C 6.5 04/08/2021    A1C 6.4 01/04/2021    A1C 6.9 10/01/2020    A1C 5.6 09/17/2018         Hyperlipidemia Follow-Up    Are you regularly taking any medication or supplement to lower your cholesterol?   Yes - atorvastatin   Are you having muscle aches or other side effects that you think could be caused by your cholesterol lowering medication?  No    Atorvastatin 20 mg daily    LDL Cholesterol Calculated   Date Value Ref Range Status   05/17/2023 51 <=100 mg/dL Final   10/01/2020 109 (H) <100 mg/dL Final     Comment:     Above desirable:  100-129 mg/dl  Borderline High:  130-159 mg/dL  High:             160-189 mg/dL  Very high:       >189 mg/dl         Hypertension Follow-up    Do you check your blood pressure regularly outside of the clinic? No   Are you following a low salt diet? Yes  Are your blood pressures ever more than 140 on the top number (systolic) OR more   than 90 on the bottom number (diastolic), for example 140/90? -Pt is unsure     BP Readings from Last 2 Encounters:   11/17/23 119/80   05/17/23  122/80     Taking losartan 25 mg daily, furosemide 40 mg daily (for HTN and lymphedema). does not check BP at home.    Anxiety/depression  Has been stable with escitalopram 20 mg daily. Increased stress due to family health issues, she is raising her grandson (turning 11 tomorrow). She feels like the medication is helpful and denies side effects. She does not want to change or add medication. Not interested in therapy at this time.     Sleep/JOSE J  She is struggling with falling asleep and staying asleep.  She does not nap, no caffeine after noon. Trazodone 50 mg PRN doesn't seem to help much.    She has severe JOSE J  Has not used CPAP for last several years  She sleeps in recliner because it's too uncomfortable sleeping in bed (due to pain from previous hernias that were surgically repaired) as well as breathing  Last saw sleep clinic in 2021 - had a recalled CPAP and was given order for new CPAP but she did not follow-up to get new CPAP        5/17/2023     8:22 AM 11/16/2023    10:12 PM 5/16/2024     8:58 AM   PHQ   PHQ-9 Total Score 8 11 6   Q9: Thoughts of better off dead/self-harm past 2 weeks Not at all Not at all Not at all         12/8/2022     8:04 AM 5/17/2023     8:41 AM 5/17/2024     4:30 PM   MAURISIO-7 SCORE   Total Score 9 (mild anxiety)     Total Score 9 7 6     Asthma  Uses albuterol PRN, singulair. Overall has been stable, but was worse with recent illness. She is improving however.      5/17/2023     8:28 AM 11/16/2023    10:23 PM 5/16/2024     9:05 AM   ACT Total Scores   ACT TOTAL SCORE (Goal Greater than or Equal to 20) 22 21 18   In the past 12 months, how many times did you visit the emergency room for your asthma without being admitted to the hospital? 0 0 0   In the past 12 months, how many times were you hospitalized overnight because of your asthma? 0 0 0         How many servings of fruits and vegetables do you eat daily?  2-3  On average, how many sweetened beverages do you drink each day  (Examples: soda, juice, sweet tea, etc.  Do NOT count diet or artificially sweetened beverages)?   1  How many days per week do you exercise enough to make your heart beat faster? 3 or less  How many minutes a day do you exercise enough to make your heart beat faster? 9 or less  How many days per week do you miss taking your medication? 2  What makes it hard for you to take your medications?  remembering to take        Review of Systems  Constitutional, HEENT, cardiovascular, pulmonary, gi and gu systems are negative, except as otherwise noted.      Objective           Vitals:  No vitals were obtained today due to virtual visit.    Physical Exam   GENERAL: alert and no distress  EYES: Eyes grossly normal to inspection.  No discharge or erythema, or obvious scleral/conjunctival abnormalities.  RESP: No audible wheeze, cough, or visible cyanosis.    SKIN: Visible skin clear. No significant rash, abnormal pigmentation or lesions.  NEURO: Cranial nerves grossly intact.  Mentation and speech appropriate for age.  PSYCH: Appropriate affect, tone, and pace of words        Video-Visit Details    Type of service:  Video Visit   Originating Location (pt. Location): Home    Distant Location (provider location):  On-site  Platform used for Video Visit: Ryan  Signed Electronically by: Sandra Ochoa PA-C

## 2024-05-26 ENCOUNTER — HEALTH MAINTENANCE LETTER (OUTPATIENT)
Age: 54
End: 2024-05-26

## 2024-05-29 ENCOUNTER — LAB (OUTPATIENT)
Dept: LAB | Facility: CLINIC | Age: 54
End: 2024-05-29
Payer: COMMERCIAL

## 2024-05-29 DIAGNOSIS — E11.9 TYPE 2 DIABETES MELLITUS WITHOUT COMPLICATION, WITHOUT LONG-TERM CURRENT USE OF INSULIN (H): ICD-10-CM

## 2024-05-29 DIAGNOSIS — E78.2 MIXED HYPERLIPIDEMIA: ICD-10-CM

## 2024-05-29 DIAGNOSIS — I10 BENIGN ESSENTIAL HYPERTENSION: ICD-10-CM

## 2024-05-29 LAB
CREAT UR-MCNC: 21.5 MG/DL
HBA1C MFR BLD: 6.6 % (ref 0–5.6)
MICROALBUMIN UR-MCNC: <12 MG/L
MICROALBUMIN/CREAT UR: NORMAL MG/G{CREAT}

## 2024-05-29 PROCEDURE — 36415 COLL VENOUS BLD VENIPUNCTURE: CPT

## 2024-05-29 PROCEDURE — 83036 HEMOGLOBIN GLYCOSYLATED A1C: CPT

## 2024-05-29 PROCEDURE — 80053 COMPREHEN METABOLIC PANEL: CPT

## 2024-05-29 PROCEDURE — 82570 ASSAY OF URINE CREATININE: CPT

## 2024-05-29 PROCEDURE — 80061 LIPID PANEL: CPT

## 2024-05-29 PROCEDURE — 82043 UR ALBUMIN QUANTITATIVE: CPT

## 2024-05-30 PROBLEM — K76.0 HEPATIC STEATOSIS: Status: ACTIVE | Noted: 2024-05-30

## 2024-05-30 LAB
ALBUMIN SERPL BCG-MCNC: 4.6 G/DL (ref 3.5–5.2)
ALP SERPL-CCNC: 111 U/L (ref 40–150)
ALT SERPL W P-5'-P-CCNC: 63 U/L (ref 0–50)
ANION GAP SERPL CALCULATED.3IONS-SCNC: 16 MMOL/L (ref 7–15)
AST SERPL W P-5'-P-CCNC: 55 U/L (ref 0–45)
BILIRUB SERPL-MCNC: 2.1 MG/DL
BUN SERPL-MCNC: 11.3 MG/DL (ref 6–20)
CALCIUM SERPL-MCNC: 9.5 MG/DL (ref 8.6–10)
CHLORIDE SERPL-SCNC: 99 MMOL/L (ref 98–107)
CHOLEST SERPL-MCNC: 160 MG/DL
CREAT SERPL-MCNC: 0.78 MG/DL (ref 0.51–0.95)
DEPRECATED HCO3 PLAS-SCNC: 26 MMOL/L (ref 22–29)
EGFRCR SERPLBLD CKD-EPI 2021: 89 ML/MIN/1.73M2
FASTING STATUS PATIENT QL REPORTED: YES
FASTING STATUS PATIENT QL REPORTED: YES
GLUCOSE SERPL-MCNC: 122 MG/DL (ref 70–99)
HDLC SERPL-MCNC: 56 MG/DL
LDLC SERPL CALC-MCNC: 57 MG/DL
NONHDLC SERPL-MCNC: 104 MG/DL
POTASSIUM SERPL-SCNC: 3.9 MMOL/L (ref 3.4–5.3)
PROT SERPL-MCNC: 7.4 G/DL (ref 6.4–8.3)
SODIUM SERPL-SCNC: 141 MMOL/L (ref 135–145)
TRIGL SERPL-MCNC: 233 MG/DL

## 2024-06-03 ENCOUNTER — VIRTUAL VISIT (OUTPATIENT)
Dept: FAMILY MEDICINE | Facility: CLINIC | Age: 54
End: 2024-06-03
Payer: COMMERCIAL

## 2024-06-03 DIAGNOSIS — R17 ELEVATED BILIRUBIN: ICD-10-CM

## 2024-06-03 DIAGNOSIS — K76.0 HEPATIC STEATOSIS: Primary | ICD-10-CM

## 2024-06-03 PROCEDURE — 99213 OFFICE O/P EST LOW 20 MIN: CPT | Mod: 95 | Performed by: PHYSICIAN ASSISTANT

## 2024-06-03 ASSESSMENT — PATIENT HEALTH QUESTIONNAIRE - PHQ9
SUM OF ALL RESPONSES TO PHQ QUESTIONS 1-9: 7
SUM OF ALL RESPONSES TO PHQ QUESTIONS 1-9: 7
10. IF YOU CHECKED OFF ANY PROBLEMS, HOW DIFFICULT HAVE THESE PROBLEMS MADE IT FOR YOU TO DO YOUR WORK, TAKE CARE OF THINGS AT HOME, OR GET ALONG WITH OTHER PEOPLE: SOMEWHAT DIFFICULT

## 2024-06-03 NOTE — PATIENT INSTRUCTIONS
- Recommend slow gradual weight loss. Goal with dietary changes and exercise to lose 5-10% body weight over 6-12 months.  - Improve nutrition: limit carbohydrates, especially simple carbohydrates, and consider following Mediterranean eating patten. Could consider meeting with nutritionist to discuss dietary modifications. Let me know if you would like a referral.  - Increase physical activity as able, ideally 150 minutes weekly  - Maintain good control of cholesterol  - Optimize blood glucose as needed.   - Alcohol: ideally abstaining from alcohol would be best but specifically recommend less than 2 alcoholic drinks per week

## 2024-06-03 NOTE — PROGRESS NOTES
"Hannah is a 54 year old who is being evaluated via a billable video visit.    How would you like to obtain your AVS? MyChart  If the video visit is dropped, the invitation should be resent by: Text to cell phone: [phone]  Will anyone else be joining your video visit? No      Assessment & Plan     Hepatic steatosis  Likely metabolic associated fatty liver disease in the setting of diabetes, obesity (BMI 47), hyperlipidemia, HTN. Diabetes is controlled with recent A1c of 6.7, HTN, hyperlipidemia is controlled with recent LDL of 57. She does not drink alcohol. Will screen additional labs below and obtain liver US, follow-up per results.  - Recommend slow gradual weight loss. Goal with dietary changes and exercise to lose 5-10% body weight over 6-12 months.  - Improve nutrition: limit carbohydrates, especially simple carbohydrates, and consider following Mediterranean eating patten. Could consider meeting with nutritionist to discuss dietary modifications. Let me know if you would like a referral.  - Increase physical activity as able, ideally 150 minutes weekly  - Maintain good control of cholesterol  - Optimize blood glucose as needed.   - Alcohol: ideally abstaining from alcohol would be best but specifically recommend less than 2 alcoholic drinks per week  - CBC with platelets; Future  - Ferritin; Future  - Iron and iron binding capacity; Future  - Hepatitis B core antibody; Future  - Hepatitis B surface antigen; Future  - Hepatitis B Surface Antibody; Future  - PRIMARY CARE FOLLOW-UP SCHEDULING; Future  - US Abdomen Limited; Future  - Hepatic panel (Albumin, ALT, AST, Bili, Alk Phos, TP); Future  - Tissue transglutaminase sana IgA and IgG; Future  - IgA; Future    Elevated bilirubin  Will recheck  - Hepatic panel (Albumin, ALT, AST, Bili, Alk Phos, TP); Future           BMI  Estimated body mass index is 47.12 kg/m  as calculated from the following:    Height as of 11/17/23: 1.6 m (5' 3\").    Weight as of " "11/17/23: 120.7 kg (266 lb).   Weight management plan: Discussed healthy diet and exercise guidelines      Brad Young is a 54 year old, presenting for the following health issues:  Results        6/3/2024     4:15 PM   Additional Questions   Roomed by raegan     History of Present Illness       Reason for visit:  Follow up on my blood work    She eats 2-3 servings of fruits and vegetables daily.She consumes 0 sweetened beverage(s) daily.She exercises with enough effort to increase her heart rate 20 to 29 minutes per day.  She exercises with enough effort to increase her heart rate 3 or less days per week. She is missing 2 dose(s) of medications per week.  She is not taking prescribed medications regularly due to remembering to take.     Follow-up on elevated liver tests  History of diabetes (controlled with recent A1c of 6.7), HTN, hyperlipidemia (controlled with recent LDL 57), obesity (BMI 47).    Hepatic steatosis was noted on liver US in 2012    Elevated liver test (ALT and AST) since around 2017, overall stable    Lab Results   Component Value Date    AST 55 05/29/2024    AST 73 07/08/2021     Lab Results   Component Value Date    ALT 63 05/29/2024     07/08/2021     No results found for: \"BILICONJ\"   Lab Results   Component Value Date    BILITOTAL 2.1 05/29/2024    BILITOTAL 0.8 07/08/2021     Lab Results   Component Value Date    ALBUMIN 4.6 05/29/2024    ALBUMIN 3.9 07/08/2021     Lab Results   Component Value Date    PROTTOTAL 7.4 05/29/2024    PROTTOTAL 7.3 07/08/2021      Lab Results   Component Value Date    ALKPHOS 111 05/29/2024    ALKPHOS 108 07/08/2021     Hepatitis C negative 4/8/2021  Has not had ferritin, iron, or hep B testing  She did just complete hepatitis B vaccine series 5/17/24    No alcohol use  Rare tylenol        Objective           Vitals:  No vitals were obtained today due to virtual visit.    Physical Exam   GENERAL: alert and no distress  EYES: Eyes grossly normal to " inspection.  No discharge or erythema, or obvious scleral/conjunctival abnormalities.  RESP: No audible wheeze, cough, or visible cyanosis.    SKIN: Visible skin clear. No significant rash, abnormal pigmentation or lesions.  NEURO: Cranial nerves grossly intact.  Mentation and speech appropriate for age.  PSYCH: Appropriate affect, tone, and pace of words        Video-Visit Details    Type of service:  Video Visit   Originating Location (pt. Location): Home    Distant Location (provider location):  On-site  Platform used for Video Visit: Ryan  Signed Electronically by: Sandra Ochoa PA-C

## 2024-06-05 ENCOUNTER — LAB (OUTPATIENT)
Dept: LAB | Facility: CLINIC | Age: 54
End: 2024-06-05
Attending: PHYSICIAN ASSISTANT
Payer: COMMERCIAL

## 2024-06-05 DIAGNOSIS — K76.0 HEPATIC STEATOSIS: ICD-10-CM

## 2024-06-05 DIAGNOSIS — R17 ELEVATED BILIRUBIN: ICD-10-CM

## 2024-06-05 LAB
ERYTHROCYTE [DISTWIDTH] IN BLOOD BY AUTOMATED COUNT: 13.7 % (ref 10–15)
HCT VFR BLD AUTO: 38.7 % (ref 35–47)
HGB BLD-MCNC: 12.5 G/DL (ref 11.7–15.7)
MCH RBC QN AUTO: 29.1 PG (ref 26.5–33)
MCHC RBC AUTO-ENTMCNC: 32.3 G/DL (ref 31.5–36.5)
MCV RBC AUTO: 90 FL (ref 78–100)
PLATELET # BLD AUTO: 336 10E3/UL (ref 150–450)
RBC # BLD AUTO: 4.3 10E6/UL (ref 3.8–5.2)
WBC # BLD AUTO: 7.7 10E3/UL (ref 4–11)

## 2024-06-05 PROCEDURE — 83540 ASSAY OF IRON: CPT

## 2024-06-05 PROCEDURE — 86364 TISS TRNSGLTMNASE EA IG CLAS: CPT

## 2024-06-05 PROCEDURE — 86706 HEP B SURFACE ANTIBODY: CPT

## 2024-06-05 PROCEDURE — 86704 HEP B CORE ANTIBODY TOTAL: CPT

## 2024-06-05 PROCEDURE — 82784 ASSAY IGA/IGD/IGG/IGM EACH: CPT

## 2024-06-05 PROCEDURE — 87340 HEPATITIS B SURFACE AG IA: CPT

## 2024-06-05 PROCEDURE — 82728 ASSAY OF FERRITIN: CPT

## 2024-06-05 PROCEDURE — 36415 COLL VENOUS BLD VENIPUNCTURE: CPT

## 2024-06-05 PROCEDURE — 85027 COMPLETE CBC AUTOMATED: CPT

## 2024-06-05 PROCEDURE — 83550 IRON BINDING TEST: CPT

## 2024-06-05 PROCEDURE — 80076 HEPATIC FUNCTION PANEL: CPT

## 2024-06-06 LAB
ALBUMIN SERPL BCG-MCNC: 4.3 G/DL (ref 3.5–5.2)
ALP SERPL-CCNC: 97 U/L (ref 40–150)
ALT SERPL W P-5'-P-CCNC: 55 U/L (ref 0–50)
AST SERPL W P-5'-P-CCNC: 39 U/L (ref 0–45)
BILIRUB DIRECT SERPL-MCNC: 0.23 MG/DL (ref 0–0.3)
BILIRUB SERPL-MCNC: 1 MG/DL
FERRITIN SERPL-MCNC: 149 NG/ML (ref 11–328)
HBV CORE AB SERPL QL IA: NONREACTIVE
HBV SURFACE AB SERPL IA-ACNC: 331 M[IU]/ML
HBV SURFACE AB SERPL IA-ACNC: REACTIVE M[IU]/ML
HBV SURFACE AG SERPL QL IA: NONREACTIVE
IGA SERPL-MCNC: 375 MG/DL (ref 84–499)
IRON BINDING CAPACITY (ROCHE): 259 UG/DL (ref 240–430)
IRON SATN MFR SERPL: 20 % (ref 15–46)
IRON SERPL-MCNC: 52 UG/DL (ref 37–145)
PROT SERPL-MCNC: 6.9 G/DL (ref 6.4–8.3)
TTG IGA SER-ACNC: 1.1 U/ML
TTG IGG SER-ACNC: <0.6 U/ML

## 2024-06-20 ENCOUNTER — HOSPITAL ENCOUNTER (OUTPATIENT)
Dept: ULTRASOUND IMAGING | Facility: CLINIC | Age: 54
Discharge: HOME | End: 2024-06-20
Attending: PHYSICIAN ASSISTANT | Admitting: PHYSICIAN ASSISTANT
Payer: COMMERCIAL

## 2024-06-20 DIAGNOSIS — K76.0 HEPATIC STEATOSIS: ICD-10-CM

## 2024-06-20 PROCEDURE — 76705 ECHO EXAM OF ABDOMEN: CPT

## 2024-07-10 DIAGNOSIS — G47.00 INSOMNIA, UNSPECIFIED TYPE: ICD-10-CM

## 2024-07-11 RX ORDER — TRAZODONE HYDROCHLORIDE 50 MG/1
50 TABLET, FILM COATED ORAL DAILY
Qty: 60 TABLET | Refills: 1 | Status: SHIPPED | OUTPATIENT
Start: 2024-07-11 | End: 2024-09-05

## 2024-07-11 NOTE — TELEPHONE ENCOUNTER
Regarding refill request for Trazodone 50 mg:  Karen Frank, RN   to [facility]         7/11/24  7:31 AM  Please contact patient and confirm current dose. Route to PCP to review and update sig.

## 2024-07-11 NOTE — TELEPHONE ENCOUNTER
"5/17/24 OV notes:  \"Sleep/JOSE J  She is struggling with falling asleep and staying asleep.  She does not nap, no caffeine after noon. Trazodone 50 mg PRN doesn't seem to help much.\"    Pt requesting refill.  Currently taking two 50 mg tablets at night as needed.    Lexie Blackman RN, BSN  [facility]      .         "

## 2024-07-19 ENCOUNTER — ANCILLARY PROCEDURE (OUTPATIENT)
Dept: MAMMOGRAPHY | Facility: CLINIC | Age: 54
End: 2024-07-19
Attending: PHYSICIAN ASSISTANT
Payer: COMMERCIAL

## 2024-07-19 DIAGNOSIS — Z12.31 VISIT FOR SCREENING MAMMOGRAM: ICD-10-CM

## 2024-07-19 PROCEDURE — 77067 SCR MAMMO BI INCL CAD: CPT | Mod: TC | Performed by: RADIOLOGY

## 2024-07-19 PROCEDURE — 77063 BREAST TOMOSYNTHESIS BI: CPT | Mod: TC | Performed by: RADIOLOGY

## 2024-08-18 DIAGNOSIS — I89.0 LYMPHEDEMA: ICD-10-CM

## 2024-08-18 DIAGNOSIS — E11.9 TYPE 2 DIABETES MELLITUS WITHOUT COMPLICATION, WITHOUT LONG-TERM CURRENT USE OF INSULIN (H): ICD-10-CM

## 2024-08-19 RX ORDER — METFORMIN HCL 500 MG
1000 TABLET, EXTENDED RELEASE 24 HR ORAL 2 TIMES DAILY WITH MEALS
Qty: 360 TABLET | Refills: 0 | Status: SHIPPED | OUTPATIENT
Start: 2024-08-19

## 2024-08-19 RX ORDER — FUROSEMIDE 20 MG
40 TABLET ORAL DAILY
Qty: 180 TABLET | Refills: 0 | Status: SHIPPED | OUTPATIENT
Start: 2024-08-19

## 2024-09-05 DIAGNOSIS — G47.00 INSOMNIA, UNSPECIFIED TYPE: ICD-10-CM

## 2024-09-05 RX ORDER — TRAZODONE HYDROCHLORIDE 50 MG/1
50-100 TABLET, FILM COATED ORAL
Qty: 60 TABLET | Refills: 1 | Status: SHIPPED | OUTPATIENT
Start: 2024-09-05

## 2024-09-05 NOTE — TELEPHONE ENCOUNTER
Please contact patient and confirm current dose. Route to PCP to review and update sig.     Lexie Blackman RN, BSN  [facility]

## 2024-09-05 NOTE — TELEPHONE ENCOUNTER
Patient called back. Patient states she is currently taking two 50mg trazodone tablets daily.     Routing to provider to update sig and send refill.     Alejo VAZ RN 9/5/2024 at 2:45 PM

## 2024-09-12 ENCOUNTER — VIRTUAL VISIT (OUTPATIENT)
Dept: SLEEP MEDICINE | Facility: CLINIC | Age: 54
End: 2024-09-12
Attending: PHYSICIAN ASSISTANT
Payer: COMMERCIAL

## 2024-09-12 VITALS — HEIGHT: 62 IN | BODY MASS INDEX: 43.79 KG/M2 | WEIGHT: 238 LBS

## 2024-09-12 DIAGNOSIS — G47.33 OSA (OBSTRUCTIVE SLEEP APNEA): Primary | ICD-10-CM

## 2024-09-12 DIAGNOSIS — E78.2 MIXED HYPERLIPIDEMIA: ICD-10-CM

## 2024-09-12 DIAGNOSIS — E11.9 TYPE 2 DIABETES MELLITUS WITHOUT COMPLICATION, WITHOUT LONG-TERM CURRENT USE OF INSULIN (H): ICD-10-CM

## 2024-09-12 PROCEDURE — 99214 OFFICE O/P EST MOD 30 MIN: CPT | Mod: 95

## 2024-09-12 RX ORDER — ATORVASTATIN CALCIUM 20 MG/1
20 TABLET, FILM COATED ORAL DAILY
Qty: 90 TABLET | Refills: 1 | Status: SHIPPED | OUTPATIENT
Start: 2024-09-12

## 2024-09-12 ASSESSMENT — PAIN SCALES - GENERAL: PAINLEVEL: NO PAIN (0)

## 2024-09-12 NOTE — PATIENT INSTRUCTIONS
I placed the order for a CPAP machine. That routes electronically to [facility]. They will contact you in the next 2-3 weeks to schedule an appointment to get the device.     A few things to know about starting CPAP:  CPAP machines are often rent-to-own over 3-12 months depending on your insurance. During the first 3 months, insurances will often want to see at least 4 hours of use on 70% of nights over a 30 day period. Ideally, you would wear it whenever sleeping, but 4 hours is where health benefits really start.   If you don't like the first mask you get, you can exchange it once in the first month for free. Otherwise, insurance will cover new supplies about every 3 months. They will give you paperwork explaining how often to clean and replace parts when you get the machine.  We have people called our sleep therapy management team who will be checking in on you in the first month. They can access data from your machine and help troubleshoot any problems you may have.    Insurances sometimes require a follow up in the 2-3 month range. Please call [phone] to schedule.    SUSIE Garcia CNP     [facility] LOCATIONS:     SHOWROOM LOCATIONS:    [facility]   [address]  Phone: [phone], option 2, option 1 customer service    [facility]   [address]  [place] [address]  Phone: [phone]    [facility]  [address]  [place] [address]  Phone: [phone]    [facility]  [address]  [place], [place]  Phone: [phone]    [facility]  [address]  [place] [address]  Phone: [phone]    [facility]  [address]  [place], [place]  Phone: [phone]    Business hours 8 AM-4:30 PM

## 2024-09-12 NOTE — NURSING NOTE
Current patient location: [id number]  [facility] [address]    Is the patient currently in the state of [place]? YES    Visit mode:VIDEO    If the visit is dropped, the patient can be reconnected by: VIDEO VISIT: Text to cell phone:   Telephone Information:   Mobile [phone]       Will anyone else be joining the visit? NO  (If patient encounters technical issues they should call [phone]    How would you like to obtain your AVS? MyChart    Are changes needed to the allergy or medication list? No    Are refills needed on medications prescribed by this physician? NO    Rooming Documentation:  Questionnaire(s) completed      Reason for visit: Consult    Molly WESTBROOKF

## 2024-09-12 NOTE — PROGRESS NOTES
Virtual Visit Details  Type of service: Video Visit   Start time: 11:05 AM  End time: 11:22 AM  Originating Location (pt. Location): Home  Distant Location (provider location):  Off-site  Platform used for Video Visit: [facility]    Outpatient Sleep Medicine Consultation:      Name: Hannah Meade MRN# [id number]   Age: 54 year old YOB: 1970     Date of Consultation: September 12, 2024  Consultation is requested by: Sandra Ochoa PA-C  [address] [place] [address] Sandra Ochoa  Primary care provider: Sandra Ochoa       Reason for Sleep Consult:     Hannah Meade is sent by Sandra Ochoa for a sleep consultation regarding previously diagnosed JOSE J.    Patient s Reason for visit  Hannah Meade main reason for visit: Sometimes wake my self up at night with my breathing also I sleep in a chair beacuse I seems to breath better and my back wont allow me to sleep laying down  Patient states problem(s) started: I am unsure how long this has been going on but at least 10 plus years  Hannah Meade's goals for this visit: To see if I should be using my Cpap it was recalled and have not been using it           Assessment and Plan:     Summary Sleep Diagnoses:  (G47.33) JOSE J (obstructive sleep apnea) (primary encounter diagnosis)  Comment: Hannah is a 54-year-old female who presents to the sleep clinic to reestablish care for previously diagnosed severe JOSE J. LOV was on 12/16/2021 with Bennett Goltz, PA-C. She was first diagnosed with mild JOSE J in 2017 at [facility]. She had another study in 2021 showing very severe sleep apnea. Her apnea is currently untreated. She has not been using CPAP for 3+ years. She still has her original Respironics machine which has been recalled. She has continued symptoms of sleep disordered breathing including snoring, sleep initiation and maintenance difficulties, daytime sleepiness, and occasional snort/gasp arousals. She would like to restart CPAP therapy.  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 No unusual nocturnal behavior. Restless legs in the evening do not typically prevent her from falling asleep. Her last ferritin on file was 149 ng/mL on 06/05/2024. She takes trazodone 100 mg at bedtime most nights.        Plan: Comprehensive DME  An order was placed for Auto-PAP 8-12 cmH2O. We reviewed compliance goals, mask exchange policy, and patient was enrolled in [facility].     Comorbid Diagnoses:  Asthma, morbid obesity, Type 2 DM, HTN, depression, anxiety, insomnia     Summary Recommendations:  Orders Placed This Encounter   Procedures    Comprehensive DME     Summary Counseling:    Sleep Testing Reviewed  Obstructive Sleep Apnea Reviewed  Complications of Untreated Sleep Apnea Reviewed    Patient will follow up in approximately 3 months to document compliance.  SUSIE Garcia CNP    Total time spent reviewing medical records, history and physical examination, review of previous testing and interpretation as well as documentation on this date: 31 minutes     CC: Sandra Ochoa PA-C           History of Present Illness:     Hannah presents to the sleep clinic to reestablish care for previously diagnosed severe JOSE J. LOV was on 12/16/2021 with Bennett Goltz, PA-C. She was first diagnosed with mild JOSE J in 2017 at [facility]. She had another study in 2021 showing very severe sleep apnea. Her apnea is currently untreated. She has not been using CPAP for 3+ years. She still has her original Respironics machine which has been recalled. She has continued symptoms of sleep disordered breathing including snoring, sleep initiation and maintenance difficulties, daytime sleepiness, and occasional snort/gasp arousals.          Past Sleep Evaluations: PSG at [facility] on 12/15/2017 at 240 lbs. AHI was 14/hr (restricted AHI 11/hr), RDI 17/hr, REM AHI 59/hr. Her O2 jerman was 72%. 12 min below  88%.     Split PSG on 12/03/2021 at 274 lbs.- AHI 85.6 events per hour, RDI 91.9 events per hour. Snoring was reported as loud. Sleep associated hypoxemia was present with 9.2 minutes spent less than or equal to 88%. Final pressure was CPAP 8.0 cmH2O.        DME: [facility]     SLEEP-WAKE SCHEDULE:     Work/School Days: Patient goes to school/work: Yes   Usually gets into bed at Between 11 and 1:00am  Takes patient about Sometimes it can take me and hour or so to fall asleep to fall asleep. She has to wait for her grandson to fall asleep and then she needs a little time to decompress.   Has trouble falling asleep Most nights nights per week  Wakes up in the middle of the night 3 to 4 times a night.  Wakes up due to Pain;External stimuli (bed partner, pets, noise, etc);Use the bathroom Her grandson and her dog can wake her. She wakes to use the bathroom x 1-2   She has trouble falling back asleep If I wake up after 3in the morning I usually have a very hard time falling back asleep times a week.   It usually takes It varries to get back to sleep  Patient is usually up at 6:30  Uses alarm: Yes    Weekends/Non-work Days/All Other Days:  Usually gets into bed at Between 11:00pm and 1:00 am   Takes patient about Varies to fall asleep  Patient is usually up at 7:00am  Uses alarm: No    Sleep Need  Patient gets Between 4 to 6 hours sleep on average   Patient thinks she needs about 7 or 8 hours sleep    Hannah Meade prefers to sleep in this position(s): Recliner Her legs get sore in the bed and it is difficult to breathe. She also gets back pain in bed.   Patient states they do the following activities in bed: Read    Naps  Patient takes a purposeful nap Try not to times a week and naps are usually If I do they are around an hour in duration  She feels better after a nap: No  She dozes off unintentionally A few times a week.   Patient has had a driving accident or near-miss due to sleepiness/drowsiness: No    SLEEP  DISRUPTIONS:    Breathing/Snoring  Patient snores: Yes  Other people complain about her snoring: Yes  Patient has been told she stops breathing in her sleep: Yes  She has issues with the following: Morning mouth dryness;Stuffy nose when you wake up;Getting up to urinate more than once Denies morning headaches.     Movement:  Patient gets pain, discomfort, with an urge to move: Yes She gets restless legs in the evening. She feels like her legs are constantly moving day and night. During the day her symptoms seem more consistent with habitual foot tapping. Restless legs in the evening do not typically prevent her from falling asleep. Her last ferritin on file was 149 ng/mL on 06/05/2024.   It happens when she is resting: Yes  It happens more at night: No  Patient has been told she kicks her legs at night: No     Behaviors in Sleep:  Hannah Meade has experienced the following behaviors while sleeping:    She has experienced sudden muscle weakness during the day: No  Pt denies bruxism, sleep talking, sleep walking, and dream enactment behavior. Pt denies sleep paralysis, hypnagogue and cataplexy.    Is there anything else you would like your sleep provider to know:      CAFFEINE AND OTHER SUBSTANCES:    Patient consumes caffeinated beverages per day:  0  Last caffeine use is usually: Do not drink cafine  List of any prescribed or over the counter stimulants that patient takes: Do not take any  List of any prescribed or over the counter sleep medication patient takes: Trazodone 2 50 MG at night trazodone 100 mg at bedtime most nights.  List of previous sleep medications that patient has tried:    Patient drinks alcohol to help them sleep: No  Patient drinks alcohol near bedtime: No    Family History:  Patient has a family member been diagnosed with a sleep disorder: No      Social History: She has custody of her grandson who has autism, and she is a full time caregiver for her parents. Her mother has Parkinson's  disease. She lives with her spouse, Arvind.      SCALES:    EPWORTH SLEEPINESS SCALE         9/12/2024     9:06 AM    Bowlus Sleepiness Scale ( ANNETTE Giraldo  [id number]<br>ESS - [place]/English - Final version - 21 Nov 07 - [facility] [place].)   Sitting and reading Moderate chance of dozing   Watching TV Moderate chance of dozing   Sitting, inactive in a public place (e.g. a theatre or a meeting) Slight chance of dozing   As a passenger in a car for an hour without a break Moderate chance of dozing   Lying down to rest in the afternoon when circumstances permit Slight chance of dozing   Sitting and talking to someone Slight chance of dozing   Sitting quietly after a lunch without alcohol Slight chance of dozing   In a car, while stopped for a few minutes in traffic Would never doze   Bowlus Score (MC) 10   Bowlus Score (Sleep) 10         INSOMNIA SEVERITY INDEX (ANGELA)          9/12/2024     8:40 AM   Insomnia Severity Index (ANGELA)   Difficulty falling asleep 2   Difficulty staying asleep 2   Problems waking up too early 3   How SATISFIED/DISSATISFIED are you with your CURRENT sleep pattern? 4   How NOTICEABLE to others do you think your sleep problem is in terms of impairing the quality of your life? 3   How WORRIED/DISTRESSED are you about your current sleep problem? 3   To what extent do you consider your sleep problem to INTERFERE with your daily functioning (e.g. daytime fatigue, mood, ability to function at work/daily chores, concentration, memory, mood, etc.) CURRENTLY? 4   ANGELA Total Score 21       Guidelines for Scoring/Interpretation:  Total score categories:  0-7 = No clinically significant insomnia   8-14 = Subthreshold insomnia   15-21 = Clinical insomnia (moderate severity)  22-28 = Clinical insomnia (severe)  Used via courtesy of www.myhealth.va.gov with permission from Marques Nava PhD., [facility]      STOP BANG         9/12/2024    10:42 AM   STOP BANG Questionnaire (  2008, the American  "Society of Anesthesiologists, Inc. [employer].)   BMI Clinic: 43.53         GAD7        5/17/2024     4:30 PM   MAURISIO-7    1. Feeling nervous, anxious, or on edge 1   2. Not being able to stop or control worrying 1   3. Worrying too much about different things 1   4. Trouble relaxing 1   5. Being so restless that it is hard to sit still 0   6. Becoming easily annoyed or irritable 1   7. Feeling afraid, as if something awful might happen 1   MAURISIO-7 Total Score 6   If you checked any problems, how difficult have they made it for you to do your work, take care of things at home, or get along with other people? Somewhat difficult         CAGE-AID         No data to display                CAGE-AID reprinted with permission from the [place] Medical Journal, AARON Gorman. and TOSHIA Phoenix, \"Conjoint screening questionnaires for alcohol and drug abuse\" [place] Medical Journal 94: 135-140, 1995.      PATIENT HEALTH QUESTIONNAIRE-9 (PHQ - 9)        6/3/2024     9:44 AM   PHQ-9 ([employer])   1.  Little interest or pleasure in doing things 1   2.  Feeling down, depressed, or hopeless 1   3.  Trouble falling or staying asleep, or sleeping too much 2   4.  Feeling tired or having little energy 1   5.  Poor appetite or overeating 1   6.  Feeling bad about yourself - or that you are a failure or have let yourself or your family down 0   7.  Trouble concentrating on things, such as reading the newspaper or watching television 1   8.  Moving or speaking so slowly that other people could have noticed. Or the opposite - being so fidgety or restless that you have been moving around a lot more than usual 0   9.  Thoughts that you would be better off dead, or of hurting yourself in some way 0   PHQ-9 Total Score 7   6.  Feeling bad about yourself 0   7.  Trouble concentrating 1   8.  Moving slowly or restless 0   9.  Suicidal or self-harm thoughts 0   1.  Little interest or pleasure in doing things Several days   2.  " "Feeling down, depressed, or hopeless Several days   3.  Trouble falling or staying asleep, or sleeping too much More than half the days   4.  Feeling tired or having little energy Several days   5.  Poor appetite or overeating Several days   6.  Feeling bad about yourself Not at all   7.  Trouble concentrating Several days   8.  Moving slowly or restless Not at all   9.  Suicidal or self-harm thoughts Not at all   PHQ-9 via [facility] TOTAL SCORE-----> 7 (Mild depression)   Difficulty at work, home, or with people Somewhat difficult       Developed by Francisca Li, Adrianna Hernandez, Pardeep Murray and colleagues, with an educational gurmeet from [employer]. No permission required to reproduce, translate, display or distribute.        Allergies:    Allergies   Allergen Reactions    Seasonal Allergies     Ampicillin Rash     \"sunburn\"    Decongestant [Oxymetazoline] Rash     \"sunburn\"    Morphine And Codeine Rash     \"sunburn\"    Sulfa Antibiotics Rash     \"sunburn\"       Medications:    Current Outpatient Medications   Medication Sig Dispense Refill    albuterol (PROAIR HFA) 108 (90 Base) MCG/ACT inhaler INHALE 2 PUFFS INTO THE LUNGS EVERY 6 HOURS 18 g 1    blood glucose (NO BRAND SPECIFIED) test strip Use to test blood sugar 1 times daily or as directed. To accompany: Blood Glucose Monitor Brands: per insurance. 100 strip 6    blood glucose monitoring (NO BRAND SPECIFIED) meter device kit Use to test blood sugar 1 times daily or as directed. Preferred blood glucose meter OR supplies to accompany: Blood Glucose Monitor Brands: per insurance. 1 kit 0    escitalopram (LEXAPRO) 20 MG tablet TAKE 1 TABLET BY MOUTH ONE TIME DAILY 90 tablet 1    fexofenadine (ALLEGRA) 180 MG tablet Take 180 mg by mouth daily      furosemide (LASIX) 20 MG tablet TAKE 2 TABLETS BY MOUTH ONCE DAILY 180 tablet 0    losartan (COZAAR) 25 MG tablet Take 1 tablet (25 mg) by mouth daily 90 tablet 0    metFORMIN (GLUCOPHAGE XR) 500 MG 24 hr " tablet TAKE 2 TABLETS BY MOUTH TWICE DAILY WITH MEALS 360 tablet 0    montelukast (SINGULAIR) 10 MG tablet TAKE ONE TABLET BY MOUTH EVERY NIGHT AT BEDTIME 90 tablet 3    thin (NO BRAND SPECIFIED) lancets Use with lanceting device. To accompany: Blood Glucose Monitor Brands: per insurance. 100 each 6    traZODone (DESYREL) 50 MG tablet Take 1-2 tablets ( mg) by mouth nightly as needed for sleep. 60 tablet 1    atorvastatin (LIPITOR) 20 MG tablet TAKE 1 TABLET BY MOUTH ONE TIME DAILY 90 tablet 1       Problem List:  Patient Active Problem List    Diagnosis Date Noted    Hepatic steatosis 05/30/2024     Priority: Medium     Noted on liver US in 2012  FIB-4 Calculation: 0.86 at 6/5/2024  9:15 AM  Calculated from:  SGOT/AST: 39 U/L at 6/5/2024  9:15 AM  SGPT/ALT: 55 U/L at 6/5/2024  9:15 AM  Platelets: 336 10e3/uL at 6/5/2024  9:15 AM  Age: 54 years        Mixed hyperlipidemia 05/17/2023     Priority: Medium    Mild episode of recurrent major depressive disorder (H24) 05/17/2023     Priority: Medium    Benign essential hypertension 05/17/2023     Priority: Medium    Type 2 diabetes mellitus without complication, without long-term current use of insulin (H) 10/13/2020     Priority: Medium    JOSE J (obstructive sleep apnea) 09/10/2018     Priority: Medium    Lymphedema 09/10/2018     Priority: Medium    Asthma 12/20/2017     Priority: Medium    Other nonspecific abnormal finding of lung field (CODE) 12/20/2017     Priority: Medium    Multiple lipomas 12/20/2017     Priority: Medium    Morbid obesity (H) 11/26/2015     Priority: Medium     High triglycerides, recurrent severe low back pain due to overload      Bronchogenic cyst 08/25/2015     Priority: Medium    Anxiety 12/31/2014     Priority: Medium    Insomnia 08/08/2011     Priority: Medium    Carbuncle and furuncle of trunk 05/31/2005     Priority: Medium        Past Medical/Surgical History:  Past Medical History:   Diagnosis Date    Anxiety     Depressive  disorder     Diabetes (H)     prediabetic    Elevated glucose 9/13/2018    Hypertension     Unsure on the date    Incisional hernia, without obstruction or gangrene 7/19/2021    Added automatically from request for surgery [id number]    Lymphedema 9/10/2018    Mild major depression (H24) 9/10/2018    Obesity, unspecified     JOSE J (obstructive sleep apnea) 9/10/2018    cpap recalled    JOSE J (obstructive sleep apnea)     Other chronic pain     criss knees    Ovarian cyst 8/17/2015    WILL NEED US FOLLOW UP AFTER CHEST SX    Plantar fasciitis 2/23/2015    Sleep apnea, unspecified 12/20/2017    Sleep apnea, unspecified     Tuberculin test reaction     Uncomplicated asthma      Past Surgical History:   Procedure Laterality Date    ABDOMEN SURGERY      tubes removed    APPENDECTOMY      COLONOSCOPY  06/13/2019    Dr. Leon [facility]    COLONOSCOPY N/A 06/13/2019    Procedure: COLONOSCOPY;  Surgeon: Mauro Leon MD;  Location: [facility]    EXCISE MASS BACK Left 09/17/2014    Procedure: EXCISE MASS BACK;  Surgeon: Yaz Avilez MD;  Location: [facility] OR    EXCISE MASS UPPER EXTREMITY Left 09/17/2014    Procedure: EXCISE MASS UPPER EXTREMITY;  Surgeon: Yaz Avilez MD;  Location: [facility] OR    HERNIORRHAPHY INCISIONAL (LOCATION) N/A 08/19/2021    Procedure: HERNIORRHAPHY open incisional and umbilical repair with mesh;  Surgeon: Rhys Lyons MD;  Location: [facility] OR    HYSTERECTOMY VAGINAL  1992    ovaries intact    other      lump on right arm removed     THORACIC SURGERY      lung surgery    THORACOTOMY Right 08/25/2015    Procedure: THORACOTOMY;  Surgeon: Andrew Gordon MD;  Location:  OR    ZC NONSPECIFIC PROCEDURE      2 C- SECTIONS       Social History:  Social History     Socioeconomic History    Marital status:      Spouse name: Arvind    Number of children: 2    Years of education: Not on file    Highest education level: Some college, no degree   Occupational History    Occupation: Enrollment      Employer: [employer]   Tobacco Use    Smoking status: Never     Passive exposure: Past    Smokeless tobacco: Never   Vaping Use    Vaping status: Never Used   Substance and Sexual Activity    Alcohol use: No    Drug use: No    Sexual activity: Yes     Partners: Male     Birth control/protection: None   Other Topics Concern    Parent/sibling w/ CABG, MI or angioplasty before 65F 55M? No   Social History Narrative    Not on file     Social Determinants of Health     Financial Resource Strain: Low Risk  (11/16/2023)    Financial Resource Strain     Within the past 12 months, have you or your family members you live with been unable to get utilities (heat, electricity) when it was really needed?: No   Food Insecurity: Low Risk  (11/16/2023)    Food Insecurity     Within the past 12 months, did you worry that your food would run out before you got money to buy more?: No     Within the past 12 months, did the food you bought just not last and you didn t have money to get more?: No   Transportation Needs: Low Risk  (11/16/2023)    Transportation Needs     Within the past 12 months, has lack of transportation kept you from medical appointments, getting your medicines, non-medical meetings or appointments, work, or from getting things that you need?: No   Physical Activity: Inactive (9/30/2019)    Exercise Vital Sign     Days of Exercise per Week: 0 days     Minutes of Exercise per Session: 0 min   Stress: No Stress Concern Present (9/30/2019)    [place] [employer] - Occupational Stress Questionnaire     Feeling of Stress : Only a little   Social Connections: Moderately Integrated (9/30/2019)    Social Connection and Isolation Panel [NHANES]     Frequency of Communication with Friends and Family: More than three times a week     Frequency of Social Gatherings with Friends and Family: Once a week     Attends Taoist Services: More than 4 times per year     Active Member of Clubs or Organizations: No      Attends Club or Organization Meetings: Never     Marital Status:    Interpersonal Safety: Low Risk  (11/17/2023)    Interpersonal Safety     Do you feel physically and emotionally safe where you currently live?: Yes     Within the past 12 months, have you been hit, slapped, kicked or otherwise physically hurt by someone?: No     Within the past 12 months, have you been humiliated or emotionally abused in other ways by your partner or ex-partner?: No   Housing Stability: High Risk (11/16/2023)    Housing Stability     Do you have housing? : Yes     Are you worried about losing your housing?: Yes       Family History:  Family History   Problem Relation Age of Onset    Neurologic Disorder Mother         Parkinsons    Diabetes Mother     Osteoporosis Mother     Genetic Disorder Mother     Hypertension Father     Cardiovascular Father     Gallbladder Disease Father     Hyperlipidemia Father     Cerebrovascular Disease Father     Obesity Father     Diabetes Maternal Grandmother     Other Cancer Maternal Grandmother     Osteoporosis Maternal Grandmother     Obesity Maternal Grandmother     Diabetes Maternal Grandfather     Diabetes Paternal Grandmother     Osteoporosis Paternal Grandmother     C.A.D. Paternal Grandfather     Cardiovascular Paternal Grandfather     Coronary Artery Disease Paternal Grandfather     Cerebrovascular Disease Paternal Grandfather     Prostate Cancer Paternal Grandfather     Hyperlipidemia Brother     Hypertension Sister     Hypertension Sister     Hyperlipidemia Sister     Asthma Sister     Obesity Sister     Anxiety Disorder Son     Anxiety Disorder Daughter     Genetic Disorder Daughter         PKD    Hypertension Cousin     Other Cancer Cousin     Anxiety Disorder Cousin     Polycystic Kidney Diease Other     Diabetes Other     Hyperlipidemia Other     Anxiety Disorder Other     Obesity Other     Diabetes Other     Obesity Other     Other Cancer Other     Colon Cancer No family hx  "of     Breast Cancer No family hx of     Ovarian Cancer No family hx of        Review of Systems:  A complete review of systems reviewed by me is negative with the exeption of what has been mentioned in the history of present illness.  In the last TWO WEEKS have you experienced any of the following symptoms?  Fevers: No  Night Sweats: No  Weight Gain: No  Pain at Night: No  Double Vision: Yes  Changes in Vision: No  Difficulty Breathing through Nose: No  Sore Throat in Morning: No  Dry Mouth in the Morning: Yes  Shortness of Breath Lying Flat: Yes  Shortness of Breath With Activity: Yes  Awakening with Shortness of Breath: No  Increased Cough: No  Heart Racing at Night: No  Swelling in Feet or Legs: No  Diarrhea at Night: Yes  Heartburn at Night: No  Urinating More than Once at Night: Yes  Losing Control of Urine at Night: No  Joint Pains at Night: Yes  Headaches in Morning: No  Weakness in Arms or Legs: No  Depressed Mood: Yes  Anxiety: Yes     Physical Examination:  Vitals: Ht 1.575 m (5' 2\")   Wt 108 kg (238 lb)   BMI 43.53 kg/m    BMI= Body mass index is 43.53 kg/m .    GENERAL APPEARANCE: healthy, alert, no distress, and cooperative  EYES: Eyes grossly normal to inspection  NECK: no asymmetry, masses, or scars  RESP: no increased work of breathing noted, no audible cough or wheeze   NEURO: mentation intact and speech normal  PSYCH: mentation appears normal and affect normal/bright  Mallampati Class: Not visualized over video.  Tonsillar Stage: Not visualized over video.         Data: All pertinent previous laboratory data reviewed     Recent Labs   Lab Test 05/29/24  1103 11/17/23  0816    140   POTASSIUM 3.9 4.3   CHLORIDE 99 101   CO2 26 28   ANIONGAP 16* 11   * 195*   BUN 11.3 12.4   CR 0.78 0.76   SERGO 9.5 9.5       Recent Labs   Lab Test 06/05/24  0915   WBC 7.7   RBC 4.30   HGB 12.5   HCT 38.7   MCV 90   MCH 29.1   MCHC 32.3   RDW 13.7          Recent Labs   Lab Test 06/05/24  0915 " "  PROTTOTAL 6.9   ALBUMIN 4.3   BILITOTAL 1.0   ALKPHOS 97   AST 39   ALT 55*       TSH (mU/L)   Date Value   09/17/2018 1.40   08/16/2017 1.84       No results found for: \"UAMP\", \"UBARB\", \"BENZODIAZEUR\", \"UCANN\", \"UCOC\", \"OPIT\", \"UPCP\"    Iron Sat Index   Date/Time Value Ref Range Status   06/05/2024 09:15 AM 20 15 - 46 % Final     Ferritin   Date/Time Value Ref Range Status   06/05/2024 09:15  11 - 328 ng/mL Final       No results found for: \"PH\", \"PHARTERIAL\", \"PO2\", \"[id number]\", \"SAT\", \"PCO2\", \"HCO3\", \"BASEEXCESS\", \"ELYSE\", \"BEB\"    @LABRCNTIPR(phv:4,pco2v:4,po2v:4,hco3v:4,george:4,o2per:4)@    Echocardiology: No results found for this or any previous visit (from the past 4320 hour(s)).    Chest x-ray:   XR Chest B Read 2 views 04/10/2024    Narrative  For Patients: As a result of the 21st Century Cures Act, medical imaging exams and procedure reports are released immediately into your electronic medical record. You may view this report before your referring provider. If you have questions, please contact your health care provider.    EXAM: XR CHEST 2 VIEWS PA AND LATERAL  LOCATION: [facility] [place]  DATE: 4/10/2024    INDICATION: Chest pain Cough  COMPARISON: 08/12/2015    Impression  Right thoracotomy and right mediastinal surgical clips. Lungs are clear. No pleural effusion. Heart size and pulmonary vascularity within normal limits.      Chest CT:   No results found for this or any previous visit from the past 365 days.      PFT: Most Recent [facility] Pulmonary Function Testing    Randy Robbins, APRN CNP 9/12/2024   "

## 2024-10-13 ENCOUNTER — HEALTH MAINTENANCE LETTER (OUTPATIENT)
Age: 54
End: 2024-10-13

## 2024-11-11 DIAGNOSIS — I10 BENIGN ESSENTIAL HYPERTENSION: ICD-10-CM

## 2024-11-11 RX ORDER — LOSARTAN POTASSIUM 25 MG/1
25 TABLET ORAL DAILY
Qty: 90 TABLET | Refills: 0 | Status: SHIPPED | OUTPATIENT
Start: 2024-11-11

## 2024-11-16 DIAGNOSIS — F41.9 ANXIETY: ICD-10-CM

## 2024-11-16 DIAGNOSIS — F33.0 MILD EPISODE OF RECURRENT MAJOR DEPRESSIVE DISORDER (H): ICD-10-CM

## 2024-11-16 DIAGNOSIS — I89.0 LYMPHEDEMA: ICD-10-CM

## 2024-11-16 DIAGNOSIS — E11.9 TYPE 2 DIABETES MELLITUS WITHOUT COMPLICATION, WITHOUT LONG-TERM CURRENT USE OF INSULIN (H): ICD-10-CM

## 2024-11-18 RX ORDER — METFORMIN HYDROCHLORIDE 500 MG/1
1000 TABLET, EXTENDED RELEASE ORAL 2 TIMES DAILY WITH MEALS
Qty: 360 TABLET | Refills: 0 | Status: SHIPPED | OUTPATIENT
Start: 2024-11-18 | End: 2024-11-19

## 2024-11-18 RX ORDER — FUROSEMIDE 20 MG/1
40 TABLET ORAL DAILY
Qty: 180 TABLET | Refills: 0 | Status: SHIPPED | OUTPATIENT
Start: 2024-11-18 | End: 2024-11-19

## 2024-11-18 RX ORDER — ESCITALOPRAM OXALATE 20 MG/1
TABLET ORAL
Qty: 90 TABLET | Refills: 0 | Status: SHIPPED | OUTPATIENT
Start: 2024-11-18 | End: 2024-11-19

## 2024-11-19 ENCOUNTER — OFFICE VISIT (OUTPATIENT)
Dept: FAMILY MEDICINE | Facility: CLINIC | Age: 54
End: 2024-11-19
Attending: PHYSICIAN ASSISTANT
Payer: COMMERCIAL

## 2024-11-19 VITALS
TEMPERATURE: 97.8 F | DIASTOLIC BLOOD PRESSURE: 78 MMHG | OXYGEN SATURATION: 96 % | WEIGHT: 240 LBS | HEART RATE: 75 BPM | BODY MASS INDEX: 44.16 KG/M2 | RESPIRATION RATE: 19 BRPM | SYSTOLIC BLOOD PRESSURE: 122 MMHG | HEIGHT: 62 IN

## 2024-11-19 DIAGNOSIS — F41.9 ANXIETY: ICD-10-CM

## 2024-11-19 DIAGNOSIS — G47.33 OSA (OBSTRUCTIVE SLEEP APNEA): ICD-10-CM

## 2024-11-19 DIAGNOSIS — I10 BENIGN ESSENTIAL HYPERTENSION: ICD-10-CM

## 2024-11-19 DIAGNOSIS — M54.50 CHRONIC BILATERAL LOW BACK PAIN, UNSPECIFIED WHETHER SCIATICA PRESENT: ICD-10-CM

## 2024-11-19 DIAGNOSIS — G89.29 CHRONIC BILATERAL LOW BACK PAIN, UNSPECIFIED WHETHER SCIATICA PRESENT: ICD-10-CM

## 2024-11-19 DIAGNOSIS — K76.0 HEPATIC STEATOSIS: ICD-10-CM

## 2024-11-19 DIAGNOSIS — E78.2 MIXED HYPERLIPIDEMIA: ICD-10-CM

## 2024-11-19 DIAGNOSIS — R20.2 TINGLING SENSATION: ICD-10-CM

## 2024-11-19 DIAGNOSIS — G47.00 INSOMNIA, UNSPECIFIED TYPE: ICD-10-CM

## 2024-11-19 DIAGNOSIS — F33.0 MILD EPISODE OF RECURRENT MAJOR DEPRESSIVE DISORDER (H): ICD-10-CM

## 2024-11-19 DIAGNOSIS — J45.40 MODERATE PERSISTENT ASTHMA WITHOUT COMPLICATION: ICD-10-CM

## 2024-11-19 DIAGNOSIS — E11.9 TYPE 2 DIABETES MELLITUS WITHOUT COMPLICATION, WITHOUT LONG-TERM CURRENT USE OF INSULIN (H): ICD-10-CM

## 2024-11-19 DIAGNOSIS — I89.0 LYMPHEDEMA: ICD-10-CM

## 2024-11-19 DIAGNOSIS — Z00.00 ROUTINE GENERAL MEDICAL EXAMINATION AT A HEALTH CARE FACILITY: Primary | ICD-10-CM

## 2024-11-19 DIAGNOSIS — E66.01 MORBID OBESITY (H): ICD-10-CM

## 2024-11-19 PROBLEM — J96.22 ACUTE AND CHRONIC RESPIRATORY FAILURE WITH HYPERCAPNIA (H): Status: ACTIVE | Noted: 2024-11-19

## 2024-11-19 PROBLEM — J96.22 ACUTE AND CHRONIC RESPIRATORY FAILURE WITH HYPERCAPNIA (H): Status: RESOLVED | Noted: 2024-11-19 | Resolved: 2024-11-19

## 2024-11-19 PROBLEM — R91.8 OTHER NONSPECIFIC ABNORMAL FINDING OF LUNG FIELD (CODE): Status: RESOLVED | Noted: 2017-12-20 | Resolved: 2024-11-19

## 2024-11-19 LAB
ALBUMIN SERPL BCG-MCNC: 4.3 G/DL (ref 3.5–5.2)
ALP SERPL-CCNC: 135 U/L (ref 40–150)
ALT SERPL W P-5'-P-CCNC: 31 U/L (ref 0–50)
ANION GAP SERPL CALCULATED.3IONS-SCNC: 12 MMOL/L (ref 7–15)
AST SERPL W P-5'-P-CCNC: 26 U/L (ref 0–45)
BILIRUB SERPL-MCNC: 1.3 MG/DL
BUN SERPL-MCNC: 11.9 MG/DL (ref 6–20)
CALCIUM SERPL-MCNC: 9.7 MG/DL (ref 8.8–10.4)
CHLORIDE SERPL-SCNC: 103 MMOL/L (ref 98–107)
CREAT SERPL-MCNC: 0.8 MG/DL (ref 0.51–0.95)
EGFRCR SERPLBLD CKD-EPI 2021: 87 ML/MIN/1.73M2
EST. AVERAGE GLUCOSE BLD GHB EST-MCNC: 134 MG/DL
GLUCOSE SERPL-MCNC: 144 MG/DL (ref 70–99)
HBA1C MFR BLD: 6.3 % (ref 0–5.6)
HCO3 SERPL-SCNC: 26 MMOL/L (ref 22–29)
POTASSIUM SERPL-SCNC: 4 MMOL/L (ref 3.4–5.3)
PROT SERPL-MCNC: 7.5 G/DL (ref 6.4–8.3)
SODIUM SERPL-SCNC: 141 MMOL/L (ref 135–145)
TSH SERPL DL<=0.005 MIU/L-ACNC: 2.39 UIU/ML (ref 0.3–4.2)
VIT B12 SERPL-MCNC: 470 PG/ML (ref 232–1245)

## 2024-11-19 PROCEDURE — 84443 ASSAY THYROID STIM HORMONE: CPT | Performed by: PHYSICIAN ASSISTANT

## 2024-11-19 PROCEDURE — 36415 COLL VENOUS BLD VENIPUNCTURE: CPT | Performed by: PHYSICIAN ASSISTANT

## 2024-11-19 PROCEDURE — 99214 OFFICE O/P EST MOD 30 MIN: CPT | Mod: 25 | Performed by: PHYSICIAN ASSISTANT

## 2024-11-19 PROCEDURE — 80053 COMPREHEN METABOLIC PANEL: CPT | Performed by: PHYSICIAN ASSISTANT

## 2024-11-19 PROCEDURE — 99207 PR FOOT EXAM NO CHARGE: CPT | Performed by: PHYSICIAN ASSISTANT

## 2024-11-19 PROCEDURE — 99396 PREV VISIT EST AGE 40-64: CPT | Performed by: PHYSICIAN ASSISTANT

## 2024-11-19 PROCEDURE — 83036 HEMOGLOBIN GLYCOSYLATED A1C: CPT | Performed by: PHYSICIAN ASSISTANT

## 2024-11-19 PROCEDURE — 82607 VITAMIN B-12: CPT | Performed by: PHYSICIAN ASSISTANT

## 2024-11-19 RX ORDER — ATORVASTATIN CALCIUM 20 MG/1
20 TABLET, FILM COATED ORAL DAILY
Qty: 90 TABLET | Refills: 1 | Status: SHIPPED | OUTPATIENT
Start: 2024-11-19

## 2024-11-19 RX ORDER — TRAZODONE HYDROCHLORIDE 50 MG/1
50-100 TABLET, FILM COATED ORAL
Qty: 60 TABLET | Refills: 5 | Status: SHIPPED | OUTPATIENT
Start: 2024-11-19

## 2024-11-19 RX ORDER — METFORMIN HYDROCHLORIDE 500 MG/1
1000 TABLET, EXTENDED RELEASE ORAL 2 TIMES DAILY WITH MEALS
Qty: 360 TABLET | Refills: 1 | Status: SHIPPED | OUTPATIENT
Start: 2024-11-19

## 2024-11-19 RX ORDER — LOSARTAN POTASSIUM 25 MG/1
25 TABLET ORAL DAILY
Qty: 90 TABLET | Refills: 1 | Status: SHIPPED | OUTPATIENT
Start: 2024-11-19

## 2024-11-19 RX ORDER — MONTELUKAST SODIUM 10 MG/1
TABLET ORAL
Qty: 90 TABLET | Refills: 3 | Status: SHIPPED | OUTPATIENT
Start: 2024-11-19

## 2024-11-19 RX ORDER — ESCITALOPRAM OXALATE 20 MG/1
TABLET ORAL
Qty: 90 TABLET | Refills: 1 | Status: SHIPPED | OUTPATIENT
Start: 2024-11-19

## 2024-11-19 RX ORDER — FUROSEMIDE 20 MG/1
40 TABLET ORAL DAILY
Qty: 180 TABLET | Refills: 1 | Status: SHIPPED | OUTPATIENT
Start: 2024-11-19

## 2024-11-19 SDOH — HEALTH STABILITY: PHYSICAL HEALTH: ON AVERAGE, HOW MANY MINUTES DO YOU ENGAGE IN EXERCISE AT THIS LEVEL?: PATIENT DECLINED

## 2024-11-19 SDOH — HEALTH STABILITY: PHYSICAL HEALTH: ON AVERAGE, HOW MANY DAYS PER WEEK DO YOU ENGAGE IN MODERATE TO STRENUOUS EXERCISE (LIKE A BRISK WALK)?: 0 DAYS

## 2024-11-19 ASSESSMENT — PAIN SCALES - GENERAL: PAINLEVEL_OUTOF10: NO PAIN (0)

## 2024-11-19 ASSESSMENT — ASTHMA QUESTIONNAIRES
QUESTION_2 LAST FOUR WEEKS HOW OFTEN HAVE YOU HAD SHORTNESS OF BREATH: ONCE OR TWICE A WEEK
ACT_TOTALSCORE: 22
QUESTION_1 LAST FOUR WEEKS HOW MUCH OF THE TIME DID YOUR ASTHMA KEEP YOU FROM GETTING AS MUCH DONE AT WORK, SCHOOL OR AT HOME: A LITTLE OF THE TIME
QUESTION_5 LAST FOUR WEEKS HOW WOULD YOU RATE YOUR ASTHMA CONTROL: WELL CONTROLLED
QUESTION_3 LAST FOUR WEEKS HOW OFTEN DID YOUR ASTHMA SYMPTOMS (WHEEZING, COUGHING, SHORTNESS OF BREATH, CHEST TIGHTNESS OR PAIN) WAKE YOU UP AT NIGHT OR EARLIER THAN USUAL IN THE MORNING: NOT AT ALL
QUESTION_4 LAST FOUR WEEKS HOW OFTEN HAVE YOU USED YOUR RESCUE INHALER OR NEBULIZER MEDICATION (SUCH AS ALBUTEROL): NOT AT ALL
ACT_TOTALSCORE: 22

## 2024-11-19 ASSESSMENT — PATIENT HEALTH QUESTIONNAIRE - PHQ9
SUM OF ALL RESPONSES TO PHQ QUESTIONS 1-9: 7
10. IF YOU CHECKED OFF ANY PROBLEMS, HOW DIFFICULT HAVE THESE PROBLEMS MADE IT FOR YOU TO DO YOUR WORK, TAKE CARE OF THINGS AT HOME, OR GET ALONG WITH OTHER PEOPLE: SOMEWHAT DIFFICULT
SUM OF ALL RESPONSES TO PHQ QUESTIONS 1-9: 7

## 2024-11-19 ASSESSMENT — SOCIAL DETERMINANTS OF HEALTH (SDOH): HOW OFTEN DO YOU GET TOGETHER WITH FRIENDS OR RELATIVES?: MORE THAN THREE TIMES A WEEK

## 2024-11-19 NOTE — PATIENT INSTRUCTIONS
Patient Education   Preventive Care Advice   This is general advice given by our system to help you stay healthy. However, your care team may have specific advice just for you. Please talk to your care team about your preventive care needs.  Nutrition  Eat 5 or more servings of fruits and vegetables each day.  Try wheat bread, brown rice and whole grain pasta (instead of white bread, rice, and pasta).  Get enough calcium and vitamin D. Check the label on foods and aim for 100% of the RDA (recommended daily allowance).  Lifestyle  Exercise at least 150 minutes each week  (30 minutes a day, 5 days a week).  Do muscle strengthening activities 2 days a week. These help control your weight and prevent disease.  No smoking.  Wear sunscreen to prevent skin cancer.  Have a dental exam and cleaning every 6 months.  Yearly exams  See your health care team every year to talk about:  Any changes in your health.  Any medicines your care team has prescribed.  Preventive care, family planning, and ways to prevent chronic diseases.  Shots (vaccines)   HPV shots (up to age 26), if you've never had them before.  Hepatitis B shots (up to age 59), if you've never had them before.  COVID-19 shot: Get this shot when it's due.  Flu shot: Get a flu shot every year.  Tetanus shot: Get a tetanus shot every 10 years.  Pneumococcal, hepatitis A, and RSV shots: Ask your care team if you need these based on your risk.  Shingles shot (for age 50 and up)  General health tests  Diabetes screening:  Starting at age 35, Get screened for diabetes at least every 3 years.  If you are younger than age 35, ask your care team if you should be screened for diabetes.  Cholesterol test: At age 39, start having a cholesterol test every 5 years, or more often if advised.  Bone density scan (DEXA): At age 50, ask your care team if you should have this scan for osteoporosis (brittle bones).  Hepatitis C: Get tested at least once in your life.  STIs (sexually  transmitted infections)  Before age 24: Ask your care team if you should be screened for STIs.  After age 24: Get screened for STIs if you're at risk. You are at risk for STIs (including HIV) if:  You are sexually active with more than one person.  You don't use condoms every time.  You or a partner was diagnosed with a sexually transmitted infection.  If you are at risk for HIV, ask about PrEP medicine to prevent HIV.  Get tested for HIV at least once in your life, whether you are at risk for HIV or not.  Cancer screening tests  Cervical cancer screening: If you have a cervix, begin getting regular cervical cancer screening tests starting at age 21.  Breast cancer scan (mammogram): If you've ever had breasts, begin having regular mammograms starting at age 40. This is a scan to check for breast cancer.  Colon cancer screening: It is important to start screening for colon cancer at age 45.  Have a colonoscopy test every 10 years (or more often if you're at risk) Or, ask your provider about stool tests like a FIT test every year or Cologuard test every 3 years.  To learn more about your testing options, visit:   .  For help making a decision, visit:   https://bit.ly/nf38237.  Prostate cancer screening test: If you have a prostate, ask your care team if a prostate cancer screening test (PSA) at age 55 is right for you.  Lung cancer screening: If you are a current or former smoker ages 50 to 80, ask your care team if ongoing lung cancer screenings are right for you.  For informational purposes only. Not to replace the advice of your health care provider. Copyright   2023 [facility]. All rights reserved. Clinically reviewed by the [facility]. [employer] 200814 - REV 01/24.  Preventing Falls: Care Instructions  Injuries and health problems such as trouble walking or poor eyesight can increase your risk of falling. So can some medicines. But there are things you can do to help  "prevent falls. You can exercise to get stronger. You can also arrange your home to make it safer.    Talk to your doctor about the medicines you take. Ask if any of them increase the risk of falls and whether they can be changed or stopped.   Try to exercise regularly. It can help improve your strength and balance. This can help lower your risk of falling.         Practice fall safety and prevention.   Wear low-heeled shoes that fit well and give your feet good support. Talk to your doctor if you have foot problems that make this hard.  Carry a cellphone or wear a medical alert device that you can use to call for help.  Use stepladders instead of chairs to reach high objects. Don't climb if you're at risk for falls. Ask for help, if needed.  Wear the correct eyeglasses, if you need them.        Make your home safer.   Remove rugs, cords, clutter, and furniture from walkways.  Keep your house well lit. Use night-lights in hallways and bathrooms.  Install and use sturdy handrails on stairways.  Wear nonskid footwear, even inside. Don't walk barefoot or in socks without shoes.        Be safe outside.   Use handrails, curb cuts, and ramps whenever possible.  Keep your hands free by using a shoulder bag or backpack.  Try to walk in well-lit areas. Watch out for uneven ground, changes in pavement, and debris.  Be careful in the winter. Walk on the grass or gravel when sidewalks are slippery. Use de-icer on steps and walkways. Add non-slip devices to shoes.    Put grab bars and nonskid mats in your shower or tub and near the toilet. Try to use a shower chair or bath bench when bathing.   Get into a tub or shower by putting in your weaker leg first. Get out with your strong side first. Have a phone or medical alert device in the bathroom with you.   Where can you learn more?  Go to https://www.[employer].net/patiented  Enter G117 in the search box to learn more about \"Preventing Falls: Care Instructions.\"  Current as of: " July 17, 2023  Content Version: 14.2 2024 [employer].   Care instructions adapted under license by your healthcare professional. If you have questions about a medical condition or this instruction, always ask your healthcare professional. [employer] disclaims any warranty or liability for your use of this information.    Learning About Stress  What is stress?     Stress is your body's response to a hard situation. Your body can have a physical, emotional, or mental response. Stress is a fact of life for most people, and it affects everyone differently. What causes stress for you may not be stressful for someone else.  A lot of things can cause stress. You may feel stress when you go on a job interview, take a test, or run a race. This kind of short-term stress is normal and even useful. It can help you if you need to work hard or react quickly. For example, stress can help you finish an important job on time.  Long-term stress is caused by ongoing stressful situations or events. Examples of long-term stress include long-term health problems, ongoing problems at work, or conflicts in your family. Long-term stress can harm your health.  How does stress affect your health?  When you are stressed, your body responds as though you are in danger. It makes hormones that speed up your heart, make you breathe faster, and give you a burst of energy. This is called the fight-or-flight stress response. If the stress is over quickly, your body goes back to normal and no harm is done.  But if stress happens too often or lasts too long, it can have bad effects. Long-term stress can make you more likely to get sick, and it can make symptoms of some diseases worse. If you tense up when you are stressed, you may develop neck, shoulder, or low back pain. Stress is linked to high blood pressure and heart disease.  Stress also harms your emotional health. It can make you lilly, tense, or depressed. Your  relationships may suffer, and you may not do well at work or school.  What can you do to manage stress?  You can try these things to help manage stress:   Do something active. Exercise or activity can help reduce stress. Walking is a great way to get started. Even everyday activities such as housecleaning or yard work can help.  Try yoga or flaca chi. These techniques combine exercise and meditation. You may need some training at first to learn them.  Do something you enjoy. For example, listen to music or go to a movie. Practice your hobby or do volunteer work.  Meditate. This can help you relax, because you are not worrying about what happened before or what may happen in the future.  Do guided imagery. Imagine yourself in any setting that helps you feel calm. You can use online videos, books, or a teacher to guide you.  Do breathing exercises. For example:  From a standing position, bend forward from the waist with your knees slightly bent. Let your arms dangle close to the floor.  Breathe in slowly and deeply as you return to a standing position. Roll up slowly and lift your head last.  Hold your breath for just a few seconds in the standing position.  Breathe out slowly and bend forward from the waist.  Let your feelings out. Talk, laugh, cry, and express anger when you need to. Talking with supportive friends or family, a counselor, or a ganesh leader about your feelings is a healthy way to relieve stress. Avoid discussing your feelings with people who make you feel worse.  Write. It may help to write about things that are bothering you. This helps you find out how much stress you feel and what is causing it. When you know this, you can find better ways to cope.  What can you do to prevent stress?  You might try some of these things to help prevent stress:  Manage your time. This helps you find time to do the things you want and need to do.  Get enough sleep. Your body recovers from the stresses of the day while  "you are sleeping.  Get support. Your family, friends, and community can make a difference in how you experience stress.  Limit your news feed. Avoid or limit time on social media or news that may make you feel stressed.  Do something active. Exercise or activity can help reduce stress. Walking is a great way to get started.  Where can you learn more?  Go to https://www.[employer].net/patiented  Enter N032 in the search box to learn more about \"Learning About Stress.\"  Current as of: October 24, 2023  Content Version: 14.2 2024 [employer].   Care instructions adapted under license by your healthcare professional. If you have questions about a medical condition or this instruction, always ask your healthcare professional. Health[employer], [employer] disclaims any warranty or liability for your use of this information.    Learning About Depression Screening  What is depression screening?  Depression screening is a way to see if you have depression symptoms. It may be done by a doctor or counselor. It's often part of a routine checkup. That's because your mental health is just as important as your physical health.  Depression is a mental health condition that affects how you feel, think, and act. You may:  Have less energy.  Lose interest in your daily activities.  Feel sad and grouchy for a long time.  Depression is very common. It affects people of all ages.  Many things can lead to depression. Some people become depressed after they have a stroke or find out they have a major illness like cancer or heart disease. The death of a loved one or a breakup may lead to depression. It can run in families. Most experts believe that a combination of inherited genes and stressful life events can cause it.  What happens during screening?  You may be asked to fill out a form about your depression symptoms. You and the doctor will discuss your answers. The doctor may ask you more questions to learn more about how you " "think, act, and feel.  What happens after screening?  If you have symptoms of depression, your doctor will talk to you about your options.  Doctors usually treat depression with medicines or counseling. Often, combining the two works best. Many people don't get help because they think that they'll get over the depression on their own. But people with depression may not get better unless they get treatment.  The cause of depression is not well understood. There may be many factors involved. But if you have depression, it's not your fault.  A serious symptom of depression is thinking about death or suicide. If you or someone you care about talks about this or about feeling hopeless, get help right away.  It's important to know that depression can be treated. Medicine, counseling, and self-care may help.  Where can you learn more?  Go to https://www.[employer].net/patiented  Enter T185 in the search box to learn more about \"Learning About Depression Screening.\"  Current as of: June 24, 2023  Content Version: 14.2 2024 [facility] [employer].   Care instructions adapted under license by your healthcare professional. If you have questions about a medical condition or this instruction, always ask your healthcare professional. Healthwise[employer] disclaims any warranty or liability for your use of this information.       "

## 2024-11-19 NOTE — PROGRESS NOTES
Preventive Care Visit  [facility] CLARE Ochoa PA-C, Family Medicine  Nov 19, 2024      Assessment & Plan     Routine general medical examination at a health care facility    Type 2 diabetes mellitus without complication, without long-term current use of insulin (H)  Chronic, stable, controlled with A1c 6.3 today. Continue present management. Follow-up in 6 months for recheck.  - HEMOGLOBIN A1C; Future  - FOOT EXAM  - HEMOGLOBIN A1C  - metFORMIN (GLUCOPHAGE XR) 500 MG 24 hr tablet; Take 2 tablets (1,000 mg) by mouth 2 times daily (with meals).  - atorvastatin (LIPITOR) 20 MG tablet; Take 1 tablet (20 mg) by mouth daily.    Benign essential hypertension  Chronic, controlled.  - losartan (COZAAR) 25 MG tablet; Take 1 tablet (25 mg) by mouth daily.    Morbid obesity (H)  Discussed healthy diet/exercise recommendations.     Mixed hyperlipidemia  Chronic, controlled  - Comprehensive metabolic panel (BMP + Alb, Alk Phos, ALT, AST, Total. Bili, TP); Future  - Comprehensive metabolic panel (BMP + Alb, Alk Phos, ALT, AST, Total. Bili, TP)  - atorvastatin (LIPITOR) 20 MG tablet; Take 1 tablet (20 mg) by mouth daily.    Hepatic steatosis  Monitoring. First noted on liver US in 2012. Low risk by FIB 4 score.  Noted on liver US in 2012  FIB-4 Calculation: 0.86 at 6/5/2024  9:15 AM  Calculated from:  SGOT/AST: 39 U/L at 6/5/2024  9:15 AM  SGPT/ALT: 55 U/L at 6/5/2024  9:15 AM  Platelets: 336 10e3/uL at 6/5/2024  9:15 AM  Age: 54 years  - Comprehensive metabolic panel (BMP + Alb, Alk Phos, ALT, AST, Total. Bili, TP); Future  - Comprehensive metabolic panel (BMP + Alb, Alk Phos, ALT, AST, Total. Bili, TP)    Moderate persistent asthma without complication  Chronic, controlled. Continue.  - montelukast (SINGULAIR) 10 MG tablet; TAKE ONE TABLET BY MOUTH EVERY NIGHT AT BEDTIME    Insomnia, unspecified type  Doing well, usually takes 100 mg nightly. No side effects or concerns. Continue to focus on sleep  "hygiene.  - traZODone (DESYREL) 50 MG tablet; Take 1-2 tablets ( mg) by mouth nightly as needed for sleep.    JOSE J (obstructive sleep apnea)  Started CPAP about 2 months ago and sleep has improved    Tingling sensation  \"Bubbling\" sensation left lateral lower leg intermittently over the past week or so. No other similar sensation in body. No leg swelling, pain, redness, warmth. No shortness of breath, chest pain. No h/o DVT. Sensation does seem more noticeable in certain positions. With chronic back pain, consider radiculopathy - lumbar spine MRI ordered as discussed below due to worsening chronic low back pain. Also consider neuropathy given h/o diabetes. Will also check B12 and thyroid. Monitor and follow-up if worsening.  - Vitamin B12; Future  - TSH with free T4 reflex; Future  - Vitamin B12  - TSH with free T4 reflex    Chronic bilateral low back pain, unspecified whether sciatica present  Chronic low back pain, gradually worsening. She did have lumbar spine MRI many years ago but no evaluation since. Worsening pain. No injury or trauma. No fevers, no loss of groin sensation, no weakness/numbness/tingling in legs (apart from possible tingling as above), no urinary/fecal incontinence or retention. Will update MRI due to worsening pain. Also recommend PT but she declines at this time.  - MR Lumbar Spine w/o Contrast; Future    Anxiety  Mild episode of recurrent major depressive disorder (H)  Chronic, stable. Would like to continue with present management.   - escitalopram (LEXAPRO) 20 MG tablet; TAKE 1 TABLET BY MOUTH ONE TIME DAILY    Lymphedema  Chronic, no edema today   - furosemide (LASIX) 20 MG tablet; Take 2 tablets (40 mg) by mouth daily.    Discussed plan of care and reasons to return to clinic or present to the ED (emergency department). Patient and/or guardian in agreement with plan, questions answered.     Counseling  Appropriate preventive services were discussed with this patient.  Checklist " reviewing preventive services available has been given to the patient.      Brad Young is a 54 year old, presenting for the following:  Physical        11/19/2024     7:32 AM   Additional Questions   Roomed by [facility]        HPI    Diabetes Follow-up     How often are you checking your blood sugar? Not at all  What concerns do you have today about your diabetes? None   Do you have any of these symptoms? (Select all that apply)  No numbness or tingling in feet.  No redness, sores or blisters on feet.  No complaints of excessive thirst.  No reports of blurry vision.  No significant changes to weight.  Have you had a diabetic eye exam in the last 12 months? No     Taking metformin 1000 mg BID. Doing well, no side effects or concerns.  Lab Results   Component Value Date    A1C 6.6 05/29/2024    A1C 7.3 11/17/2023    A1C 6.6 05/17/2023    A1C 7.9 12/08/2022    A1C 7.4 07/07/2022    A1C 6.6 07/08/2021    A1C 6.5 04/08/2021    A1C 6.4 01/04/2021    A1C 6.9 10/01/2020    A1C 5.6 09/17/2018     Not doing much exercise currently but has an exercise bike and plans to start  Has 1-2 cans of pepsi daily     HTN: Taking losartan 25 mg daily, furosemide 40 mg daily (for HTN and lymphedema). No chest pain, shortness of breath, swelling in the extremities, palpitations, dizziness, headaches, blurred vision.      Lipids: taking atorvastatin 20 mg daily. No medication side effects or concerns.     LDL Cholesterol Calculated   Date Value Ref Range Status   05/29/2024 57 <=100 mg/dL Final   10/01/2020 109 (H) <100 mg/dL Final     Comment:     Above desirable:  100-129 mg/dl  Borderline High:  130-159 mg/dL  High:             160-189 mg/dL  Very high:       >189 mg/dl       Has been stable with escitalopram 20 mg daily. She feels like the medication is helpful and denies side effects. She does not want to change or add medication. Not interested in therapy at this time.         5/16/2024     8:58 AM 6/3/2024     9:44 AM 11/19/2024      7:16 AM   PHQ   PHQ-9 Total Score 6 7 7    Q9: Thoughts of better off dead/self-harm past 2 weeks Not at all  Not at all  Not at all        Patient-reported         12/8/2022     8:04 AM 5/17/2023     8:41 AM 5/17/2024     4:30 PM   MAURISIO-7 SCORE   Total Score 9 (mild anxiety)     Total Score 9 7 6     Trazodone 100 mg PRN for sleep, takes most nights        11/16/2023    10:23 PM 5/16/2024     9:05 AM 11/19/2024     7:22 AM   ACT Total Scores   ACT TOTAL SCORE (Goal Greater than or Equal to 20) 21 18 22    In the past 12 months, how many times did you visit the emergency room for your asthma without being admitted to the hospital? 0  0  0    In the past 12 months, how many times were you hospitalized overnight because of your asthma? 0  0  0        Patient-reported       Health Care Directive  Patient does not have a Health Care Directive:       11/19/2024   General Health   How would you rate your overall physical health? (!) FAIR   Feel stress (tense, anxious, or unable to sleep) To some extent      (!) STRESS CONCERN      11/19/2024   Nutrition   Three or more servings of calcium each day? Yes   Diet: I don't know   How many servings of fruit and vegetables per day? (!) 2-3   How many sweetened beverages each day? 0-1            11/19/2024   Exercise   Days per week of moderate/strenous exercise 0 days   Average minutes spent exercising at this level Patient declined      (!) EXERCISE CONCERN      11/19/2024   Social Factors   Frequency of gathering with friends or relatives More than three times a week   Worry food won't last until get money to buy more No   Food not last or not have enough money for food? No   Do you have housing? (Housing is defined as stable permanent housing and does not include staying ouside in a car, in a tent, in an abandoned building, in an overnight shelter, or couch-surfing.) Yes   Are you worried about losing your housing? No   Lack of transportation? No   Unable to get utilities  (heat,electricity)? No            11/19/2024   Fall Risk   Fallen 2 or more times in the past year? Yes    Trouble with walking or balance? No    Reason Gait Speed Test Not Completed Patient declines       Patient-reported          11/19/2024   Dental   Dentist two times every year? Yes            11/19/2024   TB Screening   Were you born outside of the [place]? No          Today's PHQ-9 Score:       11/19/2024     7:16 AM   PHQ-9 SCORE   PHQ-9 Total Score MyChart 7 (Mild depression)   PHQ-9 Total Score 7        Patient-reported         11/19/2024   Substance Use   Alcohol more than 3/day or more than 7/wk Not Applicable   Do you use any other substances recreationally? No        Social History     Tobacco Use    Smoking status: Never     Passive exposure: Past    Smokeless tobacco: Never   Vaping Use    Vaping status: Never Used   Substance Use Topics    Alcohol use: No    Drug use: No           7/19/2024   LAST FHS-7 RESULTS   1st degree relative breast or ovarian cancer No   Any relative bilateral breast cancer No   Any male have breast cancer No   Any ONE woman have BOTH breast AND ovarian cancer No   Any woman with breast cancer before 50yrs No   2 or more relatives with breast AND/OR ovarian cancer No   2 or more relatives with breast AND/OR bowel cancer No           Mammogram Screening - Mammogram every 1-2 years updated in Health Maintenance based on mutual decision making        11/19/2024   STI Screening   New sexual partner(s) since last STI/HIV test? No        History of abnormal Pap smear: Status post hysterectomy with removal of cervix and no history of CIN2 or greater or cervical cancer. Health Maintenance and Surgical History updated.        1/9/2012     5:30 PM 5/19/2009    12:00 AM 8/25/2005    12:00 AM   PAP / HPV   PAP (Historical) NIL  NIL  NIL      ASCVD Risk   The 10-year ASCVD risk score (Federico ETIENNE, et al., 2019) is: 3.6%    Values used to calculate the score:      Age: 54 years      Sex:  Female      Is Non- : No      Diabetic: Yes      Tobacco smoker: No      Systolic Blood Pressure: 122 mmHg      Is BP treated: Yes      HDL Cholesterol: 56 mg/dL      Total Cholesterol: 160 mg/dL           Reviewed and updated as needed this visit by Provider   Tobacco  Allergies  Meds  Problems  Med Hx  Surg Hx  Fam Hx            Past Medical History:   Diagnosis Date    Anxiety     Carbuncle and furuncle of trunk 05/31/2005    Depressive disorder     Diabetes (H)     prediabetic    Elevated glucose 09/13/2018    Hypertension     Unsure on the date    Incisional hernia, without obstruction or gangrene 07/19/2021    Added automatically from request for surgery [id number]    Lymphedema 09/10/2018    Mild major depression (H) 09/10/2018    Obesity, unspecified     JOSE J (obstructive sleep apnea) 09/10/2018    cpap recalled    JOSE J (obstructive sleep apnea)     Other chronic pain     criss knees    Ovarian cyst 08/17/2015    WILL NEED US FOLLOW UP AFTER CHEST SX    Plantar fasciitis 02/23/2015    Sleep apnea, unspecified 12/20/2017    Sleep apnea, unspecified     Tuberculin test reaction     Uncomplicated asthma      Past Surgical History:   Procedure Laterality Date    ABDOMEN SURGERY      tubes removed    APPENDECTOMY      COLONOSCOPY  06/13/2019    Dr. Leon [facility]    COLONOSCOPY N/A 06/13/2019    Procedure: COLONOSCOPY;  Surgeon: Mauro Leon MD;  Location: [facility]    EXCISE MASS BACK Left 09/17/2014    Procedure: EXCISE MASS BACK;  Surgeon: Yaz Avilez MD;  Location: [facility] OR    EXCISE MASS UPPER EXTREMITY Left 09/17/2014    Procedure: EXCISE MASS UPPER EXTREMITY;  Surgeon: Yaz Avilez MD;  Location: [facility] OR    HERNIORRHAPHY INCISIONAL (LOCATION) N/A 08/19/2021    Procedure: HERNIORRHAPHY open incisional and umbilical repair with mesh;  Surgeon: hRys Lyons MD;  Location: [facility] OR    HYSTERECTOMY VAGINAL  1992    ovaries intact    other      lump on right arm removed      THORACIC SURGERY      lung surgery    THORACOTOMY Right 08/25/2015    Procedure: THORACOTOMY;  Surgeon: Andrew Gordon MD;  Location:  OR    [facility] NONSPECIFIC PROCEDURE      2 C- SECTIONS     Lab work is in process  Labs reviewed in EPIC  BP Readings from Last 3 Encounters:   11/19/24 122/78   11/17/23 119/80   05/17/23 122/80    Wt Readings from Last 3 Encounters:   11/19/24 108.9 kg (240 lb)   09/12/24 108 kg (238 lb)   11/17/23 120.7 kg (266 lb)                  Patient Active Problem List   Diagnosis    Insomnia    Anxiety    Bronchogenic cyst    Morbid obesity (H)    Asthma    Multiple lipomas    JOSE J (obstructive sleep apnea)    Lymphedema    Type 2 diabetes mellitus without complication, without long-term current use of insulin (H)    Mixed hyperlipidemia    Mild episode of recurrent major depressive disorder (H)    Benign essential hypertension    Hepatic steatosis     Past Surgical History:   Procedure Laterality Date    ABDOMEN SURGERY      tubes removed    APPENDECTOMY      COLONOSCOPY  06/13/2019    Dr. Leon [facility]    COLONOSCOPY N/A 06/13/2019    Procedure: COLONOSCOPY;  Surgeon: Mauro Leon MD;  Location: [facility]    EXCISE MASS BACK Left 09/17/2014    Procedure: EXCISE MASS BACK;  Surgeon: Yaz Avilez MD;  Location: [facility] OR    EXCISE MASS UPPER EXTREMITY Left 09/17/2014    Procedure: EXCISE MASS UPPER EXTREMITY;  Surgeon: Yaz Avilez MD;  Location: [facility]    HERNIORRHAPHY INCISIONAL (LOCATION) N/A 08/19/2021    Procedure: HERNIORRHAPHY open incisional and umbilical repair with mesh;  Surgeon: Rhys Lyons MD;  Location: [facility] OR    HYSTERECTOMY VAGINAL  1992    ovaries intact    other      lump on right arm removed     THORACIC SURGERY      lung surgery    THORACOTOMY Right 08/25/2015    Procedure: THORACOTOMY;  Surgeon: Andrew Gordon MD;  Location:  OR    [facility] NONSPECIFIC PROCEDURE      2 C- SECTIONS       Social History     Tobacco Use    Smoking  status: Never     Passive exposure: Past    Smokeless tobacco: Never   Substance Use Topics    Alcohol use: No     Family History   Problem Relation Age of Onset    Neurologic Disorder Mother         Parkinsons    Diabetes Mother     Osteoporosis Mother     Genetic Disorder Mother     Hypertension Father     Cardiovascular Father     Gallbladder Disease Father     Hyperlipidemia Father     Cerebrovascular Disease Father     Obesity Father     Diabetes Maternal Grandmother     Other Cancer Maternal Grandmother     Osteoporosis Maternal Grandmother     Obesity Maternal Grandmother     Diabetes Maternal Grandfather     Diabetes Paternal Grandmother     Osteoporosis Paternal Grandmother     C.A.D. Paternal Grandfather     Cardiovascular Paternal Grandfather     Coronary Artery Disease Paternal Grandfather     Cerebrovascular Disease Paternal Grandfather     Prostate Cancer Paternal Grandfather     Hyperlipidemia Brother     Hypertension Sister     Hypertension Sister     Hyperlipidemia Sister     Asthma Sister     Obesity Sister     Anxiety Disorder Son     Anxiety Disorder Daughter     Genetic Disorder Daughter         PKD    Hypertension Cousin     Other Cancer Cousin     Anxiety Disorder Cousin     Polycystic Kidney Diease Other     Diabetes Other     Hyperlipidemia Other     Anxiety Disorder Other     Obesity Other     Diabetes Other     Obesity Other     Other Cancer Other     Colon Cancer No family hx of     Breast Cancer No family hx of     Ovarian Cancer No family hx of          Current Outpatient Medications   Medication Sig Dispense Refill    albuterol (PROAIR HFA) 108 (90 Base) MCG/ACT inhaler INHALE 2 PUFFS INTO THE LUNGS EVERY 6 HOURS 18 g 1    atorvastatin (LIPITOR) 20 MG tablet Take 1 tablet (20 mg) by mouth daily. 90 tablet 1    blood glucose (NO BRAND SPECIFIED) test strip Use to test blood sugar 1 times daily or as directed. To accompany: Blood Glucose Monitor Brands: per insurance. 100 strip 6     "blood glucose monitoring (NO BRAND SPECIFIED) meter device kit Use to test blood sugar 1 times daily or as directed. Preferred blood glucose meter OR supplies to accompany: Blood Glucose Monitor Brands: per insurance. 1 kit 0    escitalopram (LEXAPRO) 20 MG tablet TAKE 1 TABLET BY MOUTH ONE TIME DAILY 90 tablet 1    fexofenadine (ALLEGRA) 180 MG tablet Take 180 mg by mouth daily      furosemide (LASIX) 20 MG tablet Take 2 tablets (40 mg) by mouth daily. 180 tablet 1    losartan (COZAAR) 25 MG tablet Take 1 tablet (25 mg) by mouth daily. 90 tablet 1    metFORMIN (GLUCOPHAGE XR) 500 MG 24 hr tablet Take 2 tablets (1,000 mg) by mouth 2 times daily (with meals). 360 tablet 1    montelukast (SINGULAIR) 10 MG tablet TAKE ONE TABLET BY MOUTH EVERY NIGHT AT BEDTIME 90 tablet 3    thin (NO BRAND SPECIFIED) lancets Use with lanceting device. To accompany: Blood Glucose Monitor Brands: per insurance. 100 each 6    traZODone (DESYREL) 50 MG tablet Take 1-2 tablets ( mg) by mouth nightly as needed for sleep. 60 tablet 5     Allergies   Allergen Reactions    Seasonal Allergies     Ampicillin Rash     \"sunburn\"    Decongestant [Oxymetazoline] Rash     \"sunburn\"    Morphine And Codeine Rash     \"sunburn\"    Sulfa Antibiotics Rash     \"sunburn\"     Recent Labs   Lab Test 11/19/24  0823 06/05/24  0915 05/29/24  1103 11/17/23  0816 05/17/23  0822 07/07/22  0721 03/14/22  1455 08/19/21  0643 07/08/21  0958 07/08/21  0905 04/08/21  1613 10/01/19  0852 09/17/18  0820 12/20/17  1715 08/16/17  0831   A1C 6.3*  --  6.6* 7.3* 6.6*   < > 7.2*  --   --    < > 6.5*   < > 5.6  --   --    LDL  --   --  57  --  51  --  125*  --   --   --   --    < >  --    < >  --    HDL  --   --  56  --  53  --  55  --   --   --   --    < >  --    < >  --    TRIG  --   --  233*  --  221*  --  321*  --   --   --   --    < >  --    < >  --    ALT  --  55* 63*  --   --   --   --   --  114*  --   --   --   --   --  49   CR  --   --  0.78 0.76 0.72   < > 0.78   " "< > 0.85  --  0.87   < >  --    < >  --    GFRESTIMATED  --   --  89 >90 >90   < > >90   < > 79  --  77   < >  --    < >  --    GFRESTBLACK  --   --   --   --   --   --   --   --  >90  --  89   < >  --    < >  --    POTASSIUM  --   --  3.9 4.3 4.0   < > 3.8   < > 4.5  --  4.2   < >  --    < >  --    TSH  --   --   --   --   --   --   --   --   --   --   --   --  1.40  --  1.84    < > = values in this interval not displayed.          Review of Systems  Constitutional, neuro, ENT, endocrine, pulmonary, cardiac, gastrointestinal, genitourinary, musculoskeletal, integument and psychiatric systems are negative, except as otherwise noted.     Objective    Exam  /78 (BP Location: Left arm, Patient Position: Sitting, Cuff Size: Adult Large)   Pulse 75   Temp 97.8  F (36.6  C) (Oral)   Resp 19   Ht 1.575 m (5' 2\")   Wt 108.9 kg (240 lb)   SpO2 96%   BMI 43.90 kg/m     Estimated body mass index is 43.9 kg/m  as calculated from the following:    Height as of this encounter: 1.575 m (5' 2\").    Weight as of this encounter: 108.9 kg (240 lb).    Physical Exam  GENERAL: alert and no distress  EYES: Eyes grossly normal to inspection, PERRL and conjunctivae and sclerae normal  HENT: ear canals and TM's normal, nose and mouth without ulcers or lesions  NECK: no adenopathy, no asymmetry, masses, or scars  RESP: lungs clear to auscultation - no rales, rhonchi or wheezes  CV: regular rate and rhythm, normal S1 S2, no S3 or S4, no murmur, click or rub, no peripheral edema  ABDOMEN: soft, nontender, obese, no masses and bowel sounds normal  MS: no gross musculoskeletal defects noted, no edema  NEURO: Normal strength and tone, mentation intact and speech normal  PSYCH: mentation appears normal, affect normal/bright  Diabetic foot exam: no trophic changes or ulcerative lesions, normal sensory exam, normal monofilament exam, DP reduced bilateral, and PT reduced bilateral        Signed Electronically by: Sandra Ochoa, " TR    Answers submitted by the patient for this visit:  Patient Health Questionnaire (Submitted on 11/19/2024)  If you checked off any problems, how difficult have these problems made it for you to do your work, take care of things at home, or get along with other people?: Somewhat difficult  PHQ9 TOTAL SCORE: 7

## 2024-12-13 ENCOUNTER — HOSPITAL ENCOUNTER (OUTPATIENT)
Dept: MRI IMAGING | Facility: CLINIC | Age: 54
Discharge: HOME | End: 2024-12-13
Attending: PHYSICIAN ASSISTANT | Admitting: PHYSICIAN ASSISTANT
Payer: COMMERCIAL

## 2024-12-13 DIAGNOSIS — M54.50 CHRONIC BILATERAL LOW BACK PAIN, UNSPECIFIED WHETHER SCIATICA PRESENT: ICD-10-CM

## 2024-12-13 DIAGNOSIS — G89.29 CHRONIC BILATERAL LOW BACK PAIN, UNSPECIFIED WHETHER SCIATICA PRESENT: ICD-10-CM

## 2024-12-13 PROCEDURE — 72148 MRI LUMBAR SPINE W/O DYE: CPT

## 2025-01-01 ASSESSMENT — SLEEP AND FATIGUE QUESTIONNAIRES
HOW LIKELY ARE YOU TO NOD OFF OR FALL ASLEEP WHILE SITTING INACTIVE IN A PUBLIC PLACE: MODERATE CHANCE OF DOZING
HOW LIKELY ARE YOU TO NOD OFF OR FALL ASLEEP WHILE SITTING AND READING: HIGH CHANCE OF DOZING
HOW LIKELY ARE YOU TO NOD OFF OR FALL ASLEEP WHILE WATCHING TV: MODERATE CHANCE OF DOZING
HOW LIKELY ARE YOU TO NOD OFF OR FALL ASLEEP WHEN YOU ARE A PASSENGER IN A CAR FOR AN HOUR WITHOUT A BREAK: HIGH CHANCE OF DOZING
HOW LIKELY ARE YOU TO NOD OFF OR FALL ASLEEP IN A CAR, WHILE STOPPED FOR A FEW MINUTES IN TRAFFIC: WOULD NEVER DOZE
HOW LIKELY ARE YOU TO NOD OFF OR FALL ASLEEP WHILE LYING DOWN TO REST IN THE AFTERNOON WHEN CIRCUMSTANCES PERMIT: HIGH CHANCE OF DOZING
HOW LIKELY ARE YOU TO NOD OFF OR FALL ASLEEP WHILE SITTING AND TALKING TO SOMEONE: SLIGHT CHANCE OF DOZING
HOW LIKELY ARE YOU TO NOD OFF OR FALL ASLEEP WHILE SITTING QUIETLY AFTER LUNCH WITHOUT ALCOHOL: MODERATE CHANCE OF DOZING

## 2025-01-02 ENCOUNTER — VIRTUAL VISIT (OUTPATIENT)
Dept: SLEEP MEDICINE | Facility: CLINIC | Age: 55
End: 2025-01-02
Payer: COMMERCIAL

## 2025-01-02 VITALS — WEIGHT: 246 LBS | HEIGHT: 62 IN | BODY MASS INDEX: 45.27 KG/M2

## 2025-01-02 DIAGNOSIS — G47.33 OSA (OBSTRUCTIVE SLEEP APNEA): Primary | ICD-10-CM

## 2025-01-02 ASSESSMENT — PAIN SCALES - GENERAL: PAINLEVEL_OUTOF10: NO PAIN (0)

## 2025-01-02 NOTE — PATIENT INSTRUCTIONS
[facility] LOCATIONS:     SHOWROOM LOCATIONS:    [facility]   [address], [address]  Phone:  [phone], option 2, option 1 customer service    [facility]   [address], [address]  [place] [address]  Phone: [phone]    [facility]  [address], [address]  [place] [address]  Phone: [phone]    [facility]  [address]  [place], [place]  Phone: [phone]    [facility]  [address], [address]  [place] [address]  Phone: [phone]    [facility]  [address], [address]  [place], [place]  Phone: [phone]    Business hours 8A-4:30P

## 2025-01-02 NOTE — NURSING NOTE
Current patient location: [address]  Count includes the [facility] [id number]    Is the patient currently in the state of [place]? YES    Visit mode:VIDEO    If the visit is dropped, the patient can be reconnected by:VIDEO VISIT: Text to cell phone:   Telephone Information:   Mobile [phone]       Will anyone else be joining the visit? NO  (If patient encounters technical issues they should call [phone]    Are changes needed to the allergy or medication list? No    Are refills needed on medications prescribed by this physician? NO    Rooming Documentation:  Questionnaire(s) completed    Reason for visit: RECHECK    Molly WESTBROOKF

## 2025-01-02 NOTE — PROGRESS NOTES
Virtual Visit Details  Type of service: Video Visit   Start time: 9:02 AM  End time: 9:09 AM  Originating Location (pt. Location): Home  Distant Location (provider location): Off-site  Platform used for Video Visit: [facility]    Sleep Apnea - Follow-up Visit:    Impression/Plan:  (G47.33) JOSE J (obstructive sleep apnea) (primary encounter diagnosis)  Comment: Hannah Meade presents for follow-up of her severe sleep apnea, managed with CPAP. Hannah was planning on restarting CPAP therapy after our September 2024 visit. She used her machine 18/30 days in the last month. She is tolerating the CPAP pressures, but she is struggling with the nasal pillows. She is getting sores on her nares. This is the only mask she has ever tried. She is also having bad allergies which can make it difficult to breathe. Her download shows her apnea is well controlled with a residual AHI of 0.8 events per hour. Her leak is slightly elevated.   Plan: Comprehensive DME  Continue auto-PAP 8-12 cmH2O. A prescription was written for new supplies. Recommended trying either a full face mask or a nasal mask. Also suggested fluticasone nasal spray or saline spray for management of her nasal allergies. We did discuss repeating a sleep study as she has lost weight since her 2021 study. We discussed an oral appliance as an alternative to CPAP depending on the severity of her apnea.    Hannah Meade will follow up in about 3 month(s).     Total time spent reviewing medical records, history and physical examination, review of previous testing and interpretation as well as documentation on this date: 14 minutes    CC: Sandra Ochoa PA-C     History of Present Illness:  Chief Complaint   Patient presents with    RECHECK     Hannah Meade presents for follow-up of her severe sleep apnea, managed with CPAP. Hannah was planning on restarting CPAP therapy after our September 2024 visit. She is struggling with the nasal pillows. She is getting sores on  her nares. This is the only mask she has ever tried. She is also having bad allergies which can make it difficult to breathe.     PSG at [facility] on 12/15/2017 at 240 lbs. AHI was 14/hr (restricted AHI 11/hr), RDI 17/hr, REM AHI 59/hr. Her O2 jerman was 72%. 12 min below 88%.     Split PSG on 12/03/2021 at 274 lbs.- AHI 85.6 events per hour, RDI 91.9 events per hour. Snoring was reported as loud. Sleep associated hypoxemia was present with 9.2 minutes spent less than or equal to 88%. Final pressure was CPAP 8.0 cmH2O.    Do you use a CPAP Machine at home: (Patient-Rptd) Yes  Overall, on a scale of 0-10 how would you rate your CPAP (0 poor, 10 great): (Patient-Rptd) 5    What type of mask do you use: (Patient-Rptd) Nasal Pillow  Is your mask comfortable: (Patient-Rptd) No  If not, why: (Patient-Rptd) Hurts my nose    Is your mask leaking:    If yes, where do you feel it:    How many night per week does the mask leak (0-7):      Do you notice snoring with mask on: (Patient-Rptd) No  Do you notice gasping arousals with mask on: (Patient-Rptd) No  Are you having significant oral or nasal dryness: (Patient-Rptd) Yes  Is the pressure setting comfortable: (Patient-Rptd) Yes  If not, why:      What is your typical bedtime: (Patient-Rptd) Between 10:00 and 12:00  How long does it take you to go to sleep on PAP therapy: (Patient-Rptd) 1/2 hour  What time do you typically get out of bed for the day: (Patient-Rptd) 7:00  How many hours on average per night are you using PAP therapy: (Patient-Rptd) Varies  How many hours are you sleeping per night: (Patient-Rptd) Varies night by night  Do you feel well rested in the morning: (Patient-Rptd) Yes    ResMed AirSense 11  Auto-PAP 8.0 - 12.0 cmH2O 30 day usage data: 11/25/2024-12/24/2024    53% of days with > 4 hours of use. 12/30 days with no use.   Average use 6 hours 20 minutes per day.   95%ile Leak 26.3 L/min.   CPAP 95% pressure 11.3 cm.   AHI 0.81 events per hour.      EPWORTH  SLEEPINESS SCALE         1/1/2025     3:54 PM    [place] Sleepiness Scale ( ANNETTE Giraldo  [id number]<br>ESS - [place]/English - Final version - 21 Nov 07 - [facility] [place].)   Sitting and reading High chance of dozing   Watching TV Moderate chance of dozing   Sitting, inactive in a public place (e.g. a theatre or a meeting) Moderate chance of dozing   As a passenger in a car for an hour without a break High chance of dozing   Lying down to rest in the afternoon when circumstances permit High chance of dozing   Sitting and talking to someone Slight chance of dozing   Sitting quietly after a lunch without alcohol Moderate chance of dozing   In a car, while stopped for a few minutes in traffic Would never doze   [place] Score (MC) 16   [place] Score (Sleep) 16        Patient-reported       INSOMNIA SEVERITY INDEX (ANGELA)          1/1/2025     3:49 PM   Insomnia Severity Index (ANGELA)   Difficulty falling asleep 2   Difficulty staying asleep 1   Problems waking up too early 1   How SATISFIED/DISSATISFIED are you with your CURRENT sleep pattern? 1   How NOTICEABLE to others do you think your sleep problem is in terms of impairing the quality of your life? 1   How WORRIED/DISTRESSED are you about your current sleep problem? 1   To what extent do you consider your sleep problem to INTERFERE with your daily functioning (e.g. daytime fatigue, mood, ability to function at work/daily chores, concentration, memory, mood, etc.) CURRENTLY? 2   ANGELA Total Score 9        Patient-reported       Guidelines for Scoring/Interpretation:  Total score categories:  0-7 = No clinically significant insomnia   8-14 = Subthreshold insomnia   15-21 = Clinical insomnia (moderate severity)  22-28 = Clinical insomnia (severe)  Used via courtesy of www.[place].gov with permission from Marques Nava PhD., [facility]      Past medical/surgical history, family history, social history, medications and allergies were reviewed.        Problem  "List:  Patient Active Problem List    Diagnosis Date Noted    Hepatic steatosis 05/30/2024     Priority: Medium     Noted on liver US in 2012  FIB-4 Calculation: 0.86 at 6/5/2024  9:15 AM  Calculated from:  SGOT/AST: 39 U/L at 6/5/2024  9:15 AM  SGPT/ALT: 55 U/L at 6/5/2024  9:15 AM  Platelets: 336 10e3/uL at 6/5/2024  9:15 AM  Age: 54 years      Mixed hyperlipidemia 05/17/2023     Priority: Medium    Mild episode of recurrent major depressive disorder (H) 05/17/2023     Priority: Medium    Benign essential hypertension 05/17/2023     Priority: Medium    Type 2 diabetes mellitus without complication, without long-term current use of insulin (H) 10/13/2020     Priority: Medium    JOSE J (obstructive sleep apnea) 09/10/2018     Priority: Medium    Lymphedema 09/10/2018     Priority: Medium    Asthma 12/20/2017     Priority: Medium    Multiple lipomas 12/20/2017     Priority: Medium    Morbid obesity (H) 11/26/2015     Priority: Medium     High triglycerides, recurrent severe low back pain due to overload      Bronchogenic cyst 08/25/2015     Priority: Medium    Anxiety 12/31/2014     Priority: Medium    Insomnia 08/08/2011     Priority: Medium        Ht 1.575 m (5' 2\")   Wt 111.6 kg (246 lb)   BMI 44.99 kg/m      SUSIE Garcia CNP  "

## 2025-03-22 ENCOUNTER — HEALTH MAINTENANCE LETTER (OUTPATIENT)
Age: 55
End: 2025-03-22

## 2025-04-21 ENCOUNTER — PATIENT OUTREACH (OUTPATIENT)
Dept: CARE COORDINATION | Facility: CLINIC | Age: 55
End: 2025-04-21
Payer: COMMERCIAL

## 2025-05-29 DIAGNOSIS — G47.00 INSOMNIA, UNSPECIFIED TYPE: ICD-10-CM

## 2025-05-29 RX ORDER — TRAZODONE HYDROCHLORIDE 50 MG/1
50-100 TABLET ORAL
Qty: 60 TABLET | Refills: 0 | Status: SHIPPED | OUTPATIENT
Start: 2025-05-29

## 2025-06-03 ENCOUNTER — OFFICE VISIT (OUTPATIENT)
Dept: FAMILY MEDICINE | Facility: CLINIC | Age: 55
End: 2025-06-03
Attending: PHYSICIAN ASSISTANT

## 2025-06-03 ENCOUNTER — RESULTS FOLLOW-UP (OUTPATIENT)
Dept: FAMILY MEDICINE | Facility: CLINIC | Age: 55
End: 2025-06-03

## 2025-06-03 VITALS
OXYGEN SATURATION: 96 % | TEMPERATURE: 97.4 F | BODY MASS INDEX: 45.08 KG/M2 | DIASTOLIC BLOOD PRESSURE: 82 MMHG | WEIGHT: 245 LBS | RESPIRATION RATE: 16 BRPM | SYSTOLIC BLOOD PRESSURE: 124 MMHG | HEART RATE: 86 BPM | HEIGHT: 62 IN

## 2025-06-03 DIAGNOSIS — F33.0 MILD EPISODE OF RECURRENT MAJOR DEPRESSIVE DISORDER: ICD-10-CM

## 2025-06-03 DIAGNOSIS — E66.01 MORBID OBESITY (H): ICD-10-CM

## 2025-06-03 DIAGNOSIS — E78.2 MIXED HYPERLIPIDEMIA: ICD-10-CM

## 2025-06-03 DIAGNOSIS — F41.9 ANXIETY: ICD-10-CM

## 2025-06-03 DIAGNOSIS — I10 BENIGN ESSENTIAL HYPERTENSION: ICD-10-CM

## 2025-06-03 DIAGNOSIS — I89.0 LYMPHEDEMA: ICD-10-CM

## 2025-06-03 DIAGNOSIS — E11.9 TYPE 2 DIABETES MELLITUS WITHOUT COMPLICATION, WITHOUT LONG-TERM CURRENT USE OF INSULIN (H): Primary | ICD-10-CM

## 2025-06-03 DIAGNOSIS — J45.40 MODERATE PERSISTENT ASTHMA WITHOUT COMPLICATION: ICD-10-CM

## 2025-06-03 DIAGNOSIS — G47.00 INSOMNIA, UNSPECIFIED TYPE: ICD-10-CM

## 2025-06-03 LAB
ALBUMIN SERPL BCG-MCNC: 4.5 G/DL (ref 3.5–5.2)
ALP SERPL-CCNC: 138 U/L (ref 40–150)
ALT SERPL W P-5'-P-CCNC: 50 U/L (ref 0–50)
ANION GAP SERPL CALCULATED.3IONS-SCNC: 15 MMOL/L (ref 7–15)
AST SERPL W P-5'-P-CCNC: 40 U/L (ref 0–45)
BILIRUB SERPL-MCNC: 1.3 MG/DL
BUN SERPL-MCNC: 15.9 MG/DL (ref 6–20)
CALCIUM SERPL-MCNC: 9.7 MG/DL (ref 8.8–10.4)
CHLORIDE SERPL-SCNC: 99 MMOL/L (ref 98–107)
CHOLEST SERPL-MCNC: 175 MG/DL
CREAT SERPL-MCNC: 0.9 MG/DL (ref 0.51–0.95)
CREAT UR-MCNC: 184 MG/DL
EGFRCR SERPLBLD CKD-EPI 2021: 75 ML/MIN/1.73M2
EST. AVERAGE GLUCOSE BLD GHB EST-MCNC: 134 MG/DL
FASTING STATUS PATIENT QL REPORTED: NO
FASTING STATUS PATIENT QL REPORTED: NO
GLUCOSE SERPL-MCNC: 147 MG/DL (ref 70–99)
HBA1C MFR BLD: 6.3 % (ref 0–5.6)
HCO3 SERPL-SCNC: 26 MMOL/L (ref 22–29)
HDLC SERPL-MCNC: 55 MG/DL
LDLC SERPL CALC-MCNC: 49 MG/DL
MICROALBUMIN UR-MCNC: <12 MG/L
MICROALBUMIN/CREAT UR: NORMAL MG/G{CREAT}
NONHDLC SERPL-MCNC: 120 MG/DL
POTASSIUM SERPL-SCNC: 3.9 MMOL/L (ref 3.4–5.3)
PROT SERPL-MCNC: 7.5 G/DL (ref 6.4–8.3)
SODIUM SERPL-SCNC: 140 MMOL/L (ref 135–145)
TRIGL SERPL-MCNC: 356 MG/DL

## 2025-06-03 PROCEDURE — 3079F DIAST BP 80-89 MM HG: CPT | Performed by: PHYSICIAN ASSISTANT

## 2025-06-03 PROCEDURE — G2211 COMPLEX E/M VISIT ADD ON: HCPCS | Performed by: PHYSICIAN ASSISTANT

## 2025-06-03 PROCEDURE — 80053 COMPREHEN METABOLIC PANEL: CPT | Performed by: PHYSICIAN ASSISTANT

## 2025-06-03 PROCEDURE — 83036 HEMOGLOBIN GLYCOSYLATED A1C: CPT | Performed by: PHYSICIAN ASSISTANT

## 2025-06-03 PROCEDURE — 3074F SYST BP LT 130 MM HG: CPT | Performed by: PHYSICIAN ASSISTANT

## 2025-06-03 PROCEDURE — 99214 OFFICE O/P EST MOD 30 MIN: CPT | Performed by: PHYSICIAN ASSISTANT

## 2025-06-03 PROCEDURE — 82043 UR ALBUMIN QUANTITATIVE: CPT | Performed by: PHYSICIAN ASSISTANT

## 2025-06-03 PROCEDURE — 3044F HG A1C LEVEL LT 7.0%: CPT | Performed by: PHYSICIAN ASSISTANT

## 2025-06-03 PROCEDURE — 82570 ASSAY OF URINE CREATININE: CPT | Performed by: PHYSICIAN ASSISTANT

## 2025-06-03 PROCEDURE — 1126F AMNT PAIN NOTED NONE PRSNT: CPT | Performed by: PHYSICIAN ASSISTANT

## 2025-06-03 PROCEDURE — 36415 COLL VENOUS BLD VENIPUNCTURE: CPT | Performed by: PHYSICIAN ASSISTANT

## 2025-06-03 PROCEDURE — 80061 LIPID PANEL: CPT | Performed by: PHYSICIAN ASSISTANT

## 2025-06-03 RX ORDER — FUROSEMIDE 20 MG/1
40 TABLET ORAL DAILY
Qty: 180 TABLET | Refills: 1 | Status: SHIPPED | OUTPATIENT
Start: 2025-06-03

## 2025-06-03 RX ORDER — ESCITALOPRAM OXALATE 20 MG/1
TABLET ORAL
Qty: 90 TABLET | Refills: 1 | Status: SHIPPED | OUTPATIENT
Start: 2025-06-03

## 2025-06-03 RX ORDER — LOSARTAN POTASSIUM 25 MG/1
25 TABLET ORAL DAILY
Qty: 90 TABLET | Refills: 1 | Status: SHIPPED | OUTPATIENT
Start: 2025-06-03

## 2025-06-03 RX ORDER — METFORMIN HYDROCHLORIDE 500 MG/1
1000 TABLET, EXTENDED RELEASE ORAL 2 TIMES DAILY WITH MEALS
Qty: 360 TABLET | Refills: 1 | Status: SHIPPED | OUTPATIENT
Start: 2025-06-03

## 2025-06-03 RX ORDER — ATORVASTATIN CALCIUM 20 MG/1
20 TABLET, FILM COATED ORAL DAILY
Qty: 90 TABLET | Refills: 1 | Status: SHIPPED | OUTPATIENT
Start: 2025-06-03

## 2025-06-03 RX ORDER — TRAZODONE HYDROCHLORIDE 100 MG/1
100 TABLET ORAL
Qty: 90 TABLET | Refills: 1 | Status: SHIPPED | OUTPATIENT
Start: 2025-06-03

## 2025-06-03 RX ORDER — ALBUTEROL SULFATE 90 UG/1
INHALANT RESPIRATORY (INHALATION)
Qty: 18 G | Refills: 1 | Status: SHIPPED | OUTPATIENT
Start: 2025-06-03

## 2025-06-03 ASSESSMENT — ASTHMA QUESTIONNAIRES
QUESTION_5 LAST FOUR WEEKS HOW WOULD YOU RATE YOUR ASTHMA CONTROL: WELL CONTROLLED
QUESTION_2 LAST FOUR WEEKS HOW OFTEN HAVE YOU HAD SHORTNESS OF BREATH: ONCE OR TWICE A WEEK
ACT_TOTALSCORE: 19
QUESTION_1 LAST FOUR WEEKS HOW MUCH OF THE TIME DID YOUR ASTHMA KEEP YOU FROM GETTING AS MUCH DONE AT WORK, SCHOOL OR AT HOME: A LITTLE OF THE TIME
QUESTION_4 LAST FOUR WEEKS HOW OFTEN HAVE YOU USED YOUR RESCUE INHALER OR NEBULIZER MEDICATION (SUCH AS ALBUTEROL): TWO OR THREE TIMES PER WEEK
QUESTION_3 LAST FOUR WEEKS HOW OFTEN DID YOUR ASTHMA SYMPTOMS (WHEEZING, COUGHING, SHORTNESS OF BREATH, CHEST TIGHTNESS OR PAIN) WAKE YOU UP AT NIGHT OR EARLIER THAN USUAL IN THE MORNING: ONCE OR TWICE

## 2025-06-03 ASSESSMENT — PATIENT HEALTH QUESTIONNAIRE - PHQ9
10. IF YOU CHECKED OFF ANY PROBLEMS, HOW DIFFICULT HAVE THESE PROBLEMS MADE IT FOR YOU TO DO YOUR WORK, TAKE CARE OF THINGS AT HOME, OR GET ALONG WITH OTHER PEOPLE: SOMEWHAT DIFFICULT
SUM OF ALL RESPONSES TO PHQ QUESTIONS 1-9: 7
SUM OF ALL RESPONSES TO PHQ QUESTIONS 1-9: 7

## 2025-06-03 ASSESSMENT — PAIN SCALES - GENERAL: PAINLEVEL_OUTOF10: NO PAIN (0)

## 2025-06-03 NOTE — PROGRESS NOTES
Assessment & Plan     Type 2 diabetes mellitus without complication, without long-term current use of insulin (H)  Chronic, stable, controlled with A1c 6.3 today. Continue present management. Follow-up in 6 months for recheck.   - Hemoglobin A1c; Future  - Albumin Random Urine Quantitative with Creat Ratio; Future  - Comprehensive metabolic panel (BMP + Alb, Alk Phos, ALT, AST, Total. Bili, TP); Future  - Hemoglobin A1c  - Albumin Random Urine Quantitative with Creat Ratio  - Comprehensive metabolic panel (BMP + Alb, Alk Phos, ALT, AST, Total. Bili, TP)  - Adult Eye  Referral; Future  - metFORMIN (GLUCOPHAGE XR) 500 MG 24 hr tablet; Take 2 tablets (1,000 mg) by mouth 2 times daily (with meals).  - atorvastatin (LIPITOR) 20 MG tablet; Take 1 tablet (20 mg) by mouth daily.    Mixed hyperlipidemia  Chronic, tolerating medication well. Due for monitoring labs.  - Lipid panel reflex to direct LDL Fasting; Future  - Comprehensive metabolic panel (BMP + Alb, Alk Phos, ALT, AST, Total. Bili, TP); Future  - Lipid panel reflex to direct LDL Fasting  - Comprehensive metabolic panel (BMP + Alb, Alk Phos, ALT, AST, Total. Bili, TP)  - atorvastatin (LIPITOR) 20 MG tablet; Take 1 tablet (20 mg) by mouth daily.    Benign essential hypertension  Chronic, controlled.  - Comprehensive metabolic panel (BMP + Alb, Alk Phos, ALT, AST, Total. Bili, TP); Future  - Comprehensive metabolic panel (BMP + Alb, Alk Phos, ALT, AST, Total. Bili, TP)  - losartan (COZAAR) 25 MG tablet; Take 1 tablet (25 mg) by mouth daily.    Anxiety  Mild episode of recurrent major depressive disorder  Chronic, stable. Would like to continue with present management.   - escitalopram (LEXAPRO) 20 MG tablet; TAKE 1 TABLET BY MOUTH ONE TIME DAILY    Lymphedema  Chronic, no edema today   - furosemide (LASIX) 20 MG tablet; Take 2 tablets (40 mg) by mouth daily.    Moderate persistent asthma without complication  Chronic, overall feels controlled. Asthma a  little worse recently with smoke/air quality, using albuterol 2-3 times a day over last few days. Otherwise rarely uses albuterol. She says breathing is feeling better today and she has not needed albuterol today.   - albuterol (PROAIR HFA) 108 (90 Base) MCG/ACT inhaler; INHALE 2 PUFFS INTO THE LUNGS EVERY 6 HOURS    Insomnia, unspecified type  Doing well, usually takes 100 mg nightly. No side effects or concerns. Continue to focus on sleep hygiene.   - traZODone (DESYREL) 100 MG tablet; Take 1 tablet (100 mg) by mouth nightly as needed for sleep.    Morbid obesity (H)  BMI 44. Encourage healthy diet/exercise.    Follow-up    Follow-up Visit   Expected date:  Dec 03, 2025 (Approximate)      Follow Up Appointment Details:     Follow-up with whom?: Me    Follow-Up for what?: Adult Preventive    Any Additional Chronic Condition Management?:  Anxiety  Depression  Diabetes  Hyperlipidemia  Hypertension       How?: In Person    Is this an as-needed follow-up?: No               The longitudinal plan of care for the diagnosis(es)/condition(s) as documented were addressed during this visit. Due to the added complexity in care, I will continue to support Hannah in the subsequent management and with ongoing continuity of care.    Subjective   Hannah is a 55 year old, presenting for the following health issues:  Diabetes        6/3/2025     1:24 PM   Additional Questions   Roomed by syed arroyo   Accompanied by self         6/3/2025     1:24 PM   Patient Reported Additional Medications   Patient reports taking the following new medications na     History of Present Illness       Reason for visit:  Unsure She is missing 2 dose(s) of medications per week.  She is not taking prescribed medications regularly due to remembering to take.        Diabetes Follow-up    How often are you checking your blood sugar? Not at all  What concerns do you have today about your diabetes? None   Do you have any of these symptoms? (Select all that apply)   Numbness in feet, Burning in feet, Redness, sores, or blisters on feet, and Excessive thirst  Have you had a diabetic eye exam in the last 12 months? No    Taking metformin 1000 mg BID. Doing well, no side effects or concerns.     BP Readings from Last 2 Encounters:   06/03/25 124/82   11/19/24 122/78     Hemoglobin A1C (%)   Date Value   06/03/2025 6.3 (H)   11/19/2024 6.3 (H)   07/08/2021 6.6 (H)   04/08/2021 6.5 (H)     LDL Cholesterol Calculated (mg/dL)   Date Value   05/29/2024 57   05/17/2023 51   10/01/2020 109 (H)   10/01/2019 92     HTN: Taking losartan 25 mg daily, furosemide 40 mg daily (for HTN and lymphedema). No chest pain, shortness of breath, swelling in the extremities, palpitations, dizziness, headaches, blurred vision.      Lipids: taking atorvastatin 20 mg daily. No medication side effects or concerns.    Has been stable with escitalopram 20 mg daily. She feels like the medication is helpful and denies side effects. She does not want to change or add medication. Not interested in therapy at this time.     Trazodone 100 mg PRN for sleep, takes most nights       6/3/2024     9:44 AM 11/19/2024     7:16 AM 6/3/2025    12:25 PM   PHQ   PHQ-9 Total Score 7 7  7    Q9: Thoughts of better off dead/self-harm past 2 weeks Not at all Not at all Not at all       Patient-reported         12/8/2022     8:04 AM 5/17/2023     8:41 AM 5/17/2024     4:30 PM   MAURISIO-7 SCORE   Total Score 9 (mild anxiety)     Total Score 9 7 6       Asthma a little worse recently with smoke/air quality, using albuterol 2-3 times a day over last few days. Otherwise rarely uses albuterol. She says breathing is feeling better today and she has not needed albuterol today.       5/16/2024     9:05 AM 11/19/2024     7:22 AM 6/3/2025    12:28 PM   ACT Total Scores   ACT TOTAL SCORE (Goal Greater than or Equal to 20) 18 22  19    In the past 12 months, how many times did you visit the emergency room for your asthma without being admitted to  "the hospital? 0 0 0   In the past 12 months, how many times were you hospitalized overnight because of your asthma? 0 0 0       Patient-reported         Objective    /82   Pulse 86   Temp 97.4  F (36.3  C) (Oral)   Resp 16   Ht 1.575 m (5' 2\")   Wt 111.1 kg (245 lb)   SpO2 96%   BMI 44.81 kg/m    Body mass index is 44.81 kg/m .  Physical Exam   GENERAL: alert and no distress  RESP: lungs clear to auscultation - no rales, rhonchi or wheezes  CV: regular rate and rhythm, normal S1 S2, no S3 or S4, no murmur, click or rub, no peripheral edema  NEURO: Normal strength and tone, mentation intact and speech normal  PSYCH: mentation appears normal, affect normal/bright    Results for orders placed or performed in visit on 06/03/25 (from the past 24 hours)   Hemoglobin A1c   Result Value Ref Range    Estimated Average Glucose 134 (H) <117 mg/dL    Hemoglobin A1C 6.3 (H) 0.0 - 5.6 %           Signed Electronically by: Sandra Ochoa PA-C    "

## 2025-06-04 ENCOUNTER — PATIENT OUTREACH (OUTPATIENT)
Dept: CARE COORDINATION | Facility: CLINIC | Age: 55
End: 2025-06-04

## (undated) DEVICE — DRSG GAUZE 4X4" 8044

## (undated) DEVICE — LINEN HALF SHEET 5512

## (undated) DEVICE — ESU GROUND PAD ADULT W/CORD E7507

## (undated) DEVICE — SOL NACL 0.9% IRRIG 1000ML BOTTLE 2F7124

## (undated) DEVICE — LINEN FULL SHEET 5511

## (undated) DEVICE — SUCTION CANISTER MEDIVAC LINER 3000ML W/LID 65651-530

## (undated) DEVICE — PREP SCRUB SOL EXIDINE 4% CHG 4OZ 29002-404

## (undated) DEVICE — BLADE CLIPPER 3M 9670

## (undated) DEVICE — BNDG ABDOMINAL BINDER 9X45-62" 79-89071

## (undated) DEVICE — SU VICRYL 2-0 TIE 12X18" J905T

## (undated) DEVICE — CLEANSER JET LAVAGE IRRISEPT 0.05% CHG IRRISEPT45USA

## (undated) DEVICE — DRSG STERI STRIP 1/2X4" R1547

## (undated) DEVICE — SYR BULB IRRIG 50ML LATEX FREE 0035280

## (undated) DEVICE — SUCTION TIP YANKAUER W/O VENT K86

## (undated) DEVICE — SOL ADH LIQUID BENZOIN SWAB 0.6ML C1544

## (undated) DEVICE — DRAPE LAP W/ARMBOARD 29410

## (undated) DEVICE — BAG CLEAR TRASH 1.3M 39X33" P4040C

## (undated) DEVICE — SU VICRYL 4-0 PS-2 18" UND J496H

## (undated) DEVICE — LINEN TOWEL PACK X10 5473

## (undated) DEVICE — TUBING SUCTION 12"X1/4" N612

## (undated) DEVICE — PREP SKIN SCRUB TRAY 4461A

## (undated) DEVICE — PACK MINOR CUSTOM RIDGES SBA32RMRMA

## (undated) DEVICE — GLOVE PROTEXIS W/NEU-THERA 7.5  2D73TE75

## (undated) DEVICE — KIT ENDO TURNOVER/PROCEDURE W/CLEAN A SCOPE LINERS 103888

## (undated) DEVICE — SU VICRYL 3-0 SH 27" UND J416H

## (undated) DEVICE — GLOVE PROTEXIS BLUE W/NEU-THERA 8.0  2D73EB80

## (undated) RX ORDER — OXYCODONE HYDROCHLORIDE 5 MG/1
TABLET ORAL
Status: DISPENSED
Start: 2021-08-19

## (undated) RX ORDER — ACETAMINOPHEN 325 MG/1
TABLET ORAL
Status: DISPENSED
Start: 2021-08-19

## (undated) RX ORDER — DEXAMETHASONE SODIUM PHOSPHATE 4 MG/ML
INJECTION, SOLUTION INTRA-ARTICULAR; INTRALESIONAL; INTRAMUSCULAR; INTRAVENOUS; SOFT TISSUE
Status: DISPENSED
Start: 2021-08-19

## (undated) RX ORDER — PROPOFOL 10 MG/ML
INJECTION, EMULSION INTRAVENOUS
Status: DISPENSED
Start: 2021-08-19

## (undated) RX ORDER — LIDOCAINE HYDROCHLORIDE 10 MG/ML
INJECTION, SOLUTION EPIDURAL; INFILTRATION; INTRACAUDAL; PERINEURAL
Status: DISPENSED
Start: 2021-08-19

## (undated) RX ORDER — FENTANYL CITRATE 50 UG/ML
INJECTION, SOLUTION INTRAMUSCULAR; INTRAVENOUS
Status: DISPENSED
Start: 2021-08-19

## (undated) RX ORDER — OXYCODONE HCL 10 MG/1
TABLET, FILM COATED, EXTENDED RELEASE ORAL
Status: DISPENSED
Start: 2021-08-19

## (undated) RX ORDER — ONDANSETRON 2 MG/ML
INJECTION INTRAMUSCULAR; INTRAVENOUS
Status: DISPENSED
Start: 2021-08-19

## (undated) RX ORDER — GLYCOPYRROLATE 0.2 MG/ML
INJECTION INTRAMUSCULAR; INTRAVENOUS
Status: DISPENSED
Start: 2021-08-19

## (undated) RX ORDER — BUPIVACAINE HYDROCHLORIDE AND EPINEPHRINE 2.5; 5 MG/ML; UG/ML
INJECTION, SOLUTION EPIDURAL; INFILTRATION; INTRACAUDAL; PERINEURAL
Status: DISPENSED
Start: 2021-08-19

## (undated) RX ORDER — CLINDAMYCIN PHOSPHATE 900 MG/50ML
INJECTION, SOLUTION INTRAVENOUS
Status: DISPENSED
Start: 2021-08-19

## (undated) RX ORDER — FENTANYL CITRATE 50 UG/ML
INJECTION, SOLUTION INTRAMUSCULAR; INTRAVENOUS
Status: DISPENSED
Start: 2019-06-13

## (undated) RX ORDER — NEOSTIGMINE METHYLSULFATE 1 MG/ML
VIAL (ML) INJECTION
Status: DISPENSED
Start: 2021-08-19